# Patient Record
Sex: FEMALE | Race: WHITE | NOT HISPANIC OR LATINO | Employment: OTHER | URBAN - METROPOLITAN AREA
[De-identification: names, ages, dates, MRNs, and addresses within clinical notes are randomized per-mention and may not be internally consistent; named-entity substitution may affect disease eponyms.]

---

## 2017-01-06 NOTE — TELEPHONE ENCOUNTER
----- Message from Savanna Hamm sent at 1/5/2017  1:11 PM CST -----  Contact: Self- An- [phone]  Patient is calling because she is out of her atenolol (TENORMIN) 25 MG tablet and has been for 3 days. [facility] sent her a email stating the we need to send a new prescription to them for home delivery and to make it a 90 day supply. She needs some sent to a local pharmacy till she gets the prescription from them since she is out.    [facility] [place] [facility] [address] [facility]

## 2017-01-09 RX ORDER — ATENOLOL 25 MG/1
25 TABLET ORAL 2 TIMES DAILY
Qty: 180 TABLET | Refills: 3 | Status: SHIPPED | OUTPATIENT
Start: 2017-01-09 | End: 2017-04-24 | Stop reason: SDUPTHER

## 2017-04-24 ENCOUNTER — OFFICE VISIT (OUTPATIENT)
Dept: OPHTHALMOLOGY | Facility: CLINIC | Age: 82
End: 2017-04-24
Payer: MEDICARE

## 2017-04-24 ENCOUNTER — CLINICAL SUPPORT (OUTPATIENT)
Dept: OPHTHALMOLOGY | Facility: CLINIC | Age: 82
End: 2017-04-24
Payer: MEDICARE

## 2017-04-24 DIAGNOSIS — H40.043 STEROID RESPONDER, BILATERAL: ICD-10-CM

## 2017-04-24 DIAGNOSIS — Z96.1 PSEUDOPHAKIA OF BOTH EYES: ICD-10-CM

## 2017-04-24 DIAGNOSIS — H01.119 DERMATITIS OF EYELID, CONTACT OR ALLERGIC, UNSPECIFIED LATERALITY: ICD-10-CM

## 2017-04-24 DIAGNOSIS — H43.811 PVD (POSTERIOR VITREOUS DETACHMENT), RIGHT: ICD-10-CM

## 2017-04-24 DIAGNOSIS — H35.362 FAMILIAL DRUSEN, BILATERAL: ICD-10-CM

## 2017-04-24 DIAGNOSIS — H35.361 FAMILIAL DRUSEN, BILATERAL: ICD-10-CM

## 2017-04-24 DIAGNOSIS — G24.5 BLEPHAROSPASM: ICD-10-CM

## 2017-04-24 DIAGNOSIS — H40.013 OPEN ANGLE WITH BORDERLINE FINDINGS AND LOW GLAUCOMA RISK IN BOTH EYES: Primary | ICD-10-CM

## 2017-04-24 DIAGNOSIS — H57.9 ALLERGIC EYE REACTION: ICD-10-CM

## 2017-04-24 DIAGNOSIS — H04.123 DRY EYE SYNDROME, BILATERAL: ICD-10-CM

## 2017-04-24 DIAGNOSIS — H02.403 PTOSIS, BILATERAL: ICD-10-CM

## 2017-04-24 DIAGNOSIS — H40.50X0 SECONDARY GLAUCOMA DUE TO COMBINATION MECHANISMS, UNSPECIFIED LATERALITY, STAGE UNSPECIFIED: ICD-10-CM

## 2017-04-24 DIAGNOSIS — H40.013 OPEN ANGLE WITH BORDERLINE FINDINGS AND LOW GLAUCOMA RISK IN BOTH EYES: ICD-10-CM

## 2017-04-24 PROCEDURE — 99212 OFFICE O/P EST SF 10 MIN: CPT | Mod: PBBFAC | Performed by: OPHTHALMOLOGY

## 2017-04-24 PROCEDURE — 92134 CPTRZ OPH DX IMG PST SGM RTA: CPT | Mod: 26,S$PBB,, | Performed by: OPHTHALMOLOGY

## 2017-04-24 PROCEDURE — 99999 PR PBB SHADOW E&M-EST. PATIENT-LVL II: CPT | Mod: PBBFAC,,, | Performed by: OPHTHALMOLOGY

## 2017-04-24 PROCEDURE — 92012 INTRM OPH EXAM EST PATIENT: CPT | Mod: S$PBB,,, | Performed by: OPHTHALMOLOGY

## 2017-04-24 RX ORDER — ATENOLOL 25 MG/1
25 TABLET ORAL 2 TIMES DAILY
Qty: 6 TABLET | Refills: 0 | Status: SHIPPED | OUTPATIENT
Start: 2017-04-24 | End: 2017-08-01 | Stop reason: SDUPTHER

## 2017-04-24 NOTE — PROGRESS NOTES
HPI     Glaucoma    Additional comments: Pt here for glaucoma follow up            Comments   DLS: 07/22/2016    C/o: pt states her eyes are doing all right. No visual changes or problems   since last exam.     Meds:  ATears prn ou              Refresh PM qhs ou          Last edited by Pooja Billings MA on 4/24/2017  2:07 PM. (History)            Assessment /Plan     For exam results, see Encounter Report.    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 Glaucoma Suspect   First HVF   9/6/2011   First photos   2010   Treatment / Drops started   - none           Family history    Familial drusen OU        Glaucoma meds    none        H/O adverse rxn to glaucoma drops    none        LASERS    none        GLAUCOMA SURGERIES    none        OTHER EYE SURGERIES   PC IOL OD (12/07), OS (4/2010),                                     S/P neuromyectomy, levator repair, ptosis, repair RUL margin defect (4/10/11)        CDR    0.7/0.7        Tbase    12-18/12-18        Tmax    18/18        Ttarget    ??           HVF    6 test 2011 to  2016 - gen dep/NONSPECFICIC  od // IAD (POOR RELIABILITY) os        Gonio    +4 ou        CCT   508/508        OCT   4  test 2008 to 2015 - RNFL - bord S // Bord I os        HRT    3 test 2011 to 2015  - MR -  [place] off od // nl os /// CDR [place] off  od // 0.33 os        Disc photos    2010. 2016     - Ttoday    11/12  - Test done today   HRT/Gonio      2. -  Blepharospasm  - S/P neuromyectomy, levator repair, ptosis, repair RUL margin defect (4/10/11)- Dr Begum  -  Residual RUL notch  - Pt used to get Botox injections (last Botox injection OU was in 2011)  - since retiring she does not care to get more botox - she says her friends know she blinks a lot  - notch in th RUL post surgery with assoc lag    3. Dominant familial drusen OU   Monitor    4.   LUPE / lag - 2/2 above surgery - Lagopthalmous   AT's prn // ointment qhs    5. Ptosis ou    OS >> OD   2/2 #2    6. PCIOL - ou   OD (12/07), - [facility]   OS (4/2010) - Dr Cornejo    7. Refractive error    Very happy with her glasses from [facility]   Has computer glasses and regular bifocals    8. PVD od  - not having any symptoms    Stable x 3 weeks    Warned of signs of RD     Plan:  RTC in 9  months --HVF/DFE/OCT    I have seen and personally examined the patient.  I agree with the findings, assessment and plan of the resident and/or fellow.     Dian Cornejo MD

## 2017-04-24 NOTE — TELEPHONE ENCOUNTER
----- Message from Petty Dillon sent at 4/24/2017 12:36 PM CDT -----  Contact: Self/[phone]  RX request - refill or new RX.  Is this a refill or new RX:  Refill   RX name and strength: atenolol (TENORMIN) 25 MG tablet  Directions: Take 1 tablet (25 mg total) by mouth 2 (two) times daily  Is this a 30 day or 90 day RX:  6 day supply  Pharmacy name and phone #: [facility]                    [phone]   Comments:  Pt stated that her mail order has not arrived and she would like to get a 6 day supply until the mail order arrives. Please call and advise        Thank you

## 2017-05-02 ENCOUNTER — HOSPITAL ENCOUNTER (EMERGENCY)
Facility: OTHER | Age: 82
Discharge: HOME | End: 2017-05-02
Attending: EMERGENCY MEDICINE
Payer: MEDICARE

## 2017-05-02 VITALS
RESPIRATION RATE: 16 BRPM | SYSTOLIC BLOOD PRESSURE: 186 MMHG | HEART RATE: 60 BPM | TEMPERATURE: 99 F | WEIGHT: 106 LBS | OXYGEN SATURATION: 96 % | BODY MASS INDEX: 19.51 KG/M2 | DIASTOLIC BLOOD PRESSURE: 74 MMHG | HEIGHT: 62 IN

## 2017-05-02 DIAGNOSIS — S81.811A SKIN TEAR OF RIGHT LOWER LEG WITHOUT COMPLICATION, INITIAL ENCOUNTER: ICD-10-CM

## 2017-05-02 DIAGNOSIS — S60.212A CONTUSION OF LEFT WRIST, INITIAL ENCOUNTER: ICD-10-CM

## 2017-05-02 DIAGNOSIS — S81.812A LEG LACERATION, LEFT, INITIAL ENCOUNTER: Primary | ICD-10-CM

## 2017-05-02 DIAGNOSIS — S89.90XA LEG INJURY: ICD-10-CM

## 2017-05-02 PROCEDURE — 90715 TDAP VACCINE 7 YRS/> IM: CPT | Performed by: EMERGENCY MEDICINE

## 2017-05-02 PROCEDURE — 25000003 PHARM REV CODE 250: Performed by: EMERGENCY MEDICINE

## 2017-05-02 PROCEDURE — 90471 IMMUNIZATION ADMIN: CPT | Performed by: EMERGENCY MEDICINE

## 2017-05-02 PROCEDURE — 99284 EMERGENCY DEPT VISIT MOD MDM: CPT | Mod: 25

## 2017-05-02 PROCEDURE — 63600175 PHARM REV CODE 636 W HCPCS: Performed by: EMERGENCY MEDICINE

## 2017-05-02 PROCEDURE — 12032 INTMD RPR S/A/T/EXT 2.6-7.5: CPT

## 2017-05-02 RX ORDER — ASPIRIN 81 MG/1
81 TABLET ORAL DAILY
COMMUNITY
End: 2017-10-27

## 2017-05-02 RX ORDER — LIDOCAINE HYDROCHLORIDE AND EPINEPHRINE 10; 10 MG/ML; UG/ML
10 INJECTION, SOLUTION INFILTRATION; PERINEURAL
Status: DISCONTINUED | OUTPATIENT
Start: 2017-05-02 | End: 2017-05-02

## 2017-05-02 RX ORDER — CEPHALEXIN 500 MG/1
500 CAPSULE ORAL EVERY 8 HOURS
Qty: 15 CAPSULE | Refills: 0 | Status: SHIPPED | OUTPATIENT
Start: 2017-05-02 | End: 2017-05-07

## 2017-05-02 RX ORDER — LIDOCAINE HYDROCHLORIDE AND EPINEPHRINE 20; 10 MG/ML; UG/ML
1 INJECTION, SOLUTION INFILTRATION; PERINEURAL ONCE
Status: COMPLETED | OUTPATIENT
Start: 2017-05-02 | End: 2017-05-02

## 2017-05-02 RX ADMIN — LIDOCAINE HYDROCHLORIDE,EPINEPHRINE BITARTRATE 1 ML: 20; .01 INJECTION, SOLUTION INFILTRATION; PERINEURAL at 02:05

## 2017-05-02 RX ADMIN — CLOSTRIDIUM TETANI TOXOID ANTIGEN (FORMALDEHYDE INACTIVATED), CORYNEBACTERIUM DIPHTHERIAE TOXOID ANTIGEN (FORMALDEHYDE INACTIVATED), BORDETELLA PERTUSSIS TOXOID ANTIGEN (GLUTARALDEHYDE INACTIVATED), BORDETELLA PERTUSSIS FILAMENTOUS HEMAGGLUTININ ANTIGEN (FORMALDEHYDE INACTIVATED), BORDETELLA PERTUSSIS PERTACTIN ANTIGEN, AND BORDETELLA PERTUSSIS FIMBRIAE 2/3 ANTIGEN 0.5 ML: 5; 2; 2.5; 5; 3; 5 INJECTION, SUSPENSION INTRAMUSCULAR at 02:05

## 2017-05-02 NOTE — ED AVS SNAPSHOT
[facility]  [address]  [facility] [address]               An Hazel   2017  1:33 PM   ED    Description:  Female : 10/8/1927   Department:  [facility]           Your Care was Coordinated By:     Provider Role From To    Daniel Clarke DO Attending Provider 17 1400 --      Reason for Visit     Fall           Diagnoses this Visit        Comments    Leg laceration, left, initial encounter    -  Primary     Leg injury         Skin tear of right lower leg without complication, initial encounter         Contusion of left wrist, initial encounter           ED Disposition     None           To Do List           Follow-up Information     Follow up with Alcon Thomas Jr, MD In 1 week.    Specialty:  Internal Medicine    Contact information:    Gwen HAWKINS  [facility] [address]  [phone]         These Medications        Disp Refills Start End    cephALEXin (KEFLEX) 500 MG capsule 15 capsule 0 2017    Take 1 capsule (500 mg total) by mouth every 8 (eight) hours. - Oral    Pharmacy: [facility] - [address] #: [phone]         [facility]     [facility] Nurse Care Line -  Assistance  Unless otherwise directed by your provider, please contact [facility], our nurse care line that is available for  assistance.     Registered nurses in the [facility] provide: appointment scheduling, clinical advisement, health education, and other advisory services.  Call: [phone] (toll free)               Medications           Message regarding Medications     Verify the changes and/or additions to your medication regime listed below are the same as discussed with your clinician today.  If any of these changes or additions are incorrect, please notify your healthcare provider.        START taking these NEW medications         Refills    cephALEXin (KEFLEX) 500 MG capsule 0    Sig: Take 1 capsule (500 mg total) by mouth every 8 (eight) hours.    Class: Print    Route: Oral      These medications were administered today        Dose Freq    Tdap vaccine injection 0.5 mL 0.5 mL Once    Sig: Inject 0.5 mLs into the muscle once.    Class: Normal    Route: Intramuscular    lidocaine-EPINEPHrine 2%-1:100,000 injection 1 mL 1 mL Once    Si mL by Other route once.    Class: Normal    Route: Other      STOP taking these medications     miconazole (MICOTIN) 2 % cream Apply topically 2 (two) times daily.    FERROUS FUMARATE (IRON ORAL) Take by mouth.    FLUZONE HIGH-DOSE , PF, 180 mcg/0.5 mL Syrg            Verify that the below list of medications is an accurate representation of the medications you are currently taking.  If none reported, the list may be blank. If incorrect, please contact your healthcare provider. Carry this list with you in case of emergency.           Current Medications     aspirin (ECOTRIN) 81 MG EC tablet Take 81 mg by mouth once daily.    acetaminophen (TYLENOL) 325 MG tablet Take 2 tablets (650 mg total) by mouth every 6 (six) hours as needed (mild pain). Do NOT exceed 3000 mg in a 24 hour time period and do not take with other medications containing acetaminophen    amlodipine (NORVASC) 2.5 MG tablet Take 1 tablet (2.5 mg total) by mouth once daily.    atenolol (TENORMIN) 25 MG tablet Take 1 tablet (25 mg total) by mouth 2 (two) times daily.    cephALEXin (KEFLEX) 500 MG capsule Take 1 capsule (500 mg total) by mouth every 8 (eight) hours.    cholecalciferol, vitamin D3, (VITAMIN D3) 1,000 unit capsule Take 1,000 Units by mouth once daily.    FOLIC ACID/MULTIVIT-MIN/LUTEIN (CENTRUM SILVER ORAL) Take by mouth.    losartan (COZAAR) 25 MG tablet Take 1 tablet (25 mg total) by mouth once daily.    oxybutynin (DITROPAN-XL) 5 MG TR24 Take 1 tablet (5 mg total) by mouth once daily.    pravastatin (PRAVACHOL) 20 MG  "tablet Take 0.5 tablets (10 mg total) by mouth once daily.           Clinical Reference Information           Your Vitals Were     BP Pulse Temp Resp Height Weight    114/53 (BP Location: Left arm, Patient Position: Sitting, BP Method: Automatic) 86 99.1 °F (37.3 °C) (Oral) 16 5' 2" (1.575 m) 48.1 kg (106 lb)    SpO2 BMI             92% 19.39 kg/m2         Allergies as of 5/2/2017        Reactions    Augmentin [Amoxicillin-pot Clavulanate] Nausea And Vomiting    Sulfa (Sulfonamide Antibiotics)     Zestril  [Lisinopril]     Other reaction(s): Swelling      Immunizations Administered on Date of Encounter - 5/2/2017     Name Date Dose VIS Date Route    TDAP 5/2/2017 0.5 mL 2/24/2015 Intramuscular      ED Micro, Lab, POCT     None      ED Imaging Orders     Start Ordered       Status Ordering Provider    05/02/17 1416 05/02/17 1416  X-Ray Tibia Fibula Bilateral  1 time imaging      Final result         Discharge Instructions       Use your wheel chair for 2-3 days to allow wounds to heal. May wash with mild soap and water after 1-2 days. Do not submerge in water. Pat with wash cloth. Use tylenol as needed for pain. Change dressing once daily. Return for any further issues or concerns.    Discharge References/Attachments     LACERATION, EXTREMITY: SUTURE, STAPLE, OR TAPE (ENGLISH)      [facility] Sign-Up     Activating your [facility] account is as easy as 1-2-3!     1) Visit [email or web address], select Sign Up Now, enter this activation code and your date of birth, then select Next.  [email or web address]  Expires: 6/16/2017  3:54 PM      2) Create a username and password to use when you visit [facility] in the future and select a security question in case you lose your password and select Next.    3) Enter your e-mail address and click Sign Up!    Additional Information  If you have questions, please e-mail [email or web address] or call [phone] to talk to our [facility] staff. Remember, [facility] is NOT to be used for urgent " needs. For medical emergencies, dial 911.          [facility] complies with applicable Federal civil rights laws and does not discriminate on the basis of race, color, national origin, age, disability, or sex.        Language Assistance Services     ATTENTION: Language assistance services are available, free of charge. Please call [phone].      ATENCIÓN: Si habla español, tiene a gaffney disposición servicios gratuitos de asistencia lingüística. Llame al [phone].     KIRTI Ý: N?u b?n nói Ti?ng Vi?t, có các d?ch v? h? tr? ngôn ng? mi?n phí dành cho b?n. G?i s? [phone].

## 2017-05-02 NOTE — DISCHARGE INSTRUCTIONS
Use your wheel chair for 2-3 days to allow wounds to heal. May wash with mild soap and water after 1-2 days. Do not submerge in water. Pat with wash cloth. Use tylenol as needed for pain. Change dressing once daily. Return for any further issues or concerns.

## 2017-05-02 NOTE — ED NOTES
Patient Identifiers for Anluz maria Hazel checked and correct  LOC: The patient is awake, alert and aware of environment with an appropriate affect, the patient is oriented x 3 and speaking appropriate.  APPEARANCE: Pt wearing dark glasses. Patient resting comfortably and in no acute distress. The patient is well groomed with blood stains on pants legs.. The patient's clothing is properly fastened.  SKIN: The skin is warm and dry. The patient has delayed skin turgor and dry mucus membranes. No rashes or lesions upon observation. Pt has jagged deep lac on the left lower leg, 6 cm.  Pt also has deep jagged lac on the right lower leg, 6.5 cm in length. Abrasion of the right elbow reserved.  Musculoskeletal :  Normal range of motion noted. Moves all extremities well. Swelling & ecchymosis of the left wrist. Sensation of all extremities intact. Pain not reproduced upon palpation of left wrist, hips & lower extremities.  RESPIRATORY: Airway is open and patent, respirations are spontaneous, patient has a normal effort and rate. Breath sounds are clear & equal, bilaterally.  PULSES: 2+ radial & pedal pulses, symmetrical in all extremities.    Will continue to monitor

## 2017-05-02 NOTE — ED TRIAGE NOTES
Pt fell down 4 concrete steps. No LOC. Pt has lacerations on the bilateral lower extremities. Swelling with deformity of the left wrist.

## 2017-05-02 NOTE — ED PROVIDER NOTES
Encounter Date: 5/2/2017    SCRIBE #1 NOTE: I, John Macias, am scribing for, and in the presence of, Dr. Muniz.       History     Chief Complaint   Patient presents with    Fall     fell onto her shins, skin tear to right shin, laceration to left shin, abrasion to right albow and bruising to left wrist. Patient denies LOC      Review of patient's allergies indicates:   Allergen Reactions    Augmentin [amoxicillin-pot clavulanate] Nausea And Vomiting    Sulfa (sulfonamide antibiotics)     Zestril  [lisinopril]      Other reaction(s): Swelling     HPI Comments: Time seen by provider: 2:08 PM    This is a 89 y.o. female who presents to the ED after a mechanical trip and fall down six steps at home. She reports it occurred 2 hours ago. The patient denies any head trauma or LOC with the fall. She notes abrasions her right elbow, bruising to her left wrist, laceration to her left shin, and a skin tear to her right shin. She reports 5/10 pain currently. No analgesia use noted. No blood thinner use reported. She is unsure of her tetanus immunization status. Allergies to Augmentin and sulfa drugs noted.     The history is provided by the patient and a relative.     Past Medical History:   Diagnosis Date    Allergy     Anemia     Arthritis     Fever blister     Glaucoma suspect with open angle     Hypercalcemia     Hyperlipidemia     Hypertension     Joint pain     Osteoporosis     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 Cornejo    CATARACT EXTRACTION, BILATERAL      HYSTERECTOMY      OOPHORECTOMY      TONSILLECTOMY       Family History   Problem Relation Age of Onset    Stroke Father 49    Hypertension Father     Osteoporosis Mother     Cancer Maternal Grandmother      Amblyopia Neg Hx     Blindness Neg Hx     Cataracts Neg Hx     Macular degeneration Neg Hx     Retinal detachment Neg Hx     Strabismus Neg Hx     Thyroid disease Neg Hx     Melanoma Neg Hx      Social History   Substance Use Topics    Smoking status: Never Smoker    Smokeless tobacco: Never Used      Comment: The patient exercises every other day on an exercise bike.  On alternative days she does floor exercises with weights.    Alcohol use 1.2 - 2.4 oz/week     1 Glasses of wine, 1 - 3 Standard drinks or equivalent per week      Comment: Occasional use     Review of Systems   Constitutional: Negative for fever.   HENT: Negative for sore throat.    Respiratory: Negative for shortness of breath.    Cardiovascular: Negative for chest pain.   Gastrointestinal: Negative for nausea.   Genitourinary: Negative for dysuria.   Musculoskeletal: Negative for back pain.   Skin: Negative for rash.        Positive for an abrasion her right elbow, bruising to her left wrist, laceration to her left shin, and a skin tear to her right shin.    Neurological: Negative for syncope and weakness.   Hematological: Does not bruise/bleed easily.       Physical Exam   Initial Vitals   BP Pulse Resp Temp SpO2   05/02/17 1333 05/02/17 1333 05/02/17 1333 05/02/17 1333 05/02/17 1333   114/53 86 16 99.1 °F (37.3 °C) 92 %     Physical Exam    Nursing note and vitals reviewed.  Constitutional: She appears well-developed and well-nourished. She is not diaphoretic. No distress.   HENT:   Head: Normocephalic and atraumatic.   Right Ear: External ear normal.   Left Ear: External ear normal.   Mouth/Throat: Oropharynx is clear and moist.   Eyes: Conjunctivae and EOM are normal. Pupils are equal, round, and reactive to light. Right eye exhibits no discharge. Left eye exhibits no discharge.   Neck: Normal range of motion.   Cardiovascular: Normal rate, regular rhythm and normal heart sounds. Exam reveals no gallop and no friction rub.    No  murmur heard.  Pulmonary/Chest: Breath sounds normal. No respiratory distress. She has no wheezes. She has no rhonchi. She has no rales.   Abdominal: Soft. There is no tenderness. There is no rebound and no guarding.   Musculoskeletal: Normal range of motion. She exhibits no edema or tenderness.   Full ROM of all extremities. No pain with palpation of the extremities.    Neurological: She is alert and oriented to person, place, and time. She has normal strength. No cranial nerve deficit or sensory deficit.   Skin: Skin is warm and dry. No rash and no abscess noted. No erythema. No pallor.   7 cm x 3 cm skin tear to right shin. 7 cm laceration to the left shin. Ecchymosis over the left wrist.    Psychiatric: She has a normal mood and affect. Her behavior is normal. Judgment and thought content normal.         ED Course   Lac Repair  Date/Time: 5/2/2017 3:56 PM  Performed by: RAJI FUENTES.  Authorized by: RAJI FUENTES   Body area: lower extremity  Location details: left lower leg  Laceration length: 7 cm  Foreign bodies: no foreign bodies  Anesthesia: local infiltration    Anesthesia:  Anesthesia: local infiltration  Local Anesthetic: lidocaine 2% with epinephrine   Anesthetic total: 2 mL  Preparation: Patient was prepped and draped in the usual sterile fashion.  Irrigation solution: saline  Amount of cleaning: extensive  Skin closure: Steri-Strips and glue  Subcutaneous closure: 4-0 Vicryl  Number of sutures: 5  Patient tolerance: Patient tolerated the procedure well with no immediate complications  Comments: Swabbed with benzoin.        Labs Reviewed - No data to display  EKG Readings: (Independently Interpreted)     Imaging Results         X-Ray Tibia Fibula Bilateral (Final result) Result time:  05/02/17 14:51:14    Final result by Juanjose Wyatt MD (05/02/17 14:51:14)    Impression:      Soft tissue thickening and irregularity along the left leg anterolateral subcutaneous tissues inferiorly. no  fracture.      Electronically signed by: BRITTNEY EMILIA  Date:     05/02/17  Time:    14:51     Narrative:    HISTORY: Injury    TECHNIQUE: 2 view radiograph(s) of the right and left left tibia and fibula    COMPARISON: N/A      FINDINGS:     Fracture: None.     Soft Tissues: Soft tissue thickening and irregularity along the left leg anterolateral subcutaneous tissues inferiorly.     Other: N/A              X-Rays:   Independently Interpreted Readings:   Other Readings:  Left tib fib x-ray: No acute fracture.     Medical Decision Making:   ED Management:  89-year-old female with fall.  Based on my assessment she has no injuries to the upper extremities and only wounds to the  bilaterally.  The right leg is skin tear and there is no significant amount of tissue that can be used to suture.  We'll do Steri-Strips and Dermabond.  The left one has a deep gash.  We will connect the lower tissue with absorbable suture and then flap the skin above using Steri-Strips.  She has no head trauma.  There is no need for CT the brain.  No blood work is indicated.  She does have some ecchymosis of the left wrist however she has full range of motion no tenderness to palpation no deformity.  No x-rays needed.    Patient discharged home in stable condition. Diagnosis and treatment plan explained to patient. I have answered all questions and the patient is satisfied with the plan of care.            Scribe Attestation:   Scribe #1: I performed the above scribed service and the documentation accurately describes the services I performed. I attest to the accuracy of the note.    Attending Attestation:           Physician Attestation for Scribe:  Physician Attestation Statement for Scribe #1: I, Dr. Clarke, reviewed documentation, as scribed by John Macias in my presence, and it is both accurate and complete.                 ED Course     Clinical Impression:     1. Leg laceration, left, initial encounter    2. Leg injury    3. Skin tear  of right lower leg without complication, initial encounter    4. Contusion of left wrist, initial encounter                Daniel Clarke,   05/02/17 4212

## 2017-05-10 ENCOUNTER — OFFICE VISIT (OUTPATIENT)
Dept: INTERNAL MEDICINE | Facility: CLINIC | Age: 82
End: 2017-05-10
Payer: MEDICARE

## 2017-05-10 VITALS
HEIGHT: 62 IN | SYSTOLIC BLOOD PRESSURE: 174 MMHG | WEIGHT: 102.5 LBS | DIASTOLIC BLOOD PRESSURE: 82 MMHG | HEART RATE: 63 BPM | BODY MASS INDEX: 18.86 KG/M2

## 2017-05-10 DIAGNOSIS — S81.819D: Primary | ICD-10-CM

## 2017-05-10 DIAGNOSIS — I10 ESSENTIAL HYPERTENSION: ICD-10-CM

## 2017-05-10 PROCEDURE — 99999 PR PBB SHADOW E&M-EST. PATIENT-LVL III: CPT | Mod: PBBFAC,,, | Performed by: INTERNAL MEDICINE

## 2017-05-10 PROCEDURE — 99213 OFFICE O/P EST LOW 20 MIN: CPT | Mod: S$PBB,,, | Performed by: INTERNAL MEDICINE

## 2017-05-10 PROCEDURE — 99213 OFFICE O/P EST LOW 20 MIN: CPT | Mod: PBBFAC | Performed by: INTERNAL MEDICINE

## 2017-05-10 RX ORDER — MUPIROCIN 20 MG/G
OINTMENT TOPICAL 2 TIMES DAILY
Qty: 30 G | Refills: 0 | Status: SHIPPED | OUTPATIENT
Start: 2017-05-10 | End: 2017-10-27

## 2017-05-10 RX ORDER — LOSARTAN POTASSIUM 50 MG/1
50 TABLET ORAL DAILY
Qty: 90 TABLET | Refills: 3 | Status: SHIPPED | OUTPATIENT
Start: 2017-05-10 | End: 2017-06-07

## 2017-05-10 RX ORDER — MUPIROCIN 20 MG/G
OINTMENT TOPICAL 2 TIMES DAILY
Qty: 30 G | Refills: 2 | Status: SHIPPED | OUTPATIENT
Start: 2017-05-10 | End: 2017-05-10 | Stop reason: SDUPTHER

## 2017-05-10 RX ORDER — AMLODIPINE BESYLATE 2.5 MG/1
2.5 TABLET ORAL DAILY
Qty: 90 TABLET | Refills: 3 | Status: SHIPPED | OUTPATIENT
Start: 2017-05-10 | End: 2017-08-14

## 2017-05-10 RX ORDER — MUPIROCIN 20 MG/G
OINTMENT TOPICAL 3 TIMES DAILY
Qty: 30 G | Refills: 2 | Status: SHIPPED | OUTPATIENT
Start: 2017-05-10 | End: 2017-05-10 | Stop reason: SDUPTHER

## 2017-05-10 NOTE — PROGRESS NOTES
Subjective:       Patient ID: An Hazel is a 89 y.o. female.    Chief Complaint: Follow-up    HPI the patient is an 89-year-old female comes in for follow-up of lacerations on her legs.  The patient had a fall 8 days ago when she reached for the door and lost her balance.  She then fell down about 6 cemented steps.  She was seen in the emergency room.  No fractures were identified.  Her wounds were cleaned and dressed.  She has not had any fever.  She notes that improvement in the pain which she had been having from the muscle soreness.  Her daughter is helping her in dressing and cleaning the wounds daily.  Review of Systems   Constitutional: Negative.  Negative for activity change, appetite change and fatigue.   Respiratory: Negative for cough, chest tightness and shortness of breath.    Cardiovascular: Negative for chest pain and palpitations.       Objective:      Physical Exam   Constitutional: She appears well-developed and well-nourished. No distress.   Cardiovascular: Normal rate, regular rhythm and normal heart sounds.  Exam reveals no gallop and no friction rub.    No murmur heard.  Pulmonary/Chest: Effort normal and breath sounds normal. No respiratory distress. She has no wheezes. She has no rales.   Skin: She is not diaphoretic.     irregular lacerations a noticed on the lower extremities bilaterally.  The laceration on the left is worse in the right.  There is some inflammatory changes at the margins of the laceration.  No exudate or signs of cellulitis of present.  Minimal tenderness is present.  The right lower extremity laceration appears to be healing well with only minimal inflammation.  No exudate present.  Assessment:       1. Lacerations of multiple sites of leg, unspecified laterality, subsequent encounter    2. Essential hypertension        Plan:       1.  Intensify antihypertensive therapy by increasing losartan to 50 mg per day  2.  Bactroban apply twice a day to the  lacerations  3.  Return to clinic in one month  4.  Elevate lower extremities and continue daily wound care and dressing changes

## 2017-05-10 NOTE — TELEPHONE ENCOUNTER
----- Message from Bri Vazquez sent at 5/10/2017 11:56 AM CDT -----  Contact: patient & son on phone  Patient was in today to seen today where she [facility].  However the ointment she can't go through them because Dr Thomas wanted her to start today.  Please if the nurse would call this one into her pharmacy which is [facility] [phone]  Medication is Mupirocin 2%   Please call patient once it's ordered so she can get a ride to the drugstore for .  Patient's # is [phone]

## 2017-05-10 NOTE — MR AVS SNAPSHOT
[facility]  [address]  [facility] [address]  Phone: [phone]  Fax: [phone]                  An Hazel   5/10/2017 11:00 AM   Office Visit    Description:  Female : 10/8/1927   Provider:  Alcon Thomas Jr., MD   Department:  [facility] - Internal Medicine           Reason for Visit     Follow-up                To Do List           Future Appointments        Provider Department Dept Phone    2017 11:00 AM Alcon Thomas Jr., MD [facility] Internal Medicine [phone]      Goals (5 Years of Data)     None      Follow-Up and Disposition     Return in about 4 weeks (around 2017).       These Medications        Disp Refills Start End    amlodipine (NORVASC) 2.5 MG tablet 90 tablet 3 5/10/2017 5/10/2018    Take 1 tablet (2.5 mg total) by mouth once daily. - Oral    Pharmacy: [facility] - [address] Ph #: [phone]       losartan (COZAAR) 50 MG tablet 90 tablet 3 5/10/2017 5/10/2018    Take 1 tablet (50 mg total) by mouth once daily. - Oral    Pharmacy: [facility] - [address] Ph #: [phone]       mupirocin (BACTROBAN) 2 % ointment 30 g 2 5/10/2017     Apply topically 2 (two) times daily. - Topical (Top)    Pharmacy: [facility] - [address] Ph #: [phone]         [facility]     [facility] Care [facility] -  Assistance  Unless otherwise directed by your provider, please contact [facility], our nurse care line that is available for / assistance.     Registered nurses in the [facility] provide: appointment scheduling, clinical advisement, health education, and other advisory services.  Call: [phone] (toll free)               Medications           Message regarding Medications     Verify the changes and/or additions to your medication regime listed  "below are the same as discussed with your clinician today.  If any of these changes or additions are incorrect, please notify your healthcare provider.        START taking these NEW medications        Refills    losartan (COZAAR) 50 MG tablet 3    Sig: Take 1 tablet (50 mg total) by mouth once daily.    Class: Normal    Route: Oral    mupirocin (BACTROBAN) 2 % ointment 2    Sig: Apply topically 2 (two) times daily.    Class: Normal    Route: Topical (Top)           Verify that the below list of medications is an accurate representation of the medications you are currently taking.  If none reported, the list may be blank. If incorrect, please contact your healthcare provider. Carry this list with you in case of emergency.           Current Medications     acetaminophen (TYLENOL) 325 MG tablet Take 2 tablets (650 mg total) by mouth every 6 (six) hours as needed (mild pain). Do NOT exceed 3000 mg in a 24 hour time period and do not take with other medications containing acetaminophen    amlodipine (NORVASC) 2.5 MG tablet Take 1 tablet (2.5 mg total) by mouth once daily.    aspirin (ECOTRIN) 81 MG EC tablet Take 81 mg by mouth once daily.    atenolol (TENORMIN) 25 MG tablet Take 1 tablet (25 mg total) by mouth 2 (two) times daily.    cholecalciferol, vitamin D3, (VITAMIN D3) 1,000 unit capsule Take 1,000 Units by mouth once daily.    FOLIC ACID/MULTIVIT-MIN/LUTEIN (CENTRUM SILVER ORAL) Take by mouth.    oxybutynin (DITROPAN-XL) 5 MG TR24 Take 1 tablet (5 mg total) by mouth once daily.    pravastatin (PRAVACHOL) 20 MG tablet Take 0.5 tablets (10 mg total) by mouth once daily.    losartan (COZAAR) 50 MG tablet Take 1 tablet (50 mg total) by mouth once daily.    mupirocin (BACTROBAN) 2 % ointment Apply topically 2 (two) times daily.           Clinical Reference Information           Your Vitals Were     BP Pulse Height Weight BMI    182/70 (BP Location: Left arm, Patient Position: Sitting, BP Method: Automatic) 63 5' 2" " (1.575 m) 46.5 kg (102 lb 8.2 oz) 18.75 kg/m2      Blood Pressure          Most Recent Value    BP  (!)  182/70      Allergies as of 5/10/2017     Augmentin [Amoxicillin-pot Clavulanate]    Sulfa (Sulfonamide Antibiotics)    Zestril  [Lisinopril]      Immunizations Administered on Date of Encounter - 5/10/2017     None      [facility] Sign-Up     Activating your [facility] account is as easy as 1-2-3!     1) Visit [email or web address], select Sign Up Now, enter this activation code and your date of birth, then select Next.  [email or web address]  Expires: 6/16/2017  3:54 PM      2) Create a username and password to use when you visit [facility] in the future and select a security question in case you lose your password and select Next.    3) Enter your e-mail address and click Sign Up!    Additional Information  If you have questions, please e-mail [email or web address] or call [phone] to talk to our [facility] staff. Remember, [facility] is NOT to be used for urgent needs. For medical emergencies, dial 911.         Language Assistance Services     ATTENTION: Language assistance services are available, free of charge. Please call [phone].      ATENCIÓN: Si habla español, tiene a gaffney disposición servicios gratuitos de asistencia lingüística. Llame al [phone].     CHÚ Ý: N?u b?n nói Ti?ng Vi?t, có các d?ch v? h? tr? ngôn ng? mi?n phí dành cho b?n. G?i s? [phone].         [facility] complies with applicable Federal civil rights laws and does not discriminate on the basis of race, color, national origin, age, disability, or sex.

## 2017-06-07 ENCOUNTER — OFFICE VISIT (OUTPATIENT)
Dept: INTERNAL MEDICINE | Facility: CLINIC | Age: 82
End: 2017-06-07
Payer: MEDICARE

## 2017-06-07 VITALS
HEART RATE: 68 BPM | WEIGHT: 105.19 LBS | HEIGHT: 62 IN | BODY MASS INDEX: 19.36 KG/M2 | OXYGEN SATURATION: 97 % | DIASTOLIC BLOOD PRESSURE: 60 MMHG | SYSTOLIC BLOOD PRESSURE: 180 MMHG

## 2017-06-07 DIAGNOSIS — I10 ESSENTIAL HYPERTENSION: Primary | ICD-10-CM

## 2017-06-07 PROCEDURE — 99999 PR PBB SHADOW E&M-EST. PATIENT-LVL III: CPT | Mod: PBBFAC,,, | Performed by: INTERNAL MEDICINE

## 2017-06-07 PROCEDURE — 1159F MED LIST DOCD IN RCRD: CPT | Mod: ,,, | Performed by: INTERNAL MEDICINE

## 2017-06-07 PROCEDURE — 1126F AMNT PAIN NOTED NONE PRSNT: CPT | Mod: ,,, | Performed by: INTERNAL MEDICINE

## 2017-06-07 PROCEDURE — 99213 OFFICE O/P EST LOW 20 MIN: CPT | Mod: S$PBB,,, | Performed by: INTERNAL MEDICINE

## 2017-06-07 PROCEDURE — 99213 OFFICE O/P EST LOW 20 MIN: CPT | Mod: PBBFAC | Performed by: INTERNAL MEDICINE

## 2017-06-07 RX ORDER — LOSARTAN POTASSIUM 100 MG/1
100 TABLET ORAL DAILY
Qty: 90 TABLET | Refills: 3 | Status: ON HOLD | OUTPATIENT
Start: 2017-06-07 | End: 2017-10-30

## 2017-06-07 NOTE — PROGRESS NOTES
Subjective:       Patient ID: An Hazel is a 89 y.o. female.    Chief Complaint: Follow-up    HPI the patient is an 89-year-old female comes in for follow-up of her hypertension.  She is increase losartan to 50 mg by mouth daily and has not noticed any side effects.  She is not aware of home blood pressure readings.  She is not having any chest pain.  She has had no additional falls.  The previous wounds on her legs are healing well.  Review of Systems   Constitutional: Negative.  Negative for activity change, appetite change and fatigue.   Respiratory: Negative for cough, chest tightness and shortness of breath.    Cardiovascular: Negative for chest pain and palpitations.   Musculoskeletal: Positive for back pain.   Skin: Positive for wound.       Objective:      Physical Exam   Constitutional: She appears well-developed and well-nourished. No distress.   Cardiovascular: Normal rate, regular rhythm and normal heart sounds.  Exam reveals no gallop and no friction rub.    No murmur heard.  Pulmonary/Chest: Effort normal. No respiratory distress. She has no wheezes. She has rales.   Bibasilar rales present.   Musculoskeletal:   Kyphoscoliosis noted.   Skin: She is not diaphoretic.   Lacerations in both lower extremities a healing well.  No signs of infection present       Assessment:       1. Essential hypertension        Plan:       1.  Increase losartan to 100 mg by mouth daily  2.  Return to clinic in 2 months

## 2017-06-13 ENCOUNTER — OFFICE VISIT (OUTPATIENT)
Dept: PODIATRY | Facility: CLINIC | Age: 82
End: 2017-06-13
Payer: MEDICARE

## 2017-06-13 VITALS
SYSTOLIC BLOOD PRESSURE: 166 MMHG | DIASTOLIC BLOOD PRESSURE: 71 MMHG | HEART RATE: 67 BPM | WEIGHT: 104 LBS | HEIGHT: 62 IN | BODY MASS INDEX: 19.14 KG/M2

## 2017-06-13 DIAGNOSIS — L60.2 ONYCHOGRYPHOSIS: Primary | ICD-10-CM

## 2017-06-13 DIAGNOSIS — M79.674 PAIN IN TOES OF BOTH FEET: ICD-10-CM

## 2017-06-13 DIAGNOSIS — M79.675 PAIN IN TOES OF BOTH FEET: ICD-10-CM

## 2017-06-13 DIAGNOSIS — L90.9 FAT PAD ATROPHY OF FOOT: ICD-10-CM

## 2017-06-13 PROCEDURE — 99999 PR PBB SHADOW E&M-EST. PATIENT-LVL III: CPT | Mod: PBBFAC,,, | Performed by: PODIATRIST

## 2017-06-13 PROCEDURE — 99213 OFFICE O/P EST LOW 20 MIN: CPT | Mod: S$PBB,,, | Performed by: PODIATRIST

## 2017-06-13 PROCEDURE — 1159F MED LIST DOCD IN RCRD: CPT | Mod: ,,, | Performed by: PODIATRIST

## 2017-06-13 PROCEDURE — 99213 OFFICE O/P EST LOW 20 MIN: CPT | Mod: PBBFAC | Performed by: PODIATRIST

## 2017-06-13 PROCEDURE — 1126F AMNT PAIN NOTED NONE PRSNT: CPT | Mod: ,,, | Performed by: PODIATRIST

## 2017-06-13 NOTE — PROGRESS NOTES
Subjective:      Patient ID: An  Aleksander Luz is a 89 y.o. female.    Chief Complaint: Callouses (lt foot) and Nail Care    An is a 89 y.o. female who presents to the clinic complaining of painful ingrown, elongated toenails on both feet and L foot callus        Patient Active Problem List   Diagnosis    Hyperlipidemia    Osteoporosis, unspecified    Kyphoscoliosis    Pseudoaphakia - Both Eyes    Blepharospasm - Both Eyes    Ptosis - Both Eyes    Familial drusen - Both Eyes    Lagophthalmos, unspecified - Both Eyes    Dry eye syndrome - Both Eyes    Hip fracture, intertrochanteric    Anemia    Debility    Urinary dysfunction    Open angle with borderline findings and low glaucoma risk in both eyes    Fall    Closed fracture of right inferior pubic ramus    Closed fracture of right superior pubic ramus    Pelvic hematoma in female    Idiopathic scoliosis of lumbar spine    Idiopathic scoliosis of thoracic spine    Urinary incontinence    Candidiasis of skin    Essential hypertension       Current Outpatient Prescriptions on File Prior to Visit   Medication Sig Dispense Refill    acetaminophen (TYLENOL) 325 MG tablet Take 2 tablets (650 mg total) by mouth every 6 (six) hours as needed (mild pain). Do NOT exceed 3000 mg in a 24 hour time period and do not take with other medications containing acetaminophen      amlodipine (NORVASC) 2.5 MG tablet Take 1 tablet (2.5 mg total) by mouth once daily. 90 tablet 3    aspirin (ECOTRIN) 81 MG EC tablet Take 81 mg by mouth once daily.      atenolol (TENORMIN) 25 MG tablet Take 1 tablet (25 mg total) by mouth 2 (two) times daily. 6 tablet 0    cholecalciferol, vitamin D3, (VITAMIN D3) 1,000 unit capsule Take 1,000 Units by mouth once daily.      FOLIC ACID/MULTIVIT-MIN/LUTEIN (CENTRUM SILVER ORAL) Take by mouth.      losartan (COZAAR) 100 MG tablet Take 1 tablet (100 mg total) by mouth once daily. 90 tablet 3    mupirocin (BACTROBAN) 2  % ointment Apply topically 2 (two) times daily. 30 g 0    oxybutynin (DITROPAN-XL) 5 MG TR24 Take 1 tablet (5 mg total) by mouth once daily. 90 tablet 4    pravastatin (PRAVACHOL) 20 MG tablet Take 0.5 tablets (10 mg total) by mouth once daily. 90 tablet 3     No current facility-administered medications on file prior to visit.        Review of patient's allergies indicates:   Allergen Reactions    Augmentin [amoxicillin-pot clavulanate] Nausea And Vomiting    Sulfa (sulfonamide antibiotics)     Zestril  [lisinopril]      Other reaction(s): Swelling       Past Surgical History:   Procedure Laterality Date    APPENDECTOMY      blepharospasm surgery      CATARACT EXTRACTION W/  INTRAOCULAR LENS IMPLANT  12/07 and 4/10    OD then OS w/ Dr. Cornejo    CATARACT EXTRACTION, BILATERAL      HYSTERECTOMY      OOPHORECTOMY      TONSILLECTOMY         Family History   Problem Relation Age of Onset    Stroke Father 49    Hypertension Father     Osteoporosis Mother     Cancer Maternal Grandmother     Amblyopia Neg Hx     Blindness Neg Hx     Cataracts Neg Hx     Macular degeneration Neg Hx     Retinal detachment Neg Hx     Strabismus Neg Hx     Thyroid disease Neg Hx     Melanoma Neg Hx        Social History     Social History    Marital status:      Spouse name: N/A    Number of children: 4    Years of education: N/A     Occupational History     of [employer]      Retired after 37 years     Social History Main Topics    Smoking status: Never Smoker    Smokeless tobacco: Never Used      Comment: The patient exercises every other day on an exercise bike.  On alternative days she does floor exercises with weights.    Alcohol use 1.2 - 2.4 oz/week     1 Glasses of wine, 1 - 3 Standard drinks or equivalent per week      Comment: Occasional use    Drug use: No    Sexual activity: No     Other Topics Concern    Not on file     Social History Narrative    No  "narrative on file                Review of Systems   Constitution: Negative for chills, decreased appetite and fever.   Cardiovascular: Negative for leg swelling.   Skin: Positive for dry skin and nail changes (thickened).   Musculoskeletal: Positive for joint pain. Negative for arthritis, joint swelling and myalgias.   Gastrointestinal: Negative for nausea and vomiting.   Neurological: Negative for loss of balance, numbness and paresthesias.           Objective:       Vitals:    06/13/17 0950   BP: (!) 166/71   Pulse: 67   Weight: 47.2 kg (104 lb)   Height: 5' 2" (1.575 m)   PainSc: 0-No pain        Physical Exam   Constitutional: She is oriented to person, place, and time. She appears well-developed and well-nourished.   Cardiovascular:   Pulses:       Dorsalis pedis pulses are 2+ on the right side, and 2+ on the left side.        Posterior tibial pulses are 2+ on the right side, and 2+ on the left side.   Musculoskeletal: She exhibits no edema or tenderness.        Right foot: There is no deformity.        Left foot: There is no deformity.   Adequate joint range of motion without pain, limitation, nor crepitation Bilateral feet and ankle joints. Muscle strength is 5/5 in all groups bilaterally.      Atrophy of fat  Pad from bilateral foot with easily palpable bone     Lymphadenopathy:   No palpable lymph nodes   Neurological: She is alert and oriented to person, place, and time. She has normal strength.   Skin: Skin is warm, dry and intact. No burn, no lesion, no petechiae and no rash noted. She is not diaphoretic. No erythema. Nails show no clubbing.   Nails x 10  are elongated by  2-5mm's, thickened by 1-3 mm's, dystrophic, and are yellowish in  coloration . Xerosis Bilaterally. No open lesions noted.  Hyperkeratotic tissue noted to sub 3rd MPJ L      Psychiatric: She has a normal mood and affect. Her behavior is normal.   Vitals reviewed.            Assessment:       Encounter Diagnoses   Name Primary?    " Onychogryphosis Yes    Fat pad atrophy of foot     Pain in toes of both feet          Plan:       An was seen today for callouses and nail care.    Diagnoses and all orders for this visit:    Onychogryphosis    Fat pad atrophy of foot    Pain in toes of both feet      I counseled the patient on her conditions, their implications and medical management.      Shoe inspection.Foot Education.  Patient instructed on proper foot hygeine. We discussed wearing proper shoe gear, daily foot inspections, never walking without protective shoe gear, never putting sharp instruments to feet    With patient's permission, nails were aggressively reduced and debrided x 10 to their soft tissue attachment mechanically and with electric , removing all offending nail and debris. Patient relates relief following the procedure. She will continue to monitor the areas daily, inspect her feet, wear protective shoe gear when ambulatory, moisturizer to maintain skin integrity.

## 2017-07-13 ENCOUNTER — OFFICE VISIT (OUTPATIENT)
Dept: OTOLARYNGOLOGY | Facility: CLINIC | Age: 82
End: 2017-07-13
Payer: MEDICARE

## 2017-07-13 VITALS — TEMPERATURE: 98 F | HEART RATE: 67 BPM | DIASTOLIC BLOOD PRESSURE: 57 MMHG | SYSTOLIC BLOOD PRESSURE: 148 MMHG

## 2017-07-13 DIAGNOSIS — H61.23 IMPACTED CERUMEN, BILATERAL: Primary | ICD-10-CM

## 2017-07-13 DIAGNOSIS — Z97.4 WEARS HEARING AID: ICD-10-CM

## 2017-07-13 PROCEDURE — 69210 REMOVE IMPACTED EAR WAX UNI: CPT | Mod: 50,PBBFAC | Performed by: OTOLARYNGOLOGY

## 2017-07-13 PROCEDURE — 69210 REMOVE IMPACTED EAR WAX UNI: CPT | Mod: S$PBB,,, | Performed by: OTOLARYNGOLOGY

## 2017-07-13 PROCEDURE — 99213 OFFICE O/P EST LOW 20 MIN: CPT | Mod: PBBFAC | Performed by: OTOLARYNGOLOGY

## 2017-07-13 PROCEDURE — 99999 PR PBB SHADOW E&M-EST. PATIENT-LVL III: CPT | Mod: PBBFAC,,, | Performed by: OTOLARYNGOLOGY

## 2017-07-13 PROCEDURE — 99499 UNLISTED E&M SERVICE: CPT | Mod: S$PBB,,, | Performed by: OTOLARYNGOLOGY

## 2017-07-13 NOTE — PROGRESS NOTES
CC: Ear cleaning  HPI:Ms. Luz was a [employer] with my mother at [employer].  She offers me her condolences about my mother's passing.  She is here for an ear cleaning procedure.  She left her hearing aids at home today .  Her ears were last cleaned by me in late October 2016.  Cerumen impactions recurrently complicate her use of the hearing aids requiring removal periodically.    ALLERGIES: Augmentin, sulfa, Zestril  Her medical problem list includes hyperlipidemia, osteoporosis, kyphoscoliosis, blepharospasm of both eyes, ptosis of both eyes, anemia, debility, dry eye syndrome, idiopathic scoliosis and lumbar and thoracic spine, candidiasis scan, essential hypertension, close fracture right inferior pubic ramus and right superior pubic ramus among others    PE: Blood pressure 148/57 pulse 67 temperature 98.2  Gen.: Alert and oriented lady in no acute distress  Both ears were examined under the microscope in the micro-procedure room.  Cerumen is carefully extracted from the right ear canal manually.  The right eardrum is intact and clear as visualized directly.  Some wax is extracted from the posterior aspect of left ear canal in a similar fashion.  Left eardrum is intact and clear as visualized directly.  There is no evidence of otitis media, otitis externa or TONYA of either ear.  Audiometry was not performed today.    DIAGNOSIS:     ICD-10-CM ICD-9-CM    1. Impacted cerumen, bilateral H61.23 380.4    2. Wears hearing aid Z97.4 V45.89      PLAN: Cerumen removed from both eacs  RTC at least yearly for ear cleaning

## 2017-08-01 ENCOUNTER — TELEPHONE (OUTPATIENT)
Dept: INTERNAL MEDICINE | Facility: CLINIC | Age: 82
End: 2017-08-01

## 2017-08-01 RX ORDER — ATENOLOL 25 MG/1
25 TABLET ORAL 2 TIMES DAILY
Qty: 90 TABLET | Refills: 3 | Status: SHIPPED | OUTPATIENT
Start: 2017-08-01 | End: 2018-02-19 | Stop reason: SDUPTHER

## 2017-08-01 NOTE — TELEPHONE ENCOUNTER
Spoke to patient and informed---patient states that her mail order suppose to come in today----her mail does not come until 6 pm---patient is going to see if it comes in this evening and call in the morning if not

## 2017-08-01 NOTE — TELEPHONE ENCOUNTER
----- Message from Yaya Read MA sent at 8/1/2017 10:51 AM CDT -----  Contact: self - [phone]  Patient is requesting to have 10 tabs of atenolol (TENORMIN) 25 MG tablet sent to her local pharmacy. Has not received her mail order yet. Please call. Thanks!     Pharmacy: [facility] - [place], [place] - [address]

## 2017-08-09 ENCOUNTER — TELEPHONE (OUTPATIENT)
Dept: INTERNAL MEDICINE | Facility: CLINIC | Age: 82
End: 2017-08-09

## 2017-08-09 NOTE — TELEPHONE ENCOUNTER
Spoke with pharmacist who stated that the Atenolo is on back order until September 2017, I called [facility] in the area of [facility] to find out that they have the medication in stock. Upon calling the pt I learned that [facility] had a delay in the Atenolo and she was out 3 days (that is why she called in for a refill to [facility]) but has received it and it's enough to last until the next mail order.

## 2017-08-09 NOTE — TELEPHONE ENCOUNTER
----- Message from Colette Leone sent at 8/9/2017  9:53 AM CDT -----  Contact: Poppy/ Raquel/ [phone]   Poppy is calling let the doctor know the medication atenolol (TENORMIN) 25 MG tablet is on back order. She want to know if the doctor can send something else to the pharmacy. Please call and advise     Thank you

## 2017-08-14 ENCOUNTER — LAB VISIT (OUTPATIENT)
Dept: LAB | Facility: HOSPITAL | Age: 82
End: 2017-08-14
Attending: INTERNAL MEDICINE
Payer: MEDICARE

## 2017-08-14 ENCOUNTER — OFFICE VISIT (OUTPATIENT)
Dept: INTERNAL MEDICINE | Facility: CLINIC | Age: 82
End: 2017-08-14
Payer: MEDICARE

## 2017-08-14 DIAGNOSIS — E78.5 HYPERLIPIDEMIA, UNSPECIFIED: ICD-10-CM

## 2017-08-14 DIAGNOSIS — Z23 IMMUNIZATION DUE: Primary | ICD-10-CM

## 2017-08-14 DIAGNOSIS — I10 ESSENTIAL HYPERTENSION: ICD-10-CM

## 2017-08-14 DIAGNOSIS — I10 ESSENTIAL HYPERTENSION: Primary | ICD-10-CM

## 2017-08-14 LAB
ALBUMIN SERPL BCP-MCNC: 3.8 G/DL
ALP SERPL-CCNC: 65 U/L
ALT SERPL W/O P-5'-P-CCNC: 13 U/L
ANION GAP SERPL CALC-SCNC: 7 MMOL/L
AST SERPL-CCNC: 26 U/L
BASOPHILS # BLD AUTO: 0.1 K/UL
BASOPHILS NFR BLD: 1.4 %
BILIRUB SERPL-MCNC: 0.2 MG/DL
BUN SERPL-MCNC: 23 MG/DL
CALCIUM SERPL-MCNC: 10.2 MG/DL
CHLORIDE SERPL-SCNC: 104 MMOL/L
CHOLEST/HDLC SERPL: 4.4 {RATIO}
CO2 SERPL-SCNC: 28 MMOL/L
CREAT SERPL-MCNC: 1 MG/DL
DIFFERENTIAL METHOD: ABNORMAL
EOSINOPHIL # BLD AUTO: 0.3 K/UL
EOSINOPHIL NFR BLD: 4.4 %
ERYTHROCYTE [DISTWIDTH] IN BLOOD BY AUTOMATED COUNT: 14.6 %
EST. GFR  (AFRICAN AMERICAN): 57.7 ML/MIN/1.73 M^2
EST. GFR  (NON AFRICAN AMERICAN): 50.1 ML/MIN/1.73 M^2
GLUCOSE SERPL-MCNC: 103 MG/DL
HCT VFR BLD AUTO: 30 %
HDL/CHOLESTEROL RATIO: 22.9 %
HDLC SERPL-MCNC: 188 MG/DL
HDLC SERPL-MCNC: 43 MG/DL
HGB BLD-MCNC: 9.5 G/DL
LDLC SERPL CALC-MCNC: 84.6 MG/DL
LYMPHOCYTES # BLD AUTO: 2.2 K/UL
LYMPHOCYTES NFR BLD: 31.3 %
MCH RBC QN AUTO: 29.3 PG
MCHC RBC AUTO-ENTMCNC: 31.7 G/DL
MCV RBC AUTO: 93 FL
MONOCYTES # BLD AUTO: 0.9 K/UL
MONOCYTES NFR BLD: 13.1 %
NEUTROPHILS # BLD AUTO: 3.5 K/UL
NEUTROPHILS NFR BLD: 49.7 %
NONHDLC SERPL-MCNC: 145 MG/DL
PLATELET # BLD AUTO: 285 K/UL
PMV BLD AUTO: 11.5 FL
POTASSIUM SERPL-SCNC: 4.2 MMOL/L
PROT SERPL-MCNC: 8.3 G/DL
RBC # BLD AUTO: 3.24 M/UL
SODIUM SERPL-SCNC: 139 MMOL/L
TRIGL SERPL-MCNC: 302 MG/DL
WBC # BLD AUTO: 7.04 K/UL

## 2017-08-14 PROCEDURE — 99999 PR PBB SHADOW E&M-EST. PATIENT-LVL II: CPT | Mod: PBBFAC,,, | Performed by: INTERNAL MEDICINE

## 2017-08-14 PROCEDURE — 80061 LIPID PANEL: CPT

## 2017-08-14 PROCEDURE — 1159F MED LIST DOCD IN RCRD: CPT | Mod: ,,, | Performed by: INTERNAL MEDICINE

## 2017-08-14 PROCEDURE — 99213 OFFICE O/P EST LOW 20 MIN: CPT | Mod: S$PBB,,, | Performed by: INTERNAL MEDICINE

## 2017-08-14 PROCEDURE — 3008F BODY MASS INDEX DOCD: CPT | Mod: ,,, | Performed by: INTERNAL MEDICINE

## 2017-08-14 PROCEDURE — 85025 COMPLETE CBC W/AUTO DIFF WBC: CPT

## 2017-08-14 PROCEDURE — 36415 COLL VENOUS BLD VENIPUNCTURE: CPT

## 2017-08-14 PROCEDURE — 80053 COMPREHEN METABOLIC PANEL: CPT

## 2017-08-14 RX ORDER — AMLODIPINE BESYLATE 5 MG/1
5 TABLET ORAL DAILY
Qty: 90 TABLET | Refills: 3 | Status: ON HOLD | OUTPATIENT
Start: 2017-08-14 | End: 2017-11-14 | Stop reason: HOSPADM

## 2017-08-14 NOTE — PROGRESS NOTES
Per Health Maintenance/ pre-visit chart review, pt due for lipid panel & pneumovax. Orders placed per written order guideline.

## 2017-08-15 NOTE — PROGRESS NOTES
Subjective:       Patient ID: An Hazel is a 89 y.o. female.    Chief Complaint: No chief complaint on file.    HPI the patient is an 89-year-old female comes in for follow-up of her hypertension.  She is also notices slight crampy upper epigastric pain which is now resolved.  At the time that these symptoms were occurring she had noticed some reddish stool which she attributed to the ingestion of watermelon.  She is not noticing any lightheadedness or headaches.  Review of Systems   Constitutional: Negative.  Negative for activity change, appetite change and fatigue.   Respiratory: Negative for cough, chest tightness and shortness of breath.    Cardiovascular: Negative for chest pain and palpitations.   Gastrointestinal: Positive for diarrhea.       Objective:      Physical Exam   Constitutional: She appears well-developed and well-nourished. No distress.   Cardiovascular: Normal rate, regular rhythm and normal heart sounds.  Exam reveals no gallop and no friction rub.    No murmur heard.  Pulmonary/Chest: Effort normal and breath sounds normal. No respiratory distress. She has no wheezes. She has no rales.   Marked kyphosis noted   Abdominal: Soft. Bowel sounds are normal. She exhibits no distension and no mass. There is no tenderness. There is no guarding.   Skin: She is not diaphoretic.       Assessment:       1. Essential hypertension      2.  Epigastric pain possibly related to gastritis.  Will check CBC and laboratory studies to be sure no other bleeding had occurred.    Plan:       1.  CBC, CMP  2.  Increase amlodipine to 5 mg by mouth daily  3.  Return to clinic in 3 months for follow-up of hypertension

## 2017-08-16 ENCOUNTER — TELEPHONE (OUTPATIENT)
Dept: INTERNAL MEDICINE | Facility: CLINIC | Age: 82
End: 2017-08-16

## 2017-08-16 DIAGNOSIS — D64.9 ANEMIA, UNSPECIFIED TYPE: Primary | ICD-10-CM

## 2017-08-16 NOTE — TELEPHONE ENCOUNTER
Telephone call: Discussed low hemoglobin with the patient.  She has been feeling back to normal for over a week.  Her stools are not black.  There is no blood in her stool.  She has no dizziness.  Plan: 1.  Home Hemoccult studies  2.  Begin Prilosec 20 mg by mouth twice a day  3.  Telephone follow-up next week with possible repeat CBC and iron studies.

## 2017-08-23 ENCOUNTER — TELEPHONE (OUTPATIENT)
Dept: INTERNAL MEDICINE | Facility: CLINIC | Age: 82
End: 2017-08-23

## 2017-08-23 NOTE — TELEPHONE ENCOUNTER
----- Message from Margarita Rodriguez sent at 8/23/2017  1:18 PM CDT -----  Contact: pt [phone]  Pt has questions regarding stool sample

## 2017-08-28 ENCOUNTER — LAB VISIT (OUTPATIENT)
Dept: LAB | Facility: HOSPITAL | Age: 82
End: 2017-08-28
Attending: INTERNAL MEDICINE
Payer: MEDICARE

## 2017-08-28 DIAGNOSIS — D64.9 ANEMIA, UNSPECIFIED TYPE: ICD-10-CM

## 2017-08-28 LAB
OB PNL STL: NEGATIVE

## 2017-08-28 PROCEDURE — 82272 OCCULT BLD FECES 1-3 TESTS: CPT | Mod: 91

## 2017-09-01 RX ORDER — PRAVASTATIN SODIUM 20 MG/1
TABLET ORAL
Qty: 90 TABLET | Refills: 0 | Status: SHIPPED | OUTPATIENT
Start: 2017-09-01 | End: 2018-04-25 | Stop reason: SDUPTHER

## 2017-09-05 ENCOUNTER — TELEPHONE (OUTPATIENT)
Dept: INTERNAL MEDICINE | Facility: CLINIC | Age: 82
End: 2017-09-05

## 2017-09-05 DIAGNOSIS — D64.9 ANEMIA, UNSPECIFIED TYPE: Primary | ICD-10-CM

## 2017-09-05 NOTE — TELEPHONE ENCOUNTER
----- Message from Alcon Thomas Jr., MD sent at 9/5/2017  8:13 AM CDT -----  Please contact the patient and report that the stool studies were negative for blood.  Please also encourage the patient to participate in [facility].

## 2017-09-05 NOTE — PROGRESS NOTES
Please contact the patient and report that the stool studies were negative for blood.  Please also encourage the patient to participate in [facility].

## 2017-09-05 NOTE — TELEPHONE ENCOUNTER
Spoke to pt and informed of test results.  Pt needs  clarification on directions of Prilosec   Pt was told to take twice a day, but directions states once a day.

## 2017-09-06 ENCOUNTER — LAB VISIT (OUTPATIENT)
Dept: LAB | Facility: HOSPITAL | Age: 82
End: 2017-09-06
Attending: INTERNAL MEDICINE
Payer: MEDICARE

## 2017-09-06 DIAGNOSIS — D64.9 ANEMIA, UNSPECIFIED TYPE: ICD-10-CM

## 2017-09-06 LAB
BASOPHILS # BLD AUTO: 0.06 K/UL
BASOPHILS NFR BLD: 0.8 %
DIFFERENTIAL METHOD: ABNORMAL
EOSINOPHIL # BLD AUTO: 0.2 K/UL
EOSINOPHIL NFR BLD: 2.9 %
ERYTHROCYTE [DISTWIDTH] IN BLOOD BY AUTOMATED COUNT: 14.2 %
HCT VFR BLD AUTO: 31.2 %
HGB BLD-MCNC: 9.8 G/DL
IRON SERPL-MCNC: 68 UG/DL
LYMPHOCYTES # BLD AUTO: 1.9 K/UL
LYMPHOCYTES NFR BLD: 26.3 %
MCH RBC QN AUTO: 28.9 PG
MCHC RBC AUTO-ENTMCNC: 31.4 G/DL
MCV RBC AUTO: 92 FL
MONOCYTES # BLD AUTO: 0.8 K/UL
MONOCYTES NFR BLD: 11 %
NEUTROPHILS # BLD AUTO: 4.2 K/UL
NEUTROPHILS NFR BLD: 58.7 %
PLATELET # BLD AUTO: 300 K/UL
PMV BLD AUTO: 11.2 FL
RBC # BLD AUTO: 3.39 M/UL
SATURATED IRON: 16 %
TOTAL IRON BINDING CAPACITY: 414 UG/DL
TRANSFERRIN SERPL-MCNC: 280 MG/DL
WBC # BLD AUTO: 7.19 K/UL

## 2017-09-06 PROCEDURE — 36415 COLL VENOUS BLD VENIPUNCTURE: CPT

## 2017-09-06 PROCEDURE — 85025 COMPLETE CBC W/AUTO DIFF WBC: CPT

## 2017-09-06 PROCEDURE — 83540 ASSAY OF IRON: CPT

## 2017-09-07 ENCOUNTER — TELEPHONE (OUTPATIENT)
Dept: INTERNAL MEDICINE | Facility: CLINIC | Age: 82
End: 2017-09-07

## 2017-09-07 NOTE — TELEPHONE ENCOUNTER
Please call and let the patient know that her blood test showed mild improvement in her blood count.  She does have a mild iron deficiency.  I would recommend that she begin taking an iron supplement once a day.  I recommend Feosol one a day which she can obtain over-the-counter.  She should return to clinic again in 3 months.  Dr. Thomas

## 2017-10-03 RX ORDER — OXYBUTYNIN CHLORIDE 5 MG/1
TABLET, EXTENDED RELEASE ORAL
Qty: 90 TABLET | Refills: 4 | Status: SHIPPED | OUTPATIENT
Start: 2017-10-03 | End: 2018-10-22 | Stop reason: SDUPTHER

## 2017-10-27 ENCOUNTER — HOSPITAL ENCOUNTER (INPATIENT)
Facility: OTHER | Age: 82
LOS: 4 days | Discharge: NURSING, REHAB OR LONG-TERM CARE | DRG: 481 | End: 2017-10-31
Attending: EMERGENCY MEDICINE | Admitting: HOSPITALIST
Payer: MEDICARE

## 2017-10-27 ENCOUNTER — ANESTHESIA EVENT (OUTPATIENT)
Dept: SURGERY | Facility: OTHER | Age: 82
DRG: 481 | End: 2017-10-27
Payer: MEDICARE

## 2017-10-27 ENCOUNTER — ANESTHESIA (OUTPATIENT)
Dept: SURGERY | Facility: OTHER | Age: 82
DRG: 481 | End: 2017-10-27
Payer: MEDICARE

## 2017-10-27 DIAGNOSIS — Z01.818 PREOP TESTING: ICD-10-CM

## 2017-10-27 DIAGNOSIS — S42.91XA CLOSED FRACTURE OF RIGHT SHOULDER, INITIAL ENCOUNTER: ICD-10-CM

## 2017-10-27 DIAGNOSIS — T14.90XA INJURY: ICD-10-CM

## 2017-10-27 DIAGNOSIS — S72.141A CLOSED INTERTROCHANTERIC FRACTURE OF RIGHT HIP, INITIAL ENCOUNTER: Primary | ICD-10-CM

## 2017-10-27 DIAGNOSIS — S72.141A CLOSED DISPLACED INTERTROCHANTERIC FRACTURE OF RIGHT FEMUR, INITIAL ENCOUNTER: ICD-10-CM

## 2017-10-27 DIAGNOSIS — S42.294A OTHER CLOSED NONDISPLACED FRACTURE OF PROXIMAL END OF RIGHT HUMERUS, INITIAL ENCOUNTER: ICD-10-CM

## 2017-10-27 LAB
ABO + RH BLD: NORMAL
ALBUMIN SERPL BCP-MCNC: 3.2 G/DL
ALP SERPL-CCNC: 67 U/L
ALT SERPL W/O P-5'-P-CCNC: 14 U/L
ANION GAP SERPL CALC-SCNC: 12 MMOL/L
AST SERPL-CCNC: 27 U/L
BASOPHILS # BLD AUTO: 0.05 K/UL
BASOPHILS NFR BLD: 0.4 %
BILIRUB SERPL-MCNC: 0.3 MG/DL
BLD GP AB SCN CELLS X3 SERPL QL: NORMAL
BUN SERPL-MCNC: 23 MG/DL
CALCIUM SERPL-MCNC: 10.1 MG/DL
CHLORIDE SERPL-SCNC: 102 MMOL/L
CO2 SERPL-SCNC: 24 MMOL/L
CREAT SERPL-MCNC: 0.8 MG/DL
DIFFERENTIAL METHOD: ABNORMAL
EOSINOPHIL # BLD AUTO: 0.1 K/UL
EOSINOPHIL NFR BLD: 0.6 %
ERYTHROCYTE [DISTWIDTH] IN BLOOD BY AUTOMATED COUNT: 15.9 %
EST. GFR  (AFRICAN AMERICAN): >60 ML/MIN/1.73 M^2
EST. GFR  (NON AFRICAN AMERICAN): >60 ML/MIN/1.73 M^2
GLUCOSE SERPL-MCNC: 167 MG/DL
HCT VFR BLD AUTO: 30.5 %
HGB BLD-MCNC: 9.9 G/DL
LYMPHOCYTES # BLD AUTO: 1.9 K/UL
LYMPHOCYTES NFR BLD: 13.8 %
MCH RBC QN AUTO: 29.3 PG
MCHC RBC AUTO-ENTMCNC: 32.5 G/DL
MCV RBC AUTO: 90 FL
MONOCYTES # BLD AUTO: 1 K/UL
MONOCYTES NFR BLD: 7.6 %
NEUTROPHILS # BLD AUTO: 10.5 K/UL
NEUTROPHILS NFR BLD: 77.2 %
PLATELET # BLD AUTO: 275 K/UL
PMV BLD AUTO: 11.7 FL
POTASSIUM SERPL-SCNC: 3.9 MMOL/L
PROT SERPL-MCNC: 8.3 G/DL
RBC # BLD AUTO: 3.38 M/UL
SODIUM SERPL-SCNC: 138 MMOL/L
WBC # BLD AUTO: 13.65 K/UL

## 2017-10-27 PROCEDURE — 27230 TREAT THIGH FRACTURE: CPT

## 2017-10-27 PROCEDURE — 25000003 PHARM REV CODE 250: Performed by: NURSE ANESTHETIST, CERTIFIED REGISTERED

## 2017-10-27 PROCEDURE — 63600175 PHARM REV CODE 636 W HCPCS: Performed by: NURSE ANESTHETIST, CERTIFIED REGISTERED

## 2017-10-27 PROCEDURE — C1769 GUIDE WIRE: HCPCS | Performed by: ORTHOPAEDIC SURGERY

## 2017-10-27 PROCEDURE — 63600175 PHARM REV CODE 636 W HCPCS: Performed by: ORTHOPAEDIC SURGERY

## 2017-10-27 PROCEDURE — 80053 COMPREHEN METABOLIC PANEL: CPT

## 2017-10-27 PROCEDURE — 71000033 HC RECOVERY, INTIAL HOUR: Performed by: ORTHOPAEDIC SURGERY

## 2017-10-27 PROCEDURE — C1713 ANCHOR/SCREW BN/BN,TIS/BN: HCPCS | Performed by: ORTHOPAEDIC SURGERY

## 2017-10-27 PROCEDURE — 99900035 HC TECH TIME PER 15 MIN (STAT)

## 2017-10-27 PROCEDURE — 96374 THER/PROPH/DIAG INJ IV PUSH: CPT

## 2017-10-27 PROCEDURE — 27000221 HC OXYGEN, UP TO 24 HOURS

## 2017-10-27 PROCEDURE — 36000710: Performed by: ORTHOPAEDIC SURGERY

## 2017-10-27 PROCEDURE — 71000039 HC RECOVERY, EACH ADD'L HOUR: Performed by: ORTHOPAEDIC SURGERY

## 2017-10-27 PROCEDURE — 94761 N-INVAS EAR/PLS OXIMETRY MLT: CPT

## 2017-10-27 PROCEDURE — 86900 BLOOD TYPING SEROLOGIC ABO: CPT

## 2017-10-27 PROCEDURE — 25000003 PHARM REV CODE 250: Performed by: HOSPITALIST

## 2017-10-27 PROCEDURE — 86850 RBC ANTIBODY SCREEN: CPT

## 2017-10-27 PROCEDURE — 85025 COMPLETE CBC W/AUTO DIFF WBC: CPT

## 2017-10-27 PROCEDURE — 25000003 PHARM REV CODE 250: Performed by: EMERGENCY MEDICINE

## 2017-10-27 PROCEDURE — 37000008 HC ANESTHESIA 1ST 15 MINUTES: Performed by: ORTHOPAEDIC SURGERY

## 2017-10-27 PROCEDURE — 36000711: Performed by: ORTHOPAEDIC SURGERY

## 2017-10-27 PROCEDURE — 27201423 OPTIME MED/SURG SUP & DEVICES STERILE SUPPLY: Performed by: ORTHOPAEDIC SURGERY

## 2017-10-27 PROCEDURE — 96376 TX/PRO/DX INJ SAME DRUG ADON: CPT

## 2017-10-27 PROCEDURE — 93005 ELECTROCARDIOGRAM TRACING: CPT

## 2017-10-27 PROCEDURE — 99285 EMERGENCY DEPT VISIT HI MDM: CPT | Mod: 25

## 2017-10-27 PROCEDURE — 37000009 HC ANESTHESIA EA ADD 15 MINS: Performed by: ORTHOPAEDIC SURGERY

## 2017-10-27 PROCEDURE — 93010 ELECTROCARDIOGRAM REPORT: CPT | Mod: ,,, | Performed by: INTERNAL MEDICINE

## 2017-10-27 PROCEDURE — 99223 1ST HOSP IP/OBS HIGH 75: CPT | Mod: AI,,, | Performed by: HOSPITALIST

## 2017-10-27 PROCEDURE — 96361 HYDRATE IV INFUSION ADD-ON: CPT

## 2017-10-27 PROCEDURE — 11000001 HC ACUTE MED/SURG PRIVATE ROOM

## 2017-10-27 PROCEDURE — 25000003 PHARM REV CODE 250: Performed by: SPECIALIST

## 2017-10-27 PROCEDURE — 25000003 PHARM REV CODE 250: Performed by: ORTHOPAEDIC SURGERY

## 2017-10-27 PROCEDURE — 86920 COMPATIBILITY TEST SPIN: CPT

## 2017-10-27 DEVICE — NAIL IM CANN 130 DEG 11X420 R: Type: IMPLANTABLE DEVICE | Site: FEMUR | Status: FUNCTIONAL

## 2017-10-27 DEVICE — SCREW LOCKING 40MM: Type: IMPLANTABLE DEVICE | Site: FEMUR | Status: FUNCTIONAL

## 2017-10-27 DEVICE — WIRE GUIDE 3.2MM 400MM: Type: IMPLANTABLE DEVICE | Site: FEMUR | Status: FUNCTIONAL

## 2017-10-27 DEVICE — BLADE TFNA HELICAL 100MM ST: Type: IMPLANTABLE DEVICE | Site: FEMUR | Status: FUNCTIONAL

## 2017-10-27 RX ORDER — PHENYLEPHRINE HYDROCHLORIDE 10 MG/ML
INJECTION INTRAVENOUS
Status: DISCONTINUED | OUTPATIENT
Start: 2017-10-27 | End: 2017-10-27

## 2017-10-27 RX ORDER — OXYCODONE HYDROCHLORIDE 5 MG/1
5 TABLET ORAL
Status: DISCONTINUED | OUTPATIENT
Start: 2017-10-27 | End: 2017-10-27 | Stop reason: HOSPADM

## 2017-10-27 RX ORDER — PRAVASTATIN SODIUM 10 MG/1
10 TABLET ORAL DAILY
Status: DISCONTINUED | OUTPATIENT
Start: 2017-10-28 | End: 2017-10-31 | Stop reason: HOSPADM

## 2017-10-27 RX ORDER — DEXTROSE MONOHYDRATE, SODIUM CHLORIDE, AND POTASSIUM CHLORIDE 50; 1.49; 4.5 G/1000ML; G/1000ML; G/1000ML
INJECTION, SOLUTION INTRAVENOUS CONTINUOUS
Status: DISCONTINUED | OUTPATIENT
Start: 2017-10-27 | End: 2017-10-30

## 2017-10-27 RX ORDER — DIPHENHYDRAMINE HYDROCHLORIDE 50 MG/ML
25 INJECTION INTRAMUSCULAR; INTRAVENOUS EVERY 6 HOURS PRN
Status: DISCONTINUED | OUTPATIENT
Start: 2017-10-27 | End: 2017-10-27 | Stop reason: HOSPADM

## 2017-10-27 RX ORDER — ONDANSETRON 2 MG/ML
INJECTION INTRAMUSCULAR; INTRAVENOUS
Status: DISCONTINUED | OUTPATIENT
Start: 2017-10-27 | End: 2017-10-27

## 2017-10-27 RX ORDER — LIDOCAINE HCL/PF 100 MG/5ML
SYRINGE (ML) INTRAVENOUS
Status: DISCONTINUED | OUTPATIENT
Start: 2017-10-27 | End: 2017-10-27

## 2017-10-27 RX ORDER — NEOSTIGMINE METHYLSULFATE 1 MG/ML
INJECTION, SOLUTION INTRAVENOUS
Status: DISCONTINUED | OUTPATIENT
Start: 2017-10-27 | End: 2017-10-27

## 2017-10-27 RX ORDER — RAMELTEON 8 MG/1
8 TABLET ORAL NIGHTLY PRN
Status: DISCONTINUED | OUTPATIENT
Start: 2017-10-27 | End: 2017-10-31 | Stop reason: HOSPADM

## 2017-10-27 RX ORDER — LABETALOL HYDROCHLORIDE 5 MG/ML
INJECTION, SOLUTION INTRAVENOUS
Status: DISCONTINUED | OUTPATIENT
Start: 2017-10-27 | End: 2017-10-27

## 2017-10-27 RX ORDER — ROCURONIUM BROMIDE 10 MG/ML
INJECTION, SOLUTION INTRAVENOUS
Status: DISCONTINUED | OUTPATIENT
Start: 2017-10-27 | End: 2017-10-27

## 2017-10-27 RX ORDER — OXYCODONE HYDROCHLORIDE 5 MG/1
5 TABLET ORAL EVERY 4 HOURS PRN
Status: DISCONTINUED | OUTPATIENT
Start: 2017-10-27 | End: 2017-10-31 | Stop reason: HOSPADM

## 2017-10-27 RX ORDER — ACETAMINOPHEN 10 MG/ML
INJECTION, SOLUTION INTRAVENOUS
Status: DISCONTINUED | OUTPATIENT
Start: 2017-10-27 | End: 2017-10-27

## 2017-10-27 RX ORDER — ATENOLOL 25 MG/1
25 TABLET ORAL 2 TIMES DAILY
Status: DISCONTINUED | OUTPATIENT
Start: 2017-10-27 | End: 2017-10-31 | Stop reason: HOSPADM

## 2017-10-27 RX ORDER — ONDANSETRON 2 MG/ML
4 INJECTION INTRAMUSCULAR; INTRAVENOUS EVERY 12 HOURS PRN
Status: DISCONTINUED | OUTPATIENT
Start: 2017-10-27 | End: 2017-10-31

## 2017-10-27 RX ORDER — ONDANSETRON 2 MG/ML
4 INJECTION INTRAMUSCULAR; INTRAVENOUS DAILY PRN
Status: DISCONTINUED | OUTPATIENT
Start: 2017-10-27 | End: 2017-10-27 | Stop reason: HOSPADM

## 2017-10-27 RX ORDER — FENTANYL CITRATE 50 UG/ML
25 INJECTION, SOLUTION INTRAMUSCULAR; INTRAVENOUS EVERY 5 MIN PRN
Status: DISCONTINUED | OUTPATIENT
Start: 2017-10-27 | End: 2017-10-27 | Stop reason: HOSPADM

## 2017-10-27 RX ORDER — SODIUM CHLORIDE, SODIUM LACTATE, POTASSIUM CHLORIDE, CALCIUM CHLORIDE 600; 310; 30; 20 MG/100ML; MG/100ML; MG/100ML; MG/100ML
INJECTION, SOLUTION INTRAVENOUS CONTINUOUS PRN
Status: DISCONTINUED | OUTPATIENT
Start: 2017-10-27 | End: 2017-10-27

## 2017-10-27 RX ORDER — MEPERIDINE HYDROCHLORIDE 50 MG/ML
12.5 INJECTION INTRAMUSCULAR; INTRAVENOUS; SUBCUTANEOUS ONCE AS NEEDED
Status: DISCONTINUED | OUTPATIENT
Start: 2017-10-27 | End: 2017-10-27 | Stop reason: HOSPADM

## 2017-10-27 RX ORDER — ACETAMINOPHEN 325 MG/1
650 TABLET ORAL EVERY 4 HOURS PRN
Status: DISCONTINUED | OUTPATIENT
Start: 2017-10-27 | End: 2017-10-31 | Stop reason: HOSPADM

## 2017-10-27 RX ORDER — AMLODIPINE BESYLATE 5 MG/1
5 TABLET ORAL DAILY
Status: DISCONTINUED | OUTPATIENT
Start: 2017-10-28 | End: 2017-10-31 | Stop reason: HOSPADM

## 2017-10-27 RX ORDER — HYDROMORPHONE HYDROCHLORIDE 1 MG/ML
0.5 INJECTION, SOLUTION INTRAMUSCULAR; INTRAVENOUS; SUBCUTANEOUS
Status: COMPLETED | OUTPATIENT
Start: 2017-10-27 | End: 2017-10-27

## 2017-10-27 RX ORDER — ETOMIDATE 2 MG/ML
INJECTION INTRAVENOUS
Status: DISCONTINUED | OUTPATIENT
Start: 2017-10-27 | End: 2017-10-27

## 2017-10-27 RX ORDER — MUPIROCIN 20 MG/G
1 OINTMENT TOPICAL 2 TIMES DAILY
Status: DISCONTINUED | OUTPATIENT
Start: 2017-10-27 | End: 2017-10-31 | Stop reason: HOSPADM

## 2017-10-27 RX ORDER — SODIUM CHLORIDE 9 MG/ML
1000 INJECTION, SOLUTION INTRAVENOUS
Status: COMPLETED | OUTPATIENT
Start: 2017-10-27 | End: 2017-10-27

## 2017-10-27 RX ORDER — HYDROMORPHONE HYDROCHLORIDE 2 MG/ML
0.4 INJECTION, SOLUTION INTRAMUSCULAR; INTRAVENOUS; SUBCUTANEOUS EVERY 5 MIN PRN
Status: DISCONTINUED | OUTPATIENT
Start: 2017-10-27 | End: 2017-10-27 | Stop reason: HOSPADM

## 2017-10-27 RX ORDER — GLYCOPYRROLATE 0.2 MG/ML
INJECTION INTRAMUSCULAR; INTRAVENOUS
Status: DISCONTINUED | OUTPATIENT
Start: 2017-10-27 | End: 2017-10-27

## 2017-10-27 RX ORDER — CEFAZOLIN SODIUM 1 G/50ML
1 SOLUTION INTRAVENOUS
Status: COMPLETED | OUTPATIENT
Start: 2017-10-27 | End: 2017-10-28

## 2017-10-27 RX ORDER — MORPHINE SULFATE 4 MG/ML
4 INJECTION, SOLUTION INTRAMUSCULAR; INTRAVENOUS EVERY 4 HOURS PRN
Status: DISCONTINUED | OUTPATIENT
Start: 2017-10-27 | End: 2017-10-31 | Stop reason: HOSPADM

## 2017-10-27 RX ORDER — LOSARTAN POTASSIUM 50 MG/1
100 TABLET ORAL NIGHTLY
Status: DISCONTINUED | OUTPATIENT
Start: 2017-10-27 | End: 2017-10-31 | Stop reason: HOSPADM

## 2017-10-27 RX ORDER — POLYETHYLENE GLYCOL 3350 17 G/17G
17 POWDER, FOR SOLUTION ORAL 2 TIMES DAILY PRN
Status: DISCONTINUED | OUTPATIENT
Start: 2017-10-27 | End: 2017-10-31 | Stop reason: HOSPADM

## 2017-10-27 RX ORDER — SODIUM CHLORIDE 0.9 % (FLUSH) 0.9 %
3 SYRINGE (ML) INJECTION
Status: DISCONTINUED | OUTPATIENT
Start: 2017-10-27 | End: 2017-10-31 | Stop reason: HOSPADM

## 2017-10-27 RX ADMIN — PHENYLEPHRINE HYDROCHLORIDE 100 MCG: 10 INJECTION INTRAVENOUS at 06:10

## 2017-10-27 RX ADMIN — CARBOXYMETHYLCELLULOSE SODIUM 2 DROP: 2.5 SOLUTION/ DROPS OPHTHALMIC at 06:10

## 2017-10-27 RX ADMIN — ONDANSETRON 4 MG: 2 INJECTION INTRAMUSCULAR; INTRAVENOUS at 07:10

## 2017-10-27 RX ADMIN — DEXTROSE 1 G: 50 INJECTION, SOLUTION INTRAVENOUS at 06:10

## 2017-10-27 RX ADMIN — PHENYLEPHRINE HYDROCHLORIDE 100 MCG: 10 INJECTION INTRAVENOUS at 07:10

## 2017-10-27 RX ADMIN — EPHEDRINE SULFATE 10 MG: 50 INJECTION INTRAMUSCULAR; INTRAVENOUS; SUBCUTANEOUS at 07:10

## 2017-10-27 RX ADMIN — GLYCOPYRROLATE 0.4 MG: 0.2 INJECTION, SOLUTION INTRAMUSCULAR; INTRAVENOUS at 07:10

## 2017-10-27 RX ADMIN — PHENYLEPHRINE HYDROCHLORIDE 200 MCG: 10 INJECTION INTRAVENOUS at 07:10

## 2017-10-27 RX ADMIN — ETOMIDATE 20 MG: 2 INJECTION, SOLUTION INTRAVENOUS at 06:10

## 2017-10-27 RX ADMIN — HYDROMORPHONE HYDROCHLORIDE 0.5 MG: 1 INJECTION, SOLUTION INTRAMUSCULAR; INTRAVENOUS; SUBCUTANEOUS at 04:10

## 2017-10-27 RX ADMIN — MUPIROCIN 1 G: 20 OINTMENT TOPICAL at 09:10

## 2017-10-27 RX ADMIN — DEXTROSE MONOHYDRATE, SODIUM CHLORIDE, AND POTASSIUM CHLORIDE: 50; 4.5; 1.49 INJECTION, SOLUTION INTRAVENOUS at 09:10

## 2017-10-27 RX ADMIN — ROCURONIUM BROMIDE 20 MG: 10 INJECTION, SOLUTION INTRAVENOUS at 06:10

## 2017-10-27 RX ADMIN — ATENOLOL 25 MG: 25 TABLET ORAL at 09:10

## 2017-10-27 RX ADMIN — CEFAZOLIN SODIUM 1 G: 1 SOLUTION INTRAVENOUS at 09:10

## 2017-10-27 RX ADMIN — HYDROMORPHONE HYDROCHLORIDE 0.5 MG: 1 INJECTION, SOLUTION INTRAMUSCULAR; INTRAVENOUS; SUBCUTANEOUS at 02:10

## 2017-10-27 RX ADMIN — LOSARTAN POTASSIUM 100 MG: 50 TABLET, FILM COATED ORAL at 09:10

## 2017-10-27 RX ADMIN — LABETALOL HYDROCHLORIDE 10 MG: 5 INJECTION, SOLUTION INTRAVENOUS at 07:10

## 2017-10-27 RX ADMIN — SODIUM CHLORIDE 1000 ML: 0.9 INJECTION, SOLUTION INTRAVENOUS at 04:10

## 2017-10-27 RX ADMIN — SODIUM CHLORIDE, SODIUM LACTATE, POTASSIUM CHLORIDE, AND CALCIUM CHLORIDE: 600; 310; 30; 20 INJECTION, SOLUTION INTRAVENOUS at 05:10

## 2017-10-27 RX ADMIN — LIDOCAINE HYDROCHLORIDE 20 MG: 20 INJECTION, SOLUTION INTRAVENOUS at 06:10

## 2017-10-27 RX ADMIN — NEOSTIGMINE METHYLSULFATE 3 MG: 1 INJECTION INTRAVENOUS at 07:10

## 2017-10-27 RX ADMIN — ACETAMINOPHEN 1000 MG: 10 INJECTION, SOLUTION INTRAVENOUS at 06:10

## 2017-10-27 NOTE — ED TRIAGE NOTES
Pt presents to ER via EMS for a hip fracture with obvious deformity. Pt fell at about 1330 after tripping over a foot stool. Denies any head trauma, denies LOC. Pt also c/o pain to rt shoulder. Pt received fentanyl with EMS, currently rates pain 8/10. Will continue to monitor

## 2017-10-27 NOTE — ASSESSMENT & PLAN NOTE
- Patient has good exercise tolerance for her age and has no history of chronic kidney disease, stroke or VTE.  - Multiple musculoskeletal issues including scoliosis, osteoporosis and history of falls  - She is lying flat on the stretcher and other than shoulder pain appears comfortable.  - Medically optimized for surgery today.

## 2017-10-27 NOTE — H&P
[facility] Medicine  History & Physical    Patient Name: Antiarra Hazel  MRN: [id number]  Admission Date: 10/27/2017  Attending Physician: Kj Muniz II, MD   Primary Care Provider: Alcon Thomas Jr, MD         Patient information was obtained from patient, relative(s), past medical records and ER records.     Subjective:     Principal Problem:Closed intertrochanteric fracture of right hip, initial encounter    Chief Complaint:   Chief Complaint   Patient presents with    Hip Pain     Pt fell over a stool, denies LOC, pt with right sided hip pain with shortening and bowing of right leg, given total of Fentanyl 100mcg, VSS        HPI: Ms. Luz is a 89 year old woman known to me from a previous admission.  She presented today with her 2 daughters with pain in her right hip and shoulder after falling on her right side.  She had eaten breakfast at 9 AM, then got up to make a phone call and was going to sit back down after hanging up when she tripped over a stool.  X-rays showed an acute comminuted intertrochanteric fracture of the right femur and an acute impacted comminuted fracture of the neck of the humerus with extension into the greater tuberosity.    Further workup significant for elevated BP, CBC with mild anemia at baseline and leukocytosis, normal CXR and EKG with no acute changes with previously seen anteroseptal infarction.    Patient is independent and has good exercise tolerance, lives in a 2 story house and goes up and down the stairs many times during the day.  She has HTN and takes atenolol.  She takes iron for a known iron deficiency anemia.  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  She has idiopathic scoliosis and osteoporosis.  She was hospitalized here in late 2013 after falling and fracturing her left hip, and this was repaired with Dr. Davis.  She fell again in 2/2016 and was hospitalized here with  pubic ramus fractures and required a stay in SNF.    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 Cornejo    HYSTERECTOMY      OOPHORECTOMY      ORIF HIP FRACTURE Left 12/2013    TONSILLECTOMY         Review of patient's allergies indicates:   Allergen Reactions    Augmentin [amoxicillin-pot clavulanate] Nausea And Vomiting    Sulfa (sulfonamide antibiotics)     Zestril  [lisinopril]      Other reaction(s): Swelling       No current facility-administered medications on file prior to encounter.      Current Outpatient Prescriptions on File Prior to Encounter   Medication Sig    acetaminophen (TYLENOL) 325 MG tablet Take 2 tablets (650 mg total) by mouth every 6 (six) hours as needed (mild pain). Do NOT exceed 3000 mg in a 24 hour time period and do not take with other medications containing acetaminophen    amlodipine (NORVASC) 5 MG tablet Take 1 tablet (5 mg total) by mouth once daily.    atenolol (TENORMIN) 25 MG tablet Take 1 tablet (25 mg total) by mouth 2 (two) times daily.    cholecalciferol, vitamin D3, (VITAMIN D3) 1,000 unit capsule Take 1,000 Units by mouth once daily.    FOLIC ACID/MULTIVIT-MIN/LUTEIN (CENTRUM SILVER ORAL) Take 1 tablet by mouth once daily.     losartan (COZAAR) 100 MG tablet Take 1 tablet (100 mg total) by mouth once daily. (Patient taking differently: Take 100 mg by mouth once daily. in the evening)    oxybutynin (DITROPAN-XL) 5 MG TR24 TAKE 1 TABLET DAILY    pravastatin (PRAVACHOL) 20 MG tablet TAKE ONE-HALF (1/2) TABLET DAILY    [DISCONTINUED]  aspirin (ECOTRIN) 81 MG EC tablet Take 81 mg by mouth once daily.    [DISCONTINUED] mupirocin (BACTROBAN) 2 % ointment Apply topically 2 (two) times daily.     Family History     Problem Relation (Age of Onset)    Cancer Maternal Grandmother    Hypertension Father    Osteoporosis Mother    Stroke Father (49)        Social History Main Topics    Smoking status: Never Smoker    Smokeless tobacco: Never Used      Comment: The patient exercises every other day on an exercise bike.  On alternative days she does floor exercises with weights.    Alcohol use 1.2 - 2.4 oz/week     1 Glasses of wine, 1 - 3 Standard drinks or equivalent per week      Comment: Occasional use    Drug use: No    Sexual activity: Not on file     Review of Systems   Constitutional: Negative for chills and fever.   HENT: Negative for rhinorrhea and sore throat.    Eyes: Negative for photophobia and visual disturbance.   Respiratory: Negative for cough and shortness of breath.    Cardiovascular: Negative for chest pain and palpitations.   Gastrointestinal: Negative for constipation, diarrhea, nausea and vomiting.   Endocrine: Negative for polydipsia and polyuria.   Genitourinary: Negative for dysuria and frequency.   Musculoskeletal: Positive for arthralgias and joint swelling.        Severe pain in right shoulder.  Hip less painful.    Neurological: Negative for dizziness, weakness and headaches.   Psychiatric/Behavioral: Negative for confusion and dysphoric mood.     Objective:     Vital Signs (Most Recent):  Temp: 97.4 °F (36.3 °C) (10/27/17 1431)  Pulse: 78 (10/27/17 1546)  Resp: 18 (10/27/17 1431)  BP: (!) 225/95 (10/27/17 1546)  SpO2: 96 % (10/27/17 1546) Vital Signs (24h Range):  Temp:  [97.4 °F (36.3 °C)] 97.4 °F (36.3 °C)  Pulse:  [75-82] 78  Resp:  [18] 18  SpO2:  [89 %-100 %] 96 %  BP: (190-225)/(83-95) 225/95     Weight: 47.6 kg (105 lb)  Body mass index is 19.2 kg/m².    Physical Exam   Constitutional: She is oriented to person,  place, and time. She appears well-developed and well-nourished.   Elderly woman supine on stretcher.   HENT:   Head: Normocephalic.   Mildly hard of hearing.   Eyes: Conjunctivae are normal. Pupils are equal, round, and reactive to light.   Bilateral ptosis of eyelids.   Neck: Neck supple. No thyromegaly present.   Cardiovascular: Normal rate, regular rhythm, normal heart sounds and intact distal pulses.  Exam reveals no gallop and no friction rub.    No murmur heard.  Pulmonary/Chest: Effort normal and breath sounds normal.   Abdominal: Soft. Bowel sounds are normal. She exhibits no distension. There is no tenderness.   Musculoskeletal: Normal range of motion. She exhibits edema.   Pitting edema LLE.  RLE shortened and in landry's traction.  Swelling evidence right shoulder.   Lymphadenopathy:     She has no cervical adenopathy.   Neurological: She is alert and oriented to person, place, and time.   Strength equal and symmetric   Skin: Skin is warm and dry. No rash noted.   Multiple ecchymoses both upper extremities.   Psychiatric: She has a normal mood and affect. Her behavior is normal. Thought content normal.        Significant Labs:   CBC:   Recent Labs  Lab 10/27/17  1520   WBC 13.65*   HGB 9.9*   HCT 30.5*        CMP:   Recent Labs  Lab 10/27/17  1520      K 3.9      CO2 24   *   BUN 23   CREATININE 0.8   CALCIUM 10.1   PROT 8.3   ALBUMIN 3.2*   BILITOT 0.3   ALKPHOS 67   AST 27   ALT 14   ANIONGAP 12   EGFRNONAA >60       Significant Imaging: I have reviewed all pertinent imaging results/findings within the past 24 hours.    Assessment/Plan:     * Closed intertrochanteric fracture of right hip, initial encounter    - Patient has good exercise tolerance for her age and has no history of chronic kidney disease, stroke or VTE.  - Multiple musculoskeletal issues including scoliosis, osteoporosis and history of falls  - She is lying flat on the stretcher and other than shoulder pain  appears comfortable.  - Medically optimized for surgery today.            Essential hypertension    - She is on atenolol, amlodipine and Cozaar; took meds this morning  - Suspect unusually high BP is due to pain/stress and will come down during/after surgery  - Monitor closely              VTE Risk Mitigation     None             Siria Griffin MD  Department of Hospital Medicine   [facility], [place]

## 2017-10-27 NOTE — SUBJECTIVE & OBJECTIVE
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 Cornejo    HYSTERECTOMY      OOPHORECTOMY      ORIF HIP FRACTURE Left 12/2013    TONSILLECTOMY         Review of patient's allergies indicates:   Allergen Reactions    Augmentin [amoxicillin-pot clavulanate] Nausea And Vomiting    Sulfa (sulfonamide antibiotics)     Zestril  [lisinopril]      Other reaction(s): Swelling       No current facility-administered medications on file prior to encounter.      Current Outpatient Prescriptions on File Prior to Encounter   Medication Sig    acetaminophen (TYLENOL) 325 MG tablet Take 2 tablets (650 mg total) by mouth every 6 (six) hours as needed (mild pain). Do NOT exceed 3000 mg in a 24 hour time period and do not take with other medications containing acetaminophen    amlodipine (NORVASC) 5 MG tablet Take 1 tablet (5 mg total) by mouth once daily.    atenolol (TENORMIN) 25 MG tablet Take 1 tablet (25 mg total) by mouth 2 (two) times daily.    cholecalciferol, vitamin D3, (VITAMIN D3) 1,000 unit capsule Take 1,000 Units by mouth once daily.    FOLIC ACID/MULTIVIT-MIN/LUTEIN (CENTRUM SILVER ORAL) Take 1 tablet by mouth once daily.     losartan (COZAAR) 100 MG tablet Take 1 tablet (100 mg total) by mouth once daily. (Patient taking differently: Take 100 mg by mouth once daily. in the evening)    oxybutynin (DITROPAN-XL) 5 MG TR24 TAKE 1 TABLET DAILY    pravastatin (PRAVACHOL) 20 MG tablet TAKE ONE-HALF (1/2) TABLET DAILY    [DISCONTINUED] aspirin (ECOTRIN) 81 MG EC tablet Take 81 mg by mouth  once daily.    [DISCONTINUED] mupirocin (BACTROBAN) 2 % ointment Apply topically 2 (two) times daily.     Family History     Problem Relation (Age of Onset)    Cancer Maternal Grandmother    Hypertension Father    Osteoporosis Mother    Stroke Father (49)        Social History Main Topics    Smoking status: Never Smoker    Smokeless tobacco: Never Used      Comment: The patient exercises every other day on an exercise bike.  On alternative days she does floor exercises with weights.    Alcohol use 1.2 - 2.4 oz/week     1 Glasses of wine, 1 - 3 Standard drinks or equivalent per week      Comment: Occasional use    Drug use: No    Sexual activity: Not on file     Review of Systems   Constitutional: Negative for chills and fever.   HENT: Negative for rhinorrhea and sore throat.    Eyes: Negative for photophobia and visual disturbance.   Respiratory: Negative for cough and shortness of breath.    Cardiovascular: Negative for chest pain and palpitations.   Gastrointestinal: Negative for constipation, diarrhea, nausea and vomiting.   Endocrine: Negative for polydipsia and polyuria.   Genitourinary: Negative for dysuria and frequency.   Musculoskeletal: Positive for arthralgias and joint swelling.        Severe pain in right shoulder.  Hip less painful.    Neurological: Negative for dizziness, weakness and headaches.   Psychiatric/Behavioral: Negative for confusion and dysphoric mood.     Objective:     Vital Signs (Most Recent):  Temp: 97.4 °F (36.3 °C) (10/27/17 1431)  Pulse: 78 (10/27/17 1546)  Resp: 18 (10/27/17 1431)  BP: (!) 225/95 (10/27/17 1546)  SpO2: 96 % (10/27/17 1546) Vital Signs (24h Range):  Temp:  [97.4 °F (36.3 °C)] 97.4 °F (36.3 °C)  Pulse:  [75-82] 78  Resp:  [18] 18  SpO2:  [89 %-100 %] 96 %  BP: (190-225)/(83-95) 225/95     Weight: 47.6 kg (105 lb)  Body mass index is 19.2 kg/m².    Physical Exam   Constitutional: She is oriented to person, place, and time. She appears well-developed and  well-nourished.   Elderly woman supine on stretcher.   HENT:   Head: Normocephalic.   Mildly hard of hearing.   Eyes: Conjunctivae are normal. Pupils are equal, round, and reactive to light.   Bilateral ptosis of eyelids.   Neck: Neck supple. No thyromegaly present.   Cardiovascular: Normal rate, regular rhythm, normal heart sounds and intact distal pulses.  Exam reveals no gallop and no friction rub.    No murmur heard.  Pulmonary/Chest: Effort normal and breath sounds normal.   Abdominal: Soft. Bowel sounds are normal. She exhibits no distension. There is no tenderness.   Musculoskeletal: Normal range of motion. She exhibits edema.   Pitting edema LLE.  RLE shortened and in landry's traction.  Swelling evidence right shoulder.   Lymphadenopathy:     She has no cervical adenopathy.   Neurological: She is alert and oriented to person, place, and time.   Strength equal and symmetric   Skin: Skin is warm and dry. No rash noted.   Multiple ecchymoses both upper extremities.   Psychiatric: She has a normal mood and affect. Her behavior is normal. Thought content normal.        Significant Labs:   CBC:   Recent Labs  Lab 10/27/17  1520   WBC 13.65*   HGB 9.9*   HCT 30.5*        CMP:   Recent Labs  Lab 10/27/17  1520      K 3.9      CO2 24   *   BUN 23   CREATININE 0.8   CALCIUM 10.1   PROT 8.3   ALBUMIN 3.2*   BILITOT 0.3   ALKPHOS 67   AST 27   ALT 14   ANIONGAP 12   EGFRNONAA >60       Significant Imaging: I have reviewed all pertinent imaging results/findings within the past 24 hours.

## 2017-10-27 NOTE — HPI
Ms. Luz is a 89 year old woman known to me from a previous admission.  She presented today with her 2 daughters with pain in her right hip and shoulder after falling on her right side.  She had eaten breakfast at 9 AM, then got up to make a phone call and was going to sit back down after hanging up when she tripped over a stool.  X-rays showed an acute comminuted intertrochanteric fracture of the right femur and an acute impacted comminuted fracture of the neck of the humerus with extension into the greater tuberosity.    Further workup significant for elevated BP, CBC with mild anemia at baseline and leukocytosis, normal CXR and EKG with no acute changes with previously seen anteroseptal infarction.    Patient is independent and has good exercise tolerance, lives in a 2 story house and goes up and down the stairs many times during the day.  She has HTN and takes atenolol.  She takes iron for a known iron deficiency anemia.  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  She has idiopathic scoliosis and osteoporosis.  She was hospitalized here in late 2013 after falling and fracturing her left hip, and this was repaired with Dr. Davis.  She fell again in 2/2016 and was hospitalized here with pubic ramus fractures and required a stay in SNF.

## 2017-10-27 NOTE — ED PROVIDER NOTES
Encounter Date: 10/27/2017    SCRIBE #1 NOTE: IKay, am scribing for, and in the presence of, .       History     Chief Complaint   Patient presents with    Hip Pain     Pt fell over a stool, denies LOC, pt with right sided hip pain with shortening and bowing of right leg, given total of Fentanyl 100mcg, VSS     Time seen by provider: 2:37 PM    This is a 89 y.o. female who presents with complaint of hip pain that occurred PTA. Pt reports she tripped and and fell. Pt reports associated right shoulder pain, but denies any sign of trauma,back pain, LOC, headache, light-headedness, or dizziness.Pain to right hip is described as constant. She reports no identifying, alleviating, or exacerbating factors.           The history is provided by the patient.     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 Cornejo    HYSTERECTOMY      OOPHORECTOMY      ORIF HIP FRACTURE Left 12/2013    TONSILLECTOMY       Family History   Problem Relation Age of Onset    Stroke Father 49    Hypertension Father     Osteoporosis Mother     Cancer Maternal Grandmother     Amblyopia Neg Hx     Blindness Neg Hx     Cataracts Neg Hx     Macular degeneration Neg Hx     Retinal detachment Neg Hx     Strabismus Neg Hx     Thyroid disease Neg Hx     Melanoma Neg Hx      Social  History   Substance Use Topics    Smoking status: Never Smoker    Smokeless tobacco: Never Used      Comment: The patient exercises every other day on an exercise bike.  On alternative days she does floor exercises with weights.    Alcohol use 1.2 - 2.4 oz/week     1 Glasses of wine, 1 - 3 Standard drinks or equivalent per week      Comment: Occasional use     Review of Systems   Constitutional: Negative for chills, diaphoresis and fever.   HENT: Negative for congestion and sore throat.    Respiratory: Negative for shortness of breath.    Cardiovascular: Negative for chest pain.   Gastrointestinal: Negative for constipation, diarrhea, nausea and vomiting.   Genitourinary: Negative for dysuria.   Musculoskeletal: Negative for back pain and neck pain.        Positive for right hip and shoulder pain.   Skin: Negative for rash.   Neurological: Negative for dizziness, weakness, light-headedness and headaches.   Hematological: Does not bruise/bleed easily.       Physical Exam     Initial Vitals [10/27/17 1431]   BP Pulse Resp Temp SpO2   (!) 190/83 75 18 97.4 °F (36.3 °C) 100 %      MAP       118.67         Physical Exam    Nursing note and vitals reviewed.  Constitutional: She appears well-developed and well-nourished. She is not diaphoretic. No distress.   Elderly female with good health for age. Mild distress.    HENT:   Head: Normocephalic and atraumatic.   Mouth/Throat: Oropharynx is clear and moist.   Small contusion to central lower lip. No other cranial facial trauma.   Eyes: EOM are normal. Pupils are equal, round, and reactive to light. Right eye exhibits no discharge. Left eye exhibits no discharge.   Neck: Normal range of motion.   Pt has no neck pain.   Cardiovascular: Normal rate, regular rhythm, normal heart sounds and intact distal pulses.   Pulmonary/Chest: Breath sounds normal. No respiratory distress. She has no wheezes.   Abdominal: Soft. There is no tenderness. There is no rebound and no guarding.    Musculoskeletal: Normal range of motion. She exhibits tenderness. She exhibits no edema.   Non-specific tenderness of right shoulder, but good ROM. neurovasticularly intact. Marked deformity of right hip with shortening and extreme internal rotation. Intact distal sensation 2+ pulse. No bony tenderness of knee or ankle.   Lymphadenopathy:     She has no cervical adenopathy.   Neurological: She is alert and oriented to person, place, and time.   Skin: Skin is warm and dry.   Psychiatric: She has a normal mood and affect. Her behavior is normal. Judgment and thought content normal.         ED Course   Orthopedic Injury  Date/Time: 10/27/2017 3:03 PM  Performed by: KIMBERLY AGUILAR II  Authorized by: KIMBERLY AGUILAR II     Location procedure was performed:  [facility] EMERGENCY DEPARTMENT  Pre-operative diagnosis:  Hip fracture  Post-operative diagnosis:  Hip fracture  Consent Done?:  Emergent Situation  Injury:     Injury location:  Hip    Location details:  Right hip    Injury type:  Fracture    Fracture type: proximal femoral neck        Pre-procedure assessment:     Neurovascular status: Neurovascularly intact      Distal perfusion: normal      Neurological function: normal      Range of motion: reduced      Local anesthesia used?: No      Patient sedated?: No        Selections made in this section will also lock the Injury type section above.:     Manipulation performed?: Yes      Skin traction used?: Yes      Skeletal traction used?: No      Reduction successful?: Yes      Confirmation: Reduction confirmed by x-ray      MAST used?: No      Immobilization: traction with hanging weight.    Complications: No      Specimens: No      Implants: No    Post-procedure assessment:     Neurovascular status: Neurovascularly intact      Distal perfusion: normal      Neurological function: normal      Range of motion: improved      Patient tolerance:  Patient tolerated the procedure well with no immediate  complications      Critical Care  Date/Time: 10/27/2017 5:41 PM  Performed by: KIMBERLY AGUILAR II  Authorized by: KIMBERLY AGUILAR II   Direct patient critical care time: 15 minutes  Additional history critical care time: 3 minutes  Ordering / reviewing critical care time: 10 minutes  Documentation critical care time: 10 minutes  Consulting other physicians critical care time: 9 minutes  Consult with family critical care time: 5 minutes  Total critical care time (exclusive of procedural time) : 52 minutes  Critical care was necessary to treat or prevent imminent or life-threatening deterioration of the following conditions: trauma.        Labs Reviewed   CBC W/ AUTO DIFFERENTIAL - Abnormal; Notable for the following:        Result Value    WBC 13.65 (*)     RBC 3.38 (*)     Hemoglobin 9.9 (*)     Hematocrit 30.5 (*)     RDW 15.9 (*)     Gran # 10.5 (*)     Gran% 77.2 (*)     Lymph% 13.8 (*)     All other components within normal limits   COMPREHENSIVE METABOLIC PANEL - Abnormal; Notable for the following:     Glucose 167 (*)     Albumin 3.2 (*)     All other components within normal limits   TYPE & SCREEN     EKG Readings: (Independently Interpreted)   Initial Reading: No STEMI.   Normal Sinus Rhythm rate of 70 bpm.        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 Acute fracture of right hip with displacement and angulation.    Medical Decision Making:   Clinical Tests:   Lab Tests: Ordered and Reviewed  Radiological Study: Ordered and Reviewed  Medical Tests: Ordered and Reviewed  ED Management:  3:49 PM  Discussed case with Dr. Gerber, hopsitalist, will admit pt.    Imaging Results          SURG Fl Surgery FL[place]oro Greater Than 1 Hour (In process)                X-Ray Chest AP Portable (Final result)  Result time 10/27/17 15:33:26    Final result by Magalys Aquino MD (10/27/17 15:33:26)                 Impression:     No acute finding within the chest.        Electronically signed by: Magalys Aquino MD  Date:     10/27/17  Time:    15:33              Narrative:    Single view obtained.  Comparison is 2016 imaging.    There is scoliosis, osseous demineralization and degenerative change.  Patient's known right humeral fracture is again noted. Cardiac size is not enlarged.  Vascular calcification is present along the wall of the aorta. Lung fields are clear.  No large volume of pleural fluid is present.                             X-Ray Shoulder Complete 2 View Right (Final result)  Result time 10/27/17 15:27:05    Final result by Robin Rizo MD (10/27/17 15:27:05)                 Impression:        Acute impacted comminuted fracture of the neck and the greater tuberosity of the humerus.      Electronically signed by: ROBIN RIZO MD  Date:     10/27/17  Time:    15:27              Narrative:    History: Trauma.    Procedure: Right shoulder 2 views    Findings:    There is a acute impacted comminuted fracture of the neck of the humerus..  Fracture also extends into the greater tuberosity.  No dislocation.  No clavicular fracture or scapular fracture.                             X-Ray Pelvis Routine AP (Final result)  Result time 10/27/17 15:18:13   Procedure changed from X-Ray Hip 2 View Right     Final result by Robin Rizo MD (10/27/17 15:18:13)                 Impression:        1.  Acute comminuted intertrochanteric fracture of the right femur as above.  No dislocations.  2.  Old healed fractures of the right superior and inferior pubic rami and the left hip.      Electronically signed by: ROBIN RIZO MD  Date:     10/27/17  Time:    15:18              Narrative:    History: Trauma.    Procedure: Pelvis one view (2 films).    Findings:    The examination is compared to study of 2/16/2016.    There is acute comminuted intertrochanteric fracture of the right femur since the previous study.  There is a varus deformity of the right leg.  No dislocation of the  femoral head.  Healing fractures of the superior and inferior pubic rami on the right are noted.    Postoperative changes from left hip pinning remain without significant change from the previous study.  No loosening of the hardware noted on the left.  Healed intertrochanteric fracture noted.    There is osteopenia.                                      Scribe Attestation:   Scribe #1: I performed the above scribed service and the documentation accurately describes the services I performed. I attest to the accuracy of the note.            ED Course    I personally performed the history, physical exam and medical decision making; the documentation recorded by the scribe accurately reflects the service I performed and the decisions made by me.      Elderly female, good health for age, presents via EMS after mechanical trip and fall.  No syncope or loss of consciousness.  Primary complaint is right hip pain.  Presented with shortening and deformity, right leg was twisted, underneath her left leg, rotated at least 90°.  Patient was complaining of pain radiating down to the foot.  Soon after arrival on the ED stretcher, manipulation was performed the femur and hip were reduced to better anatomic positioning and distal traction device was applied.  Subsequent x-ray confirms intertrochanteric fracture.  The patient also complained of right shoulder pain and x-ray shows a nondisplaced fracture of the humerus for which she will be placed in a sling and swath.  Case discussed with her orthopedic surgeon, Dr. Davis, service Dr. Watkins is covering plan is for operative repair this evening as the patient has not had anything to eat since breakfast patient, family updated with findings and plan of care.  Hospitalist also consulted for preoperative evaluation and risk assessment    Clinical Impression:     1. Closed intertrochanteric fracture of right hip, initial encounter    2. Injury    3. Other closed nondisplaced fracture  of proximal end of right humerus, initial encounter                                 Kj Muniz II, MD  10/27/17 9130

## 2017-10-27 NOTE — ANESTHESIA PREPROCEDURE EVALUATION
10/27/2017  An Haezl is a 89 y.o., female.    Pre-op Assessment    I have reviewed the Patient Summary Reports.     I have reviewed the Nursing Notes.   I have reviewed the Medications.     Review of Systems  Anesthesia Hx:  No problems with previous Anesthesia  Denies Family Hx of Anesthesia complications.   Denies Personal Hx of Anesthesia complications.   Social:  Non-Smoker, No Alcohol Use    Hematology/Oncology:     Oncology Normal    -- Anemia:   EENT/Dental:   Glaucoma. Dry eye syndrome.   Cardiovascular:   Exercise tolerance: good Hypertension, well controlled    Pulmonary:  Pulmonary Normal Chronic cough due to allergies   Renal/:  Renal/ Normal     Hepatic/GI:  Hepatic/GI Normal    Musculoskeletal:   Arthritis  Osteoporosis. Kyphoscoliosis. Spine Disorders:    Neurological:   Neuromuscular Disease, kyphoscoliosis   Endocrine:  Endocrine Normal    Dermatological:  Skin Normal    Psych:  Psychiatric Normal           Physical Exam  General:  Well nourished, Cachexia    Airway/Jaw/Neck:  Airway Findings: Mouth Opening: Normal Tongue: Normal  General Airway Assessment: Adult, Good  Mallampati: II  Improves to II with phonation.  TM Distance: Normal, at least 6 cm  Jaw/Neck Findings:  Neck ROM: Extension Decreased, Mild     Eyes/Ears/Nose:  EYES/EARS/NOSE FINDINGS: Normal   Dental:  Dental Findings: In tact, Periodontal Disease, Mild, upper partial dentures, lower partial dentures        Mental Status:  Mental Status Findings:  Alert and Oriented         Anesthesia Plan  Type of Anesthesia, risks & benefits discussed:  Anesthesia Type:  general  Patient's Preference: Pt refuses spinal.  Intra-op Monitoring Plan: standard ASA monitors  Intra-op Monitoring Plan Comments:   Post Op Pain Control Plan:   Post Op Pain Control Plan Comments:   Induction:   IV and Inhalation  Beta Blocker:          Informed Consent: Patient understands risks and agrees with Anesthesia plan.  Questions answered. Anesthesia consent signed with patient.  ASA Score: 3  emergent   Day of Surgery Review of History & Physical:    H&P update referred to the surgeon.     Anesthesia Plan Notes: Mild anemia. Pt had GA for a hip fracture in 2013 at this facility and did well. Plan GA.        Ready For Surgery From Anesthesia Perspective.     Patient Active Problem List   Diagnosis    Hyperlipidemia    Osteoporosis    Kyphoscoliosis    Pseudoaphakia - Both Eyes    Blepharospasm - Both Eyes    Ptosis - Both Eyes    Familial drusen - Both Eyes    Lagophthalmos, unspecified - Both Eyes    Dry eye syndrome - Both Eyes    Hip fracture, intertrochanteric    Anemia    Debility    Urinary dysfunction    Open angle with borderline findings and low glaucoma risk in both eyes    Fall    Closed fracture of right inferior pubic ramus    Closed fracture of right superior pubic ramus    Pelvic hematoma in female    Idiopathic scoliosis of lumbar spine    Idiopathic scoliosis of thoracic spine    Urinary incontinence    Candidiasis of skin    Essential hypertension    Closed intertrochanteric fracture of right hip, initial encounter     Past Medical History:   Diagnosis Date    Anemia     Arthritis     Fever blister     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 Cornejo    HYSTERECTOMY      OOPHORECTOMY      ORIF HIP FRACTURE Left 12/2013    TONSILLECTOMY       Social History     Social History    Marital status:      Spouse name: N/A    Number of children: 4    Years of  education: N/A     Occupational History     of [employer]      Retired after 37 years     Social History Main Topics    Smoking status: Never Smoker    Smokeless tobacco: Never Used      Comment: The patient exercises every other day on an exercise bike.  On alternative days she does floor exercises with weights.    Alcohol use 1.2 - 2.4 oz/week     1 Glasses of wine, 1 - 3 Standard drinks or equivalent per week      Comment: Occasional use    Drug use: No    Sexual activity: Not on file     Other Topics Concern    Not on file     Social History Narrative    No narrative on file     CMP    Recent Labs  Lab 10/27/17  1520   *   CALCIUM 10.1   ALBUMIN 3.2*   PROT 8.3      K 3.9   CO2 24      BUN 23   CREATININE 0.8   ALKPHOS 67   ALT 14   AST 27   BILITOT 0.3     Recent Results (from the past 336 hour(s))   CBC auto differential    Collection Time: 10/27/17  3:20 PM   Result Value Ref Range    WBC 13.65 (H) 3.90 - 12.70 K/uL    Hemoglobin 9.9 (L) 12.0 - 16.0 g/dL    Hematocrit 30.5 (L) 37.0 - 48.5 %    Platelets 275 150 - 350 K/uL     BMP    Recent Labs  Lab 10/27/17  1520   *      K 3.9      CO2 24   BUN 23   CREATININE 0.8   CALCIUM 10.1     Lab Results   Component Value Date    TSH 3.870 02/16/2016

## 2017-10-27 NOTE — ASSESSMENT & PLAN NOTE
- She is on atenolol, amlodipine and Cozaar; took meds this morning  - Suspect unusually high BP is due to pain/stress and will come down during/after surgery  - Monitor closely

## 2017-10-28 PROBLEM — S42.454A CLOSED NONDISPLACED FRACTURE OF LATERAL CONDYLE OF RIGHT HUMERUS: Status: ACTIVE | Noted: 2017-10-28

## 2017-10-28 PROBLEM — S42.91XA CLOSED FRACTURE OF RIGHT SHOULDER: Status: ACTIVE | Noted: 2017-10-28

## 2017-10-28 LAB
ANION GAP SERPL CALC-SCNC: 8 MMOL/L
BASOPHILS # BLD AUTO: 0.03 K/UL
BASOPHILS NFR BLD: 0.3 %
BLD PROD TYP BPU: NORMAL
BLOOD UNIT EXPIRATION DATE: NORMAL
BLOOD UNIT TYPE CODE: 5100
BLOOD UNIT TYPE: NORMAL
BUN SERPL-MCNC: 26 MG/DL
CALCIUM SERPL-MCNC: 8.7 MG/DL
CHLORIDE SERPL-SCNC: 103 MMOL/L
CO2 SERPL-SCNC: 24 MMOL/L
CODING SYSTEM: NORMAL
CREAT SERPL-MCNC: 0.9 MG/DL
DIFFERENTIAL METHOD: ABNORMAL
DISPENSE STATUS: NORMAL
EOSINOPHIL # BLD AUTO: 0 K/UL
EOSINOPHIL NFR BLD: 0.1 %
ERYTHROCYTE [DISTWIDTH] IN BLOOD BY AUTOMATED COUNT: 15.8 %
EST. GFR  (AFRICAN AMERICAN): >60 ML/MIN/1.73 M^2
EST. GFR  (NON AFRICAN AMERICAN): 56 ML/MIN/1.73 M^2
GLUCOSE SERPL-MCNC: 163 MG/DL
HCT VFR BLD AUTO: 21.5 %
HGB BLD-MCNC: 6.8 G/DL
LYMPHOCYTES # BLD AUTO: 1.1 K/UL
LYMPHOCYTES NFR BLD: 9.7 %
MAGNESIUM SERPL-MCNC: 1.8 MG/DL
MCH RBC QN AUTO: 28.6 PG
MCHC RBC AUTO-ENTMCNC: 31.6 G/DL
MCV RBC AUTO: 90 FL
MONOCYTES # BLD AUTO: 1.2 K/UL
MONOCYTES NFR BLD: 10.5 %
NEUTROPHILS # BLD AUTO: 9 K/UL
NEUTROPHILS NFR BLD: 79.2 %
PHOSPHATE SERPL-MCNC: 5.3 MG/DL
PLATELET # BLD AUTO: 198 K/UL
PMV BLD AUTO: 11 FL
POTASSIUM SERPL-SCNC: 4.8 MMOL/L
RBC # BLD AUTO: 2.38 M/UL
SODIUM SERPL-SCNC: 135 MMOL/L
TRANS ERYTHROCYTES VOL PATIENT: NORMAL ML
WBC # BLD AUTO: 11.31 K/UL

## 2017-10-28 PROCEDURE — 36430 TRANSFUSION BLD/BLD COMPNT: CPT

## 2017-10-28 PROCEDURE — 83735 ASSAY OF MAGNESIUM: CPT

## 2017-10-28 PROCEDURE — 11000001 HC ACUTE MED/SURG PRIVATE ROOM

## 2017-10-28 PROCEDURE — 63600175 PHARM REV CODE 636 W HCPCS: Performed by: HOSPITALIST

## 2017-10-28 PROCEDURE — 63600175 PHARM REV CODE 636 W HCPCS: Performed by: ORTHOPAEDIC SURGERY

## 2017-10-28 PROCEDURE — P9021 RED BLOOD CELLS UNIT: HCPCS

## 2017-10-28 PROCEDURE — 99233 SBSQ HOSP IP/OBS HIGH 50: CPT | Mod: ,,, | Performed by: HOSPITALIST

## 2017-10-28 PROCEDURE — 25000003 PHARM REV CODE 250: Performed by: HOSPITALIST

## 2017-10-28 PROCEDURE — 36415 COLL VENOUS BLD VENIPUNCTURE: CPT

## 2017-10-28 PROCEDURE — 84100 ASSAY OF PHOSPHORUS: CPT

## 2017-10-28 PROCEDURE — 85025 COMPLETE CBC W/AUTO DIFF WBC: CPT

## 2017-10-28 PROCEDURE — G8979 MOBILITY GOAL STATUS: HCPCS | Mod: CJ

## 2017-10-28 PROCEDURE — G8978 MOBILITY CURRENT STATUS: HCPCS | Mod: CL

## 2017-10-28 PROCEDURE — 85018 HEMOGLOBIN: CPT

## 2017-10-28 PROCEDURE — 97530 THERAPEUTIC ACTIVITIES: CPT

## 2017-10-28 PROCEDURE — 94761 N-INVAS EAR/PLS OXIMETRY MLT: CPT

## 2017-10-28 PROCEDURE — 97161 PT EVAL LOW COMPLEX 20 MIN: CPT

## 2017-10-28 PROCEDURE — 99900035 HC TECH TIME PER 15 MIN (STAT)

## 2017-10-28 PROCEDURE — 25000003 PHARM REV CODE 250: Performed by: ORTHOPAEDIC SURGERY

## 2017-10-28 PROCEDURE — 80048 BASIC METABOLIC PNL TOTAL CA: CPT

## 2017-10-28 PROCEDURE — 97116 GAIT TRAINING THERAPY: CPT

## 2017-10-28 RX ORDER — HYDROCODONE BITARTRATE AND ACETAMINOPHEN 500; 5 MG/1; MG/1
TABLET ORAL
Status: DISCONTINUED | OUTPATIENT
Start: 2017-10-28 | End: 2017-10-31 | Stop reason: HOSPADM

## 2017-10-28 RX ORDER — ENOXAPARIN SODIUM 100 MG/ML
40 INJECTION SUBCUTANEOUS EVERY 24 HOURS
Status: DISCONTINUED | OUTPATIENT
Start: 2017-10-28 | End: 2017-10-31 | Stop reason: HOSPADM

## 2017-10-28 RX ADMIN — MUPIROCIN 1 G: 20 OINTMENT TOPICAL at 10:10

## 2017-10-28 RX ADMIN — ACETAMINOPHEN 650 MG: 325 TABLET ORAL at 07:10

## 2017-10-28 RX ADMIN — ATENOLOL 25 MG: 25 TABLET ORAL at 08:10

## 2017-10-28 RX ADMIN — DEXTROSE MONOHYDRATE, SODIUM CHLORIDE, AND POTASSIUM CHLORIDE: 50; 4.5; 1.49 INJECTION, SOLUTION INTRAVENOUS at 12:10

## 2017-10-28 RX ADMIN — LOSARTAN POTASSIUM 100 MG: 50 TABLET, FILM COATED ORAL at 08:10

## 2017-10-28 RX ADMIN — MUPIROCIN 1 G: 20 OINTMENT TOPICAL at 08:10

## 2017-10-28 RX ADMIN — ACETAMINOPHEN 650 MG: 325 TABLET ORAL at 03:10

## 2017-10-28 RX ADMIN — SODIUM CHLORIDE 500 ML: 0.9 INJECTION, SOLUTION INTRAVENOUS at 09:10

## 2017-10-28 RX ADMIN — ENOXAPARIN SODIUM 40 MG: 100 INJECTION SUBCUTANEOUS at 05:10

## 2017-10-28 RX ADMIN — ACETAMINOPHEN 650 MG: 325 TABLET ORAL at 05:10

## 2017-10-28 RX ADMIN — ACETAMINOPHEN 650 MG: 325 TABLET ORAL at 11:10

## 2017-10-28 RX ADMIN — CEFAZOLIN SODIUM 1 G: 1 SOLUTION INTRAVENOUS at 05:10

## 2017-10-28 RX ADMIN — ATENOLOL 25 MG: 25 TABLET ORAL at 09:10

## 2017-10-28 RX ADMIN — PRAVASTATIN SODIUM 10 MG: 10 TABLET ORAL at 09:10

## 2017-10-28 RX ADMIN — CEFAZOLIN SODIUM 1 G: 1 SOLUTION INTRAVENOUS at 12:10

## 2017-10-28 NOTE — PLAN OF CARE
"DC Planning:    Writer spoke with patient's daughter Dian in regards to plan of care. Per Dian, patient has history of falls and orthopedic surgeries. Patient has DME at home and does not anticipate having DME needs besides Hip Kit if OT recommends. Daughter stated patient has "usually gone to rehab and Home Health after for nursing and aid but no therapy". Patient and family would prefer referral to [facility] if recommended, pending PT/OT eval. Patient's daughter to stay at home with patient 24/7 through [facility] No needs expressed at this time, will re-evaluate for needs when recommendations are documented and discussed with treatment team. CM to follow and remain supportive.      10/28/17 1126   Discharge Assessment   Assessment Type Discharge Planning Assessment   Confirmed/corrected address and phone number on facesheet? Yes   Assessment information obtained from? Caregiver;Medical Record   Prior to hospitilization cognitive status: Alert/Oriented   Prior to hospitalization functional status: Assistive Equipment   Current cognitive status: Alert/Oriented   Current Functional Status: Assistive Equipment   Lives With alone   Able to Return to Prior Arrangements unable to determine at this time (comments)   Is patient able to care for self after discharge? Unable to determine at this time (comments)   Who are your caregiver(s) and their phone number(s)? Dian Luz, daughter, [phone]   Patient currently being followed by outpatient case management? No   Patient currently receives any other outside agency services? No   Equipment Currently Used at Home 3-in-1 commode;wheelchair;walker, rolling;shower chair;grab bar   Do you have any problems affording any of your prescribed medications? No   Is the patient taking medications as prescribed? yes   Does the patient have transportation home? Yes   Transportation Available family or friend will provide "   Discharge Plan A Home Health   Discharge Plan B Rehab   Patient/Family In Agreement With Plan yes

## 2017-10-28 NOTE — HOSPITAL COURSE
Patient underwent ORIF of the right hip on 10/27.  Her arm was treated with a sling and swathe, and PT/OT were consulted to work with her.  Given her excellent pre-injury functioning (able to do all ADLs and climb stairs) and with two joints affected she is a good candidate for inpatient rehabilitation.  She was accepted to [facility] on 10/31/17.

## 2017-10-28 NOTE — TRANSFER OF CARE
"Anesthesia Transfer of Care Note    Patient: An Hazel    Procedure(s) Performed: Procedure(s) (LRB):  OPEN REDUCTION INTERNAL FIXATION-HIP (Right)    Patient location: PACU    Anesthesia Type: general    Transport from OR: Transported from OR on 2-3 L/min O2 by NC with adequate spontaneous ventilation    Post pain: adequate analgesia    Post assessment: no apparent anesthetic complications and tolerated procedure well    Post vital signs: stable    Level of consciousness: awake and responds to stimulation    Nausea/Vomiting: no nausea/vomiting    Complications: none    Transfer of care protocol was followed      Last vitals:   Visit Vitals  BP (!) 225/95   Pulse 78   Temp 36.3 °C (97.4 °F) (Oral)   Resp 18   Ht 5' 2" (1.575 m)   Wt 47.6 kg (105 lb)   SpO2 96%   BMI 19.20 kg/m²     "

## 2017-10-28 NOTE — PLAN OF CARE
Problem: Patient Care Overview  Goal: Plan of Care Review  Outcome: Ongoing (interventions implemented as appropriate)  Pt resting in bed. VSS. 1 unit PRBC's transfused. Tolerated well. Dressing to right hip CDI. Sling to right arm. Pain controlled with po tylenol. Small in place, draining clear yellow urine. IV fluids maintained. Plan of care ongoing and revised as necessary. Will continue to monitor.

## 2017-10-28 NOTE — PROGRESS NOTES
Assessment/Plan:    Status Post right  Hip fracture intramedulary nail fixation of comminuted proximal femur fracture. Also with right proximal humerus fracture. Doing well postoperatively.   No chest pain, no shortness of breath. No nausea  Continues current post-op course  Consider transfusion if repeat Hct confirms significant acute blood loss anemia.  Pt for mobilization. Will bi non weightbearing through the right shoulder  May weight bear as tolerated BLE.     LOS: 1 day     Subjective:  Post-Operative Day: 1 Status Post right Total Hip Arthroplasty  Systemic or Specific Complaints:No Complaints    Objective:  Vital signs in last 24 hours:  Temp:  [96.1 °F (35.6 °C)-98 °F (36.7 °C)] 97.5 °F (36.4 °C)  Pulse:  [59-82] 74  Resp:  [16-18] 18  SpO2:  [89 %-100 %] 95 %  BP: (111-225)/(53-95) 124/54      General: alert, appears stated age and cooperative   Wound: Wound clean and dry no evidence of infection.   Motion: Painful range of Motion   DVT Exam: No evidence of DVT seen on physical exam.   Rt shoulder swelling. No significant deformity, Moving hand, fingers well. Normal sensability, Vasc intact  Right hip dressing dry, mild swelling.    Data Review  CBC:   Lab Results   Component Value Date    WBC 11.31 10/28/2017    RBC 2.38 (L) 10/28/2017    HGB 6.8 (L) 10/28/2017    HGB 6.8 (L) 10/28/2017    HCT 21.5 (L) 10/28/2017     10/28/2017

## 2017-10-28 NOTE — PT/OT/SLP EVAL
Physical Therapy  Evaluation and Treatement    [facility] Aleksander Luz   MRN: [id number]   Admitting Diagnosis: Closed intertrochanteric fracture of right hip, initial encounter   Surgery Date: 10/27/2017      Surgeon(s) and Role:     * Claude S. Williams IV, MD - Primary     Assisting Surgeon: None     Pre-op Diagnosis:  Closed fracture of right hip, initial encounter [S72.001A]     Post-op Diagnosis:  Post-Op Diagnosis Codes:     * Closed fracture of right hip, initial encounter [S72.001A]     Procedure(s) (LRB):  OPEN REDUCTION INTERNAL FIXATION-HIP (Right)    Closed fx of R humerus - nonsurgical      PT Received On: 10/28/17  PT Start Time: 1340     PT Stop Time: 1447    PT Total Time (min): 67 min       Billable Minutes:  Evaluation 22, Gait [id number] and Therapeutic Activity 30    Diagnosis: Closed intertrochanteric fracture of right hip, initial encounter      Past Medical History:   Diagnosis Date    Anemia     Arthritis     Fever blister     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 Cornejo    HYSTERECTOMY      OOPHORECTOMY      ORIF HIP FRACTURE Left 12/2013    TONSILLECTOMY         Referring physician: GUILLERMO Watkins  Date referred to PT: 10-27-17    General Precautions: Standard, fall  Orthopedic Precautions: RLE weight bearing as tolerated, RUE non weight bearing   Braces: UE Sling (R arm)       Do you have any cultural, spiritual, Sikh conflicts, given your current situation?: none mentioned    Patient History:  Lives With: alone, child(jose alberto), adult (two adult daughters have moved in with her to give her care)  Living Arrangements: house  Home Accessibility:  stairs to enter home  Home Layout: Able to live on 1st floor  Number of Stairs to Enter Home: 4  Stair Railings at Home: outside, present at both sides, inside, present on left side  Transportation Available: family or friend will provide  Living Environment Comment: has a daughter that is living with her but works during day and another daughter who lives in [place] that will be staying with her full time until the end of November ( 5 weeks).  pt will be able to stay on the first floor and would benefit from a hospital bed.  P has a tub/shower combo with and electric tub benc that lowers/lift pt into/out of tub.    Equipment Currently Used at Home: 3-in-1 commode, cane, straight, wheelchair, walker, rolling, shower chair (three point cane)  DME owned (not currently used): standard walker    Previous Level of Function:  Ambulation Skills: needs device (amb with cane mostly outside home)  Transfer Skills: needs device  ADL Skills: needs device  Work/Leisure Activity: needs device    Subjective:  Communicated with patient and two daughters prior to session.    Chief Complaint: R shoulder pain  Patient goals: to be able to walk independently again    Pain/Comfort  Pain Rating 1: 5/10  Location - Side 1: Right  Location 1: shoulder  Pain Addressed 1: Pre-medicate for activity, Distraction  Pain Rating Post-Intervention 1: 7/10  Pain Rating 2: 1/10  Location - Side 2: Right  Location 2: leg  Pain Addressed 2: Pre-medicate for activity, Distraction (pain increased with mobility)  Pain Rating Post-Intervention 2: 5/10      Objective:   Patient found with: peripheral IV, call catheter     Cognitive Exam:  Oriented to: Person, Place, Time and Situation    Follows Commands/attention: Follows multistep  commands  Communication: clear/fluent  Safety awareness/insight to disability: intact    Physical Exam:  Postural examination/scapula alignment: Rounded shoulder, Head forward and Scoliosis    Skin integrity: Visible skin  intact  Edema: Mild R LE and UE    Sensation:   Intact    Upper Extremity Range of Motion:  Right Upper Extremity: Deficits: nwb 2/2 fx  Left Upper Extremity: WFL    Upper Extremity Strength:  Right Upper Extremity: Deficits: nwb 2/2 fx  Left Upper Extremity: WFL    Lower Extremity Range of Motion:  Right Lower Extremity: WFL  Left Lower Extremity: WFL    Lower Extremity Strength:  Right Lower Extremity: WFL except limited by pain  Left Lower Extremity: WFL     Fine motor coordination:  Impaired  RLE heel shin - 2/2 pain    Gross motor coordination: WFL    Functional Mobility:  Bed Mobility:  Supine to Sit: Maximum Assistance    Transfers:  Sit <> Stand Assistance: Moderate Assistance  Sit <> Stand Assistive Device: Hemiwalker    Gait:   Gait Distance: 2  Assistance 1: Moderate assistance  Gait Assistive Device: Edison Walker  Gait Pattern: swing-through gait  Gait Deviation(s): decreased lissette, toe drag, increased stride width, decreased step length, decreased toe-to-floor clearance      Balance:   Static Sit: GOOD+: Takes MAXIMAL challenges from all directions.    Dynamic Sit: GOOD-: Maintains balance through MODERATE excursions of active trunk movement,     Static Stand: POOR: Needs MODERATE assist to maintain  Dynamic stand: POOR: N/A    Therapeutic Activities and Exercises:  On /off [facility]    AM-PAC 6 CLICK MOBILITY  How much help from another person does this patient currently need?   1 = Unable, Total/Dependent Assistance  2 = A lot, Maximum/Moderate Assistance  3 = A little, Minimum/Contact Guard/Supervision  4 = None, Modified [place]/Independent    Turning over in bed (including adjusting bedclothes, sheets and blankets)?: 2  Sitting down on and standing up from a chair with arms (e.g., wheelchair, bedside commode, etc.): 2  Moving from lying on back to sitting on the side of the bed?: 2  Moving to and from a bed to a chair (including a wheelchair)?: 2  Need to walk in hospital room?: 2  Climbing 3-5  steps with a railing?: 1  Total Score: 11     AM-PAC Raw Score CMS G-Code Modifier Level of Impairment Assistance   6 % Total / Unable   7 - 9 CM 80 - 100% Maximal Assist   10 - 14 CL 60 - 80% Moderate Assist   15 - 19 CK 40 - 60% Moderate Assist   20 - 22 CJ 20 - 40% Minimal Assist   23 CI 1-20% SBA / CGA   24 CH 0% Independent/ Mod I     Patient left up in chair with all lines intact, call button in reach, nurse notified and daughters present.    Assessment:   An Hazel is a 89 y.o. female with a medical diagnosis of Closed intertrochanteric fracture of right hip, initial encounter   Surgery Date: 10/27/2017      Surgeon(s) and Role:     * Claude S. Williams IV, MD - Primary     Assisting Surgeon: None     Pre-op Diagnosis:  Closed fracture of right hip, initial encounter [S72.001A]     Post-op Diagnosis:  Post-Op Diagnosis Codes:     * Closed fracture of right hip, initial encounter [S72.001A]     Procedure(s) (LRB):  OPEN REDUCTION INTERNAL FIXATION-HIP (Right)    Closed fx of R humerus - nonsurgical     and presents with pain and impaired functional mobility especially limited 2/2 R humerus fx that will not be surgically treated and is NWB.  Pt will benefit from hospital PT and SNF to regain full independent functional mobility.      Rehab identified problem list/impairments: Rehab identified problem list/impairments: weakness, impaired endurance, gait instability, impaired functional mobilty, impaired balance, pain, decreased upper extremity function, decreased lower extremity function, orthopedic precautions    Rehab potential is good.    Activity tolerance: Good    Discharge recommendations: Discharge Facility/Level Of Care Needs: nursing facility, skilled     Barriers to discharge: Barriers to Discharge: Decreased caregiver support, Inaccessible home environment    Equipment recommendations: Equipment Needed After Discharge: walker, javy     GOALS:    Physical Therapy Goals         Problem: Physical Therapy Goal    Goal Priority Disciplines Outcome Goal Variances Interventions   Physical Therapy Goal     PT/OT, PT      Description:  Goals to be met by 11-3-17.  1. Sup<>sit min A  2. Sit<>stand with RW min A  3. amb 10' with RW min A                      PLAN:    Patient to be seen daily to address the above listed problems via gait training, therapeutic activities, therapeutic exercises  Plan of Care expires: 11/28/17  Plan of Care reviewed with: patient, daughter (TWO DAUGHTERS)    Functional Assessment Tool Used: AM-PAC  Score: 11  Functional Limitation: Mobility: Walking and moving around  Mobility: Walking and Moving Around Current Status (): CL  Mobility: Walking and Moving Around Goal Status (): DALTON De La Cruz, PT  10/28/2017

## 2017-10-28 NOTE — PLAN OF CARE
Problem: Physical Therapy Goal  Goal: Physical Therapy Goal  Goals to be met by 11-3-17.  1. Sup<>sit min A  2. Sit<>stand with RW min A  3. amb 10' with RW min A    -    Comments: Physical therapy eval completed.  Recommend SNF.  Will need hemiwalker and hospital bed.

## 2017-10-28 NOTE — ASSESSMENT & PLAN NOTE
- Expected post-operative drop in H/H, somewhat greater than expected so will repeat stat hemoglobin  - If still low will transfuse one unit given lower than usual BP.

## 2017-10-28 NOTE — SUBJECTIVE & OBJECTIVE
Interval History:  Patient's BP was on the low side overnight and she was treated with a bolus of IVF this morning with BP held (except beta blocker).  She is alert this morning and having problems maneuvering with her left arm as she is right handed.  Has sore throat after surgery.  No other complaints.    Review of Systems   Constitutional: Negative for chills and fever.   Respiratory: Negative for cough and shortness of breath.    Cardiovascular: Negative for chest pain and palpitations.     Objective:     Vital Signs (Most Recent):  Temp: 97.5 °F (36.4 °C) (10/28/17 0722)  Pulse: 74 (10/28/17 0958)  Resp: 18 (10/28/17 0722)  BP: (!) 124/54 (10/28/17 0958)  SpO2: 95 % (10/28/17 0958) Vital Signs (24h Range):  Temp:  [96.1 °F (35.6 °C)-98 °F (36.7 °C)] 97.5 °F (36.4 °C)  Pulse:  [59-82] 74  Resp:  [16-18] 18  SpO2:  [89 %-100 %] 95 %  BP: (111-225)/(53-95) 124/54     Weight: 47.6 kg (104 lb 15 oz)  Body mass index is 19.19 kg/m².    Intake/Output Summary (Last 24 hours) at 10/28/17 1042  Last data filed at 10/28/17 0543   Gross per 24 hour   Intake             1248 ml   Output              800 ml   Net              448 ml      Physical Exam   Constitutional: She is oriented to person, place, and time. She appears well-developed and well-nourished.   Elderly woman sitting up, NAD.   HENT:   Head: Normocephalic.   Mildly hard of hearing.   Eyes: Conjunctivae are normal. Pupils are equal, round, and reactive to light.   Bilateral ptosis of eyelids.   Neck: Neck supple. No thyromegaly present.   Cardiovascular: Normal rate, regular rhythm, normal heart sounds and intact distal pulses.  Exam reveals no gallop and no friction rub.    No murmur heard.  Pulmonary/Chest: Effort normal and breath sounds normal.   Abdominal: Soft. Bowel sounds are normal. She exhibits no distension. There is no tenderness.   Musculoskeletal: Normal range of motion. She exhibits no edema.   Right arm in sling.   Lymphadenopathy:     She has  no cervical adenopathy.   Neurological: She is alert and oriented to person, place, and time.   Strength equal and symmetric   Skin: Skin is warm and dry. No rash noted.   Multiple ecchymoses both upper extremities.   Psychiatric: She has a normal mood and affect. Her behavior is normal. Thought content normal.       Significant Labs:   CBC:   Recent Labs  Lab 10/27/17  1520 10/28/17  0436   WBC 13.65* 11.31   HGB 9.9* 6.8*   HCT 30.5* 21.5*    198       Significant Imaging: I have reviewed all pertinent imaging results/findings within the past 24 hours.

## 2017-10-28 NOTE — PT/OT/SLP PROGRESS
Occupational Therapy  Not Seen Note    An Hazel  MRN: [id number]    Patient not seen today secondary to Unavailable (Comment) (nursing care, blood transfusion). Will follow-up Sunday, 10/29/17.    HUSSEIN Ramirez  10/28/2017

## 2017-10-28 NOTE — BRIEF OP NOTE
[facility]  Brief Operative Note    SUMMARY     Surgery Date: 10/27/2017     Surgeon(s) and Role:     * Claude S. Williams IV, MD - Primary    Assisting Surgeon: None    Pre-op Diagnosis:  Closed fracture of right hip, initial encounter [S72.001A]    Post-op Diagnosis:  Post-Op Diagnosis Codes:     * Closed fracture of right hip, initial encounter [S72.001A]    Procedure(s) (LRB):  OPEN REDUCTION INTERNAL FIXATION-HIP (Right)    Anesthesia: General    Description of Procedure: Right him intertrochanteric hip fracture    Description of the findings of the procedure: as above    Estimated Blood Loss: * 100cc   *         Specimens:   Specimen (12h ago through future)    None

## 2017-10-28 NOTE — ANESTHESIA POSTPROCEDURE EVALUATION
"Anesthesia Post Evaluation    Patient: An Hazel    Procedure(s) Performed: Procedure(s) (LRB):  OPEN REDUCTION INTERNAL FIXATION-HIP (Right)    Final Anesthesia Type: general  Patient location during evaluation: PACU  Patient participation: Yes- Able to Participate  Level of consciousness: awake and alert  Post-procedure vital signs: reviewed and stable  Pain management: adequate  Airway patency: patent  PONV status at discharge: No PONV  Anesthetic complications: no      Cardiovascular status: hemodynamically stable  Respiratory status: unassisted  Hydration status: euvolemic  Follow-up not needed.        Visit Vitals  BP (!) 157/69 (BP Location: Left arm, Patient Position: Lying)   Pulse 67   Temp 36.1 °C (97 °F) (Axillary)   Resp 18   Ht 5' 2" (1.575 m)   Wt 47.6 kg (105 lb)   SpO2 99%   BMI 19.20 kg/m²       Pain/Henry Score: Pain Assessment Performed: Yes (10/27/2017  7:51 PM)  Presence of Pain: denies (10/27/2017  7:51 PM)  Pain Rating Prior to Med Admin: 7 (10/27/2017  4:27 PM)  Henry Score: 8 (10/27/2017  7:51 PM)      "

## 2017-10-28 NOTE — PLAN OF CARE
Problem: Patient Care Overview  Goal: Plan of Care Review  Outcome: Ongoing (interventions implemented as appropriate)  Pt in no distress on 2L N[place], will continue to monitor.

## 2017-10-28 NOTE — ASSESSMENT & PLAN NOTE
- She is on atenolol, amlodipine and Cozaar - BP decreased after surgery  - Hold amlodipine and Cozaar this morning  - Monitor closely

## 2017-10-28 NOTE — PROGRESS NOTES
[facility] Hospital Medicine  Progress Note    Patient Name: An Hazel  MRN: [id number]  Patient Class: IP- Inpatient   Admission Date: 10/27/2017  Length of Stay: 1 days  Attending Physician: Siria Gerber MD  Primary Care Provider: Alcon Thomas Jr, MD        Subjective:     Principal Problem:Closed intertrochanteric fracture of right hip, initial encounter    HPI:  Ms. Luz is a 89 year old woman known to me from a previous admission.  She presented today with her 2 daughters with pain in her right hip and shoulder after falling on her right side.  She had eaten breakfast at 9 AM, then got up to make a phone call and was going to sit back down after hanging up when she tripped over a stool.  X-rays showed an acute comminuted intertrochanteric fracture of the right femur and an acute impacted comminuted fracture of the neck of the humerus with extension into the greater tuberosity.    Further workup significant for elevated BP, CBC with mild anemia at baseline and leukocytosis, normal CXR and EKG with no acute changes with previously seen anteroseptal infarction.    Patient is independent and has good exercise tolerance, lives in a 2 story house and goes up and down the stairs many times during the day.  She has HTN and takes atenolol.  She takes iron for a known iron deficiency anemia.  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  She has idiopathic scoliosis and osteoporosis.  She was hospitalized here in late 2013 after falling and fracturing her left hip, and this was repaired with Dr. Davis.  She fell again in 2/2016 and was hospitalized here with pubic ramus fractures and required a stay in SNF.    Hospital Course:  Patient underwent ORIF of the right hip on 10/27.  Her arm was treated with a sling and swathe, and PT/OT were consulted to work with her.    Interval History:  Patient's BP was on the low side overnight and she  was treated with a bolus of IVF this morning with BP held (except beta blocker).  She is alert this morning and having problems maneuvering with her left arm as she is right handed.  Has sore throat after surgery.  No other complaints.    Review of Systems   Constitutional: Negative for chills and fever.   Respiratory: Negative for cough and shortness of breath.    Cardiovascular: Negative for chest pain and palpitations.     Objective:     Vital Signs (Most Recent):  Temp: 97.5 °F (36.4 °C) (10/28/17 0722)  Pulse: 74 (10/28/17 0958)  Resp: 18 (10/28/17 0722)  BP: (!) 124/54 (10/28/17 0958)  SpO2: 95 % (10/28/17 0958) Vital Signs (24h Range):  Temp:  [96.1 °F (35.6 °C)-98 °F (36.7 °C)] 97.5 °F (36.4 °C)  Pulse:  [59-82] 74  Resp:  [16-18] 18  SpO2:  [89 %-100 %] 95 %  BP: (111-225)/(53-95) 124/54     Weight: 47.6 kg (104 lb 15 oz)  Body mass index is 19.19 kg/m².    Intake/Output Summary (Last 24 hours) at 10/28/17 1042  Last data filed at 10/28/17 0543   Gross per 24 hour   Intake             1248 ml   Output              800 ml   Net              448 ml      Physical Exam   Constitutional: She is oriented to person, place, and time. She appears well-developed and well-nourished.   Elderly woman sitting up, NAD.   HENT:   Head: Normocephalic.   Mildly hard of hearing.   Eyes: Conjunctivae are normal. Pupils are equal, round, and reactive to light.   Bilateral ptosis of eyelids.   Neck: Neck supple. No thyromegaly present.   Cardiovascular: Normal rate, regular rhythm, normal heart sounds and intact distal pulses.  Exam reveals no gallop and no friction rub.    No murmur heard.  Pulmonary/Chest: Effort normal and breath sounds normal.   Abdominal: Soft. Bowel sounds are normal. She exhibits no distension. There is no tenderness.   Musculoskeletal: Normal range of motion. She exhibits no edema.   Right arm in sling.   Lymphadenopathy:     She has no cervical adenopathy.   Neurological: She is alert and oriented to  person, place, and time.   Strength equal and symmetric   Skin: Skin is warm and dry. No rash noted.   Multiple ecchymoses both upper extremities.   Psychiatric: She has a normal mood and affect. Her behavior is normal. Thought content normal.       Significant Labs:   CBC:   Recent Labs  Lab 10/27/17  1520 10/28/17  0436   WBC 13.65* 11.31   HGB 9.9* 6.8*   HCT 30.5* 21.5*    198       Significant Imaging: I have reviewed all pertinent imaging results/findings within the past 24 hours.    Assessment/Plan:      * Closed intertrochanteric fracture of right hip, initial encounter    - POD #1 ORIF  - Pain control adequate  - PT/OT  - DVT prophylaxis with enoxaparin for now        Essential hypertension    - She is on atenolol, amlodipine and Cozaar - BP decreased after surgery  - Hold amlodipine and Cozaar this morning  - Monitor closely            Closed fracture of right shoulder    - OT consulted.          Acute posthemorrhagic anemia    - Expected post-operative drop in H/H, somewhat greater than expected so will repeat stat hemoglobin  - If still low will transfuse one unit given lower than usual BP.          Kyphoscoliosis              Osteoporosis                VTE Risk Mitigation         Ordered     enoxaparin injection 40 mg  Daily     Route:  Subcutaneous        10/28/17 1054     Medium Risk of VTE  Once      10/27/17 1723     Place sequential compression device  Until discontinued      10/27/17 1723              Siria Griffin MD  Department of Hospital Medicine   [facility]

## 2017-10-29 LAB
BASOPHILS # BLD AUTO: 0.04 K/UL
BASOPHILS NFR BLD: 0.3 %
DIFFERENTIAL METHOD: ABNORMAL
EOSINOPHIL # BLD AUTO: 0.1 K/UL
EOSINOPHIL NFR BLD: 0.8 %
ERYTHROCYTE [DISTWIDTH] IN BLOOD BY AUTOMATED COUNT: 15.9 %
HCT VFR BLD AUTO: 25.4 %
HGB BLD-MCNC: 8.2 G/DL
LYMPHOCYTES # BLD AUTO: 1.6 K/UL
LYMPHOCYTES NFR BLD: 10.6 %
MCH RBC QN AUTO: 28.2 PG
MCHC RBC AUTO-ENTMCNC: 32.3 G/DL
MCV RBC AUTO: 87 FL
MONOCYTES # BLD AUTO: 1.8 K/UL
MONOCYTES NFR BLD: 12.4 %
NEUTROPHILS # BLD AUTO: 11.1 K/UL
NEUTROPHILS NFR BLD: 75.5 %
PLATELET # BLD AUTO: 171 K/UL
PMV BLD AUTO: 11.2 FL
RBC # BLD AUTO: 2.91 M/UL
WBC # BLD AUTO: 14.65 K/UL

## 2017-10-29 PROCEDURE — 36415 COLL VENOUS BLD VENIPUNCTURE: CPT

## 2017-10-29 PROCEDURE — 97530 THERAPEUTIC ACTIVITIES: CPT

## 2017-10-29 PROCEDURE — G8997 SWALLOW GOAL STATUS: HCPCS | Mod: CJ

## 2017-10-29 PROCEDURE — 25000003 PHARM REV CODE 250: Performed by: ORTHOPAEDIC SURGERY

## 2017-10-29 PROCEDURE — 99900035 HC TECH TIME PER 15 MIN (STAT)

## 2017-10-29 PROCEDURE — 25000003 PHARM REV CODE 250: Performed by: HOSPITALIST

## 2017-10-29 PROCEDURE — 63600175 PHARM REV CODE 636 W HCPCS: Performed by: HOSPITALIST

## 2017-10-29 PROCEDURE — 85025 COMPLETE CBC W/AUTO DIFF WBC: CPT

## 2017-10-29 PROCEDURE — 97116 GAIT TRAINING THERAPY: CPT

## 2017-10-29 PROCEDURE — 97166 OT EVAL MOD COMPLEX 45 MIN: CPT

## 2017-10-29 PROCEDURE — 92610 EVALUATE SWALLOWING FUNCTION: CPT

## 2017-10-29 PROCEDURE — G8996 SWALLOW CURRENT STATUS: HCPCS | Mod: CJ

## 2017-10-29 PROCEDURE — G8998 SWALLOW D/C STATUS: HCPCS | Mod: CJ

## 2017-10-29 PROCEDURE — 94761 N-INVAS EAR/PLS OXIMETRY MLT: CPT

## 2017-10-29 PROCEDURE — 11000001 HC ACUTE MED/SURG PRIVATE ROOM

## 2017-10-29 PROCEDURE — 97110 THERAPEUTIC EXERCISES: CPT

## 2017-10-29 PROCEDURE — 99233 SBSQ HOSP IP/OBS HIGH 50: CPT | Mod: ,,, | Performed by: HOSPITALIST

## 2017-10-29 RX ADMIN — ACETAMINOPHEN 650 MG: 325 TABLET ORAL at 04:10

## 2017-10-29 RX ADMIN — ATENOLOL 25 MG: 25 TABLET ORAL at 08:10

## 2017-10-29 RX ADMIN — ACETAMINOPHEN 650 MG: 325 TABLET ORAL at 08:10

## 2017-10-29 RX ADMIN — DEXTROSE MONOHYDRATE, SODIUM CHLORIDE, AND POTASSIUM CHLORIDE: 50; 4.5; 1.49 INJECTION, SOLUTION INTRAVENOUS at 02:10

## 2017-10-29 RX ADMIN — ACETAMINOPHEN 650 MG: 325 TABLET ORAL at 03:10

## 2017-10-29 RX ADMIN — LOSARTAN POTASSIUM 100 MG: 50 TABLET, FILM COATED ORAL at 08:10

## 2017-10-29 RX ADMIN — ENOXAPARIN SODIUM 40 MG: 100 INJECTION SUBCUTANEOUS at 04:10

## 2017-10-29 RX ADMIN — PRAVASTATIN SODIUM 10 MG: 10 TABLET ORAL at 08:10

## 2017-10-29 RX ADMIN — RAMELTEON 8 MG: 8 TABLET, FILM COATED ORAL at 10:10

## 2017-10-29 RX ADMIN — MUPIROCIN 1 G: 20 OINTMENT TOPICAL at 08:10

## 2017-10-29 RX ADMIN — AMLODIPINE BESYLATE 5 MG: 5 TABLET ORAL at 08:10

## 2017-10-29 RX ADMIN — ACETAMINOPHEN 650 MG: 325 TABLET ORAL at 12:10

## 2017-10-29 NOTE — CARE UPDATE
Pt lying in bed with HOB elevated. AAO x 4. Resp. Even and unlabored. Sling to right arm. Dsg to right hip CDI. Peripheral iv's to right arm patent with D51/2NS with 20KCL @ 75cc/hr infusing to right arm. No acute distress noted. No c/o disocmfort @ this time. Safety maintained. Vital signs stable. Call light in reach. Family @ bedside.

## 2017-10-29 NOTE — SUBJECTIVE & OBJECTIVE
Interval History:  She is up in a chair after therapy.  Has been sitting for 2.5 hours and wants to get back in bed.  She does not like the idea of SNF, prefers to go home.  Daughter at bedside noncommittal.    Review of Systems   Constitutional: Negative for chills and fever.   Respiratory: Negative for cough and shortness of breath.    Cardiovascular: Negative for chest pain and palpitations.     Objective:     Vital Signs (Most Recent):  Temp: 97.5 °F (36.4 °C) (10/29/17 1125)  Pulse: 67 (10/29/17 1125)  Resp: 18 (10/29/17 1125)  BP: (!) 152/70 (10/29/17 1125)  SpO2: 96 % (10/29/17 1125) Vital Signs (24h Range):  Temp:  [97.5 °F (36.4 °C)-99.2 °F (37.3 °C)] 97.5 °F (36.4 °C)  Pulse:  [67-83] 67  Resp:  [16-20] 18  SpO2:  [94 %-97 %] 96 %  BP: (139-168)/(58-73) 152/70     Weight: 47.6 kg (104 lb 15 oz)  Body mass index is 19.19 kg/m².    Intake/Output Summary (Last 24 hours) at 10/29/17 1424  Last data filed at 10/29/17 1235   Gross per 24 hour   Intake           809.17 ml   Output             1125 ml   Net          -315.83 ml      Physical Exam   Constitutional: She is oriented to person, place, and time. She appears well-developed and well-nourished.   Elderly woman sitting up, NAD.   HENT:   Head: Normocephalic.   Mildly hard of hearing.   Eyes: Conjunctivae are normal. Pupils are equal, round, and reactive to light.   Bilateral ptosis of eyelids.   Neck: Neck supple. No thyromegaly present.   Cardiovascular: Normal rate, regular rhythm, normal heart sounds and intact distal pulses.  Exam reveals no gallop and no friction rub.    No murmur heard.  Pulmonary/Chest: Effort normal and breath sounds normal.   Abdominal: Soft. Bowel sounds are normal. She exhibits no distension. There is no tenderness.   Musculoskeletal: Normal range of motion. She exhibits no edema.   Right arm in sling.   Lymphadenopathy:     She has no cervical adenopathy.   Neurological: She is alert and oriented to person, place, and time.    Strength equal and symmetric   Skin: Skin is warm and dry. No rash noted.   Multiple ecchymoses both upper extremities.   Psychiatric: She has a normal mood and affect. Her behavior is normal. Thought content normal.       Significant Labs:   CBC:   Recent Labs  Lab 10/27/17  1520 10/28/17  0436 10/28/17  1049 10/29/17  0449   WBC 13.65* 11.31  --  14.65*   HGB 9.9* 6.8* 6.8*  6.8* 8.2*   HCT 30.5* 21.5*  --  25.4*    198  --  171       Significant Imaging: I have reviewed all pertinent imaging results/findings within the past 24 hours.

## 2017-10-29 NOTE — PLAN OF CARE
Problem: SLP Goal  Goal: SLP Goal  SLP Short Term Goals:  1.  Pt will be able to consume a dental soft/finely chopped diet with thin liquids with no signs of airway threat or aspiration given min assistance to follow through on compensatory strategies: small bites/sips, alternate bites/sips, slow feeding rate (single bites/sips at a time)  Outcome: Ongoing (interventions implemented as appropriate)  Bedside swallow evaluation completed. See report for details     Comments: Speech to follow up x1 visit to review swallow precautions with pt and pt's family

## 2017-10-29 NOTE — PLAN OF CARE
Problem: Occupational Therapy Goal  Goal: Occupational Therapy Goal  Goals to be met by 11/29/17  1. Max A UBD  2. Max A bed>BSC/chair with Edison-Walker  3. Set-up G/H (seated)  4. Min A Right shoulder pendulum and elbow>hand ROM exercises     Outcome: Ongoing (interventions implemented as appropriate)  OT evaluation completed with treatment initiated.  Recommend SNF placement at discharge.  If discharge home she will need attendant care and DME: hospital bed, edison-walker, drop-arm BSC, mckenzie lift.  HUSSEIN Ramirez 10/29/2017

## 2017-10-29 NOTE — PT/OT/SLP EVAL
Occupational Therapy  Evaluation/Treatment    An Hazel   MRN: [id number]   Admitting Diagnosis: Closed intertrochanteric fracture of right hip, initial encounter, S/p Right hip ORIF, Right humerus fx    OT Date of Treatment: 10/29/17   OT Start Time: 0904  OT Stop Time: 1040  OT Total Time (min): 96 min    Billable Minutes:  Evaluation 42  Therapeutic Activity 38  Therapeutic Exercise 16    Diagnosis: Closed intertrochanteric fracture of right hip, initial encounter   S/p Right hip ORIF, Right humerus fx (no surgical repair)    Past Medical History:   Diagnosis Date    Anemia     Arthritis     Fever blister     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 Cornejo    HYSTERECTOMY      OOPHORECTOMY      ORIF HIP FRACTURE Left 12/2013    TONSILLECTOMY         Referring physician: Malcolm Watkins  Date referred to OT: 10/27/17    General Precautions: Standard, fall  Orthopedic Precautions: RLE weight bearing as tolerated, RUE non weight bearing  Braces: UE Sling    Do you have any cultural, spiritual, Catholic conflicts, given your current situation?: none     Patient History:  Living Environment  Lives With: alone (daughter stays at her house nearly full time)  Living Arrangements: house  Home Accessibility: stairs to enter home  Home Layout: Able to live on 1st floor  Number of Stairs to Enter Home: 4  Stair Railings at Home: outside, present at both sides, inside, present at both sides  Transportation Available: family or friend will provide (until the end of the month, then no transportation exeept by arrangement - daughter works day hours)  Living Environment  Comment: The patient lives alone, with her daughter staying with her nearly full-time to assist with her mother's care. thid daughter works day hours and has limited ability to assist with her care at discharge. SHRAVAN aguilera is visiting through [employer] and can assist with her mother's care during this limited time span.  Equipment Currently Used at Home: 3-in-1 commode, cane, straight, cane, quad, wheelchair, walker, rolling, bath bench, shower chair (3=1 commode used over toilet / DME available for use)    Prior level of function:   Bed Mobility/Transfers: needs device (QC)  Grooming: needs device (QC)  Bathing: needs device (TTB, QC for transferf)  Upper Body Dressing: independent  Lower Body Dressing: independent  Toileting: needs device (QLC)  Home Management Skills: unable to perform  Driving License: Yes  Mode of Transportation: Car  Leisure and Hobbies: TV, reading  IADL Comments: ambulates with QC, MI self-care, daughter does household tasks     Dominant hand: right    Subjective:  Communicated with PT prior to session.  The patient was found seated in bed trying to eat breakfast. She reported difficulty eating because of swallowing difficulties since surgery; Speech Therapy consult attained from Dr Gerber.  She also expressed concern about non-surgical intervention with Right shoulder.  This was discussed with Dr Watkins.  He reports it to be a complete non-displaced fx of the Right humeral head, expected to heal within 3 month without surgical intervention.  He provided OT orders to begin ROM exercises to the RUE noting that it was to be pendulum exercises (verbally recommending no PROM Right shoulder), encouraging AROM elbow, forearm hand within the patient's pain tolerance.  The RUE is NWB with no UE support for ambulation.  The patient was agreeable to OT service.    Chief Complaint: pain Right shoulder   Patient/Family stated goals: regain PLOF    Pain/Comfort  Pain Rating 1: 5/10  Location  - Side 1: Right  Location 1: shoulder  Pain Addressed 1: Cessation of Activity, Pre-medicate for activity, Distraction  Pain Rating Post-Intervention 1: 5/10    Objective:  Patient found with: peripheral IV, telemetry, [place]    Cognitive Exam:  Oriented to: Person, Place, Time and Situation  Follows Commands/attention: Follows two-step commands  Communication: clear/fluent  Memory:  No Deficits noted  Safety awareness/insight to disability: impaired  Coping skills/emotional control: Appropriate to situation    Visual/perceptual:  Impaired  acuity and glasses, [place] (hearing aides at home), partial dental plates at home    Physical Exam:  Postural examination/scapula alignment: Rounded shoulder, Head forward, Posterior pelvic tilt and Kyphosis  Skin integrity: Visible skin intact  Edema: None noted during Assessment    Sensation:   Intact for light touch both hands    Upper Extremity Range of Motion:  Right Upper Extremity: NT (arm in sling)  Left Upper Extremity: shoulder flexion/ABD 60*    Upper Extremity Strength:  Right Upper Extremity: NT  Left Upper Extremity: arm 3/5, elbow-forearm 4/5   Strength: fair both hands    Fine motor coordination:   Right hand (WFL - pt limits hand use in the sling), Left hand: WFL for self-care    Gross motor coordination: positional static sitting EOB good, with very little positional displacement sited, poor standing balance, not able to step without Total A  for LLE limb placement with stand-pivot transfer    Functional Mobility:  Bed Mobility:  Supine to Sit: Maximum Assistance    Transfers: (manual assist)   Mod A sit>stand/Max A stand>sit   Max A stand-pivot transfer bed>chair    Functional Ambulation: none    Activities of Daily Living:  Feeding Level of Assistance: Stand by assistance (Left hand use: difficulty manipulating utencils and containers)  UE Dressing Level of Assistance: Total assistance (hospital gown)  LE Dressing Level of Assistance: Total assistance  Grooming  "Position:  (supported sitting in bed)  Grooming Level of Assistance: Minimum assistance (brush teeth, wash face, brush hair)      Balance:   Static Sit: FAIR: Maintains without assist, but unable to take any challenges   Dynamic Sit: FAIR+: Maintains balance through MINIMAL excursions of active trunk motion  Static Stand: 0: Needs MAXIMAL assist to maintain   Dynamic stand: Not husam to step with stand-pivot transfer    Therapeutic Activities and Exercises:  Education (at Dr Watkins request): status of Right shoulder fx, reason for non-surgical intervention, expected time for bone healing, reason for initiating RUE exercise  Pt/family conference: in-depth discussion: discharge planning options and reason for current recommendations, rehabilitation treatment planning goals with potential functional status and DME needs if discharged home  Discussion: RUE hand and lower arm use precautions and recommendations  Instruction: bed mobility, transfer training, and UE hand use with self-care    AM-PAC 6 CLICK ADL  How much help from another person does this patient currently need?  1 = Unable, Total/Dependent Assistance  2 = A lot, Maximum/Moderate Assistance  3 = A little, Minimum/Contact Guard/Supervision  4 = None, Modified [place]/Independent    Putting on and taking off regular lower body clothing? : 1  Bathing (including washing, rinsing, drying)?: 1  Toileting, which includes using toilet, bedpan, or urinal? : 1  Putting on and taking off regular upper body clothing?: 1  Taking care of personal grooming such as brushing teeth?: 3 (supported sitting)  Eating meals?: 3 (supported sitting)  Total Score: 10    AM-PAC Raw Score CMS "G-Code Modifier Level of Impairment Assistance   6 % Total / Unable   7 - 9 CM 80 - 100% Maximal Assist   10-14 CL 60 - 80% Moderate Assist   15 - 19 CK 40 - 60% Moderate Assist   20 - 22 CJ 20 - 40% Minimal Assist   23 CI 1-20% SBA / CGA   24 CH 0% Independent/ Mod I       Patient " left up in chair with all lines intact, call button in reach, nurse notified and Speech Therapist and patient's daughter present    Assessment:  An Hazel is a 89 y.o. female with a medical diagnosis of Closed intertrochanteric fracture of right hip, initial encounter,S/p Right hip ORIF, Right humerus fx  and presents wit weakness, impaired self care skills, impaired functional mobility, gait instability, impaired balance, decreased upper extremity function, decreased lower extremity function, decreased safety awareness, pain, orthopedic precautions effecting functional performance during the session.  Extra time, expanded time for patient/family education and environmental modifications, adaptation and approach and patient assist needed for safety.  OT treatment is needed to maximize function, begin RUE exercise and address discharge needs during her hospital stay.  .  Rehab identified problem list/impairments: Rehab identified problem list/impairments: weakness, impaired self care skills, impaired functional mobility, gait instability, impaired balance, decreased upper extremity function, decreased lower extremity function, decreased safety awareness, pain, orthopedic precautions    Rehab potential is good.    Activity tolerance: Good    Discharge recommendations: Discharge Facility/Level Of Care Needs: nursing facility, skilled (If discharged home whe will need attendant care and Home Health PT/OT)     Barriers to discharge: Barriers to Discharge: Inaccessible home environment, Decreased caregiver support    Equipment recommendations: hospital bed, lift device, walker, javy (drop-arm BSC (for use at bed side))     GOALS:    Occupational Therapy Goals        Problem: Occupational Therapy Goal    Goal Priority Disciplines Outcome Interventions   Occupational Therapy Goal     OT, PT/OT Ongoing (interventions implemented as appropriate)    Description:  Goals to be met by 11/29/17  1. Max SHRAVAN POZO  2. Max  A bed>BSC/chair with Edison-Walker  3. Set-up G/H (seated)  4. Min A Right shoulder pendulum and elbow>hand ROM exercises                      PLAN:  Patient to be seen daily to address the above listed problems via self-care/home management, therapeutic activities, therapeutic exercises  Plan of Care expires: 11/29/17  Plan of Care reviewed with: patient, daughter         Leanne OlsonHUSSEIN  10/29/2017

## 2017-10-29 NOTE — PLAN OF CARE
Problem: Patient Care Overview  Goal: Plan of Care Review  Outcome: Ongoing (interventions implemented as appropriate)  Patient in no apparent distress. Sat's  95 % on room air . Will continue to monitor.

## 2017-10-29 NOTE — PT/OT/SLP EVAL
Speech Language Pathology  Evaluation    [facility] Aleksander Luz   MRN: [id number]   Admitting Diagnosis: Closed intertrochanteric fracture of right hip, initial encounter    Diet recommendations: Solid Diet Level: Dental Soft, Finely chopped meat  Liquid Diet Level: Thin   1. Small bites/sips  2. Alternate bites/sips  3. Slow feeding rate (SINGLE bite/sip at a time)  4. Pt must be sitting upright for all PO intake    SLP Treatment Date: 10/29/17  Speech Start Time: 1030     Speech Stop Time: 1055     Speech Total (min): 25 min       TREATMENT BILLABLE MINUTES:  Eval Swallow and Oral Function 25 minutes    Diagnosis: Closed intertrochanteric fracture of right hip, initial encounter  Ms. uLz is a 89 year old woman who presented on 10/27/2017 with her 2 daughters with pain in her right hip and shoulder after falling on her right side.  She had eaten breakfast at 9 AM, then got up to make a phone call and was going to sit back down after hanging up when she tripped over a stool.  X-rays showed an acute comminuted intertrochanteric fracture of the right femur and an acute impacted comminuted fracture of the neck of the humerus with extension into the greater tuberosity.     Further workup significant for elevated BP, CBC with mild anemia at baseline and leukocytosis, normal CXR and EKG with no acute changes with previously seen anteroseptal infarction.     Patient is independent and has good exercise tolerance, lives in a 2 story house and goes up and down the stairs many times during the day.  She has HTN and takes atenolol.  She takes iron for a known iron deficiency anemia.  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  She has idiopathic scoliosis and osteoporosis.  She was hospitalized here in late 2013 after falling and fracturing her left hip, and this was repaired with Dr. Davis.  She fell again in 2/2016 and was hospitalized here with pubic ramus fractures and required a  "stay in SNF.    Status Post right Hip fracture intramedulary nail fixation of comminuted proximal femur fracture. Also with right proximal humerus fracture. Doing well postoperatively.     Speech consulted to assess current diet as pt reportedly experiencing difficulty swallowing since sx.    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 Cornejo    HYSTERECTOMY      OOPHORECTOMY      ORIF HIP FRACTURE Left 12/2013    TONSILLECTOMY         Has the patient been evaluated by SLP for swallowing? : Yes  Keep patient NPO?: No   General Precautions: Standard, fall       Prior diet: regular solids/thin liquids.    Subjective:  Pt sitting upright in bedside chair after working with OT as ST entered room. Pt reports difficulties "getting food down" with eggs and grits this AM. Pt stated, "I don't like applesauce but that's the only thin I could eat this morning." Pt's dtr present at bedside reports no swallowing difficulties with peaches and lasagna last night. Pt reports swallowing difficulties take place in AM since brief intubation during sx. Pt reported experiencing anxiety this AM and previous morning which she feels may have contributed to swallowing difficulties.     Objective:   Patient found with: peripheral IV, call catheter     COGNITIVE COMMUNICATION STATUS: Pt is alert, pleasant, cooperative, and oriented x4. Able to follow all commands and express all wants and needs independently. Speech is 100% intelligible at conversational level.    Oral Musculature Evaluation:  Oral Musculature: " WFL  Dentition: present and adequate  Mandibular Strength and Mobility: WNL  Oral Labial Strength and Mobility: WNL  Lingual Strength and Mobility: WNL  Velar Elevation: WNL  Buccal Strength and Mobility: WNL  Volitional Cough: strong, productive  Volitional Swallow: WFL  Voice Prior to PO Intake: clear, strong vocal quality     Bedside Swallow Eval:  Consistencies Assessed: Thin liquids (x7 straw sips of thin liquids), Puree (x4 tsp bites of pudding), Soft solids (x3 tsp bites of soft/diced peaches) and Solids (x4 small pieces of sabra cracker)    Oral Phase: WNL for labial seal with spoon and straw, ability to form a cohesive bolus, ability to bite and chew a dry cracker, and a-p transport of liquids, puree, and dental soft/regular solids. No overt signs of difficulty with mastication noted with any PO trials.    Pharyngeal Phase: Trigger of the swallow reflex and hyolaryngeal elevation/excursion appear to be WFL. No overt signs of airway threat or aspiration on intake of liquids from straw, puree from tsp, or on dental soft/regular solids. No coughing, throat clearing, or change in vocal quality noted before, during, or after swallowing any consistencies.    Additional Treatment:  SLP discussed diet recommendations and reviewed universal swallow precautions with pt and pt's dtr at bedside.    Assessment:  An Hazel is a 89 y.o. female with a medical diagnosis of Closed intertrochanteric fracture of right hip, initial encounter and presents with no overt s/s of aspiration or dysphagia with any consistencies. Recommend dental soft/finely chopped diet w/ thin liquids with adherence to universal swallow precautions: small bites/sips, alternate bites/sips, single bites/sips at a time. ST will f/u x1 to ensure safety with diet and to review swallow precautions with patient.    Do you have any cultural, spiritual, Restorationist conflicts, given your current situation?: none mentioned     Discharge  recommendations: Discharge Facility/Level Of Care Needs: nursing facility, skilled     Goals:    SLP Goals        Problem: SLP Goal    Goal Priority Disciplines Outcome   SLP Goal     SLP Ongoing (interventions implemented as appropriate)   Description:  SLP Short Term Goals:  1.  Pt will be able to consume a dental soft/finely chopped diet with thin liquids with no signs of airway threat or aspiration given min assistance to follow through on compensatory strategies: small bites/sips, alternate bites/sips, slow feeding rate (single bites/sips at a time)                     Plan:   Patient to be seen Therapy Frequency: 1 x/week   Plan of Care expires:    Plan of Care reviewed with: patient, daughter  SLP Follow-up?: Yes  SLP - Next Visit Date: 10/30/17      SLP G-Codes  Functional Assessment Tool Used: NOMS  Score: 5  Functional Limitations: Swallowing  Swallow Current Status (): DALTON  Swallow Goal Status (): DALTON Castillo CCC-SLP  10/29/2017

## 2017-10-29 NOTE — ASSESSMENT & PLAN NOTE
- POD #2 ORIF  - Pain control adequate  - PT/OT recommending SNF  - DVT prophylaxis with enoxaparin for now.

## 2017-10-29 NOTE — PROGRESS NOTES
[facility] Hospital Medicine  Progress Note    Patient Name: An Hzael  MRN: [id number]  Patient Class: IP- Inpatient   Admission Date: 10/27/2017  Length of Stay: 2 days  Attending Physician: Siria Gerber MD  Primary Care Provider: Alcon Thomas Jr, MD        Subjective:     Principal Problem:Closed intertrochanteric fracture of right hip, initial encounter    HPI:  Ms. Luz is a 89 year old woman known to me from a previous admission.  She presented today with her 2 daughters with pain in her right hip and shoulder after falling on her right side.  She had eaten breakfast at 9 AM, then got up to make a phone call and was going to sit back down after hanging up when she tripped over a stool.  X-rays showed an acute comminuted intertrochanteric fracture of the right femur and an acute impacted comminuted fracture of the neck of the humerus with extension into the greater tuberosity.    Further workup significant for elevated BP, CBC with mild anemia at baseline and leukocytosis, normal CXR and EKG with no acute changes with previously seen anteroseptal infarction.    Patient is independent and has good exercise tolerance, lives in a 2 story house and goes up and down the stairs many times during the day.  She has HTN and takes atenolol.  She takes iron for a known iron deficiency anemia.  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  She has idiopathic scoliosis and osteoporosis.  She was hospitalized here in late 2013 after falling and fracturing her left hip, and this was repaired with Dr. Davis.  She fell again in 2/2016 and was hospitalized here with pubic ramus fractures and required a stay in SNF.    Hospital Course:  Patient underwent ORIF of the right hip on 10/27.  Her arm was treated with a sling and swathe, and PT/OT were consulted to work with her.    Interval History:  She is up in a chair after therapy.  Has been  sitting for 2.5 hours and wants to get back in bed.  She does not like the idea of SNF, prefers to go home.  Daughter at bedside noncommittal.    Review of Systems   Constitutional: Negative for chills and fever.   Respiratory: Negative for cough and shortness of breath.    Cardiovascular: Negative for chest pain and palpitations.     Objective:     Vital Signs (Most Recent):  Temp: 97.5 °F (36.4 °C) (10/29/17 1125)  Pulse: 67 (10/29/17 1125)  Resp: 18 (10/29/17 1125)  BP: (!) 152/70 (10/29/17 1125)  SpO2: 96 % (10/29/17 1125) Vital Signs (24h Range):  Temp:  [97.5 °F (36.4 °C)-99.2 °F (37.3 °C)] 97.5 °F (36.4 °C)  Pulse:  [67-83] 67  Resp:  [16-20] 18  SpO2:  [94 %-97 %] 96 %  BP: (139-168)/(58-73) 152/70     Weight: 47.6 kg (104 lb 15 oz)  Body mass index is 19.19 kg/m².    Intake/Output Summary (Last 24 hours) at 10/29/17 1424  Last data filed at 10/29/17 1235   Gross per 24 hour   Intake           809.17 ml   Output             1125 ml   Net          -315.83 ml      Physical Exam   Constitutional: She is oriented to person, place, and time. She appears well-developed and well-nourished.   Elderly woman sitting up, NAD.   HENT:   Head: Normocephalic.   Mildly hard of hearing.   Eyes: Conjunctivae are normal. Pupils are equal, round, and reactive to light.   Bilateral ptosis of eyelids.   Neck: Neck supple. No thyromegaly present.   Cardiovascular: Normal rate, regular rhythm, normal heart sounds and intact distal pulses.  Exam reveals no gallop and no friction rub.    No murmur heard.  Pulmonary/Chest: Effort normal and breath sounds normal.   Abdominal: Soft. Bowel sounds are normal. She exhibits no distension. There is no tenderness.   Musculoskeletal: Normal range of motion. She exhibits no edema.   Right arm in sling.   Lymphadenopathy:     She has no cervical adenopathy.   Neurological: She is alert and oriented to person, place, and time.   Strength equal and symmetric   Skin: Skin is warm and dry. No rash  noted.   Multiple ecchymoses both upper extremities.   Psychiatric: She has a normal mood and affect. Her behavior is normal. Thought content normal.       Significant Labs:   CBC:   Recent Labs  Lab 10/27/17  1520 10/28/17  0436 10/28/17  1049 10/29/17  0449   WBC 13.65* 11.31  --  14.65*   HGB 9.9* 6.8* 6.8*  6.8* 8.2*   HCT 30.5* 21.5*  --  25.4*    198  --  171       Significant Imaging: I have reviewed all pertinent imaging results/findings within the past 24 hours.    Assessment/Plan:      * Closed intertrochanteric fracture of right hip, initial encounter    - POD #2 ORIF  - Pain control adequate  - PT/OT recommending SNF  - DVT prophylaxis with enoxaparin for now.        Essential hypertension    - She is on atenolol, amlodipine and Cozaar - BP decreased after surgery  - Meds held yesterday for low BP, recovered after transfusion and now elevated.          Closed fracture of right shoulder    - OT consulted as well as PT.  - Will need SNF          Acute posthemorrhagic anemia    - Expected post-operative drop in H/H, transfused one unit yesterday with good result.          Kyphoscoliosis              Osteoporosis                VTE Risk Mitigation         Ordered     enoxaparin injection 40 mg  Daily     Route:  Subcutaneous        10/28/17 1054     Medium Risk of VTE  Once      10/27/17 1723     Place sequential compression device  Until discontinued      10/27/17 1723              Siria Griffin MD  Department of Hospital Medicine   [facility]

## 2017-10-29 NOTE — PLAN OF CARE
Problem: Patient Care Overview  Goal: Plan of Care Review  Outcome: Ongoing (interventions implemented as appropriate)  Patient remains free from injury or falls. HTN through shift but other vital signs stable throughout night on room air. Positions self independently. Pain managed with PO medications. Small to gravity draining clear yellow urine with adequate output. Neurovascular intact. Hip precautions maintained. Bed in low locked position and call light within reach. Will continue to monitor.

## 2017-10-29 NOTE — ASSESSMENT & PLAN NOTE
- She is on atenolol, amlodipine and Cozaar - BP decreased after surgery  - Meds held yesterday for low BP, recovered after transfusion and now elevated.

## 2017-10-29 NOTE — PLAN OF CARE
Problem: Patient Care Overview  Goal: Plan of Care Review  Outcome: Ongoing (interventions implemented as appropriate)  Pt remains on RA with saturation 95-96%.

## 2017-10-29 NOTE — PLAN OF CARE
Problem: Physical Therapy Goal  Goal: Physical Therapy Goal  Goals to be met by 11-3-17.  1. Sup<>sit min A  2. Sit<>stand with RW min A  3. amb 10' with RW min A     -    Comments: Excellent participation in therapy.  Pt with limited endurance.  Excellent SNF candidate.

## 2017-10-29 NOTE — PROGRESS NOTES
Assessment/Plan:    Status Post right  Hip fracture intramedulary nail fixation of comminuted proximal femur fracture. Also with right proximal humerus fracture. Doing well postoperatively.   No chest pain, no shortness of breath. No nausea  Continues current post-op course  Hct improved today  Pt for mobilization. Will bi non weightbearing through the right shoulder  May weight bear as tolerated BLE.  D/C planning, possible SNF eval   LOS: 2 days     Subjective:  Post-Operative Day:  2  Status Post right Total Hip ORIF with IM nail.  Systemic or Specific Complaints:No Complaints    Objective:  Vital signs in last 24 hours:  Temp:  [98 °F (36.7 °C)-99.2 °F (37.3 °C)] 98.4 °F (36.9 °C)  Pulse:  [71-83] 73  Resp:  [16-20] 16  SpO2:  [90 %-97 %] 95 %  BP: (124-168)/(54-73) 151/63      General: alert, appears stated age and cooperative   Wound: Wound clean and dry no evidence of infection.   Motion: Painful range of Motion   DVT Exam: No evidence of DVT seen on physical exam.   Rt shoulder swelling. No significant deformity, Moving hand, fingers well. Normal sensability, Vasc intact  Right hip dressing dry, mild swelling.    Data Review  CBC:   Lab Results   Component Value Date    WBC 14.65 (H) 10/29/2017    RBC 2.91 (L) 10/29/2017    HGB 8.2 (L) 10/29/2017    HCT 25.4 (L) 10/29/2017     10/29/2017

## 2017-10-29 NOTE — PT/OT/SLP PROGRESS
Physical Therapy  Treatment    An  Aleksander Luz   MRN: [id number]   Admitting Diagnosis: Closed intertrochanteric fracture of right hip, initial encounter     Surgery Date: 10/27/2017      Surgeon(s) and Role:     * Claude S. Williams IV, MD - Primary     Assisting Surgeon: None     Pre-op Diagnosis:  Closed fracture of right hip, initial encounter [S72.001A]     Post-op Diagnosis:  Post-Op Diagnosis Codes:     * Closed fracture of right hip, initial encounter [S72.001A]     Procedure(s) (LRB):  OPEN REDUCTION INTERNAL FIXATION-HIP (Right)      PT Received On: 10/29/17  PT Start Time: 1332     PT Stop Time: 1405    PT Total Time (min): 33 min       Billable Minutes:  Gait [id number] and Therapeutic Exercise 15    Treatment Type: Treatment                General Precautions: Standard, fall  Orthopedic Precautions: RLE weight bearing as tolerated, RUE non weight bearing   Braces: UE Sling (R shoulder)    Do you have any cultural, spiritual, Hoahaoism conflicts, given your current situation?: none mentioned    Subjective:  Communicated with patient and daughter prior to session.      Pain/Comfort  Pain Rating 1: 5/10  Location - Side 1: Right  Location 1: shoulder  Pain Addressed 1: Pre-medicate for activity, Distraction  Pain Rating Post-Intervention 1: 5/10  Pain Rating 2: 1/10  Location - Side 2: Right  Location 2: leg  Pain Addressed 2: Pre-medicate for activity, Distraction  Pain Rating Post-Intervention 2: 3/10    Objective:   Patient found with: peripheral IV, telemetry    Functional Mobility:  Bed Mobility:   Sit to Supine: Maximum Assistance    Transfers:  Sit <> Stand Assistance: Moderate Assistance  Sit <> Stand Assistive Device: Hemiwalker    Gait:   Gait Distance: 2 steps each leg  Assistance 1: Maximum assistance  Gait Assistive Device: Edison Walker  Gait Deviation(s):  (difficulty advancing right foot needing assistance  needed max v/c to advance and use hemiwalker )        Balance:   Static Sit:  FAIR: Maintains without assist, but unable to take any challenges   Dynamic Sit: FAIR: Cannot move trunk without losing balance  Static Stand: POOR: Needs MODERATE assist to maintain  Dynamic stand: POOR: N/A     Therapeutic Activities and Exercises:  In bed supine: ap x 20; B slr with assist x 15; B ab with assist x 15; qs x 5; gs x 5     AM-PAC 6 CLICK MOBILITY  How much help from another person does this patient currently need?   1 = Unable, Total/Dependent Assistance  2 = A lot, Maximum/Moderate Assistance  3 = A little, Minimum/Contact Guard/Supervision  4 = None, Modified [place]/Independent    Turning over in bed (including adjusting bedclothes, sheets and blankets)?: 2  Sitting down on and standing up from a chair with arms (e.g., wheelchair, bedside commode, etc.): 2  Moving from lying on back to sitting on the side of the bed?: 2  Moving to and from a bed to a chair (including a wheelchair)?: 2  Need to walk in hospital room?: 2  Climbing 3-5 steps with a railing?: 1  Total Score: 11    AM-PAC Raw Score CMS G-Code Modifier Level of Impairment Assistance   6 % Total / Unable   7 - 9 CM 80 - 100% Maximal Assist   10 - 14 CL 60 - 80% Moderate Assist   15 - 19 CK 40 - 60% Moderate Assist   20 - 22 CJ 20 - 40% Minimal Assist   23 CI 1-20% SBA / CGA   24 CH 0% Independent/ Mod I     Patient left with bed in chair position with all lines intact, call button in reach, bed alarm on, nurse notified and daughter' present.    Assessment:  An Hazel is a 89 y.o. female with a medical diagnosis of Closed intertrochanteric fracture of right hip, initial encounter   Surgery Date: 10/27/2017      Surgeon(s) and Role:     * Claude S. Williams IV, MD - Primary     Assisting Surgeon: None     Pre-op Diagnosis:  Closed fracture of right hip, initial encounter [S72.001A]     Post-op Diagnosis:  Post-Op Diagnosis Codes:     * Closed fracture of right hip, initial encounter [S72.001A]     Procedure(s)  (LRB):  OPEN REDUCTION INTERNAL FIXATION-HIP (Right)    and presents with pain and impaired functional mobility.  Pt is excellent SNF candidate and will benefit to regain full independent functional mobility.      Rehab identified problem list/impairments: Rehab identified problem list/impairments: weakness, impaired endurance, impaired balance, impaired functional mobilty, gait instability, decreased coordination, decreased upper extremity function, decreased lower extremity function, pain, orthopedic precautions    Rehab potential is excellent.    Activity tolerance: Excellent    Discharge recommendations: Discharge Facility/Level Of Care Needs: nursing facility, skilled     Barriers to discharge: Barriers to Discharge: Inaccessible home environment, Decreased caregiver support    Equipment recommendations: Equipment Needed After Discharge: hospital bed, walker, javy     GOALS:    Physical Therapy Goals        Problem: Physical Therapy Goal    Goal Priority Disciplines Outcome Goal Variances Interventions   Physical Therapy Goal     PT/OT, PT      Description:  Goals to be met by 11-3-17.  1. Sup<>sit min A  2. Sit<>stand with RW min A  3. amb 10' with RW min A                      PLAN:    Patient to be seen daily  to address the above listed problems via gait training, therapeutic activities, therapeutic exercises  Plan of Care expires: 11/28/17  Plan of Care reviewed with: patient, daughter         Mike De La Cruz, PT  10/29/2017

## 2017-10-30 LAB
BASOPHILS # BLD AUTO: 0.02 K/UL
BASOPHILS NFR BLD: 0.1 %
DIFFERENTIAL METHOD: ABNORMAL
EOSINOPHIL # BLD AUTO: 0 K/UL
EOSINOPHIL NFR BLD: 0.1 %
ERYTHROCYTE [DISTWIDTH] IN BLOOD BY AUTOMATED COUNT: 16.3 %
HCT VFR BLD AUTO: 24.1 %
HGB BLD-MCNC: 7.9 G/DL
LYMPHOCYTES # BLD AUTO: 1.1 K/UL
LYMPHOCYTES NFR BLD: 8.3 %
MCH RBC QN AUTO: 28.7 PG
MCHC RBC AUTO-ENTMCNC: 32.8 G/DL
MCV RBC AUTO: 88 FL
MONOCYTES # BLD AUTO: 1.7 K/UL
MONOCYTES NFR BLD: 12.6 %
NEUTROPHILS # BLD AUTO: 10.5 K/UL
NEUTROPHILS NFR BLD: 78.6 %
PLATELET # BLD AUTO: 186 K/UL
PMV BLD AUTO: 11 FL
RBC # BLD AUTO: 2.75 M/UL
WBC # BLD AUTO: 13.39 K/UL

## 2017-10-30 PROCEDURE — 99900035 HC TECH TIME PER 15 MIN (STAT)

## 2017-10-30 PROCEDURE — 97110 THERAPEUTIC EXERCISES: CPT

## 2017-10-30 PROCEDURE — 85025 COMPLETE CBC W/AUTO DIFF WBC: CPT

## 2017-10-30 PROCEDURE — 11000001 HC ACUTE MED/SURG PRIVATE ROOM

## 2017-10-30 PROCEDURE — 97530 THERAPEUTIC ACTIVITIES: CPT

## 2017-10-30 PROCEDURE — 63600175 PHARM REV CODE 636 W HCPCS: Performed by: ORTHOPAEDIC SURGERY

## 2017-10-30 PROCEDURE — 63600175 PHARM REV CODE 636 W HCPCS: Performed by: HOSPITALIST

## 2017-10-30 PROCEDURE — 94761 N-INVAS EAR/PLS OXIMETRY MLT: CPT

## 2017-10-30 PROCEDURE — 25000003 PHARM REV CODE 250: Performed by: HOSPITALIST

## 2017-10-30 PROCEDURE — 25000003 PHARM REV CODE 250: Performed by: ORTHOPAEDIC SURGERY

## 2017-10-30 PROCEDURE — 36415 COLL VENOUS BLD VENIPUNCTURE: CPT

## 2017-10-30 PROCEDURE — 86580 TB INTRADERMAL TEST: CPT | Performed by: ORTHOPAEDIC SURGERY

## 2017-10-30 PROCEDURE — 99233 SBSQ HOSP IP/OBS HIGH 50: CPT | Mod: ,,, | Performed by: HOSPITALIST

## 2017-10-30 RX ORDER — LOSARTAN POTASSIUM 100 MG/1
100 TABLET ORAL DAILY
Qty: 90 TABLET | Refills: 3 | Status: ON HOLD
Start: 2017-10-30 | End: 2017-11-14 | Stop reason: HOSPADM

## 2017-10-30 RX ORDER — ENOXAPARIN SODIUM 100 MG/ML
40 INJECTION SUBCUTANEOUS DAILY
Status: ON HOLD
Start: 2017-10-30 | End: 2017-11-14 | Stop reason: HOSPADM

## 2017-10-30 RX ADMIN — TUBERCULIN PURIFIED PROTEIN DERIVATIVE 5 UNITS: 5 INJECTION, SOLUTION INTRADERMAL at 12:10

## 2017-10-30 RX ADMIN — MUPIROCIN 1 G: 20 OINTMENT TOPICAL at 08:10

## 2017-10-30 RX ADMIN — AMLODIPINE BESYLATE 5 MG: 5 TABLET ORAL at 08:10

## 2017-10-30 RX ADMIN — DEXTROSE MONOHYDRATE, SODIUM CHLORIDE, AND POTASSIUM CHLORIDE: 50; 4.5; 1.49 INJECTION, SOLUTION INTRAVENOUS at 04:10

## 2017-10-30 RX ADMIN — RAMELTEON 8 MG: 8 TABLET, FILM COATED ORAL at 10:10

## 2017-10-30 RX ADMIN — ACETAMINOPHEN 650 MG: 325 TABLET ORAL at 12:10

## 2017-10-30 RX ADMIN — ATENOLOL 25 MG: 25 TABLET ORAL at 09:10

## 2017-10-30 RX ADMIN — PRAVASTATIN SODIUM 10 MG: 10 TABLET ORAL at 08:10

## 2017-10-30 RX ADMIN — ACETAMINOPHEN 650 MG: 325 TABLET ORAL at 06:10

## 2017-10-30 RX ADMIN — MUPIROCIN 1 G: 20 OINTMENT TOPICAL at 09:10

## 2017-10-30 RX ADMIN — ACETAMINOPHEN 650 MG: 325 TABLET ORAL at 04:10

## 2017-10-30 RX ADMIN — ENOXAPARIN SODIUM 40 MG: 100 INJECTION SUBCUTANEOUS at 05:10

## 2017-10-30 RX ADMIN — ACETAMINOPHEN 650 MG: 325 TABLET ORAL at 08:10

## 2017-10-30 RX ADMIN — ATENOLOL 25 MG: 25 TABLET ORAL at 08:10

## 2017-10-30 RX ADMIN — LOSARTAN POTASSIUM 100 MG: 50 TABLET, FILM COATED ORAL at 09:10

## 2017-10-30 RX ADMIN — ACETAMINOPHEN 650 MG: 325 TABLET ORAL at 10:10

## 2017-10-30 RX ADMIN — ACETAMINOPHEN 650 MG: 325 TABLET ORAL at 01:10

## 2017-10-30 NOTE — PLAN OF CARE
Updated clinicals sent to [facility] via [facility]. Called [facility] to follow up-message left on Whitney's voicemail.

## 2017-10-30 NOTE — PLAN OF CARE
Dr Gerber & therapy are requesting that a referral be sent to [facility] feeling that the patient may be more suitable for that level of care. Referral sent via [facility] & called into [facility]. [facility] will review & get back to us but feels patient can be admitted tomorrow

## 2017-10-30 NOTE — PROGRESS NOTES
[facility] Hospital Medicine  Progress Note    Patient Name: An Hazel  MRN: [id number]  Patient Class: IP- Inpatient   Admission Date: 10/27/2017  Length of Stay: 3 days  Attending Physician: Siria Gerber MD  Primary Care Provider: Alcon Thomas Jr, MD        Subjective:     Principal Problem:Closed intertrochanteric fracture of right hip, initial encounter    HPI:  Ms. Luz is a 89 year old woman known to me from a previous admission.  She presented today with her 2 daughters with pain in her right hip and shoulder after falling on her right side.  She had eaten breakfast at 9 AM, then got up to make a phone call and was going to sit back down after hanging up when she tripped over a stool.  X-rays showed an acute comminuted intertrochanteric fracture of the right femur and an acute impacted comminuted fracture of the neck of the humerus with extension into the greater tuberosity.    Further workup significant for elevated BP, CBC with mild anemia at baseline and leukocytosis, normal CXR and EKG with no acute changes with previously seen anteroseptal infarction.    Patient is independent and has good exercise tolerance, lives in a 2 story house and goes up and down the stairs many times during the day.  She has HTN and takes atenolol.  She takes iron for a known iron deficiency anemia.  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  She has idiopathic scoliosis and osteoporosis.  She was hospitalized here in late 2013 after falling and fracturing her left hip, and this was repaired with Dr. Davis.  She fell again in 2/2016 and was hospitalized here with pubic ramus fractures and required a stay in SNF.    Hospital Course:  Patient underwent ORIF of the right hip on 10/27.  Her arm was treated with a sling and swathe, and PT/OT were consulted to work with her.  Given her excellent pre-injury functioning (able to do all ADLs and  climb stairs) and with two joints affected she is a good candidate for inpatient rehabilitation.      Interval History:  Seen while working with OT.  Doing very well.  No complaints, pain is tolerable.      Review of Systems   Constitutional: Negative for chills and fever.   Respiratory: Negative for cough and shortness of breath.    Cardiovascular: Negative for chest pain and palpitations.     Objective:     Vital Signs (Most Recent):  Temp: 97.6 °F (36.4 °C) (10/30/17 1246)  Pulse: 76 (10/30/17 1246)  Resp: 16 (10/30/17 1246)  BP: 129/60 (10/30/17 1246)  SpO2: 96 % (10/30/17 1246) Vital Signs (24h Range):  Temp:  [97.6 °F (36.4 °C)-99.3 °F (37.4 °C)] 97.6 °F (36.4 °C)  Pulse:  [71-77] 76  Resp:  [16-20] 16  SpO2:  [95 %-96 %] 96 %  BP: (129-185)/(60-78) 129/60     Weight: 47.6 kg (104 lb 15 oz)  Body mass index is 19.19 kg/m².    Intake/Output Summary (Last 24 hours) at 10/30/17 1455  Last data filed at 10/30/17 0800   Gross per 24 hour   Intake              240 ml   Output             2500 ml   Net            -2260 ml      Physical Exam   Constitutional: She is oriented to person, place, and time. She appears well-developed and well-nourished.   Elderly woman sitting up, NAD.   HENT:   Head: Normocephalic.   Mildly hard of hearing.   Eyes: Conjunctivae are normal. Pupils are equal, round, and reactive to light.   Neck: Neck supple. No thyromegaly present.   Cardiovascular: Normal rate, regular rhythm, normal heart sounds and intact distal pulses.  Exam reveals no gallop and no friction rub.    No murmur heard.  Pulmonary/Chest: Effort normal and breath sounds normal.   Abdominal: Soft. Bowel sounds are normal. She exhibits no distension. There is no tenderness.   Musculoskeletal: Normal range of motion. She exhibits no edema.   Right arm in sling.   Lymphadenopathy:     She has no cervical adenopathy.   Neurological: She is alert and oriented to person, place, and time.   Strength equal and symmetric   Skin: Skin  is warm and dry. No rash noted.   Multiple ecchymoses both upper extremities.   Psychiatric: She has a normal mood and affect. Her behavior is normal. Thought content normal.       Significant Labs: All pertinent labs within the past 24 hours have been reviewed.    Significant Imaging: I have reviewed all pertinent imaging results/findings within the past 24 hours.    Assessment/Plan:      * Closed intertrochanteric fracture of right hip, initial encounter    - POD #3 ORIF  - Pain control adequate  - Recommend inpatient rehab given good pre-morbid functioning and with 2 joints affected.  - DVT prophylaxis with enoxaparin for 35 days total.        Essential hypertension    - She is on atenolol, amlodipine and Cozaar - BP decreased after surgery  - Meds held for low BP after surgery, recovered after transfusion and now normalized.          Closed fracture of right shoulder    - OT consulted as well as PT.  - Training with javy walker          Acute posthemorrhagic anemia    - Expected post-operative drop in H/H, transfused one unit with good result.  - Asymptomatic          Kyphoscoliosis              Osteoporosis                VTE Risk Mitigation         Ordered     enoxaparin injection 40 mg  Daily     Route:  Subcutaneous        10/28/17 1054     Medium Risk of VTE  Once      10/27/17 1723     Place sequential compression device  Until discontinued      10/27/17 1723              Siria Griffin MD  Department of Hospital Medicine   [facility]

## 2017-10-30 NOTE — PROGRESS NOTES
Assessment/Plan:    Status Post right  Hip fracture intramedulary nail fixation of comminuted proximal femur fracture. Also with right proximal humerus fracture. Doing well postoperatively. Less shoulder pain today.  No chest pain, no shortness of breath. No nausea  Continues current post-op course  Hct improved today  Pt for mobilization. Will be non weightbearing through the right shoulder  May weight bear as tolerated BLE.  D/C planning, possible SNF eval   LOS: 3 days     Subjective:  Post-Operative Day:  2  Status Post right Total Hip ORIF with IM nail.  Systemic or Specific Complaints:No Complaints    Objective:  Vital signs in last 24 hours:  Temp:  [97.5 °F (36.4 °C)-99.3 °F (37.4 °C)] 98.3 °F (36.8 °C)  Pulse:  [67-77] 73  Resp:  [18-20] 18  SpO2:  [95 %-96 %] 96 %  BP: (152-185)/(62-78) 185/77      General: alert, appears stated age and cooperative   Wound: Wound clean and dry no evidence of infection.   Motion: Painful range of Motion   DVT Exam: No evidence of DVT seen on physical exam.   Rt shoulder swelling. No significant deformity, Moving hand although some edema today, fingers well. Normal sensability, Vasc intact  Rt hand with some edema - plan to d/c IV from right hand now.  Right hip dressing dry, mild swelling.    Data Review  CBC:   Lab Results   Component Value Date    WBC 13.39 (H) 10/30/2017    RBC 2.75 (L) 10/30/2017    HGB 7.9 (L) 10/30/2017    HCT 24.1 (L) 10/30/2017     10/30/2017

## 2017-10-30 NOTE — PLAN OF CARE
CM met with patient & daughter for second & third choice SNF's to send out more referrals. Both asked for some time to review facilities & ask around & will give me their decision shortly

## 2017-10-30 NOTE — PT/OT/SLP PROGRESS
"Occupational Therapy  Treatment    An  Aleksander Luz   MRN: [id number]   Admitting Diagnosis: Closed intertrochanteric fracture of right hip, initial encounter    OT Date of Treatment: 10/30/17   OT Start Time: 1400  OT Stop Time: 1505  OT Total Time (min): 65 min    Billable Minutes:  Therapeutic Activity 65    General Precautions: Standard, fall  Orthopedic Precautions: RLE weight bearing as tolerated, RUE non weight bearing  Braces: UE Sling    Do you have any cultural, spiritual, Hindu conflicts, given your current situation?: none    Subjective:  Communicated with nursing prior to session.  "I trust you, I don't trust myself!" Pt with great fear of falling requiring rapport building and education.   Pain/Comfort  Pain Rating 1: 4/10  Location - Side 1: Right  Location 1: shoulder  Pain Addressed 1: Reposition, Distraction, Cessation of Activity  Pain Rating Post-Intervention 1: 4/10    Objective:  Patient found with: peripheral IV, telemetry, SCD     Functional Mobility:  Bed Mobility:  Scooting/Bridging: Minimum Assistance  Sit to Supine: Minimum Assistance    Transfers:   Sit <> Stand Assistance:  (x3 trials 1st trial- MAX A, 2nd/3rd MOD A )  Sit <> Stand Assistive Device: Hemiwalker (Pt required verbal and tactile cues for walker management and trust for safety. )  Bed <> Chair Technique:  (chair> bed, side steps)  Bed <> Chair Transfer Assistance: Maximum Assistance (with verbal cueing and walker management for safety and use of javy-walker. )  Bed <> Chair Assistive Device: Hemiwalker    Functional Ambulation: side steps with javy walker MAX-MOD A for safety and walker management      Balance:   Static Sit: GOOD-: Takes MODERATE challenges from all directions but inconsistently  Dynamic Sit: GOOD-: Maintains balance through MODERATE excursions of active trunk movement,     Static Stand: POOR: Needs MODERATE assist to maintain  Dynamic stand: POOR: N/A    Therapeutic Activities and Exercises:  Sit <> " "stand transfers, javy-walker education, functional transfers, bed mobility, light pendulum exercises while sitting edge of chair (pt unable to tolerate in standing), education on ROM exercises and positioning, education on OT POC.       AM-PAC 6 CLICK ADL   How much help from another person does this patient currently need?   1 = Unable, Total/Dependent Assistance  2 = A lot, Maximum/Moderate Assistance  3 = A little, Minimum/Contact Guard/Supervision  4 = None, Modified [place]/Independent    Putting on and taking off regular lower body clothing? : 1  Bathing (including washing, rinsing, drying)?: 1  Toileting, which includes using toilet, bedpan, or urinal? : 2 (pt with incontinent "leaks")  Putting on and taking off regular upper body clothing?: 1  Taking care of personal grooming such as brushing teeth?: 3  Eating meals?: 3  Total Score: 11     AM-PAC Raw Score CMS "G-Code Modifier Level of Impairment Assistance   6 % Total / Unable   7 - 8 CM 80 - 100% Maximal Assist   9-13 CL 60 - 80% Moderate Assist   14 - 19 CK 40 - 60% Moderate Assist   20 - 22 CJ 20 - 40% Minimal Assist   23 CI 1-20% SBA / CGA   24 CH 0% Independent/ Mod I       Patient left HOB elevated with call button in reach and daughters  present    ASSESSMENT:  An Hazel is a 89 y.o. female with a medical diagnosis of Closed intertrochanteric fracture of right hip, initial encounter and presents with Rehab identified problem list/impairments: Rehab identified problem list/impairments: weakness, impaired endurance, impaired functional mobilty, decreased safety awareness, pain, impaired balance, decreased lower extremity function, decreased upper extremity function, decreased ROM, orthopedic precautions, edema, gait instability, impaired fine motor. Pt completed transfer with javy-walker with increased verbal cuing, tactile cues. Pt presenting with a extreme fear of falling and lack of trusting mobility and use of " edison-walker. Pt participated in static standing and use of edison-walker prior to transfer for improved trust and conformability for transfers.  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 Pt's clinical condition meets full inpatient rehab criteria. The lower level of care (SNF) cannot provide to total interdisciplinary treatment approach needed. Pt is at high risk of unplanned readmission due to fall risk, inability to self-administer medication, and lack of 24 hour caregiver in prior setting.  Pt would continue to benefit from skilled occupational therapy intervention for increased activity tolerance and independence in ADL's during her acute care stay.      Rehab potential is good.    Activity tolerance: Good    Discharge recommendations: Discharge Facility/Level Of Care Needs: rehabilitation facility     Barriers to discharge: Barriers to Discharge: Inaccessible home environment, Decreased caregiver support    Equipment recommendations:  (pending rehab recommendations)     GOALS:    Occupational Therapy Goals        Problem: Occupational Therapy Goal    Goal Priority Disciplines Outcome Interventions   Occupational Therapy Goal     OT, PT/OT Ongoing (interventions implemented as appropriate)    Description:  Goals to be met by 11/29/17  1. Max A UBD  2. Max A bed>BSC/chair with Edison-Walker (MET 10/30/17)  3. Set-up G/H (seated)  4. Min A Right shoulder pendulum and elbow>hand ROM exercises                        Plan:  Patient to be seen daily to address the above listed problems via self-care/home management, therapeutic activities, therapeutic exercises  Plan of Care expires: 11/29/17  Plan of Care reviewed with: patient, daughter         Jose DIAZ Eliezer, OT  10/30/2017

## 2017-10-30 NOTE — ASSESSMENT & PLAN NOTE
- She is on atenolol, amlodipine and Cozaar - BP decreased after surgery  - Meds held for low BP after surgery, recovered after transfusion and now normalized.

## 2017-10-30 NOTE — SUBJECTIVE & OBJECTIVE
Interval History:  Seen while working with OT.  Doing very well.  No complaints, pain is tolerable.      Review of Systems   Constitutional: Negative for chills and fever.   Respiratory: Negative for cough and shortness of breath.    Cardiovascular: Negative for chest pain and palpitations.     Objective:     Vital Signs (Most Recent):  Temp: 97.6 °F (36.4 °C) (10/30/17 1246)  Pulse: 76 (10/30/17 1246)  Resp: 16 (10/30/17 1246)  BP: 129/60 (10/30/17 1246)  SpO2: 96 % (10/30/17 1246) Vital Signs (24h Range):  Temp:  [97.6 °F (36.4 °C)-99.3 °F (37.4 °C)] 97.6 °F (36.4 °C)  Pulse:  [71-77] 76  Resp:  [16-20] 16  SpO2:  [95 %-96 %] 96 %  BP: (129-185)/(60-78) 129/60     Weight: 47.6 kg (104 lb 15 oz)  Body mass index is 19.19 kg/m².    Intake/Output Summary (Last 24 hours) at 10/30/17 1455  Last data filed at 10/30/17 0800   Gross per 24 hour   Intake              240 ml   Output             2500 ml   Net            -2260 ml      Physical Exam   Constitutional: She is oriented to person, place, and time. She appears well-developed and well-nourished.   Elderly woman sitting up, NAD.   HENT:   Head: Normocephalic.   Mildly hard of hearing.   Eyes: Conjunctivae are normal. Pupils are equal, round, and reactive to light.   Neck: Neck supple. No thyromegaly present.   Cardiovascular: Normal rate, regular rhythm, normal heart sounds and intact distal pulses.  Exam reveals no gallop and no friction rub.    No murmur heard.  Pulmonary/Chest: Effort normal and breath sounds normal.   Abdominal: Soft. Bowel sounds are normal. She exhibits no distension. There is no tenderness.   Musculoskeletal: Normal range of motion. She exhibits no edema.   Right arm in sling.   Lymphadenopathy:     She has no cervical adenopathy.   Neurological: She is alert and oriented to person, place, and time.   Strength equal and symmetric   Skin: Skin is warm and dry. No rash noted.   Multiple ecchymoses both upper extremities.   Psychiatric: She has  a normal mood and affect. Her behavior is normal. Thought content normal.       Significant Labs: All pertinent labs within the past 24 hours have been reviewed.    Significant Imaging: I have reviewed all pertinent imaging results/findings within the past 24 hours.

## 2017-10-30 NOTE — PLAN OF CARE
[facility]    FACILITY TRANSFER ORDERS      Patient Name: An Hazel  YOB: 1927    PCP: Alcon Thomas Jr, MD   PCP [facility]: [address] [facility] [place] [address]  PCP Phone Number: [phone]  PCP Fax: [phone]    Encounter Date: 10/30/2017    Admit to:  Inpatient rehab    Vital Signs:  Routine    Diagnoses:   Active Hospital Problems    Diagnosis  POA    *Closed intertrochanteric fracture of right hip, initial encounter [S72.141A]  Yes     Priority: 1 - High    Essential hypertension [I10]  Yes     Priority: 2     Closed fracture of right shoulder [S42.91XA]  Yes    Acute posthemorrhagic anemia [D62]  Yes    Kyphoscoliosis [M41.9]  Yes    Osteoporosis [M81.0]  Yes      Resolved Hospital Problems    Diagnosis Date Resolved POA   No resolved problems to display.       Allergies:  Review of patient's allergies indicates:   Allergen Reactions    Augmentin [amoxicillin-pot clavulanate] Nausea And Vomiting    Sulfa (sulfonamide antibiotics)     Zestril  [lisinopril]      Other reaction(s): Swelling       Diet: soft diet    Activities:  Non weight bearing RUE for 2 weeks.  Sling and swathe for comfort.  Weight bearing as tolerated RLE.    CONSULTS:    Physical Therapy to evaluate and treat.  and Occupational Therapy to evaluate and treat.       Follow up:   With Dr. Claude Williams IV in 2 weeks.      WOUND CARE ORDERS  Surgical wound:  Dressing changes every 3 days.  Remove staples on POD #14.    Medications: Review discharge medications with patient and family and provide education.      Current Discharge Medication List      START taking these medications    Details   enoxaparin (LOVENOX) 40 mg/0.4 mL Syrg Inject 0.4 mLs (40 mg total) into the skin once daily.         CONTINUE these medications which have CHANGED    Details   losartan (COZAAR) 100 MG tablet Take 1 tablet (100 mg total) by mouth once daily. in the evening  Qty: 90 tablet, Refills: 3          CONTINUE these medications which have NOT CHANGED    Details   acetaminophen (TYLENOL) 325 MG tablet Take 2 tablets (650 mg total) by mouth every 6 (six) hours as needed (mild pain). Do NOT exceed 3000 mg in a 24 hour time period and do not take with other medications containing acetaminophen      amlodipine (NORVASC) 5 MG tablet Take 1 tablet (5 mg total) by mouth once daily.  Qty: 90 tablet, Refills: 3      atenolol (TENORMIN) 25 MG tablet Take 1 tablet (25 mg total) by mouth 2 (two) times daily.  Qty: 90 tablet, Refills: 3      cholecalciferol, vitamin D3, (VITAMIN D3) 1,000 unit capsule Take 1,000 Units by mouth once daily.      FOLIC ACID/MULTIVIT-MIN/LUTEIN (CENTRUM SILVER ORAL) Take 1 tablet by mouth once daily.       oxybutynin (DITROPAN-XL) 5 MG TR24 TAKE 1 TABLET DAILY  Qty: 90 tablet, Refills: 4      pravastatin (PRAVACHOL) 20 MG tablet TAKE ONE-HALF (1/2) TABLET DAILY  Qty: 90 tablet, Refills: 0                  _________________________________  Siria Griffin MD  10/30/2017

## 2017-10-30 NOTE — PLAN OF CARE
Problem: Patient Care Overview  Goal: Plan of Care Review  Outcome: Ongoing (interventions implemented as appropriate)  Pt on RA;SPO2 96% with no distress oxygen not needed.Will continue to monitor.

## 2017-10-30 NOTE — NURSING
c/o of pain, see nursing flowsheet and MAR for details.  c/o of slight nausea, but passed quickly after taking pills. Offered snack and prn Zofran but pt refused at present.

## 2017-10-30 NOTE — PLAN OF CARE
CM met with patient at bedside regarding discharge disposition. Patient is in agreement to SNF placement & reports her preference is [facility] [place]. Referral sent to [facility] via [facility]. PASSR completed & faxed in to obtain 142. LOCET called in. Order initiated for PPD.

## 2017-10-30 NOTE — PLAN OF CARE
Problem: Physical Therapy Goal  Goal: Physical Therapy Goal  Goals to be met by 11-3-17.  1. Sup<>sit min A  2. Sit<>stand with RW min A  3. amb 10' with RW min A     Pt with slow progression towards goals, sup to sit max.A, sit to stand max/totA with HW, pt t/f'd to chair with max/totA with HW (pt resisting to opposite direction of t/f, despite cueing). Recommend SNF.

## 2017-10-30 NOTE — PT/OT/SLP PROGRESS
"Physical Therapy  Treatment    An  Aleksander Luz   MRN: [id number]   Admitting Diagnosis: Closed intertrochanteric fracture of right hip, initial encounter    PT Received On: 10/30/17  PT Start Time: 1100     PT Stop Time: 1201    PT Total Time (min): 61 min       Billable Minutes:  Therapeutic Activity 35 and Therapeutic Exercise 26    Treatment Type: Treatment  PT/PTA: PTA     PTA Visit Number: 1       General Precautions: Standard, fall  Orthopedic Precautions: RLE weight bearing as tolerated, RUE non weight bearing   Braces: UE Sling    Do you have any cultural, spiritual, Mosque conflicts, given your current situation?: none mentioned    Subjective:  Communicated with nurse prior to session.  Pt agreeable to tx session, reporting afterwards that she has "a fear of falling". Pt also reports she is used to using a RW, "like last time" (when pt fx R hip).    Pain/Comfort  Pain Rating 1: 4/10  Location - Side 1: Right  Location 1: shoulder  Pain Addressed 1: Pre-medicate for activity, Reposition, Distraction  Pain Rating Post-Intervention 1: 5/10  Pain Rating 2: 0/10  Location - Side 2: Right  Location - Orientation 2: generalized  Location 2: hip  Pain Addressed 2: Pre-medicate for activity  Pain Rating Post-Intervention 2: 0/10    Objective:   Patient found with: peripheral IV, telemetry, SCD    Functional Mobility:  Bed Mobility:   Supine to Sit: Maximum Assistance, With side rail (HOB partially up)    Transfers:  Sit <> Stand Assistance: Moderate Assistance, Maximum Assistance  Sit <> Stand Assistive Device: Hemiwalker  Bed <> Chair Technique: Stand Pivot  Bed <> Chair Assistance: Maximum Assistance, Total Assistance  Bed <> Chair Assistive Device: Hemiwalker    Gait:   Gait Distance: pt mostly shuffling feet, not wanting to take steps (fear of falling per pt)  Assistance 1: Maximum assistance, Total assistance  Gait Assistive Device: Edison Walker    Stairs:  n/a    Balance:   Static Sit: FAIR+: Able to " take MINIMAL challenges from all directions  Dynamic Sit: FAIR+: Maintains balance through MINIMAL excursions of active trunk motion  Static Stand: POOR: Needs MODERATE assist to maintain  Dynamic stand: POOR: N/A     Therapeutic Activities and Exercises:  Pt perf'd supine ex's of AP's, QS,GS, heelslides; seated LAQ's x 10 ea. With BLE's.     AM-PAC 6 CLICK MOBILITY  How much help from another person does this patient currently need?   1 = Unable, Total/Dependent Assistance  2 = A lot, Maximum/Moderate Assistance  3 = A little, Minimum/Contact Guard/Supervision  4 = None, Modified [place]/Independent    Turning over in bed (including adjusting bedclothes, sheets and blankets)?: 2  Sitting down on and standing up from a chair with arms (e.g., wheelchair, bedside commode, etc.): 2  Moving from lying on back to sitting on the side of the bed?: 2  Moving to and from a bed to a chair (including a wheelchair)?: 2  Need to walk in hospital room?: 2  Climbing 3-5 steps with a railing?: 1  Total Score: 11    AM-PAC Raw Score CMS G-Code Modifier Level of Impairment Assistance   6 % Total / Unable   7 - 9 CM 80 - 100% Maximal Assist   10 - 14 CL 60 - 80% Moderate Assist   15 - 19 CK 40 - 60% Moderate Assist   20 - 22 CJ 20 - 40% Minimal Assist   23 CI 1-20% SBA / CGA   24 CH 0% Independent/ Mod I     Patient left up in chair with all lines intact, call button in reach, nurse notified and daughter present.    Assessment:  An Hazel is a 89 y.o. female with a medical diagnosis of Closed intertrochanteric fracture of right hip, initial encounter and presents with dec mobility, mostly having pain in R shldr, minimal c/o's R hip pain. Pt's fear of falling limiting progress at this time.    Rehab identified problem list/impairments: Rehab identified problem list/impairments: weakness, impaired endurance, gait instability, impaired balance, impaired functional mobilty, impaired self care skills, decreased  lower extremity function, decreased upper extremity function, pain, decreased safety awareness, decreased ROM, edema, impaired skin, orthopedic precautions    Rehab potential is good.    Activity tolerance: Good    Discharge recommendations: Discharge Facility/Level Of Care Needs: nursing facility, skilled     Barriers to discharge: Barriers to Discharge: Inaccessible home environment, Decreased caregiver support    Equipment recommendations: Equipment Needed After Discharge: walker, javy, hospital bed (equipment needs could change , defer to SNF)     GOALS:    Physical Therapy Goals        Problem: Physical Therapy Goal    Goal Priority Disciplines Outcome Goal Variances Interventions   Physical Therapy Goal     PT/OT, PT      Description:  Goals to be met by 11-3-17.  1. Sup<>sit min A  2. Sit<>stand with RW min A  3. amb 10' with RW min A                      PLAN:    Patient to be seen daily  to address the above listed problems via gait training, therapeutic activities, therapeutic exercises  Plan of Care expires: 11/28/17  Plan of Care reviewed with: patient, daughter         Kerry Smallwood, PTA  10/30/2017

## 2017-10-30 NOTE — ASSESSMENT & PLAN NOTE
- POD #3 ORIF  - Pain control adequate  - Recommend inpatient rehab given good pre-morbid functioning and with 2 joints affected.  - DVT prophylaxis with enoxaparin for 35 days total.

## 2017-10-30 NOTE — PLAN OF CARE
Problem: Occupational Therapy Goal  Goal: Occupational Therapy Goal  Goals to be met by 11/29/17  1. Max A UBD  2. Max A bed>BSC/chair with Edison-Walker (MET 10/30/17)  3. Set-up G/H (seated)  4. Min A Right shoulder pendulum and elbow>hand ROM exercises     Outcome: Ongoing (interventions implemented as appropriate)  Pt completed transfer with edison-walker with increased verbal cuing, tactile cues. Pt presenting with a extreme fear of falling and lack of trusting mobility and use of edison-walker. Pt participated in static standing and use of edison-walker prior to transfer for improved trust and conformability for transfers.  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 Pt's clinical condition meets full inpatient rehab criteria. The lower level of care (SNF) cannot provide to total interdisciplinary treatment approach needed. Pt is at high risk of unplanned readmission due to fall risk, inability to self-administer medication, and lack of 24 hour caregiver in prior setting.  Pt would continue to benefit from skilled occupational therapy intervention for increased activity tolerance and independence in ADL's during her acute care stay.

## 2017-10-31 ENCOUNTER — HOSPITAL ENCOUNTER (INPATIENT)
Facility: HOSPITAL | Age: 82
LOS: 14 days | Discharge: HOME HEALTH | DRG: 560 | End: 2017-11-14
Attending: PHYSICAL MEDICINE & REHABILITATION | Admitting: PHYSICAL MEDICINE & REHABILITATION
Payer: MEDICARE

## 2017-10-31 ENCOUNTER — TELEPHONE (OUTPATIENT)
Dept: INTERNAL MEDICINE | Facility: CLINIC | Age: 82
End: 2017-10-31

## 2017-10-31 VITALS
DIASTOLIC BLOOD PRESSURE: 70 MMHG | OXYGEN SATURATION: 96 % | WEIGHT: 104.94 LBS | RESPIRATION RATE: 18 BRPM | HEART RATE: 79 BPM | BODY MASS INDEX: 19.31 KG/M2 | SYSTOLIC BLOOD PRESSURE: 153 MMHG | HEIGHT: 62 IN | TEMPERATURE: 97 F

## 2017-10-31 DIAGNOSIS — S72.141A CLOSED DISPLACED INTERTROCHANTERIC FRACTURE OF RIGHT FEMUR, INITIAL ENCOUNTER: ICD-10-CM

## 2017-10-31 DIAGNOSIS — S72.009A HIP FRACTURE: ICD-10-CM

## 2017-10-31 DIAGNOSIS — S42.91XA CLOSED FRACTURE OF RIGHT SHOULDER, INITIAL ENCOUNTER: ICD-10-CM

## 2017-10-31 DIAGNOSIS — I10 ESSENTIAL HYPERTENSION: ICD-10-CM

## 2017-10-31 DIAGNOSIS — Z78.9 IMPAIRED MOBILITY AND ADLS: Primary | ICD-10-CM

## 2017-10-31 DIAGNOSIS — Z74.09 IMPAIRED MOBILITY AND ADLS: Primary | ICD-10-CM

## 2017-10-31 DIAGNOSIS — S72.141A CLOSED INTERTROCHANTERIC FRACTURE OF RIGHT HIP, INITIAL ENCOUNTER: ICD-10-CM

## 2017-10-31 DIAGNOSIS — S72.143A: ICD-10-CM

## 2017-10-31 PROCEDURE — 25000003 PHARM REV CODE 250: Performed by: HOSPITALIST

## 2017-10-31 PROCEDURE — 92526 ORAL FUNCTION THERAPY: CPT

## 2017-10-31 PROCEDURE — 25000003 PHARM REV CODE 250: Performed by: ORTHOPAEDIC SURGERY

## 2017-10-31 PROCEDURE — 0QS604Z REPOSITION RIGHT UPPER FEMUR WITH INTERNAL FIXATION DEVICE, OPEN APPROACH: ICD-10-PCS | Performed by: ORTHOPAEDIC SURGERY

## 2017-10-31 PROCEDURE — 99223 1ST HOSP IP/OBS HIGH 75: CPT | Mod: ,,, | Performed by: PHYSICAL MEDICINE & REHABILITATION

## 2017-10-31 PROCEDURE — 97530 THERAPEUTIC ACTIVITIES: CPT

## 2017-10-31 PROCEDURE — 99238 HOSP IP/OBS DSCHRG MGMT 30/<: CPT | Mod: ,,, | Performed by: HOSPITALIST

## 2017-10-31 PROCEDURE — 25000003 PHARM REV CODE 250: Performed by: PHYSICAL MEDICINE & REHABILITATION

## 2017-10-31 PROCEDURE — 63600175 PHARM REV CODE 636 W HCPCS: Performed by: HOSPITALIST

## 2017-10-31 PROCEDURE — 94799 UNLISTED PULMONARY SVC/PX: CPT

## 2017-10-31 PROCEDURE — 12800000 HC REHAB SEMI-PRIVATE ROOM

## 2017-10-31 RX ORDER — ATENOLOL 25 MG/1
25 TABLET ORAL 2 TIMES DAILY
Status: DISCONTINUED | OUTPATIENT
Start: 2017-10-31 | End: 2017-11-14 | Stop reason: HOSPADM

## 2017-10-31 RX ORDER — AMLODIPINE BESYLATE 5 MG/1
5 TABLET ORAL DAILY
Status: CANCELLED | OUTPATIENT
Start: 2017-11-01

## 2017-10-31 RX ORDER — OXYCODONE HYDROCHLORIDE 5 MG/1
5 TABLET ORAL EVERY 4 HOURS PRN
Status: DISCONTINUED | OUTPATIENT
Start: 2017-10-31 | End: 2017-11-03

## 2017-10-31 RX ORDER — LOSARTAN POTASSIUM 50 MG/1
100 TABLET ORAL NIGHTLY
Status: DISCONTINUED | OUTPATIENT
Start: 2017-10-31 | End: 2017-11-14 | Stop reason: HOSPADM

## 2017-10-31 RX ORDER — ONDANSETRON 4 MG/1
4 TABLET, FILM COATED ORAL ONCE
Status: DISCONTINUED | OUTPATIENT
Start: 2017-10-31 | End: 2017-10-31

## 2017-10-31 RX ORDER — RAMELTEON 8 MG/1
8 TABLET ORAL NIGHTLY PRN
Status: DISCONTINUED | OUTPATIENT
Start: 2017-10-31 | End: 2017-11-14 | Stop reason: HOSPADM

## 2017-10-31 RX ORDER — OXYCODONE HYDROCHLORIDE 5 MG/1
5 TABLET ORAL EVERY 4 HOURS PRN
Status: CANCELLED | OUTPATIENT
Start: 2017-10-31

## 2017-10-31 RX ORDER — ATENOLOL 25 MG/1
25 TABLET ORAL 2 TIMES DAILY
Status: CANCELLED | OUTPATIENT
Start: 2017-10-31

## 2017-10-31 RX ORDER — PRAVASTATIN SODIUM 10 MG/1
10 TABLET ORAL DAILY
Status: DISCONTINUED | OUTPATIENT
Start: 2017-11-01 | End: 2017-11-14 | Stop reason: HOSPADM

## 2017-10-31 RX ORDER — MUPIROCIN 20 MG/G
1 OINTMENT TOPICAL 2 TIMES DAILY
Status: CANCELLED | OUTPATIENT
Start: 2017-10-31 | End: 2017-11-01

## 2017-10-31 RX ORDER — ACETAMINOPHEN 325 MG/1
650 TABLET ORAL EVERY 4 HOURS PRN
Status: CANCELLED | OUTPATIENT
Start: 2017-10-31

## 2017-10-31 RX ORDER — ENOXAPARIN SODIUM 100 MG/ML
40 INJECTION SUBCUTANEOUS EVERY 24 HOURS
Status: DISCONTINUED | OUTPATIENT
Start: 2017-10-31 | End: 2017-11-14 | Stop reason: HOSPADM

## 2017-10-31 RX ORDER — DOCUSATE SODIUM 100 MG/1
100 CAPSULE, LIQUID FILLED ORAL 2 TIMES DAILY
Status: DISCONTINUED | OUTPATIENT
Start: 2017-10-31 | End: 2017-11-10

## 2017-10-31 RX ORDER — ONDANSETRON 4 MG/1
4 TABLET, FILM COATED ORAL EVERY 6 HOURS PRN
Status: DISCONTINUED | OUTPATIENT
Start: 2017-10-31 | End: 2017-10-31 | Stop reason: HOSPADM

## 2017-10-31 RX ORDER — POLYETHYLENE GLYCOL 3350 17 G/17G
17 POWDER, FOR SOLUTION ORAL 2 TIMES DAILY PRN
Status: CANCELLED | OUTPATIENT
Start: 2017-10-31

## 2017-10-31 RX ORDER — BISACODYL 10 MG
10 SUPPOSITORY, RECTAL RECTAL DAILY PRN
Status: DISCONTINUED | OUTPATIENT
Start: 2017-10-31 | End: 2017-11-14 | Stop reason: HOSPADM

## 2017-10-31 RX ORDER — RAMELTEON 8 MG/1
8 TABLET ORAL NIGHTLY PRN
Status: CANCELLED | OUTPATIENT
Start: 2017-10-31

## 2017-10-31 RX ORDER — ACETAMINOPHEN 325 MG/1
650 TABLET ORAL EVERY 4 HOURS PRN
Status: DISCONTINUED | OUTPATIENT
Start: 2017-10-31 | End: 2017-11-14 | Stop reason: HOSPADM

## 2017-10-31 RX ORDER — ENOXAPARIN SODIUM 100 MG/ML
40 INJECTION SUBCUTANEOUS EVERY 24 HOURS
Status: CANCELLED | OUTPATIENT
Start: 2017-10-31

## 2017-10-31 RX ORDER — PRAVASTATIN SODIUM 10 MG/1
10 TABLET ORAL DAILY
Status: CANCELLED | OUTPATIENT
Start: 2017-11-01

## 2017-10-31 RX ORDER — LOSARTAN POTASSIUM 50 MG/1
100 TABLET ORAL NIGHTLY
Status: CANCELLED | OUTPATIENT
Start: 2017-10-31

## 2017-10-31 RX ORDER — AMLODIPINE BESYLATE 5 MG/1
5 TABLET ORAL DAILY
Status: DISCONTINUED | OUTPATIENT
Start: 2017-11-01 | End: 2017-11-07

## 2017-10-31 RX ORDER — ONDANSETRON 4 MG/1
4 TABLET, FILM COATED ORAL EVERY 6 HOURS PRN
Status: DISCONTINUED | OUTPATIENT
Start: 2017-10-31 | End: 2017-10-31

## 2017-10-31 RX ORDER — ONDANSETRON 4 MG/1
4 TABLET, FILM COATED ORAL EVERY 6 HOURS PRN
Status: CANCELLED | OUTPATIENT
Start: 2017-10-31

## 2017-10-31 RX ORDER — POLYETHYLENE GLYCOL 3350 17 G/17G
17 POWDER, FOR SOLUTION ORAL 2 TIMES DAILY PRN
Status: DISCONTINUED | OUTPATIENT
Start: 2017-10-31 | End: 2017-11-14 | Stop reason: HOSPADM

## 2017-10-31 RX ORDER — ONDANSETRON 8 MG/1
8 TABLET, ORALLY DISINTEGRATING ORAL EVERY 8 HOURS PRN
Status: DISCONTINUED | OUTPATIENT
Start: 2017-10-31 | End: 2017-11-14 | Stop reason: HOSPADM

## 2017-10-31 RX ORDER — ACETAMINOPHEN 325 MG/1
650 TABLET ORAL 2 TIMES DAILY
Status: DISCONTINUED | OUTPATIENT
Start: 2017-10-31 | End: 2017-11-03

## 2017-10-31 RX ORDER — MUPIROCIN 20 MG/G
1 OINTMENT TOPICAL 2 TIMES DAILY
Status: DISPENSED | OUTPATIENT
Start: 2017-10-31 | End: 2017-11-01

## 2017-10-31 RX ADMIN — ATENOLOL 25 MG: 25 TABLET ORAL at 07:10

## 2017-10-31 RX ADMIN — OXYCODONE HYDROCHLORIDE 5 MG: 5 TABLET ORAL at 08:10

## 2017-10-31 RX ADMIN — LOSARTAN POTASSIUM 100 MG: 50 TABLET, FILM COATED ORAL at 09:10

## 2017-10-31 RX ADMIN — AMLODIPINE BESYLATE 5 MG: 5 TABLET ORAL at 08:10

## 2017-10-31 RX ADMIN — ONDANSETRON 4 MG: 4 TABLET, FILM COATED ORAL at 08:10

## 2017-10-31 RX ADMIN — MUPIROCIN 1 G: 20 OINTMENT TOPICAL at 09:10

## 2017-10-31 RX ADMIN — DOCUSATE SODIUM 100 MG: 100 CAPSULE, LIQUID FILLED ORAL at 08:10

## 2017-10-31 RX ADMIN — ATENOLOL 25 MG: 25 TABLET ORAL at 08:10

## 2017-10-31 RX ADMIN — PRAVASTATIN SODIUM 10 MG: 10 TABLET ORAL at 08:10

## 2017-10-31 RX ADMIN — ACETAMINOPHEN 650 MG: 325 TABLET ORAL at 08:10

## 2017-10-31 RX ADMIN — ACETAMINOPHEN 650 MG: 325 TABLET ORAL at 12:10

## 2017-10-31 RX ADMIN — ACETAMINOPHEN 650 MG: 325 TABLET ORAL at 02:10

## 2017-10-31 RX ADMIN — ENOXAPARIN SODIUM 40 MG: 100 INJECTION SUBCUTANEOUS at 04:10

## 2017-10-31 RX ADMIN — ACETAMINOPHEN 325 MG: 325 TABLET ORAL at 08:10

## 2017-10-31 RX ADMIN — MUPIROCIN 1 G: 20 OINTMENT TOPICAL at 08:10

## 2017-10-31 NOTE — PLAN OF CARE
Problem: SLP Goal  Goal: SLP Goal  SLP Short Term Goals:  1.  Pt will be able to consume a dental soft/finely chopped diet with thin liquids with no signs of airway threat or aspiration given min assistance to follow through on compensatory strategies: small bites/sips, alternate bites/sips, slow feeding rate (single bites/sips at a time) GOAL MET  Outcome: Outcome(s) achieved Date Met: 10/31/17  Pt was seen for ST session. No further ST service warranted at this time for swallowing as pt is tolerating current diet.     Lauren Narayan MS, CCC-SLP  Speech Language Pathologist  Pager: [phone]  10/31/2017

## 2017-10-31 NOTE — H&P
[facility]  Physical Medicine & Rehab  History & Physical    Patient Name: An Hazel  MRN: [id number]  Admission Date: 10/31/2017  Attending Physician: Verna Mc MD  Primary Care Provider: Alcon Thomas Jr, MD    Subjective:     Principal Problem: Closed intertrochanteric fracture of right hip, initial encounter    HPI: 89 year old  Presented to [facility]  9/29 with her 2 daughters with pain in her right hip and shoulder after falling on her right side. S/p mechanical fall.   X-rays showed an acute comminuted intertrochanteric fracture of the right femur and an acute impacted comminuted fracture of the neck of the humerus with extension into the greater tuberosity.    ORIF of the right hip on 10/27, WBAT.  Her arm was treated with a sling and swath.   - Ortho precautions: Non weight bearing RUE for 2 weeks.  Sling and swathe for comfort.  Weight bearing as tolerated RLE.  - Surgical wound:  Dressing changes every 3 days.  Remove staples on POD #14.    Diet: Solid Diet Level: Dental Soft (finely chopped meat)  Liquid Diet Level: Thin (baseline diet)      Current Functional Status:  Participating with therapy.  Min A w bed mobility, max w transfers, Amb Max/TA t mostly shuffling feet, not wanting to take steps (fear of falling per pt)  UBD/LBD TA    Functional History: Patient lives  Alone. 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 Cornejo    HYSTERECTOMY      OOPHORECTOMY       ORIF HIP FRACTURE Left 12/2013    TONSILLECTOMY       Review of patient's allergies indicates:   Allergen Reactions    Augmentin [amoxicillin-pot clavulanate] Nausea And Vomiting    Sulfa (sulfonamide antibiotics)     Zestril  [lisinopril]      Other reaction(s): Swelling       Scheduled Medications:    acetaminophen  650 mg Oral BID    [START ON 11/1/2017] amLODIPine  5 mg Oral Daily    atenolol  25 mg Oral BID    docusate sodium  100 mg Oral BID    enoxaparin  40 mg Subcutaneous Daily    losartan  100 mg Oral QHS    mupirocin  1 g Nasal BID    [START ON 11/1/2017] pravastatin  10 mg Oral Daily       PRN Medications:   acetaminophen, bisacodyl, ondansetron, oxyCODONE, polyethylene glycol, ramelteon    Family History     Problem Relation (Age of Onset)    Cancer Maternal Grandmother    Hypertension Father    Osteoporosis Mother    Stroke Father (49)        Social History Main Topics    Smoking status: Never Smoker    Smokeless tobacco: Never Used      Comment: The patient exercises every other day on an exercise bike.  On alternative days she does floor exercises with weights.    Alcohol use 1.2 - 2.4 oz/week     1 Glasses of wine, 1 - 3 Standard drinks or equivalent per week      Comment: Occasional use    Drug use: No    Sexual activity: Not on file     Review of Systems   Constitutional: Negative for unexpected weight change.        Vision stable w glasses  Hearing stable w hearing aids  Bladder urgency, premorbid  Bowels stable   HENT: Negative for congestion and ear pain.    Eyes: Negative for discharge.   Respiratory: Negative for shortness of breath.    Cardiovascular: Negative for chest pain.   Gastrointestinal: Negative for abdominal pain and vomiting.   Endocrine: Negative for cold intolerance.   Genitourinary: Negative for flank pain.   Neurological: Positive for weakness.   Psychiatric/Behavioral: Negative for hallucinations.     Objective:     Vital Signs (Most Recent):       Vital  Signs (24h Range):  Temp:  [97.2 °F (36.2 °C)-98.6 °F (37 °C)] 97.2 °F (36.2 °C)  Pulse:  [72-80] 79  Resp:  [16-20] 18  SpO2:  [95 %-97 %] 96 %  BP: (153-176)/(70-75) 153/70     There is no height or weight on file to calculate BMI.    Physical Exam   Constitutional: She is oriented to person, place, and time. She appears well-developed and well-nourished.   HENT:   Head: Normocephalic and atraumatic.   Eyes: EOM are normal.   Neck: Normal range of motion.   Cardiovascular: Normal rate.    Pulmonary/Chest: Effort normal. No respiratory distress.   Abdominal: Soft. She exhibits no distension. There is no tenderness.   Musculoskeletal:   RUE in sling, + pulse,  WNL  LUE: SA, EF/EE  WFL  RLE: HF/KE 1/5, DF/PF 3/5  LLE: HF, KE, DF/PF 4/5      Neurological: She is alert and oriented to person, place, and time.   Skin: Skin is warm.   Psychiatric: She has a normal mood and affect.   Vitals reviewed.    NEUROLOGICAL EXAMINATION:     MENTAL STATUS   Oriented to person, place, and time.     CRANIAL NERVES     CN III, IV, VI   Extraocular motions are normal.       Diagnostic Results: Labs: Reviewed    Assessment/Plan:     An Hazel is a 89 y.o. female being admitted to inpatient rehabilitation on 10/31/2017 for Closed intertrochanteric fracture of right hip, initial encounter with impaired mobility and ADLs.     * Closed intertrochanteric fracture of right hip, initial encounter    ORIF of the right hip on 10/27, WBAT.  Her arm was treated with a sling and swath.   - Ortho precautions: Non weight bearing RUE for 2 weeks.  Sling and swathe for comfort.  Weight bearing as tolerated RLE.  - Surgical wound:  Dressing changes every 3 days.  Remove staples on POD #14  - PT/OT        Essential hypertension    - cont to monitor BP/HR    Vitals:    10/31/17 1400   BP: (!) 141/62   BP Location: Left arm   Patient Position: Lying   Pulse: 77   Resp: 18   Temp: 98.1 °F (36.7 °C)   TempSrc: Oral   SpO2: 100%  "  Weight: 47.6 kg (104 lb 15 oz)   Height: 5' 2" (1.575 m)             Impaired mobility and ADLs    Other Rehab Plan/Continue to monitor:   Nutrition:    - Prealbumin - will order on admission    - Nutritionist on board    - GI ppx: none  Bladder Management:  monitor for continence   Bowel Management: monitor for continence   - Miralax  and Suppository PRN   - Will monitor for regularity   Mood: stable   Sleep: monitor; ramelteon PRN   Skin: Monitor   DVT ppx: Lovenox 40mg daily               Verna Mc MD  Department of Physical Medicine & Rehab   [facility]    Post Admission Assessment:    An Hazel is a 89 y.o. female being admitted to inpatient rehabilitation on 10/31/2017 for multiple fx with impaired mobility and ADLs.   Patient is appropriate for inpatient rehabilitation and is expected to tolerate 3 hours of therapy a day or 15 hours of therapy a week.  Patient is expected to benefit from the following therapy services: Physical Therapy, Occupational Therapy as well as Recreational Therapy  Impairments include:  Impaired balance, Decreased Endurance, Impaired activity tolerance  Goals:  Supervision to Mod I with Mobility, ADLs, Transfers using LRAD   Precautions:  Fall, Aspiration,   Non weight bearing RUE for 2 weeks, Sling and swathe for comfort,  Weight bearing as tolerated to RLE.     ELOS: 10-14 days   Disposition: Home with family     I have had the opportunity to examine the patient within 24 hours of admission and have reviewed the Pre-Admission Assessment and find it consistent with my examination and evaluation of the patient. I confirm that this patient is appropriate for admission and treatment in this inpatient rehabilitation hospital, needs intense interdisciplinary rehabilitation care under my direction and is expected to achieve meaningful goals within a reasonable period of time that are consistent with the planned discharge disposition.     The " patient will be admitted for inpatient comprehensive interdisciplinary rehabilitation to address the impairments and medical conditions listed above while assessing equipment needs and compensatory strategies, with coordinated interdisciplinary services that will include physical therapy, occupational therapy, and close monitoring and treatment with 24-hour rehabilitative nursing. This interdisciplinary program will be performed under the direction of a physiatrist.    Verna Mc MD

## 2017-10-31 NOTE — NURSING
NURSE TECH FIM  REPORT    EATING  [] Pt. opens packages, cuts food, pours liquids, and feeds self.  Regular consistency (7)  [x] Pt. needs dentures, swallow technique, special foods or drink consistency (6)  [] Pt. needs supervision (cueing), set up (open containers, cut food, etc. (5)  [] Pt. needs assist with occasional scooping or checking for food pocketing (75-99%) (4)  [] Pt. needs assist to load each bite on utensils, pt.brings food to mouth (50-74%) (3)  [] Pt. needs assist to scoop, bring food to mouth (hand over hand) (25-49%) (2)  [] Pt. Is receiving IV fluids for hydration or tube feedings  (0-24%) (1)      GROOMING   [] Pt. performs all tasks independently and safely (7)  [] Pt. obtains all articles.  Needs adaptive/assistive device (such as wash mitt) (6)  [] Pt. needs supervision (cueing), set up (helper obtains articles, applies toothpaste, plugs razor, hands items to patient, opens containers, adjusts water temperature) (5)       [] Pt. doing 3 out of 4,  or 4 out of 5 tasks.  Needs assist to put in or remove dentures.  Needs steadying assist.(Pt. Doing 75-99%. (4)                                                                 [] Pt. doing 2 out of 4, or 3 out of 5 tasks (50-74%) (3)  [] Pt. doing 1 out of 4, or 2 out of 5 tasks (25-49%) (2)  [] Pt. doing 0 out of 4, or 1 out of 5 tasks or needs assistance of 2 helpers (0-24%) (1)  [] Activity done with OT  Did not occur    BATHING  [] Pt. safely bathes whole body (10 parts of 10) (7)  [] Pt. doing 10 out of 10 body parts.  Uses adaptive devices (such as wash mitt, long handled sponge, etc.) (6)  []  Pt. doing 8-9 out of 10 body parts , or pt. needs steadying assistance. (75-99%) (4)  []  Pt. doing 10 out of 10 body parts buts needs supervision or set up (5)  [] Pt. doing 5-7 out of 10 body parts (50-74%) (3)  [] Pt. doing 3-4 of out 10 body parts (25-49%) (2)  [] Pt. doing 0-2 out of 10 body parts or needs assist of two helpers. (0-24%) (1)    []  Activity done with OT  Did not occur    DRESSING UPPER BODY  [] Pt. dresses independently and undresses independently, obtaining clothes from          storage area (7)  [] Pt. needs adaptive devices (such as Velcro fastener, reacher, button hook, etc.)          Applies abdominal binder or any orthotic.  Uses  walker for steadying. (6)  [] Pt. needs supervision (cueing), set up (helper brings clothes or applies orthotics (such as abdominal binder, TLSO, cervical collar) (5)  [] Pt. doing 75-99%. (4)  [] Pt. doing 50-74%. (3)  [] Pt. doing 25-49%. (2)  [] Pt. doing 0-24%, or needs assistance of 2 helpers. (1)  [] Activity done with OT.  Did not occur    DRESSING LOWER BODY  [] Pt. independent with all parts of dressing lower body. (7)  [] Pt. needs adaptive devices ( such as reacher, long handled shoe horn, sock aid) (6)  [] Pt. needs supervision (cueing), set up(helper brings clothes or applies orthotic, such   as ELIZABETH hose, AFO) (5)  [] Pt. doing 75-99%.  Needs steadying assistance: buttoning pants, zipping a zipper,        fastening a belt, tying shoelaces, applying one sock/shoe. (4)  [] Pt. doing 50-74%. (3)  [] Pt. doing 25-49%. (2)  [] Pt. Doing 0-24%, or needs assistance of 2 helpers. (1)  [] Activity done with OT.  Did not occur    TOILETING  [] Pt. doing 3 out of 3 tasks independently (pulling down clothes, cleansing dona area,  pulling up clothes) (7)  [] Pt. doing 3 out of 3 tasks, but needs assistive device (grab bars, etc.) (6)  [] Pt. needs supervision (cueing), or set up (obtaining supplies for dona care.) (5)  [] Pt. doing 3 out of 3 tasks, but needs steadying assistance with one or more parts. (75-90%) (4)  [] Pt. doing 2 out of 3 tasks (50-74%) (3)  [] Pt. doing out of 3 tasks (25-49%) (2)  [] Pt. needs assist (more than steadying) with all 3 tasks.  Needs assist of 2 helpers.    *Incontinent of B/B today. (0-24%) (1)  [] Activity did not occur.      BLADDER  [] Pt.  uses toilet (7)  [] Pt. is on  dialysis - does not urinate (7)  [] Pt. uses bedside commode (5)  [] Pt. uses bedpan - staff does ____% of tasks(includes rolling on/off, holding bedpan in place, hygiene, and emptying pan)  [] Pt. uses urinal - staff empties (5)                     []  Pt. needs help with positioning the urinal (4)                     []  Pt. needs help holding the urinal (1)  [] Pt. has call catheter/suprapubic catheter/concom cath - staff empties (1)  [] Pt. is on ICP:                     []  Pt. does catheterization (6)                     []  Staff does catheterization (1)  [x] Pt. is incontinent - staff does _100___% of tasks (includes clothes management,  hygiene, and changing diaper)  [] Number of accidents during this shift ____       BOWEL  [] Pt. uses toilet (7)  [] Pt. uses bedside commode (5)  [] Pt. uses bedpan - staff does ____% of tasks (includes rolling on/off, holding bedpan                                                          In place, hygiene, and emptying pan)  [] Pt. on bowel program:                    [] Pt.inserts suppository (6)                    [] Nurse inserts suppository (4)                    []  Nurse does dig. stim (1)  [] Pt. has colostomy - staff maintains care and empties (1)                    [] Pt. does ____% of care for colostomy  [] Pt. is incontinent - staff does ____% of tasks (includes clothes management,  hygiene, and changing diaper)  [] Number of accidents during this shift____  [x] No bowel movement this shift      TRANSFERS:  BED/CHAIR/WHEELCHAIR  [] Pt. transfers without assistive device into and out of wheelchair/bed/chair (7)  [] Pt. uses assistive/adaptive devices (grab bars, sliding board, walker, bed rails,  wheelchair armrests, etc.) (6)  [] Pt. needs supervision, cues, or head of bed raised by staff (5)  [] Pt. needs steadying assist with any or all parts of transfer, or needs assist with one  leg during transfer (4)  [] Pt. needs lifting or lowering, or needs assist  with 2 legs during transfer (3)  [] Pt. needs lifting and lowering during transfer (2)  [x] Pt. needs assist of 2 helpers or mechanical lift (1)  [] Activity did not occur   ________________________________________________________________  Includes lying to seated position at edge of bed.  Check assist level needed:  [] Used bedrails              []  Supervision                   []   Min. A  [] Mod A                          []  Max A                            []  Total A    If in wheelchair:  Check assist level needed:  [] Lock brakes                 []  Lifts/lowers footrests       []  Removes w/c arm                                                                                (for slide board transfers)    TRANSFER:  TOILET/BEDSIDE COMMODE   [] Pt. transfers from wheelchair or walking without assistive device to toilet.  Gets on and off a standard toilet. (7)  [] Pt. uses assistive/adaptive device (commode, grab bars, sliding board, walker prosthesis, orthotic, raised toilet seat) (6)  [] Pt. needs supervision, cues, or set up (needs assist to lock brakes, remove leg or arm rest, position sliding board) (5)  [] Pt. needs steadying assist with any or all parts of transfer or needs assist with one leg during transfer. (4)  [] Pt. needs lifting or lowering or needs assist with two legs during transfer. (3)  [] Pt. needs lifting and lowering during transfer. (2)  [] Pt. needs assist of 2 helpers or mechanical lift. (1)  [x] Activity did not occur.      TRANSFER:  SHOWER  [] Pt. transfers without assistive device into and out of shower. (7)  [] Pt. uses assistive/adaptive device (shower chair/bench, grab bars, sliding board,   walker, prothesis, orthotic, raised toilet seat. (6)  [] Pt. needs supervision, cues, or set up (needs assist to lock brakes, remove leg or armrest, position sliding board) (5)  [] Pt. needs steadying assist with any or all parts of transfer or needs assist with one      leg during transfer.  (4)  [] Pt. needs lifting or lowering or needs assist with two legs during transfer. (3)  [] Pt. needs lifting and lowering during transfer. (2)  [] Pt. needs assist of 2 helpers.  [place] pushes shower chair on wheels in and out of   shower. (1)  [x] Activity did not occur.                                                                                                                                                                                 r                                                                                                                    pT.

## 2017-10-31 NOTE — PROGRESS NOTES
Physical Therapy Discharge Summary    An Hazel  MRN: [id number]   Closed intertrochanteric fracture of right hip, initial encounter   Patient Discharged from acute Physical Therapy on 10/31/17.  Please refer to prior PT noted date on 10/30/17 for functional status.     Assessment:   Goals partially met.  GOALS:    Physical Therapy Goals        Problem: Physical Therapy Goal    Goal Priority Disciplines Outcome Goal Variances Interventions   Physical Therapy Goal     PT/OT, PT      Description:  Goals to be met by 11-3-17.  1. Sup<>sit min A  2. Sit<>stand with RW min A  3. amb 10' with RW min A                    Reasons for Discontinuation of Therapy Services  Transfer to alternate level of care.      Plan:  Patient Discharged to: Inpatient Rehab.

## 2017-10-31 NOTE — PT/OT/SLP PROGRESS
"Occupational Therapy  Treatment    An  Aleksander Luz   MRN: [id number]   Admitting Diagnosis: Closed intertrochanteric fracture of right hip, initial encounter    OT Date of Treatment: 10/31/17   OT Start Time: 1234  OT Stop Time: 1252  OT Total Time (min): 18 min    Billable Minutes:  Therapeutic Activity 18    General Precautions: Standard, fall  Orthopedic Precautions: RLE weight bearing as tolerated, RUE non weight bearing  Braces:      Do you have any cultural, spiritual, Taoism conflicts, given your current situation?: none    Subjective:  Communicated with nursing prior to session.  "Thank - you so much, you really helped me."       Objective:     Functional Mobility:    Transfers:   Sit <> Stand Assistance: Moderate Assistance  Sit <> Stand Assistive Device: Hemiwalker  Bed <> Chair Technique: Stand Pivot (Chair to wheelchair 1-2 steps to left)  Bed <> Chair Assistive Device: No Assistive Device    Activities of Daily Living:    LE Dressing Level of Assistance: Total assistance (stood and PCT pulling over hips)    Therapeutic Activities and Exercises:  Functional transfers, standing balance with LB dressing, positioning and education on POC    AM-PAC 6 CLICK ADL   How much help from another person does this patient currently need?   1 = Unable, Total/Dependent Assistance  2 = A lot, Maximum/Moderate Assistance  3 = A little, Minimum/Contact Guard/Supervision  4 = None, Modified [place]/Independent    Putting on and taking off regular lower body clothing? : 1  Bathing (including washing, rinsing, drying)?: 1  Toileting, which includes using toilet, bedpan, or urinal? : 2 ("incontinent leaks")  Putting on and taking off regular upper body clothing?: 1  Taking care of personal grooming such as brushing teeth?: 3  Eating meals?: 3  Total Score: 11     AM-PAC Raw Score CMS "G-Code Modifier Level of Impairment Assistance   6 % Total / Unable   7 - 8 CM 80 - 100% Maximal Assist   9-13 CL 60 - 80% " Moderate Assist   14 - 19 CK 40 - 60% Moderate Assist   20 - 22 CJ 20 - 40% Minimal Assist   23 CI 1-20% SBA / CGA   24 CH 0% Independent/ Mod I       Patient left with transport in wheelchair leaving room with nursing notified    ASSESSMENT:  An Hazel is a 89 y.o. female with a medical diagnosis of Closed intertrochanteric fracture of right hip, initial encounter and presents with the below impairments. Pt seen prior to discharge. Pt completed SIT <> stand transfer with MOD A from bedside chair with verbal cuing for safety with edisno-walker with ADL's. Stand pivot transfer to wheelchair with no DME. Pt presented with decreased anxiety and improved standing quality. Pt repositioned for safety and hand positioning for safety with transport.  Pt would continue benefit from skilled occupational therapy intervention for increased activity tolerance and independence in ADL's.    Rehab identified problem list/impairments: Rehab identified problem list/impairments: weakness, impaired endurance, impaired functional mobilty, decreased safety awareness, pain, impaired balance, decreased lower extremity function, decreased upper extremity function, decreased ROM, orthopedic precautions, edema, gait instability, impaired fine motor    Rehab potential is good.    Activity tolerance: Good    Discharge recommendations: Discharge Facility/Level Of Care Needs: rehabilitation facility     Barriers to discharge: Barriers to Discharge: Inaccessible home environment, Decreased caregiver support    Equipment recommendations:  (pending rehab recommendations)     GOALS:    Occupational Therapy Goals        Problem: Occupational Therapy Goal    Goal Priority Disciplines Outcome Interventions   Occupational Therapy Goal     OT, PT/OT Ongoing (interventions implemented as appropriate)    Description:  Goals to be met by 11/29/17  1. Max A UBD  2. Max A bed>BSC/chair with Edison-Walker (MET 10/30/17)  3. Set-up G/H (seated)  4.  Min A Right shoulder pendulum and elbow>hand ROM exercises                       Plan:  Patient to be seen daily to address the above listed problems via self-care/home management, therapeutic activities, therapeutic exercises  Plan of Care expires: 11/29/17  Plan of Care reviewed with: patient, daughter         Jose Martins, OT  10/31/2017

## 2017-10-31 NOTE — PROGRESS NOTES
FILI called [facility] to arrange transport [phone], spoke to Angela pickcaroline for 12:30pm.      Called report to [facility] [phone], FILI informed edi Yang's nurse of report contact and pickup time.

## 2017-10-31 NOTE — PT/OT/SLP PROGRESS
"Speech Language Pathology  Treatment  Discharge Summary    An Hazel   MRN: [id number]   Admitting Diagnosis: Closed intertrochanteric fracture of right hip, initial encounter    Diet recommendations: Solid Diet Level: Dental Soft (finely chopped meat)  Liquid Diet Level: Thin (baseline diet)  Feed only when awake/alert, HOB to 90 degrees, Small bites/sips, Alternating bites/sips and 1 bite/sip at a time    SLP Treatment Date: 10/31/17  Speech Start Time: 1023     Speech Stop Time: 1040     Speech Total (min): 17 min       TREATMENT BILLABLE MINUTES:  Treatment Swallowing Dysfunction 17    Has the patient been evaluated by SLP for swallowing? : Yes  Keep patient NPO?: No   General Precautions: Standard, fall          Subjective:  Awake & alert, seated in reclined position in chair. NSG reports that pt is tolerating current diet & taking meds whole with thin without difficulty although appetite is poor. Pt reports that she has been nauseous "I can't eat" & was recently given meds to treat this.     Pain/Comfort  Pain Rating 1: 0/10  Pain Rating Post-Intervention 2: 0/10    Objective:    Pt is able to elicit strong volitional cough, clear voicing prior to PO trials & adequate hyolaryngeal excursion & rise felt via digital palpation. Pt tolerated 6 oz thin liquid via cup & straw sips with no s/s aspiration & timely initiation of swallow. Pt tolerated 2 saltine crackers, self fed, taking small bites, demonstrating prolonged mastication but clears oral cavity completely given time & by alternating bites/sips. No s/s aspiration with solid trial. SLP reviewed all recs & all swallow precautions & strategies, pt verbalized understanding.     FIM:                                 Assessment:  An Hazel is a 89 y.o. female with a medical diagnosis of Closed intertrochanteric fracture of right hip, initial encounter and presents with oropharyngeal swallow appears WFL, tolerating baseline diet. No " further ST f/u necessary for swallowing at this time.    Discharge recommendations: Discharge Facility/Level Of Care Needs: rehabilitation facility     Goals:    SLP Goals     Not on file          Multidisciplinary Problems (Resolved)        Problem: SLP Goal    Goal Priority Disciplines Outcome   SLP Goal   (Resolved)     SLP Outcome(s) achieved   Description:  SLP Short Term Goals:  1.  Pt will be able to consume a dental soft/finely chopped diet with thin liquids with no signs of airway threat or aspiration given min assistance to follow through on compensatory strategies: small bites/sips, alternate bites/sips, slow feeding rate (single bites/sips at a time) GOAL MET                     Plan:   Patient to be seen Therapy Frequency: 1 x/week   Plan of Care expires:    Plan of Care reviewed with: patient  SLP Follow-up?: No  SLP - Next Visit Date: 10/30/17           Lauren Narayan CCC-SLP  10/31/2017

## 2017-10-31 NOTE — TELEPHONE ENCOUNTER
----- Message from Janae Asencio sent at 10/31/2017  1:08 PM CDT -----  Contact: Dian jovel- [phone]  Wanted to let Dr know that mother is in the hospital.

## 2017-10-31 NOTE — DISCHARGE SUMMARY
[facility] Hospital Medicine  Discharge Summary      Patient Name: An Hazel  MRN: [id number]  Admission Date: 10/27/2017  Hospital Length of Stay: 4 days  Discharge Date and Time:  10/31/2017 9:49 AM  Attending Physician: Siria Gerber MD   Discharging Provider: Siria Gerber MD  Primary Care Provider: Alcon Thomas Jr, MD      HPI:   Ms. Luz is a 89 year old woman known to me from a previous admission.  She presented today with her 2 daughters with pain in her right hip and shoulder after falling on her right side.  She had eaten breakfast at 9 AM, then got up to make a phone call and was going to sit back down after hanging up when she tripped over a stool.  X-rays showed an acute comminuted intertrochanteric fracture of the right femur and an acute impacted comminuted fracture of the neck of the humerus with extension into the greater tuberosity.    Further workup significant for elevated BP, CBC with mild anemia at baseline and leukocytosis, normal CXR and EKG with no acute changes with previously seen anteroseptal infarction.    Patient is independent and has good exercise tolerance, lives in a 2 story house and goes up and down the stairs many times during the day.  She has HTN and takes atenolol.  She takes iron for a known iron deficiency anemia.  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  She has idiopathic scoliosis and osteoporosis.  She was hospitalized here in late 2013 after falling and fracturing her left hip, and this was repaired with Dr. Davis.  She fell again in 2/2016 and was hospitalized here with pubic ramus fractures and required a stay in SNF.    Procedure(s) (LRB):  OPEN REDUCTION INTERNAL FIXATION-HIP (ADD ON ) (Right)      Hospital Course:   Patient underwent ORIF of the right hip on 10/27.  Her arm was treated with a sling and swathe, and PT/OT were consulted to work with her.  Given her  excellent pre-injury functioning (able to do all ADLs and climb stairs) and with two joints affected she is a good candidate for inpatient rehabilitation.  She was accepted to [facility] on 10/31/17.       Consults:   Consults         Status Ordering Provider     Inpatient consult to Orthopedic Surgery  Once     Provider:  Claude S. Williams IV, MD    Acknowledged FEDERICO MIRAMONTES     Inpatient consult to Physical Medicine Rehab  Once     Provider:  (Not yet assigned)    Acknowledged FEDERICO MIRAMONTES     Inpatient consult to SNF  Once     Provider:  (Not yet assigned)    Acknowledged WILLIAMS, CLAUDE S. 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    Medications:  Reconciled Home Medications:   Current Discharge Medication List      START taking these medications    Details   enoxaparin (LOVENOX) 40 mg/0.4 mL Syrg Inject 0.4 mLs (40 mg total) into the skin once daily.         CONTINUE these medications which have CHANGED    Details   losartan (COZAAR) 100 MG tablet Take 1 tablet (100 mg total) by mouth once daily. in the evening  Qty: 90 tablet, Refills: 3         CONTINUE these medications which have NOT CHANGED    Details   acetaminophen (TYLENOL) 325 MG tablet Take 2 tablets (650 mg total) by mouth every 6 (six) hours as needed (mild pain). Do NOT exceed 3000 mg in a 24 hour time period and do not take  with other medications containing acetaminophen      amlodipine (NORVASC) 5 MG tablet Take 1 tablet (5 mg total) by mouth once daily.  Qty: 90 tablet, Refills: 3      atenolol (TENORMIN) 25 MG tablet Take 1 tablet (25 mg total) by mouth 2 (two) times daily.  Qty: 90 tablet, Refills: 3      cholecalciferol, vitamin D3, (VITAMIN D3) 1,000 unit capsule Take 1,000 Units by mouth once daily.      FOLIC ACID/MULTIVIT-MIN/LUTEIN (CENTRUM SILVER ORAL) Take 1 tablet by mouth once daily.       oxybutynin (DITROPAN-XL) 5 MG TR24 TAKE 1 TABLET DAILY  Qty: 90 tablet, Refills: 4      pravastatin (PRAVACHOL) 20 MG tablet TAKE ONE-HALF (1/2) TABLET DAILY  Qty: 90 tablet, Refills: 0             Time spent on the discharge of patient: <30 minutes  Patient was seen and examined on the date of discharge and determined to be suitable for discharge.         Siria Griffin MD  Department of Hospital Medicine  [facility]

## 2017-10-31 NOTE — PROGRESS NOTES
Admit Assessment    Patient Identification  An Hazel   :  10/8/1927  Admit Date:  10/31/2017  Attending Provider:  Verna Mc MD                EXPECTED DATE OF DISCHARGE:  To be determined at first treatment team staffing.     Pt was admitted to inpatient rehab.   is involved.    NAME OF PERSON PROVIDING INFORMATION FOR ASSESSMENT:  Patient    EMERGENCY CONTACT:  Primary Contact: Dian Luz                             Mobile Phone: [phone]                Relation: Daughter    LIVING ARRANGEMENTS: [address] [facility], [facility] [address].  Pt lived alone, but daughter Dian has recently been staying with pt.  Dian works during the day and pt will be home alone.      LEVEL OF INDEPENDENCE/ASSISTANCE REQUIRED PRIOR TO ADMISSION: Independent with ADLs and driving short distances.      HOME HEALTH OR OUTPATIENT THERAPY USAGE PRIOR TO ADMISSION:  None      DURABLE MEDICAL EQUIPMENT:  Wheelchair, rolling walker, cane, shower chair and commode.    Patient Preference of agencies include: None stated  Patient/Caregiver informed of right to choose providers or agencies.  Patient provides permission to release any necessary information to [facility] and to [facility] agencies as needed to facilitate patient care, treatment planning, and patient discharge planning.  Written and verbal resources provided.      Outpatient Pharmacy:     [facility] - [place], [place] - [address] [place] [address]  Phone: [phone] Fax: [phone]    [facility] [address] [place] [address]  [address] [place] [address]  Phone: [phone] Fax: [phone]    [facility]  [address] [place] [address]  Phone: [phone] Fax: [phone]    [facility] - [place], [place] - [address] [facility]  [address] [facility] [address]  Phone: [phone] Fax:  [phone]    [facility], [place] - [address]  [address]  [address]  [facility] [address]  Phone: [phone] Fax: [phone]      FAMILY/CAREGIVER SUPPORT:  Daughter Dian is primary support.  Pt has second daughter currently visiting and will assist with care through [facility].      LEVEL OF ORIENTATION:  AA&Ox4      ADJUSTMENT TO DIAGNOSIS AND TREATMENT:  Pt adjusting appropriately      EMOTIONAL/BEHAVIORAL STATUS/COGNITIVE ISSUES:  Pt presented with calm mood and  good recall.  Pt is hard of hearing.       History/Current Substance Use:   Social History     Social History Main Topics    Smoking status: Never Smoker    Smokeless tobacco: Never Used      Comment: The patient exercises every other day on an exercise bike.  On alternative days she does floor exercises with weights.    Alcohol use 1.2 - 2.4 oz/week     1 Glasses of wine, 1 - 3 Standard drinks or equivalent per week      Comment: Occasional use    Drug use: No    Sexual activity: Not on file         Financial:  Payor/Plan Subscr  Sex Relation Sub. Ins. ID Effective Group Num   1. MEDICARE - ME* ANITA RUGGIERO* 10/8/1927 Female  [id number] 10/1/1992                                    [address]   2. [facility]* ANITA RUGGIERO* 10/8/1927 Female  [id number] 1/[birth year] [id number]                                   [address]          DISCHARGE PLAN:  Home with daughters and home health.      PATIENT PREFERENCE OF AGENCIES: None stated       PATIENT/CAREGIVER CONCERNS EXPRESSED DURING ASSESSMENT:  None stated      INTERVENTIONS/REFERRALS:  Pt caregiver engaged in treatment planning process. SW will continue to follow for all resources, education, discharge planning and psychosocial needs.  Patient/caregiver engaged in treatment planning process.  SW available as needed.

## 2017-10-31 NOTE — PLAN OF CARE
10/31/17 1134   Final Note   Assessment Type Final Discharge Note   Discharge Disposition Rehab  ([facility]   Hospital Follow Up  Appt(s) scheduled? No   Discharge plans and expectations educations in teach back method with documentation complete? Yes

## 2017-10-31 NOTE — PT/OT/SLP DISCHARGE
Occupational Therapy Discharge Summary    An Hazel  MRN: [id number]   Closed intertrochanteric fracture of right hip, initial encounter   Patient Discharged from acute Occupational Therapy on 10/31/17.  Please refer to prior OT note dated on 10/31/17 for functional status.     Assessment:   Patient appropriate for care in another setting.  GOALS:    Occupational Therapy Goals        Problem: Occupational Therapy Goal    Goal Priority Disciplines Outcome Interventions   Occupational Therapy Goal     OT, PT/OT Ongoing (interventions implemented as appropriate)    Description:  Goals to be met by 11/29/17  1. Max A UBD  2. Max A bed>BSC/chair with Edison-Walker (MET 10/30/17)  3. Set-up G/H (seated)  4. Min A Right shoulder pendulum and elbow>hand ROM exercises                     Reasons for Discontinuation of Therapy Services  Transfer to alternate level of care.      Plan:  Patient Discharged to: Inpatient Rehab.

## 2017-10-31 NOTE — PLAN OF CARE
Problem: Occupational Therapy Goal  Goal: Occupational Therapy Goal  Goals to be met by 11/29/17  1. Max A UBD  2. Max A bed>BSC/chair with Edison-Walker (MET 10/30/17)  3. Set-up G/H (seated)  4. Min A Right shoulder pendulum and elbow>hand ROM exercises      Outcome: Ongoing (interventions implemented as appropriate)  Pt seen prior to discharge. Pt completed SIT <> stand transfer with MOD A from bedside chair with verbal cuing for safety with edison-walker with ADL's. Stand pivot transfer to wheelchair with no DME. Pt presented with decreased anxiety and improved standing quality. Pt repositioned for safety and hand positioning for safety with transport.  Pt would continue benefit from skilled occupational therapy intervention for increased activity tolerance and independence in ADL's.

## 2017-11-01 LAB
ALBUMIN SERPL BCP-MCNC: 2.1 G/DL
ALP SERPL-CCNC: 74 U/L
ALT SERPL W/O P-5'-P-CCNC: 9 U/L
ANION GAP SERPL CALC-SCNC: 10 MMOL/L
AST SERPL-CCNC: 22 U/L
BACTERIA #/AREA URNS AUTO: ABNORMAL /HPF
BASOPHILS # BLD AUTO: 0.06 K/UL
BASOPHILS NFR BLD: 0.5 %
BILIRUB SERPL-MCNC: 0.7 MG/DL
BILIRUB UR QL STRIP: NEGATIVE
BUN SERPL-MCNC: 24 MG/DL
CALCIUM SERPL-MCNC: 9 MG/DL
CHLORIDE SERPL-SCNC: 103 MMOL/L
CLARITY UR REFRACT.AUTO: ABNORMAL
CO2 SERPL-SCNC: 22 MMOL/L
COLOR UR AUTO: YELLOW
CREAT SERPL-MCNC: 0.7 MG/DL
DIFFERENTIAL METHOD: ABNORMAL
EOSINOPHIL # BLD AUTO: 0.3 K/UL
EOSINOPHIL NFR BLD: 2.1 %
ERYTHROCYTE [DISTWIDTH] IN BLOOD BY AUTOMATED COUNT: 16.8 %
EST. GFR  (AFRICAN AMERICAN): >60 ML/MIN/1.73 M^2
EST. GFR  (NON AFRICAN AMERICAN): >60 ML/MIN/1.73 M^2
GLUCOSE SERPL-MCNC: 104 MG/DL
GLUCOSE UR QL STRIP: NEGATIVE
HCT VFR BLD AUTO: 26 %
HGB BLD-MCNC: 8 G/DL
HGB UR QL STRIP: ABNORMAL
HYALINE CASTS UR QL AUTO: 0 /LPF
KETONES UR QL STRIP: ABNORMAL
LEUKOCYTE ESTERASE UR QL STRIP: ABNORMAL
LYMPHOCYTES # BLD AUTO: 1.6 K/UL
LYMPHOCYTES NFR BLD: 13.4 %
MAGNESIUM SERPL-MCNC: 1.8 MG/DL
MCH RBC QN AUTO: 28.5 PG
MCHC RBC AUTO-ENTMCNC: 30.8 G/DL
MCV RBC AUTO: 93 FL
MICROSCOPIC COMMENT: ABNORMAL
MONOCYTES # BLD AUTO: 1.6 K/UL
MONOCYTES NFR BLD: 13.4 %
NEUTROPHILS # BLD AUTO: 8.4 K/UL
NEUTROPHILS NFR BLD: 70.6 %
NITRITE UR QL STRIP: POSITIVE
NON-SQ EPI CELLS #/AREA URNS AUTO: 1 /HPF
PH UR STRIP: 5 [PH] (ref 5–8)
PLATELET # BLD AUTO: 305 K/UL
PMV BLD AUTO: 11.7 FL
POTASSIUM SERPL-SCNC: 4.3 MMOL/L
PREALB SERPL-MCNC: 9 MG/DL
PROT SERPL-MCNC: 6.7 G/DL
PROT UR QL STRIP: ABNORMAL
RBC # BLD AUTO: 2.81 M/UL
RBC #/AREA URNS AUTO: 13 /HPF (ref 0–4)
SODIUM SERPL-SCNC: 135 MMOL/L
SP GR UR STRIP: 1.01 (ref 1–1.03)
SQUAMOUS #/AREA URNS AUTO: 4 /HPF
URN SPEC COLLECT METH UR: ABNORMAL
UROBILINOGEN UR STRIP-ACNC: NEGATIVE EU/DL
WBC # BLD AUTO: 11.9 K/UL
WBC #/AREA URNS AUTO: >100 /HPF (ref 0–5)
WBC CLUMPS UR QL AUTO: ABNORMAL

## 2017-11-01 PROCEDURE — 83735 ASSAY OF MAGNESIUM: CPT

## 2017-11-01 PROCEDURE — 63600175 PHARM REV CODE 636 W HCPCS: Performed by: HOSPITALIST

## 2017-11-01 PROCEDURE — 97167 OT EVAL HIGH COMPLEX 60 MIN: CPT

## 2017-11-01 PROCEDURE — 99233 SBSQ HOSP IP/OBS HIGH 50: CPT | Mod: ,,, | Performed by: PHYSICAL MEDICINE & REHABILITATION

## 2017-11-01 PROCEDURE — 12800000 HC REHAB SEMI-PRIVATE ROOM

## 2017-11-01 PROCEDURE — 81001 URINALYSIS AUTO W/SCOPE: CPT

## 2017-11-01 PROCEDURE — 87086 URINE CULTURE/COLONY COUNT: CPT

## 2017-11-01 PROCEDURE — 87186 SC STD MICRODIL/AGAR DIL: CPT

## 2017-11-01 PROCEDURE — 36415 COLL VENOUS BLD VENIPUNCTURE: CPT

## 2017-11-01 PROCEDURE — 85025 COMPLETE CBC W/AUTO DIFF WBC: CPT

## 2017-11-01 PROCEDURE — 87088 URINE BACTERIA CULTURE: CPT

## 2017-11-01 PROCEDURE — 97163 PT EVAL HIGH COMPLEX 45 MIN: CPT

## 2017-11-01 PROCEDURE — 84134 ASSAY OF PREALBUMIN: CPT

## 2017-11-01 PROCEDURE — 97535 SELF CARE MNGMENT TRAINING: CPT

## 2017-11-01 PROCEDURE — 80053 COMPREHEN METABOLIC PANEL: CPT

## 2017-11-01 PROCEDURE — 25000003 PHARM REV CODE 250: Performed by: PHYSICAL MEDICINE & REHABILITATION

## 2017-11-01 PROCEDURE — 87077 CULTURE AEROBIC IDENTIFY: CPT

## 2017-11-01 PROCEDURE — 25000003 PHARM REV CODE 250: Performed by: HOSPITALIST

## 2017-11-01 PROCEDURE — 97530 THERAPEUTIC ACTIVITIES: CPT

## 2017-11-01 RX ORDER — OXYBUTYNIN CHLORIDE 5 MG/1
5 TABLET, EXTENDED RELEASE ORAL DAILY
Status: DISCONTINUED | OUTPATIENT
Start: 2017-11-02 | End: 2017-11-02

## 2017-11-01 RX ADMIN — ONDANSETRON 8 MG: 8 TABLET, ORALLY DISINTEGRATING ORAL at 09:11

## 2017-11-01 RX ADMIN — ACETAMINOPHEN 650 MG: 325 TABLET ORAL at 08:11

## 2017-11-01 RX ADMIN — DOCUSATE SODIUM 100 MG: 100 CAPSULE, LIQUID FILLED ORAL at 08:11

## 2017-11-01 RX ADMIN — ENOXAPARIN SODIUM 40 MG: 100 INJECTION SUBCUTANEOUS at 06:11

## 2017-11-01 RX ADMIN — ATENOLOL 25 MG: 25 TABLET ORAL at 08:11

## 2017-11-01 RX ADMIN — LOSARTAN POTASSIUM 100 MG: 50 TABLET, FILM COATED ORAL at 08:11

## 2017-11-01 RX ADMIN — OXYCODONE HYDROCHLORIDE 5 MG: 5 TABLET ORAL at 05:11

## 2017-11-01 RX ADMIN — OXYCODONE HYDROCHLORIDE 5 MG: 5 TABLET ORAL at 03:11

## 2017-11-01 RX ADMIN — ACETAMINOPHEN 650 MG: 325 TABLET ORAL at 09:11

## 2017-11-01 RX ADMIN — DOCUSATE SODIUM 100 MG: 100 CAPSULE, LIQUID FILLED ORAL at 09:11

## 2017-11-01 RX ADMIN — AMLODIPINE BESYLATE 5 MG: 5 TABLET ORAL at 09:11

## 2017-11-01 RX ADMIN — ONDANSETRON 8 MG: 8 TABLET, ORALLY DISINTEGRATING ORAL at 03:11

## 2017-11-01 RX ADMIN — ATENOLOL 25 MG: 25 TABLET ORAL at 12:11

## 2017-11-01 RX ADMIN — ACETAMINOPHEN 650 MG: 325 TABLET ORAL at 02:11

## 2017-11-01 RX ADMIN — OXYCODONE HYDROCHLORIDE 5 MG: 5 TABLET ORAL at 09:11

## 2017-11-01 NOTE — PLAN OF CARE
Problem: Fall Risk (Adult)  Goal: Absence of Falls  Patient will demonstrate the desired outcomes by discharge/transition of care.   Outcome: Ongoing (interventions implemented as appropriate)  Patient remains free of fall or injury, safety measures maintained. Side rails up x 2 . Bed alarm set. Call light is within reach. Frequent rounds to assess pain & safety.    Problem: Pressure Ulcer Risk (Lucho Scale) (Adult,Obstetrics,Pediatric)  Goal: Skin Integrity  Patient will demonstrate the desired outcomes by discharge/transition of care.   Outcome: Ongoing (interventions implemented as appropriate)  No new skin breakdown noted this shift. Assistance is provided to turn & reposition often.     Problem: Pain, Acute (Adult)  Goal: Acceptable Pain Control/Comfort Level  Patient will demonstrate the desired outcomes by discharge/transition of care.   Outcome: Ongoing (interventions implemented as appropriate)  Pain is being managed with prn meds. Patient is resting well thus far this shift.     Problem: Mobility, Physical Impaired (Adult)  Goal: Enhanced Mobility Skills  Patient will demonstrate the desired outcomes by discharge/transition of care.   Outcome: Ongoing (interventions implemented as appropriate)  Patient is mod assist with transfers.

## 2017-11-01 NOTE — ASSESSMENT & PLAN NOTE
ORIF of the right hip on 10/27, WBAT. Arm was treated with a sling and swath.   - Ortho precautions: Non weight bearing RUE for 2 weeks.    - Sling and swathe for comfort.  Weight bearing as tolerated RLE.  - Surgical wound:  Dressing changes every 3 days.  Remove staples on POD #14  - PT/OT    Tolerating therapy evaluations today

## 2017-11-01 NOTE — PROGRESS NOTES
Physical Therapy   Admit Physical Therapy FIM Scores    An Yanesisabella Luz   MRN: [id number]        11/01/17 1400   Transfers   Bed/Chair/WC 2   Toilet 2   Tub 1   Locomotion   Distance Walked 1   Distance Wheelchair 1   Walk 1   Wheelchair 1   Mode C   Stairs 1     Ronda Templeton, PT  11/1/2017

## 2017-11-01 NOTE — PROGRESS NOTES
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     Hypercalcemia     Hyperlipidemia     Hypertension     Osteoporosis     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    Pubic ramus fracture     Scoliosis         Date Referred to PT 10/31/17   Referring Physician Jesusita   Family/Caregiver Present Yes  (Pt's daughter present)   Patient Found (position) Supine in bed   Pt found with (sling to R UE)   Precautions   General Precautions fall   Orthopedic Yes   RUE Weight Bearing NWB   RLE Weight Bearing WBAT   Required Braces or Orthoses Yes   RUE Brace/Splint sling   Visual/Auditory Vision impaired  (Pt requires glasses)   Previous Level of Function   Ambulation Skills needs device   Transfer Skills needs device   ADL Skills needs device   Living Environment   Lives With alone   Living Arrangements house   Home Accessibility stairs to enter home;stairs within home   Home Layout Able to live on 1st floor   Number of Stairs to Enter Home 5   Number of Stairs Within Home 18   Stair Railings at Home outside, present at both sides;inside, present on left side   Transportation Available family or friend will provide   Living Environment Comment Per pt, pt lives in a 2 story home in [place], [place]. Pt lives alone but her daughter comes to help her with meals after work and often stays overnight. Pt's daughter from [place] will be staying for 5 weeks following D/C. Pt's master bedroom is on the 2nd floor but pt reports that she will stay on  "the 1st floor at D/C. Pt was using a SPC for mobility in community. Pt reports having a mechanical tub lift.    Equipment Currently Used at Home 3-in-1 commode;bath bench;wheelchair;cane, quad  (mechanical tub lift)   Subjective   Patient states "I am feeling better than I did earlier"   Pain/Comfort   Pain Rating 1 4/10   Location - Side 1 Right   Location - Orientation 1 anterior   Location 1 shoulder   Pain Addressed 1 Reposition;Distraction   Pain Rating Post-Intervention 1 4/10   Cognitive Status   Level of Consciousness (AVPU) alert   Arousal Level opens eyes spontaneously   Orientation oriented x 4   Able to Follow Commands (Communication) WFL   Speech clear/fluent;follows commands   Mood/Behavior calm;cooperative   Sensory Examination   Additional Documentation yes   Light Touch   RLE w/i normal limits   LLE w/i normal limits   Range of Motion (ROM)   Range of Motion Examination LLE ROM was WFL  (Unable to perform ROM testing on pt's R UE due to injury. Pt's R hip flexion passive ROM limited due to pain)       MMT: Hip, Rehab Eval   Right Flexion (2-/5) poor minus, right   Left Flexion (4-/5) good minus, left       MMT: Knee, Rehab Eval   Left Flexion (4-/5) good minus, left   Right Flexion (2/5) poor, right   Left Extension (4-/5) good minus, left   Right Extension (2/5) poor, right       MMT: Ankle, Rehab Eval   Left Dorsiflexion (4-/5) good minus, left   Right Dorsiflexion (3/5) fair, right   Left Plantarflexion (4-/5) good minus, left   Right Plantarflexion (3/5) fair, right   Tone   Right LE normal   Left LE normal   Observation   Appearance Thin, elderly female sitting upright in wheelchair with R UE sling donned   Posture Rounded shoulders;Kyphosis in Thoracic Spine;Scoliosis  (while seated at EOB)   Skin thin  (bruising along B forearms, skin visible to PT intact)   Bed Mobility   Bed Mobility yes   Rolling/Turning to Left Maximum assistance  (x 1 trial in bed)   Rolling/Turning Right (Not performed " due to NWB to R LE)   Supine to Sit Maximum Assistance   Supine to Sit Comments x 1 trial in bed, assistance required for B LE and trunk management, Increased time required for pt to complete transfer   Sit to Supine Maximum Assistance  (x 1 trial in bed, )   Transfers   Transfer yes   Sit to Stand   Sit <> Stand Assistance Moderate Assistance;Maximum Assistance   Sit <> Stand Assistive Device No Assistive Device;Other (see comments)  (parallel bars)   Trials/Comments Pt performed multiple trials of sit to stand with no AD. Pt required moderate to maximum physical assistance to elevate hips out of wheelchair    Stand to Sit   Assistance Moderate Assistance;Maximum Assistance   Assistive Device No Assistive Device;Other (see comments)  (parallel bars)   Trials/Comments Pt performed multiple trials throughout PT evaluation, pt required assistance to control descent into sitting in wheelchair    Bed to Chair   Bed <> Chair Technique Stand Pivot   Bed <> Chair Assistance Maximum Assistance   Bed <> Chair Assistive Device No Assistive Device   Trials/Comments x 1 trial, pt required lifting and lowering assistance to complete sit to stand and stand to sit transfer. Pt required moderate assistance for pivot portion of transfer    Chair to Bed   Technique Stand Pivot   Assistance Maximum Assistance   Assistive Device No Assistive Device   Trials/Comments x 1 trial, pt required lifting and lowering assistance to perform sit to stand and stand to sit portion of transfer. Pt required moderate assist for pivot portion of transfer   Tub Bench Transfer   Trials/Comments Due to pt's difficulty maintaining standing and pt's fear of falling, PT deferred performing tub transfer due to safety concerns. PT will attempt transfer when pt is appropriate    Toilet Transfer   Toilet Transfer Technique Stand Pivot   Toilet Transfer Assistance Maximum Assistance   Toilet Transfer Assistive Device No Assistive Device   Trials/Comments x 1  trial, pt required lifting and lowering assistance to complete sit to stand and stand to sit portion of transfer. Pt required moderate assistance for pivot portion of transfer    Car Transfer   Trials/Comments Due to low nature of car, pt's fear of falling, and pt's decreased ability to perform sit to stand transfer, PT deferred performing car transfer due to safety concerns   Wheelchair Activities   Propulsion Yes   Propulsion Type 1 Manual   Level 1 Level tile   Method 1 Left upper extremity;Left lower extremity   Level of Assistance 1 Minimum assistance;Moderate assistance   Description/ Details 1 Pt propelled self in wheelchair x 2 trials: 1st trial: 32 feet, 2nd trial: 27 feet. Pt required seated rest breaks between trials due to fatigue. Pt had difficulty coordinating UE and LE.    Gait   Gait Yes   Weight Bearing Status WBAT to R LE    Gait 1   Surface 1 Level tile   Gait Assistive Device Parallel bars   Other Apparatus 1 Wheelchair follow   Assistance 1 Maximum assistance;Total assistance   Gait Distance Pt took 2 steps forward in parallel bars, posterior lean observed. Pt became anxious and fearful of falling and required to sit in wheelchair. Pt required physical assistnace from PT to bring R LE forward during swing phase   Gait Pattern swing-through gait   Gait Deviation(s) decreased lissette;increased time in double stance;decreased velocity of limb motion;decreased step length;decreased stride length;decreased toe-to-floor clearance;decreased weight-shifting ability;forward lean   Impairments Contributing to Gait Deviations impaired balance;decreased strength;pain;impaired postural control;decreased ROM;other (see comments)  (Pt's fear of falling)   Stairs   Stairs No  (Due to pt's fear of falling and requiring increased assistance for sit to stand transfers, PT deferred having pt perform curb step and stair negotiation)   Endurance Deficit   Endurance Deficit Yes   Endurance Deficit Description Pt  required frequent rest breaks throughout PT evaluation due to pt's R LE pain, fatigue, and SOB   Balance   Balance Yes   Static Sitting Balance   Static Sitting-Balance Support Left upper extremity supported;Feet supported;Feet unsupported  (seated at EOB)   Static Sitting-Level of Assistance Stand by Assistance   Static Sitting-Comment/# of Minutes Pt tolerated static sitting, extremely kyphotic posture observed   Static Standing Balance   Static Standing-Balance Support Left upper extremity supported  (in parallel bars)   Static Standing-Level of Assistance Minimum assistance   Static Standing-Comment/# of Minutes Pt tolerated 1 trial of static standing in parallel bars for 32 seconds, posterior lean observed with decreased R LE weight bearing    Activity Tolerance   Activity Tolerance Patient tolerated treatment well;Patient limited by fatigue   After Treatment   Patient Position After Treatment Supine in bed   Patient after treatment left call button in reach   Treatment   Treatment PT educated pt on importance of maintaining NWB to R UE during all functional mobility.     PT deferred having pt perform picking object up off of floor and ambulating over uneven surfaces due to safety concerns.   Assessment   Prognosis Good   Problem List Decreased range of motion;Decreased strength;Decreased endurance;Impaired balance;Decreased mobility;Orthopedic restrictions;Pain;Decreased skin integrity   Session Assessment other (see comments)  (PT evaluation completed)   Assessment Pt is a 89 y/o female who presents to [facility] following a recent hospitalization due to R femur fracture with IM nail placement due to fall at home. Prior to admit pt required use of cane for mobility and was able to complete all self care independently. Pt presents to PT with increased fear of falling, R UE and R LE pain, decreased R LE strength, poor endurance, and overall impaired functional mobility. Pt will benefit from further  skilled PT intervention in order to increase pt's functional independence and to decrease pt's caregiver burden. Pt's greatest barrier to D/C home is that pt has 5 steps to enter her home.    Level of Motivation/Participation good   Barriers to Discharge Inaccessible home environment;Decreased caregiver support   Barriers to Discharge Comments Pt's daughter works during day. Pt has 5 steps to enter her home   Short Term Goals   Additional Documentation yes   Time For Goal Achievement 7 days   Pt Will Perform Supine To Sit New goal;With moderate assist   Supine to Sit-Met/Not Met Not Met   Pt Will Perform Sit to Supine New goal;With moderate assist   Sit to Supine - Met/Not Met Not Met   Pt Will Transfer Sit to Stand New goal;With moderate assist  (with appropriate AD)   Transfer Sit to stand - Met/Not Met Not Met   Pt Will Transfer Bed/Chair New goal;Stand Pivot;With moderate assist  (with appropriate AD)   Transfer Bed/Chair - Met/Not Met Not Met   Pt Will Ambulate New goal;11-30 feet;With moderate assist  (with appropriate AD)   Ambulate - Met/Not Met Not met   Pt Will Stand New goal;3-5 min;With minimal assist  (in parallel bars)   Stand - Met/Not Met Not Met   Pt Will Tolerate Exercise New goal;15-20 reps;With verbal cues required/provided;With active assist   Tolerate Exercise - Met/Not Met Not met   Pt Will Propel Wheelchair New goal; feet;With (L) UE;With (L) LE;With contact guard assist   Propel Wheelchair - Met/Not Met Not met   Other Goal Patient will perform stand pivot transfer from wheelchair to bedside commode with appropriate AD and moderate assist   Other Goal - Progress Not met   Long Term Goals   Additional Documentation yes   Time For Goal Achievement 2 weeks   Pt Will Perform Supine To Sit New goal;With minimal assist   Supine to Sit-Met/Not Met Not Met   Pt Will Perform Sit to Supine New goal;With minimal assist   Sit to Supine - Met/Not Met Not Met   Pt Will Transfer Sit to Stand New  goal;With minimal assist  (with appropriate AD)   Transfer Sit to stand - Met/Not Met Not Met   Pt Will Transfer Bed/Chair New goal;Stand Pivot;With minimal assist  (with appropriate AD)   Transfer Bed/Chair - Met/Not Met Not Met   Pt Will Ambulate New goal; feet;With minimal assist  (with appropriate AD)   Ambulate - Met/Not Met Not met   Pt Will Go Up / Down Stairs New goal;3-5 stairs;With 1 Rail;With minimal assist;With moderate assist   Go Up/Down Stairs - Met/Not Met Not met   Pt Will Propel Wheelchair New goal;> 150 feet;With stand by assist;With (L) UE;With (L) LE   Propel Wheelchair - Met/Not Met Not met   Other Goal Patient will perform stand pivot transfer from wheelchair to bedside commode with appropriate AD and minimal assistance   Other Goal - Progress Not met   Other Goal 2 Patient will perform stand pivot transfer with appropriate AD from wheelchair to bedside commode with minimal assistance   Other Goal - Progress 2 Not met    Discharge Recommendations   Equipment Needed After Discharge (TBD pending weight bearing status)   Discharge Facility/Level Of Care Needs home health PT   Plan   Planned Therapy Intervention Plan of care initiated;Bed mobility training;Transfer training;Gait training;Balance Training;ROM;Strengthening;Stretching;Wheelchair Management/Propulsion   Therapy Frequency 2 times/day;daily;Monday-Friday;Saturday or Sunday   Physical Therapy Follow-up   PT Follow-up? Yes   PT - Next Visit Date 11/02/17   Treatment/Billable Minutes   Evaluation 60   Therapeutic Activity 30   Total Time 90     Ronda Templeton, PT  11/1/2017

## 2017-11-01 NOTE — PROGRESS NOTES
Recreational Therapy   Evaluation    An  Aleksander Luz  MRN: [id number]         11/01/17 1800   Rec Therapy Time Calculation   Rec Start Time 0305   Rec Stop Time 0340   Rec Total Time (min) 35 min   General   Admit Date 10/31/17   Primary Diagnosis Interochanteric Fx Right Hip   Secondary Diagnosis Cath   Number of Children 5   Occupation Retired   If you were to take part in activities, which of the following would you prefer? Both   Do you feel like you have enough to keep you busy now? Yes   Subjective   Patient states Hello   Family states She a little tired from today   Precautions   General Precautions fall   Orthopedic Yes   RUE Weight Bearing NWB   RLE Weight Bearing WBAT   Required Braces or Orthoses Yes   RUE Brace/Splint sling   Visual/Auditory Vision impaired   Assessment   Musculoskeletal impaired fine motor coordination;impaired gross motor coordination;impaired strength;impaired endurance   Hearing (wfl)   Cognitive Concerns oriented x4;short term memory loss   Leisure Interest Survey   Leisure Interest Survey Yes   Social/Group Activities   Mandaeism/Rastafari Current Interest   Other Social/Group Activity (Being with friends and family)   Solitary Activities   Watching TV Current Interest   Reading Current Interest   Therapeutic Recreation   Social Skills Interacts independently in social activity   Leisure Attitude/Participation Able to identify personal benefits and sets goals for leisure involvement   Rec Therapy Group Assistance Moderate Assistance;Maximum Assistance   Problem Solving Activity Assistance Moderate Assistance   Assessment   Assessment Pt seen at bedside to complete Rt assessment, pt's 2 daughter were present at the time.  All voiced that pt was tired from today activities, therapist reduced Rt time to allow pt to rest. During the assessment pt was able to answer the questions appropriately, pt current and premorbid leisure interests are appropriate for age.  No pain complaints  voiced, no barriers noted.   Goals   Additional Documentation yes   Goal Formulation With patient   Time For Goal Achievement (10-12 days)   Goal 1 Pt will attend and activley participate in various Rt activites to enchance physical and social skills   Plan   Planned Therapy Intervention Plan of care initiated;Social group;Group Recreational Therapy;Music   PT Frequency Minimum of 1 visit per week   Time   Treatment time 2 units     Rehab orientation and unit safety rules reviewed with pt and her 2 daughters who were present.  All agreed that they understood what was discussed and reviewed with them.    Jennifer Diaz, CTRS,   11/1/2017

## 2017-11-01 NOTE — NURSING
NURSE TECH FIM  REPORT    EATING  [x] Pt. opens packages, cuts food, pours liquids, and feeds self.  Regular consistency (7)  [] Pt. needs dentures, swallow technique, special foods or drink consistency (6)  [] Pt. needs supervision (cueing), set up (open containers, cut food, etc. (5)  [] Pt. needs assist with occasional scooping or checking for food pocketing (75-99%) (4)  [] Pt. needs assist to load each bite on utensils, pt.brings food to mouth (50-74%) (3)  [] Pt. needs assist to scoop, bring food to mouth (hand over hand) (25-49%) (2)  [] Pt. Is receiving IV fluids for hydration or tube feedings  (0-24%) (1)      GROOMING   [] Pt. performs all tasks independently and safely (7)  [] Pt. obtains all articles.  Needs adaptive/assistive device (such as wash mitt) (6)  [] Pt. needs supervision (cueing), set up (helper obtains articles, applies toothpaste, plugs razor, hands items to patient, opens containers, adjusts water temperature) (5)       [] Pt. doing 3 out of 4,  or 4 out of 5 tasks.  Needs assist to put in or remove dentures.  Needs steadying assist.(Pt. Doing 75-99%. (4)                                                                 [] Pt. doing 2 out of 4, or 3 out of 5 tasks (50-74%) (3)  [] Pt. doing 1 out of 4, or 2 out of 5 tasks (25-49%) (2)  [] Pt. doing 0 out of 4, or 1 out of 5 tasks or needs assistance of 2 helpers (0-24%) (1)  [x] Activity done with OT      BATHING  [] Pt. safely bathes whole body (10 parts of 10) (7)  [] Pt. doing 10 out of 10 body parts.  Uses adaptive devices (such as wash mitt, long handled sponge, etc.) (6)  []  Pt. doing 8-9 out of 10 body parts , or pt. needs steadying assistance. (75-99%) (4)  []  Pt. doing 10 out of 10 body parts buts needs supervision or set up (5)  [] Pt. doing 5-7 out of 10 body parts (50-74%) (3)  [] Pt. doing 3-4 of out 10 body parts (25-49%) (2)  [] Pt. doing 0-2 out of 10 body parts or needs assist of two helpers. (0-24%) (1)    [x] Activity  done with OT      DRESSING UPPER BODY  [] Pt. dresses independently and undresses independently, obtaining clothes from          storage area (7)  [] Pt. needs adaptive devices (such as Velcro fastener, reacher, button hook, etc.)          Applies abdominal binder or any orthotic.  Uses  walker for steadying. (6)  [] Pt. needs supervision (cueing), set up (helper brings clothes or applies orthotics (such as abdominal binder, TLSO, cervical collar) (5)  [] Pt. doing 75-99%. (4)  [] Pt. doing 50-74%. (3)  [] Pt. doing 25-49%. (2)  [] Pt. doing 0-24%, or needs assistance of 2 helpers. (1)  [x] Activity done with OT.      DRESSING LOWER BODY  [] Pt. independent with all parts of dressing lower body. (7)  [] Pt. needs adaptive devices ( such as reacher, long handled shoe horn, sock aid) (6)  [] Pt. needs supervision (cueing), set up(helper brings clothes or applies orthotic, such   as ELIZABETH hose, AFO) (5)  [] Pt. doing 75-99%.  Needs steadying assistance: buttoning pants, zipping a zipper,        fastening a belt, tying shoelaces, applying one sock/shoe. (4)  [] Pt. doing 50-74%. (3)  [] Pt. doing 25-49%. (2)  [] Pt. Doing 0-24%, or needs assistance of 2 helpers. (1)  [x] Activity done with OT.      TOILETING  [] Pt. doing 3 out of 3 tasks independently (pulling down clothes, cleansing dona area,  pulling up clothes) (7)  [] Pt. doing 3 out of 3 tasks, but needs assistive device (grab bars, etc.) (6)  [] Pt. needs supervision (cueing), or set up (obtaining supplies for dona care.) (5)  [] Pt. doing 3 out of 3 tasks, but needs steadying assistance with one or more parts. (75-90%) (4)  [] Pt. doing 2 out of 3 tasks (50-74%) (3)  [] Pt. doing out of 3 tasks (25-49%) (2)  [] Pt. needs assist (more than steadying) with all 3 tasks.  Needs assist of 2 helpers.    Incontinent of B/B today. (0-24%) (1)  [x] Activity did not occur.      BLADDER  [] Pt.  uses toilet (7)  [] Pt. is on dialysis - does not urinate (7)  [] Pt. uses  bedside commode (5)  [] Pt. uses bedpan - staff does ____% of tasks(includes rolling on/off, holding bedpan in place, hygiene, and emptying pan)  [] Pt. uses urinal - staff empties (5)                     []  Pt. needs help with positioning the urinal (4)                     []  Pt. needs help holding the urinal (1)  [] Pt. has call catheter/suprapubic catheter/concom cath - staff empties (1)  [] Pt. is on ICP:                     []  Pt. does catheterization (6)                     []  Staff does catheterization (1)  [] Pt. is incontinent - staff does ____% of tasks (includes clothes management,  hygiene, and changing diaper)  [] Number of accidents during this shift ____       BOWEL  [] Pt. uses toilet (7)  [] Pt. uses bedside commode (5)  [] Pt. uses bedpan - staff does ____% of tasks (includes rolling on/off, holding bedpan                                                          In place, hygiene, and emptying pan)  [] Pt. on bowel program:                    [] Pt.inserts suppository (6)                    [] Nurse inserts suppository (4)                    []  Nurse does dig. stim (1)  [] Pt. has colostomy - staff maintains care and empties (1)                    [] Pt. does ____% of care for colostomy  [] Pt. is incontinent - staff does ____% of tasks (includes clothes management,  hygiene, and changing diaper)  [] Number of accidents during this shift____  [] No bowel movement this shift      TRANSFERS:  BED/CHAIR/WHEELCHAIR  [] Pt. transfers without assistive device into and out of wheelchair/bed/chair (7)  [] Pt. uses assistive/adaptive devices (grab bars, sliding board, walker, bed rails,  wheelchair armrests, etc.) (6)  [] Pt. needs supervision, cues, or head of bed raised by staff (5)  [] Pt. needs steadying assist with any or all parts of transfer, or needs assist with one  leg during transfer (4)  [] Pt. needs lifting or lowering, or needs assist with 2 legs during transfer (3)  [] Pt. needs lifting  and lowering during transfer (2)  [] Pt. needs assist of 2 helpers or mechanical lift (1)  [x] Activity did not occur   ________________________________________________________________  Includes lying to seated position at edge of bed.  Check assist level needed:  [] Used bedrails              []  Supervision                   []   Min. A  [] Mod A                          []  Max A                            []  Total A    If in wheelchair:  Check assist level needed:  [] Lock brakes                 []  Lifts/lowers footrests       []  Removes w/c arm                                                                                (for slide board transfers)    TRANSFER:  TOILET/BEDSIDE COMMODE   [] Pt. transfers from wheelchair or walking without assistive device to toilet.  Gets on and off a standard toilet. (7)  [] Pt. uses assistive/adaptive device (commode, grab bars, sliding board, walker prosthesis, orthotic, raised toilet seat) (6)  [] Pt. needs supervision, cues, or set up (needs assist to lock brakes, remove leg or arm rest, position sliding board) (5)  [] Pt. needs steadying assist with any or all parts of transfer or needs assist with one leg during transfer. (4)  [] Pt. needs lifting or lowering or needs assist with two legs during transfer. (3)  [] Pt. needs lifting and lowering during transfer. (2)  [] Pt. needs assist of 2 helpers or mechanical lift. (1)  [x] Activity did not occur.      TRANSFER:  SHOWER  [] Pt. transfers without assistive device into and out of shower. (7)  [] Pt. uses assistive/adaptive device (shower chair/bench, grab bars, sliding board,   walker, prothesis, orthotic, raised toilet seat. (6)  [] Pt. needs supervision, cues, or set up (needs assist to lock brakes, remove leg or armrest, position sliding board) (5)  [] Pt. needs steadying assist with any or all parts of transfer or needs assist with one      leg during transfer. (4)  [] Pt. needs lifting or lowering or needs assist  with two legs during transfer. (3)  [] Pt. needs lifting and lowering during transfer. (2)  [] Pt. needs assist of 2 helpers.  [employer] pushes shower chair on wheels in and out of   shower. (1)  [x] Activity did not occur.                                                                                                                                                                                 r                                                                                                                    pT.

## 2017-11-01 NOTE — PROGRESS NOTES
Occupational Therapy  Admit [facility]    An Hazel  MRN: [id number]  Room/Bed: E278/E278 A      11/01/17 0830   Transfers   Bed/Chair/WC 2   Toilet 2   Tub 1   Self Care   Eating 5   Grooming 3   Bathing 3   Dressing-Upper 3   Dressing-Lower 1   Toileting 1   Communication   Comprehension 6   Mode B   Expression 6   Mode B   Social Cognition   Social Interaction 5   Problem Solving 5   Memory 6     Addendum( adding a detail to already initiated content)documented on 11/2/2017 (date of addendum) for services provided on 11/1/2017 (date of actual service) by HUSSEIN Mosqueda(name and title of person making change) due to information entered in error (reason).      Trina Moore OT  11/1/2017

## 2017-11-01 NOTE — PROGRESS NOTES
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 Cornejo    HYSTERECTOMY      OOPHORECTOMY      ORIF HIP FRACTURE Left 12/2013    TONSILLECTOMY          Date Referred to OT 10/31/17   Referring Physician Dr. Mc   Family/Caregiver Present No   Patient Found (position) Supine in bed   Precautions   General Precautions fall   Orthopedic Yes   RUE Weight Bearing NWB   RLE Weight Bearing WBAT   Required Braces or Orthoses Yes   RUE Brace/Splint Sling   Visual/Auditory Vision impaired  (glasses)   BADL History   Bed Mobility/Transfers needs device   Grooming independent   Bathing needs device   Upper Body Dressing independent   Lower Body Dressing independent   Toileting needs device   Home Management Skills needs assist   IADL History   Homemaking Responsibilities Yes   Meal Prep Responsibility Secondary   Laundry Responsibility Primary   Cleaning Responsibility Primary   Bill Paying/Finance Responsibility Primary   Shopping Responsibility Secondary   Driving License Yes   Mode of Transportation Car   Occupation Retired   Type of Occupation  of [employer]    Leisure and  "Hobbies Reading (part of bookclub)    Living Environment   Lives With alone   Living Arrangements house   Home Accessibility stairs to enter home  (5)   Home Layout Able to live on 1st floor   Number of Stairs to Enter Home (5)   Stair Railings at Home outside, present at both sides   Transportation Available family or friend will provide   Living Environment Comment Pt reports living in 2S with 5 CORTEZ with rails on both sides. Pt reports using QC for ambulation and owns w/c and standard walker. Pt reports needing assist for IADLs, but being (I) with ADLs PTA. Pt reports daughter will be able to assist at D/C (one will be staying with her during the day until the end of November - the other will be staying at night).    Equipment Currently Used at Home 3-in-1 commode;bath bench;cane, quad;shower chair;wheelchair   Subjective   Patient states "I've gone through this whole thing before. I've had some falls and went to the hospital."    Pain/Comfort   Pain Rating 5/10   Location - Side Right   Location leg   Pain Addressed Reposition;Distraction;Nurse notified   Cognitive Status    Comprehension:    - Understands complex / abstract conversation/directions with extra time, assistance device (glasses, if visual mode or both; hearing aide if auditory mode or both    (6)      Expression:    - Expresses complex / abstract ideas with extra time, assistive devic (augmentative communication device    (6)    Social Interaction:    - Interacts appropriately 90% of time - needs monitoring or encouragement for participation or interaction  (5)      Problem Solving:    - Solves complex problems with cues  (5)  - Solves basic problems 90% of the time. Ex: I need to go to bed-Im tired  (5)    Memory:      -  Recognizes, recalls, or executes 3 steps of 3 step request with extra time/devic (memory book, calendar to remember)  (6)       Level of Consciousness (AVPU) alert   Arousal Level opens eyes spontaneously   Orientation " oriented x 4   Able to Follow Commands (Communication) WFL   Speech clear/fluent;follows commands   Mood/Behavior calm;cooperative   Sensory Exam   Additional Documenation yes   Light Touch   LUE w/i normal limits   RUE w/i normal limits   Proprioception   LUE w/i normal limits   Stereognosis   LUE w/i normal limits   RUE w/i normal limits   Range of Motion (ROM)   Range of Motion Examination other (see comments)   Additional Documentation (RUE not tested 2* to fx; LUE WFL)   Manual Muscle Testing (MMT)   Manual Muscle Testing Results other (see comments)   Additional Documentation (LUE 3/5)   Fine Motor Coordination   Left Hand, Manipulation of Objects mild impairment   Right Hand, Manipulation of Objects moderate impairment   Tone   Right UE  normal   Left UE normal   Observation   Appearance 89 y.o. asleep in bed    Posture Kyphosis in Thoracic Spine;Rounded shoulders;Scoliosis;Forward flexed trunk   Skin Other (see comments)  (Incision lateral R hip)   Bed Mobility   Bed Mobility yes   Rolling/Turning to Left Stand by assistance   Rolling/Turning Left Comments SBA for safety no use of bed rail   Scooting/Bridging Contact Guard Assistance   Scooting/Bridging Comments CGA for steadying assist to EOB   Supine to Sit Maximum Assistance   Supine to Sit Comments Max (A) for (A) with trunk and RLE   Sit to Supine Minimum Assistance   Sit to Supine Comments Min (A) for RLE over EOB   Transfers   Transfer yes   Sit to Stand   Sit <> Stand Assistance Maximum Assistance   Sit <> Stand Assistive Device Quad Cane   Trials/Comments Max (A) from EOB   Stand to Sit   Assistance Maximum Assistance   Assistive Device Quad Cane   Trials/Comments Max (A) to EOB   Bed to Chair   Bed <> Chair Technique Stand Pivot   Bed <> Chair Transfer Assistance Maximum Assistance   Bed <> Chair Assistive Device Quad Cane   Trials/Comments Max (A) from bed > chair   Tub Bench Transfer   Tub Transfer Technique Stand Pivot   Tub Bench Transfer  Assistance Total Assistance   Tub Transfer Assistive Device Quad Cane   Trials/Comments Total (A) as pt requires two helpers and is unsafe to clear threshold at this time   Toilet Transfer   Toilet Transfer Technique Stand Pivot   Toilet Transfer Assistance Maximum Assistance   Toilet Transfer Assistive Device Quad Cane   Trials/Comments Max (A) from w/c    Feeding   Feeding Level of Assistance Set-up Assistance   Feeding Where Assessed Wheelchair   Feeding Comments Set-up for opening containers   Grooming   Additional Grooming yes   Grooming Level of Assistance Stand by assistance   Hand Washing Level of Assistance Moderate assistance   Face Washing Level of Assistance Stand by assistance   Oral Hygeine Level of Assistance Stand by assistance   Grooming Where Assessed Edge of bed   Grooming Comments Mod (A) for assistance for washing LUE; Set-up for opening toothpaste; SBA for hair combing    Bathing   Bathing Level of Assistance Moderate assistance   Bathing Where Assessed Edge of bed   Bathing Comments Mod (A) for 5/10 body parts with assistance for RUE, LUE, LLE and RLE below knees, and buttocks   UE Dressing   UE Dressing Level of Assistance Moderate assistance   UE Dressing Where Assessed Edge of bed   UE Dressing Comments Mod (A) for threading RUE and overhead   LE Dressing   LE Dressing Yes   LE Dressing  Level of Assistance Total assistance   LE Dressing Level of Assistance Total assistance   Sock Level of Assistance Total assistance   Adult Briefs Level of Assistance Total assistance   LE Dressing Where Assessed Edge of bed   LE Dressing Comments Total (A) for 4/4 steps for donning pants and undergarment   Toileting   Toileting Level of Assistance Total assistance   Toileting Where Assessed Toilet   Toileting Comments Total (A) for doffing/donning pants and pericare    Treatment   Treatment Pt performed self care tasks including bathing, UB dressing, LB dressing, and toileting.    Activity Tolerance    Activity Tolerance Patient tolerated treatment well   After Treatment   Patient Position After Treatment Supine in bed   Patient after treatment left call button in reach;with bed alarm on   Assessment   Prognosis Fair   Problem List Decreased Self Care skills;Decreased upper extremity range of motion;Decreased upper extremity strength;Decreased safe judgment during ADL;Decreased endurance;Decreased fine motor control;Decreased functional mobility;Decreased gross motor control;Decreased IADLs;Decreased Function of right upper extremity;Decreased trunk control for functional activities   Assessment Pt tolerated tx session and eval well today with some c/o pain at beginning (nursing then notified.) Pt with minimal edema present in RUE and kyphosis in thoracic spine. Pt with FOF demonstrated prior to and during t/fs. Pt with decreased functional mobility and (I) in ADLs and would benefit from continued skilled OT services.    Level of Motivation/Participation good   Barriers to Discharge Home environment accessibility limitations   Short Term Goals   Additional Documentation yes   Time For Goal Achievement 7 days   Pt Will Transfer Bed/Chair New goal;With minimal assist   Transfer Bed/Chair - Met/Not Met Not Met   Pt Will Perform Bathing New goal;With minimal assistance   Bathing - Met/Not Met Not Met   Pt Will Perform LE Dressing New goal;With moderate assistance   LE Dressing - Met/Not Met Not Met   Pt Will Perform Toileting New goal;With moderate assistance   Toileting-Met/Not Met Not Met   Long Term Goals   Additional Documentation yes   Time For Goal Achievement (2 weeks)   Pt Will Transfer Bed/Chair New goal;With contact guard assistance   Transfer Bed/Chair - Met/Not Met Not Met   Pt Will Transfer To Bedside Commode New goal;With contact guard assist   Transfer Bedside Commode -Met/Not Met Not Met   Pt Will Transfer To Shower New goal;With contact guard assist   Transfer to Shower-Met/Not Met Not Met   Pt Will  Perform Eating New goal;With modified independence   Eating - Progress dental soft diet   Eating - Met/Not Met Not Met  (dental soft diet)   Pt Will Perform Grooming New goal;Independently   Grooming - Met/Not Met Not Met   Pt Will Perform Bathing New goal;With minimal assistance   Bathing - Met/Not Met Not Met   Pt Will Perform UE Dressing New goal;Independently   UE Dressing - Met/Not Met Not Met   Pt Will Perform LE Dressing New goal;With contact guard assistance   LE Dressing - Met/Not Met Not Met   Pt Will Perform Toileting New goal;With contact guard assistance   Toileting-Met/Not Met Not Met   Discharge Recommendations   Equipment Needed After Discharge walker, rolling   Discharge Facility/Level Of Care Needs home health OT   Plan   Plan Continue with current plan   Therapy Frequency 2 times/day;Monday-Friday;Saturday or Sunday   Occupational Therapy Follow-up   OT Follow-up? Yes   Treatment/Billable Minutes   Evaluation 60   Self Care/Home Management 30   Total Time 90       Trina Moore OT  11/1/2017    LEGEND:   CGA: Contact Guard Assist   EOB: Edge of Bed   HHA: Hand Held Assist   HOB: Head of Bed   (I): Independent-patient performs task in a timely manner   Max (A): Maximal Assist-patient performs 25-49% of task   Min (A): Minimal Assist- patient performs 75% or more of task   Mod (A): Moderate Assist- patient performs 50-74% of task   NA: Not applicable   NT: Not tested   OOB: Out of Bed   PTA: Prior to admit   QC: Quad Cane   RW: Rolling Walker   (S): Supervision- patient requires cues, coaxing, prompting   SBA: Stand By Assist   SC: Straight Cane   SW: Standard Walker   TBA: To be assessed   Total (A): Total Assist- patient performs less than 25% of task   WC: Wheelchair   WFL: Within Functional Limits   WNL: Within Normal Limits

## 2017-11-01 NOTE — ASSESSMENT & PLAN NOTE
"- cont to monitor BP/HR    Vitals:    10/31/17 1400   BP: (!) 141/62   BP Location: Left arm   Patient Position: Lying   Pulse: 77   Resp: 18   Temp: 98.1 °F (36.7 °C)   TempSrc: Oral   SpO2: 100%   Weight: 47.6 kg (104 lb 15 oz)   Height: 5' 2" (1.575 m)       "

## 2017-11-01 NOTE — OP NOTE
DATE OF PROCEDURE:  10/31/2017.    SURGEON:  Claude Williams, IV, M.D.    ASSISTANT:  None.    PREOPERATIVE DIAGNOSIS:  Right hip intertrochanteric/subtrochanteric femoral   fracture.    POSTOPERATIVE DIAGNOSIS:  Right hip intertrochanteric/subtrochanteric femoral   fracture.    PROCEDURE PERFORMED:  Right hip fracture open reduction and internal fixation   with intramedullary nail.  Synthes TFN long locking nail.    INDICATIONS:  Ms. Luz is a 89-year-old woman who sustained a fall.  She has   been unable to ambulate due to right hip pain and x-rays have demonstrated a   displaced fracture of the proximal femur.  After the potential benefits as well   as risks and complications of treatment options were reviewed with the patient   elected to undergo the above procedure.    PROCEDURE IN DETAIL:  After proper informed consent was obtained, the patient   was transferred to the Operating Suite, placed supine on the operating table.    General endotracheal anesthesia was administered per the anesthesiologist   without difficulty.  The patient was placed on the fracture table and all bony   prominences were well padded.  The right lower extremity was placed into a   well-padded boot.  With traction and the left lower extremity was placed into a   stirrup.  The image intensifier was then brought in and the fracture was   identified and was reduced with a combination of traction and rotation of the   lower extremity.  The image intensifier confirmed appropriate alignment and   position of the proximal femur fracture.  The right hip was then thoroughly   prepped with alcohol, ChloraPrep and draped in the usual sterile fashion.    Routine preoperative timeout was taken and the operative site was positively   identified by the operative team.  Preoperative antibiotics were administered.    An incision was then made over the lateral aspect of the right hip and careful   dissection was carried down through the subdermal tissue  using Bovie cautery for   hemostasis.  The greater trochanter was identified and a guidepin was placed   from the tip of the greater trochanter and into the proximal femur.  The image   intensifier confirmed appropriate alignment and position.  A ball-tip guidewire   was then advanced down the length of the femur and was serially reamed to a size   12.5.  An 11 mm nail was then advanced anterograde into appropriate position   and the guide was used to identify appropriate position where a guidepin was   inserted from the lateral aspect of the proximal femur across the fracture site   and into the femoral head and this was confirmed to be centered in the AP and   lateral planes using the image intensifier.  The triple reamer was used followed   by the proximal femur locking screw, which had excellent purchase in the   femoral head.  The proximal locking screw was secured and attention was then   turned to the distal femur where the image intensifier was used to visualize the   distal locking static hole where a locking bolt was placed from lateral to   medial using the image intensifier to confirm appropriate alignment and position   and length of the screw.  Once this was drilled and the screw was placed   securely and the image intensifier again was able to visualize the entire femur   from distal to the hip in the AP and lateral planes and was able to confirm   appropriate alignment and position of the proximal femur fracture and all   hardware.  The wounds were irrigated and then closed in layers with 0 and 2-0   Vicryl and sterile skin staples.  A sterile soft dressing was then applied.  The   patient was awakened in the Operating Suite and taken to the recovery area in   stable condition.  She tolerated the procedure very well.  Lap, instrument and   needle counts were correct.    BLOOD LOSS:  Approximately 150 mL    COMPLICATIONS:  None.      TANYA/IN  dd: 10/31/2017 13:54:59 (CDT)  td: 10/31/2017 21:21:35 (CDT)   Doc ID   #[id number]  Job ID #[id number]    CC:

## 2017-11-01 NOTE — ASSESSMENT & PLAN NOTE
Other Rehab Plan/Continue to monitor:   Nutrition:    - Prealbumin -  Pending    - Nutritionist on board    - GI ppx: none  Bladder Management:  monitor for continence   - restart home Oxybutinin  - UA consistent w infxn, will await culture  Bowel Management: monitor for continence   - Miralax  and Suppository PRN   - Will monitor for regularity   Mood: stable   Sleep: monitor; ramelteon PRN   Skin: Monitor   DVT ppx: Lovenox 40mg daily

## 2017-11-02 PROBLEM — E87.1 HYPONATREMIA: Status: ACTIVE | Noted: 2017-11-02

## 2017-11-02 LAB
ANION GAP SERPL CALC-SCNC: 7 MMOL/L
BASOPHILS # BLD AUTO: 0.03 K/UL
BASOPHILS NFR BLD: 0.2 %
BUN SERPL-MCNC: 25 MG/DL
CALCIUM SERPL-MCNC: 9.3 MG/DL
CHLORIDE SERPL-SCNC: 104 MMOL/L
CO2 SERPL-SCNC: 23 MMOL/L
CREAT SERPL-MCNC: 0.8 MG/DL
DIFFERENTIAL METHOD: ABNORMAL
EOSINOPHIL # BLD AUTO: 0.2 K/UL
EOSINOPHIL NFR BLD: 1.2 %
ERYTHROCYTE [DISTWIDTH] IN BLOOD BY AUTOMATED COUNT: 16.4 %
EST. GFR  (AFRICAN AMERICAN): >60 ML/MIN/1.73 M^2
EST. GFR  (NON AFRICAN AMERICAN): >60 ML/MIN/1.73 M^2
GLUCOSE SERPL-MCNC: 112 MG/DL
HCT VFR BLD AUTO: 25.1 %
HGB BLD-MCNC: 7.8 G/DL
LYMPHOCYTES # BLD AUTO: 1.1 K/UL
LYMPHOCYTES NFR BLD: 8.2 %
MCH RBC QN AUTO: 28.5 PG
MCHC RBC AUTO-ENTMCNC: 31.1 G/DL
MCV RBC AUTO: 92 FL
MONOCYTES # BLD AUTO: 1.4 K/UL
MONOCYTES NFR BLD: 10 %
NEUTROPHILS # BLD AUTO: 11.1 K/UL
NEUTROPHILS NFR BLD: 80.4 %
PLATELET # BLD AUTO: 317 K/UL
PMV BLD AUTO: 11.4 FL
POTASSIUM SERPL-SCNC: 4.9 MMOL/L
RBC # BLD AUTO: 2.74 M/UL
SODIUM SERPL-SCNC: 134 MMOL/L
WBC # BLD AUTO: 13.82 K/UL

## 2017-11-02 PROCEDURE — 25000003 PHARM REV CODE 250: Performed by: PHYSICAL MEDICINE & REHABILITATION

## 2017-11-02 PROCEDURE — 97802 MEDICAL NUTRITION INDIV IN: CPT

## 2017-11-02 PROCEDURE — 97110 THERAPEUTIC EXERCISES: CPT

## 2017-11-02 PROCEDURE — 25000003 PHARM REV CODE 250: Performed by: HOSPITALIST

## 2017-11-02 PROCEDURE — 97150 GROUP THERAPEUTIC PROCEDURES: CPT

## 2017-11-02 PROCEDURE — 97530 THERAPEUTIC ACTIVITIES: CPT

## 2017-11-02 PROCEDURE — 12800000 HC REHAB SEMI-PRIVATE ROOM

## 2017-11-02 PROCEDURE — 63600175 PHARM REV CODE 636 W HCPCS: Performed by: HOSPITALIST

## 2017-11-02 PROCEDURE — 85025 COMPLETE CBC W/AUTO DIFF WBC: CPT

## 2017-11-02 PROCEDURE — 36415 COLL VENOUS BLD VENIPUNCTURE: CPT

## 2017-11-02 PROCEDURE — 80048 BASIC METABOLIC PNL TOTAL CA: CPT

## 2017-11-02 PROCEDURE — 97535 SELF CARE MNGMENT TRAINING: CPT

## 2017-11-02 PROCEDURE — 99233 SBSQ HOSP IP/OBS HIGH 50: CPT | Mod: ,,, | Performed by: PHYSICAL MEDICINE & REHABILITATION

## 2017-11-02 RX ORDER — LACTULOSE 10 G/15ML
30 SOLUTION ORAL DAILY PRN
Status: DISCONTINUED | OUTPATIENT
Start: 2017-11-02 | End: 2017-11-06

## 2017-11-02 RX ORDER — POLYETHYLENE GLYCOL 3350 17 G/17G
17 POWDER, FOR SOLUTION ORAL DAILY
Status: DISCONTINUED | OUTPATIENT
Start: 2017-11-02 | End: 2017-11-14 | Stop reason: HOSPADM

## 2017-11-02 RX ORDER — OXYBUTYNIN CHLORIDE 5 MG/1
5 TABLET, EXTENDED RELEASE ORAL NIGHTLY
Status: DISCONTINUED | OUTPATIENT
Start: 2017-11-02 | End: 2017-11-14 | Stop reason: HOSPADM

## 2017-11-02 RX ORDER — CIPROFLOXACIN 500 MG/1
500 TABLET ORAL EVERY 12 HOURS
Status: ACTIVE | OUTPATIENT
Start: 2017-11-02 | End: 2017-11-11

## 2017-11-02 RX ADMIN — DOCUSATE SODIUM 100 MG: 100 CAPSULE, LIQUID FILLED ORAL at 09:11

## 2017-11-02 RX ADMIN — LACTULOSE 30 G: 20 SOLUTION ORAL at 05:11

## 2017-11-02 RX ADMIN — ACETAMINOPHEN 650 MG: 325 TABLET ORAL at 07:11

## 2017-11-02 RX ADMIN — ONDANSETRON 8 MG: 8 TABLET, ORALLY DISINTEGRATING ORAL at 08:11

## 2017-11-02 RX ADMIN — CIPROFLOXACIN HYDROCHLORIDE 500 MG: 500 TABLET, FILM COATED ORAL at 09:11

## 2017-11-02 RX ADMIN — OXYCODONE HYDROCHLORIDE 5 MG: 5 TABLET ORAL at 02:11

## 2017-11-02 RX ADMIN — ONDANSETRON 8 MG: 8 TABLET, ORALLY DISINTEGRATING ORAL at 12:11

## 2017-11-02 RX ADMIN — ONDANSETRON 8 MG: 8 TABLET, ORALLY DISINTEGRATING ORAL at 02:11

## 2017-11-02 RX ADMIN — ACETAMINOPHEN 650 MG: 325 TABLET ORAL at 12:11

## 2017-11-02 RX ADMIN — OXYCODONE HYDROCHLORIDE 5 MG: 5 TABLET ORAL at 05:11

## 2017-11-02 RX ADMIN — POLYETHYLENE GLYCOL 3350 17 G: 17 POWDER, FOR SOLUTION ORAL at 02:11

## 2017-11-02 RX ADMIN — AMLODIPINE BESYLATE 5 MG: 5 TABLET ORAL at 08:11

## 2017-11-02 RX ADMIN — ATENOLOL 25 MG: 25 TABLET ORAL at 08:11

## 2017-11-02 RX ADMIN — PRAVASTATIN SODIUM 10 MG: 10 TABLET ORAL at 08:11

## 2017-11-02 RX ADMIN — ENOXAPARIN SODIUM 40 MG: 100 INJECTION SUBCUTANEOUS at 05:11

## 2017-11-02 RX ADMIN — LOSARTAN POTASSIUM 100 MG: 50 TABLET, FILM COATED ORAL at 09:11

## 2017-11-02 RX ADMIN — OXYBUTYNIN CHLORIDE 5 MG: 5 TABLET, FILM COATED, EXTENDED RELEASE ORAL at 09:11

## 2017-11-02 RX ADMIN — DOCUSATE SODIUM 100 MG: 100 CAPSULE, LIQUID FILLED ORAL at 08:11

## 2017-11-02 RX ADMIN — OXYCODONE HYDROCHLORIDE 5 MG: 5 TABLET ORAL at 09:11

## 2017-11-02 RX ADMIN — ACETAMINOPHEN 650 MG: 325 TABLET ORAL at 05:11

## 2017-11-02 RX ADMIN — ATENOLOL 25 MG: 25 TABLET ORAL at 09:11

## 2017-11-02 NOTE — ASSESSMENT & PLAN NOTE
Inadequate oral intake   Related to (etiology):   Reduced appetite post op    Signs and Symptoms (as evidenced by):   PO of 50-75% in the presence of increased needs    Interventions/Recommendations (treatment strategy):Boost plus bid, RD to follow    Nutrition Diagnosis Status:   New

## 2017-11-02 NOTE — PROGRESS NOTES
Occupational Therapy   FIM scores    An Hazel  MRN: [id number]  Room/Bed: E278/E278 A       11/02/17 1200   Transfers   Bed/Chair/WC 2   Self Care   Dressing-Upper 2   Dressing-Lower 2       HUSSEIN Lagos  11/2/2017    LEGEND:   CGA: Contact Guard Assist   EOB: Edge of Bed   HHA: Hand Held Assist   HOB: Head of Bed   (I): Independent-patient performs task in a timely manner   Max (A): Maximal Assist-patient performs 25-49% of task   Min (A): Minimal Assist- patient performs 75% or more of task   Mod (A): Moderate Assist- patient performs 50-74% of task   NA: Not applicable   NT: Not tested   OOB: Out of Bed   PTA: Prior to admit   QC: Quad Cane   RW: Rolling Walker   (S): Supervision- patient requires cues, coaxing, prompting   SBA: Stand By Assist   SC: Straight Cane   SW: Standard Walker   TBA: To be assessed   Total (A): Total Assist- patient performs less than 25% of task   WC: Wheelchair   WFL: Within Functional Limits   WNL: Within Normal Limits

## 2017-11-02 NOTE — CONSULTS
"  [facility]  Adult Nutrition  Consult Note    SUMMARY     Recommendations     Goals: PO to meet 70% of EEN by 11/06/17  Nutrition Goal Status: new  Communication of RD Recs: reviewed with physician     Reason for Assessment    Reason for Assessment: physician consult  Diagnosis:  (spp ORIF hip)  Relevent Medical History: HTN, HLD, Hypercalcemia, anemia   Interdisciplinary Rounds: attended      Nutrition Discharge Planning: DC on dental soft diet , finely chopped     Nutrition Prescription Ordered    Current Diet Order: Dental soft finely chopped  Nutrition Order Comments: Consider boost plus oral supplement        Oral Nutrition Supplement: none     Evaluation of Received Nutrients/Fluid Intake     Energy Calories Required: not meeting needs          Protein Required: not meeting needs         Fluid Required: meeting needs  Comments: PO 50-75%     Tolerance: tolerating     % Intake of Estimated Energy Needs: 50 - 75 %  % Meal Intake: 50%     Nutrition Risk Screen     Nutrition Risk Screen: no indicators present    Nutrition/Diet History    Patient Reported Diet/Restrictions/Preferences: general   Food Preferences: No cultural or Hindu food preferences       Labs/Tests/Procedures/Meds       Pertinent Labs Reviewed: reviewed, pertinent  Pertinent Labs Comments: Fasting glucose 112, Hg 7.8, Hct 25.1  Pertinent Medications Reviewed: reviewed, pertinent  Pertinent Medications Comments: docusate, statin,    Physical Findings    Overall Physical Appearance: loss of muscle mass, loss of subcutaneous fat, weak  Tubes:  (-)  Oral/Mouth Cavity: WDL  Skin: incision    Anthropometrics     Height: 5' 2" (157.5 cm)  Weight Method: Stated  Weight: 47.6 kg (104 lb 15 oz)     Ideal Body Weight (IBW), Female: 110 lb     % Ideal Body Weight, Female (lb): 95.4 lb  BMI (Calculated): 19.2  BMI Grade: 17 - 18.4 protein-energy malnutrition grade I      Estimated/Assessed Needs  Weight Used For Calorie Calculations: " 47.6 kg (104 lb 15 oz)   Height (cm): 157 cm     Energy Need Method: Barren-St Jeor x 1.25(PAL) = 1100   RMR (Barren-St. Jeor Equation): 849.25      Weight Used For Protein Calculations: 47.6 kg (104 lb 15 oz)  Protein Requirements: 62g (1.3gm/kg)       Fluid Need Method: RDA Method 1100 or per MD   Assessment and Plan    * Closed intertrochanteric fracture of right hip, initial encounter    Inadequate oral intake   Related to (etiology):   Reduced appetite post op    Signs and Symptoms (as evidenced by):   PO of 50-75% in the presence of increased needs    Interventions/Recommendations (treatment strategy):Boost plus bid, RD to follow    Nutrition Diagnosis Status:   New              Monitor and Evaluation  Food and Nutrient Intake: energy intake   Knowledge/Beliefs/Attitudes: food and nutrition knowledge/skill    Biochemical Data, Medical Tests and Procedures: electrolyte and renal panel   Nutrition Risk  Level of Risk:  (follow twice per week till po established)  Nutrition Follow-Up  RD Follow-up?: Yes

## 2017-11-02 NOTE — ASSESSMENT & PLAN NOTE
- cont to monitor BP/HR     Vitals:    11/01/17 0700 11/01/17 0927 11/01/17 1916 11/02/17 0700   BP: (!) 145/64 (!) 145/64 137/62 (!) 149/65   BP Location: Left arm  Left arm Right arm   Patient Position: Lying  Lying Lying   Pulse: 80  78 81   Resp: 18  18 16   Temp: 98.8 °F (37.1 °C)  97.9 °F (36.6 °C) 98.7 °F (37.1 °C)   TempSrc:   Oral Oral   SpO2: (!) 93%  (!) 93% (!) 94%   Weight:       Height:

## 2017-11-02 NOTE — PROGRESS NOTES
[facility]  Physical Medicine & Rehab  Progress Note    Patient Name: An Hazel  MRN: [id number]  Patient Class: IP- Rehab   Admission Date: 10/31/2017  Length of Stay: 1 days  Attending Physician: Verna Mc MD  Primary Care Provider: Alcon Thomas Jr, MD    Subjective:     Principal Problem:Closed intertrochanteric fracture of right hip, initial encounter    Interval History: No acute events over night. Patient is tolerating therapy. Pain controlled w esthela Tylenol and PRN Oxy.      Scheduled Medications:    acetaminophen  650 mg Oral BID    amLODIPine  5 mg Oral Daily    atenolol  25 mg Oral BID    docusate sodium  100 mg Oral BID    enoxaparin  40 mg Subcutaneous Daily    losartan  100 mg Oral QHS    [START ON 11/2/2017] oxybutynin  5 mg Oral Daily    pravastatin  10 mg Oral Daily       PRN Medications: acetaminophen, bisacodyl, ondansetron, oxyCODONE, polyethylene glycol, ramelteon    Review of Systems   Constitutional: Negative for unexpected weight change.   HENT: Negative for congestion and ear pain.    Eyes: Negative for discharge.   Respiratory: Negative for shortness of breath.    Cardiovascular: Negative for chest pain.   Gastrointestinal: Negative for abdominal pain and vomiting.   Endocrine: Negative for cold intolerance.   Genitourinary: Negative for flank pain.   Neurological: Positive for weakness.   Psychiatric/Behavioral: Negative for hallucinations.     Objective:     Vital Signs (Most Recent):  Temp: 97.9 °F (36.6 °C) (11/01/17 1916)  Pulse: 78 (11/01/17 1916)  Resp: 18 (11/01/17 1916)  BP: 137/62 (11/01/17 1916)  SpO2: (!) 93 % (11/01/17 1916)    Vital Signs (24h Range):  Temp:  [97.9 °F (36.6 °C)-98.8 °F (37.1 °C)] 97.9 °F (36.6 °C)  Pulse:  [78-80] 78  Resp:  [18] 18  SpO2:  [93 %] 93 %  BP: (137-145)/(62-64) 137/62     Physical Exam   Constitutional: She appears well-developed and well-nourished.   HENT:   Head: Normocephalic and atraumatic.  "  Eyes: EOM are normal.   Cardiovascular: Normal rate.    Pulmonary/Chest: Effort normal. No respiratory distress.   Abdominal: Soft. She exhibits no distension. There is no tenderness.   Musculoskeletal:   RUE in sling, + pulse,  WNL  LUE: SA, EF/EE  WFL  RLE: HF/KE 1/5, DF/PF 3/5  LLE: HF, KE, DF/PF 4/5    Neurological: She is alert.   Skin: Skin is warm.   Psychiatric: She has a normal mood and affect.   Vitals reviewed.    NEUROLOGICAL EXAMINATION:     CRANIAL NERVES     CN III, IV, VI   Extraocular motions are normal.       Assessment/Plan:      An Hazel is a 89 y.o. female admitted to inpatient rehabilitation on 10/31/2017 for Closed intertrochanteric fracture of right hip, initial encounter with impaired mobility and ADLs. Patient remains appropriate for PT, OT, and as required Speech therapy. Patient continues to require 24 hour nursing care as well as daily Physician assessment.    * Closed intertrochanteric fracture of right hip, initial encounter    ORIF of the right hip on 10/27, WBAT. Arm was treated with a sling and swath.   - Ortho precautions: Non weight bearing RUE for 2 weeks.    - Sling and swathe for comfort.  Weight bearing as tolerated RLE.  - Surgical wound:  Dressing changes every 3 days.  Remove staples on POD #14  - PT/OT    Tolerating therapy evaluations today        Essential hypertension    - cont to monitor BP/HR    Vitals:    10/31/17 1400   BP: (!) 141/62   BP Location: Left arm   Patient Position: Lying   Pulse: 77   Resp: 18   Temp: 98.1 °F (36.7 °C)   TempSrc: Oral   SpO2: 100%   Weight: 47.6 kg (104 lb 15 oz)   Height: 5' 2" (1.575 m)             Impaired mobility and ADLs    Other Rehab Plan/Continue to monitor:   Nutrition:    - Prealbumin -  Pending    - Nutritionist on board    - GI ppx: none  Bladder Management:  monitor for continence   - restart home Oxybutinin  - UA consistent w infxn, will await culture  Bowel Management: monitor for continence   - " Miralax  and Suppository PRN   - Will monitor for regularity   Mood: stable   Sleep: monitor; ramelteon PRN   Skin: Monitor   DVT ppx: Lovenox 40mg daily               DISCHARGE PLANNING:  Tentative Discharge Date:     Future Appointments  Date Time Provider Department Center   11/13/2017 1:20 PM Alcon Thomas Jr., MD [facility]       Verna Mc MD  Department of Physical Medicine & Rehab   [facility]

## 2017-11-02 NOTE — SUBJECTIVE & OBJECTIVE
Interval History: No acute events over night. Patient is tolerating therapy. Pain controlled w esthela Tylenol and PRN Oxy.      Scheduled Medications:    acetaminophen  650 mg Oral BID    amLODIPine  5 mg Oral Daily    atenolol  25 mg Oral BID    docusate sodium  100 mg Oral BID    enoxaparin  40 mg Subcutaneous Daily    losartan  100 mg Oral QHS    [START ON 11/2/2017] oxybutynin  5 mg Oral Daily    pravastatin  10 mg Oral Daily       PRN Medications: acetaminophen, bisacodyl, ondansetron, oxyCODONE, polyethylene glycol, ramelteon    Review of Systems   Constitutional: Negative for unexpected weight change.   HENT: Negative for congestion and ear pain.    Eyes: Negative for discharge.   Respiratory: Negative for shortness of breath.    Cardiovascular: Negative for chest pain.   Gastrointestinal: Negative for abdominal pain and vomiting.   Endocrine: Negative for cold intolerance.   Genitourinary: Negative for flank pain.   Neurological: Positive for weakness.   Psychiatric/Behavioral: Negative for hallucinations.     Objective:     Vital Signs (Most Recent):  Temp: 97.9 °F (36.6 °C) (11/01/17 1916)  Pulse: 78 (11/01/17 1916)  Resp: 18 (11/01/17 1916)  BP: 137/62 (11/01/17 1916)  SpO2: (!) 93 % (11/01/17 1916)    Vital Signs (24h Range):  Temp:  [97.9 °F (36.6 °C)-98.8 °F (37.1 °C)] 97.9 °F (36.6 °C)  Pulse:  [78-80] 78  Resp:  [18] 18  SpO2:  [93 %] 93 %  BP: (137-145)/(62-64) 137/62     Physical Exam   Constitutional: She appears well-developed and well-nourished.   HENT:   Head: Normocephalic and atraumatic.   Eyes: EOM are normal.   Cardiovascular: Normal rate.    Pulmonary/Chest: Effort normal. No respiratory distress.   Abdominal: Soft. She exhibits no distension. There is no tenderness.   Musculoskeletal:   RUE in sling, + pulse,  WNL  LUE: SA, EF/EE  WFL  RLE: HF/KE 1/5, DF/PF 3/5  LLE: HF, KE, DF/PF 4/5    Neurological: She is alert.   Skin: Skin is warm.   Psychiatric: She has a normal mood  and affect.   Vitals reviewed.    NEUROLOGICAL EXAMINATION:     CRANIAL NERVES     CN III, IV, VI   Extraocular motions are normal.

## 2017-11-02 NOTE — NURSING
NURSE TECH FIM  REPORT    EATING  [x] Pt. opens packages, cuts food, pours liquids, and feeds self.  Regular consistency (7)  [] Pt. needs dentures, swallow technique, special foods or drink consistency (6)  [] Pt. needs supervision (cueing), set up (open containers, cut food, etc. (5)  [] Pt. needs assist with occasional scooping or checking for food pocketing (75-99%) (4)  [] Pt. needs assist to load each bite on utensils, pt.brings food to mouth (50-74%) (3)  [] Pt. needs assist to scoop, bring food to mouth (hand over hand) (25-49%) (2)  [] Pt. Is receiving IV fluids for hydration or tube feedings  (0-24%) (1)      GROOMING   [] Pt. performs all tasks independently and safely (7)  [] Pt. obtains all articles.  Needs adaptive/assistive device (such as wash mitt) (6)  [] Pt. needs supervision (cueing), set up (helper obtains articles, applies toothpaste, plugs razor, hands items to patient, opens containers, adjusts water temperature) (5)       [] Pt. doing 3 out of 4,  or 4 out of 5 tasks.  Needs assist to put in or remove dentures.  Needs steadying assist.(Pt. Doing 75-99%. (4)                                                                 [x] Pt. doing 2 out of 4, or 3 out of 5 tasks (50-74%) (3)  [] Pt. doing 1 out of 4, or 2 out of 5 tasks (25-49%) (2)  [] Pt. doing 0 out of 4, or 1 out of 5 tasks or needs assistance of 2 helpers (0-24%) (1)  [] Activity done with OT      BATHING  [] Pt. safely bathes whole body (10 parts of 10) (7)  [] Pt. doing 10 out of 10 body parts.  Uses adaptive devices (such as wash mitt, long handled sponge, etc.) (6)  []  Pt. doing 8-9 out of 10 body parts , or pt. needs steadying assistance. (75-99%) (4)  []  Pt. doing 10 out of 10 body parts buts needs supervision or set up (5)  [x] Pt. doing 5-7 out of 10 body parts (50-74%) (3)  [] Pt. doing 3-4 of out 10 body parts (25-49%) (2)  [] Pt. doing 0-2 out of 10 body parts or needs assist of two helpers. (0-24%) (1)    [] Activity  done with OT      DRESSING UPPER BODY  [] Pt. dresses independently and undresses independently, obtaining clothes from          storage area (7)  [] Pt. needs adaptive devices (such as Velcro fastener, reacher, button hook, etc.)          Applies abdominal binder or any orthotic.  Uses  walker for steadying. (6)  [x] Pt. needs supervision (cueing), set up (helper brings clothes or applies orthotics (such as abdominal binder, TLSO, cervical collar) (5)  [] Pt. doing 75-99%. (4)  [] Pt. doing 50-74%. (3)  [] Pt. doing 25-49%. (2)  [] Pt. doing 0-24%, or needs assistance of 2 helpers. (1)  [] Activity done with OT.      DRESSING LOWER BODY  [] Pt. independent with all parts of dressing lower body. (7)  [] Pt. needs adaptive devices ( such as reacher, long handled shoe horn, sock aid) (6)  [x] Pt. needs supervision (cueing), set up(helper brings clothes or applies orthotic, such   as ELIZABETH hose, AFO) (5)  [] Pt. doing 75-99%.  Needs steadying assistance: buttoning pants, zipping a zipper,        fastening a belt, tying shoelaces, applying one sock/shoe. (4)  [] Pt. doing 50-74%. (3)  [] Pt. doing 25-49%. (2)  [] Pt. Doing 0-24%, or needs assistance of 2 helpers. (1)  [] Activity done with OT.      TOILETING  [] Pt. doing 3 out of 3 tasks independently (pulling down clothes, cleansing dona area,  pulling up clothes) (7)  [] Pt. doing 3 out of 3 tasks, but needs assistive device (grab bars, etc.) (6)  [] Pt. needs supervision (cueing), or set up (obtaining supplies for dona care.) (5)  [] Pt. doing 3 out of 3 tasks, but needs steadying assistance with one or more parts. (75-90%) (4)  [] Pt. doing 2 out of 3 tasks (50-74%) (3)  [] Pt. doing out of 3 tasks (25-49%) (2)  [x] Pt. needs assist (more than steadying) with all 3 tasks.  Needs assist of 2 helpers.    Incontinent of B/B today. (0-24%) (1)  [] Activity did not occur.      BLADDER  [] Pt.  uses toilet (7)  [] Pt. is on dialysis - does not urinate (7)  [] Pt. uses  bedside commode (5)  [x] Pt. uses bedpan - staff does ____% of tasks(includes rolling on/off, holding bedpan in place, hygiene, and emptying pan)  [] Pt. uses urinal - staff empties (5)                     []  Pt. needs help with positioning the urinal (4)                     []  Pt. needs help holding the urinal (1)  [] Pt. has call catheter/suprapubic catheter/concom cath - staff empties (1)  [] Pt. is on ICP:                     []  Pt. does catheterization (6)                     []  Staff does catheterization (1)  [] Pt. is incontinent - staff does ____% of tasks (includes clothes management,  hygiene, and changing diaper)  [] Number of accidents during this shift ____       BOWEL  [] Pt. uses toilet (7)  [] Pt. uses bedside commode (5)  [] Pt. uses bedpan - staff does ____% of tasks (includes rolling on/off, holding bedpan                                                          In place, hygiene, and emptying pan)  [] Pt. on bowel program:                    [] Pt.inserts suppository (6)                    [] Nurse inserts suppository (4)                    []  Nurse does dig. stim (1)  [] Pt. has colostomy - staff maintains care and empties (1)                    [] Pt. does ____% of care for colostomy  [] Pt. is incontinent - staff does ____% of tasks (includes clothes management,  hygiene, and changing diaper)  [] Number of accidents during this shift____  [x] No bowel movement this shift      TRANSFERS:  BED/CHAIR/WHEELCHAIR  [] Pt. transfers without assistive device into and out of wheelchair/bed/chair (7)  [] Pt. uses assistive/adaptive devices (grab bars, sliding board, walker, bed rails,  wheelchair armrests, etc.) (6)  [] Pt. needs supervision, cues, or head of bed raised by staff (5)  [x] Pt. needs steadying assist with any or all parts of transfer, or needs assist with one  leg during transfer (4)  [] Pt. needs lifting or lowering, or needs assist with 2 legs during transfer (3)  [] Pt. needs  lifting and lowering during transfer (2)  [] Pt. needs assist of 2 helpers or mechanical lift (1)  [] Activity did not occur   ________________________________________________________________  Includes lying to seated position at edge of bed.  Check assist level needed:  [] Used bedrails              []  Supervision                   []   Min. A  [] Mod A                          []  Max A                            []  Total A    If in wheelchair:  Check assist level needed:  [] Lock brakes                 []  Lifts/lowers footrests       []  Removes w/c arm                                                                                (for slide board transfers)    TRANSFER:  TOILET/BEDSIDE COMMODE   [] Pt. transfers from wheelchair or walking without assistive device to toilet.  Gets on and off a standard toilet. (7)  [] Pt. uses assistive/adaptive device (commode, grab bars, sliding board, walker prosthesis, orthotic, raised toilet seat) (6)  [] Pt. needs supervision, cues, or set up (needs assist to lock brakes, remove leg or arm rest, position sliding board) (5)  [] Pt. needs steadying assist with any or all parts of transfer or needs assist with one leg during transfer. (4)  [] Pt. needs lifting or lowering or needs assist with two legs during transfer. (3)  [] Pt. needs lifting and lowering during transfer. (2)  [] Pt. needs assist of 2 helpers or mechanical lift. (1)  [x] Activity did not occur.      TRANSFER:  SHOWER  [] Pt. transfers without assistive device into and out of shower. (7)  [] Pt. uses assistive/adaptive device (shower chair/bench, grab bars, sliding board,   walker, prothesis, orthotic, raised toilet seat. (6)  [] Pt. needs supervision, cues, or set up (needs assist to lock brakes, remove leg or armrest, position sliding board) (5)  [] Pt. needs steadying assist with any or all parts of transfer or needs assist with one      leg during transfer. (4)  [] Pt. needs lifting or lowering or needs  assist with two legs during transfer. (3)  [] Pt. needs lifting and lowering during transfer. (2)  [] Pt. needs assist of 2 helpers.  Rhodes pushes shower chair on wheels in and out of   shower. (1)  [x] Activity did not occur.                                                                                                                                                                                 r                                                                                                                    pT.

## 2017-11-02 NOTE — PROGRESS NOTES
Subjective/Objective  Interval History: No acute events overnight. Patient is tolerating therapy. Pain controlled w esthela Tylenol and PRN Oxy. No other complaints.      Scheduled Medications:    acetaminophen  650 mg Oral BID    amLODIPine  5 mg Oral Daily    atenolol  25 mg Oral BID    docusate sodium  100 mg Oral BID    enoxaparin  40 mg Subcutaneous Daily    losartan  100 mg Oral QHS    oxybutynin  5 mg Oral QHS    pravastatin  10 mg Oral Daily       PRN Medications: acetaminophen, bisacodyl, ondansetron, oxyCODONE, polyethylene glycol, ramelteon    Review of Systems   Constitutional: Negative for unexpected weight change.   HENT: Negative for congestion and ear pain.    Eyes: Negative for discharge.   Respiratory: Negative for shortness of breath.    Cardiovascular: Negative for chest pain.   Gastrointestinal: Negative for abdominal pain and vomiting.   Endocrine: Negative for cold intolerance.   Genitourinary: Negative for flank pain.   Neurological: Positive for weakness.   Psychiatric/Behavioral: Negative for hallucinations.     Objective:     Vital Signs (Most Recent):  Temp: 98.7 °F (37.1 °C) (11/02/17 0700)  Pulse: 81 (11/02/17 0700)  Resp: 16 (11/02/17 0700)  BP: (!) 149/65 (11/02/17 0700)  SpO2: (!) 94 % (11/02/17 0700)    Vital Signs (24h Range):  Temp:  [97.9 °F (36.6 °C)-98.7 °F (37.1 °C)] 98.7 °F (37.1 °C)  Pulse:  [78-81] 81  Resp:  [16-18] 16  SpO2:  [93 %-94 %] 94 %  BP: (137-149)/(62-65) 149/65     Physical Exam   Constitutional: She appears well-developed and well-nourished.   HENT:   Head: Normocephalic and atraumatic.   Eyes: EOM are normal.   Cardiovascular: Normal rate.    Pulmonary/Chest: Effort normal. No respiratory distress.   Abdominal: Soft. She exhibits no distension. There is no tenderness.   Musculoskeletal:   RUE in sling, + pulse,  WNL  LUE: SA, EF/EE  WFL  RLE: HF/KE 1/5, DF/PF 3/5  LLE: HF, KE, DF/PF 4/5    Neurological: She is alert.   Skin: Skin is warm.    Psychiatric: She has a normal mood and affect.   Vitals reviewed.    NEUROLOGICAL EXAMINATION:     CRANIAL NERVES     CN III, IV, VI   Extraocular motions are normal.         Scheduled Meds:   acetaminophen  650 mg Oral BID    amLODIPine  5 mg Oral Daily    atenolol  25 mg Oral BID    docusate sodium  100 mg Oral BID    enoxaparin  40 mg Subcutaneous Daily    losartan  100 mg Oral QHS    oxybutynin  5 mg Oral QHS    pravastatin  10 mg Oral Daily     Continuous Infusions:   PRN Meds:acetaminophen, bisacodyl, ondansetron, oxyCODONE, polyethylene glycol, ramelteon    Vital signs in last 24 hours:  Temp:  [97.9 °F (36.6 °C)-98.7 °F (37.1 °C)] 98.7 °F (37.1 °C)  Pulse:  [78-81] 81  Resp:  [16-18] 16  SpO2:  [93 %-94 %] 94 %  BP: (137-149)/(62-65) 149/65    Intake/Output last 3 shifts:  No intake/output data recorded.  Intake/Output this shift:  No intake/output data recorded.    Problem Assessment/Plan  Cardiac/Vascular   Essential hypertension   Assessment & Plan     - cont to monitor BP/HR     Vitals:    11/01/17 0700 11/01/17 0927 11/01/17 1916 11/02/17 0700   BP: (!) 145/64 (!) 145/64 137/62 (!) 149/65   BP Location: Left arm  Left arm Right arm   Patient Position: Lying  Lying Lying   Pulse: 80  78 81   Resp: 18  18 16   Temp: 98.8 °F (37.1 °C)  97.9 °F (36.6 °C) 98.7 °F (37.1 °C)   TempSrc:   Oral Oral   SpO2: (!) 93%  (!) 93% (!) 94%   Weight:       Height:                 Renal/   Hyponatremia   Assessment & Plan    Last Na 134  Cont to monitor for now        Oncology   Anemia   Assessment & Plan    Likely post-op  Last Hgb 7.8  Pt asx  Cont to monitor closely        Other   Impaired mobility and ADLs   Assessment & Plan    Nutrition:    - Prealbumin -  Pending    - Nutritionist on board    - GI ppx: none  Bladder Management:  monitor for continence   - restart home Oxybutinin  - UA consistent w infxn, will await culture  Bowel Management: monitor for continence   - Miralax  and Suppository PRN   -  Will monitor for regularity   Mood: stable   Sleep: monitor; ramelteon PRN   Skin: Monitor   DVT ppx: Lovenox 40mg daily         * Closed intertrochanteric fracture of right hip, initial encounter   Assessment & Plan    ORIF of the right hip on 10/27, WBAT. Arm was treated with a sling and swath.   - Ortho precautions: Non weight bearing RUE for 2 weeks.    - Sling and swathe for comfort.  Weight bearing as tolerated RLE.  - Surgical wound:  Dressing changes every 3 days.  Remove staples on POD #14  - PT/OT

## 2017-11-02 NOTE — PROGRESS NOTES
Recreational Therapy   Treatment    An Hazel  MRN: [id number]       11/02/17 1115   Rec Therapy Time Calculation   Rec Start Time 1115   Rec Stop Time 1151   Rec Total Time (min) 36 min   General   Primary Diagnosis Interochanteric Fx   Subjective   Patient states What your name again?   Precautions   General Precautions fall   RUE Weight Bearing NWB   RLE Weight Bearing WBAT   RUE Brace/Splint sling   Visual/Auditory Vision impaired   Attendance   Activity Cards   Participation Active participation   Therapeutic Recreation   Rec Therapy Group Assistance Moderate Assistance   Assessment   Assessment Pt seen for Rt session, pt was seated in w/c.  Pt edyta engaged in card game activity, pt required min-mod verbal cues throughout the game.  Pt's endurance fair, social affect pleasant, interactive well throughout the activity.  No pain voice, but pt did state she need to rest before lunch, pt was return to room 9 mins early. Barriers: endurarnce   Plan   Planned Therapy Intervention Continue with current plan   PT Frequency Minimum of 1 visit per week   Time   Treatment time 3 units         Jennifer Diaz, LISAS, 11/2/2017

## 2017-11-02 NOTE — PROGRESS NOTES
Physical Therapy   Daily Physical Therapy FIM Scores    An Mehta Aleksander Luz   MRN: [id number]        11/02/17 1600   Transfers   Bed/Chair/WC 2   Locomotion   Distance Walked 1   Distance Wheelchair 1   Walk 1   Wheelchair 1   Mode C     Ronda Templeton, PT  11/2/2017

## 2017-11-02 NOTE — NURSING
TB skin test site reading = negative; 0 induration. Read from left inner forearm site at 1700 on 11/02/2017.

## 2017-11-02 NOTE — PROGRESS NOTES
Occupational Therapy  PM Endurance Group Session  An Hazel   MRN: [id number]   A client care conference was performed between the LOTR and MARK, prior to treatment by DEEDEE, to discuss the patient's status, treatment plan and established goals.     11/02/17 1330   OT Time Calculation   OT Start Time 1330   OT Stop Time 1415   OT Total Time (min) 45 min   General   OT Date of Treatment 11/02/17   Treatment/Billable Minutes   Therapeutic Group 45   Total Time 45     Patient participated in 45 minute seated high endurance group. The group activity challenged (LUE)  upper extremity and lower extremity strengthening. In addition, cardiovascular and functional endurance were challenged during this group. Pt utulized 1# Dumbell  during the following exercises.    Patient completed 20 reps x 3 sets bicep curls, side raises, shoulder flexion, shoulder extension (in LUE )    Patient completed 20 reps x 3 sets hip flexion, knee flexion, knee extension, toe and heel taps (in LLE circuit)    - Alternating punches, side punches, diagonal reaches, Sh ADD/ABDuction, arm raises, running (LUE) 3 x 20 reps    Pt required short rest breaks during therapeutic exercises. These activities were performed in a group setting to encourage participation with peers and social interaction skills.                 SABRINA Vegas 11/2/2017

## 2017-11-02 NOTE — SUBJECTIVE & OBJECTIVE
Interval History: No acute events overnight. Patient is tolerating therapy. Pain controlled w esthela Tylenol and PRN Oxy. No other complaints.      Scheduled Medications:    acetaminophen  650 mg Oral BID    amLODIPine  5 mg Oral Daily    atenolol  25 mg Oral BID    docusate sodium  100 mg Oral BID    enoxaparin  40 mg Subcutaneous Daily    losartan  100 mg Oral QHS    oxybutynin  5 mg Oral QHS    pravastatin  10 mg Oral Daily       PRN Medications: acetaminophen, bisacodyl, ondansetron, oxyCODONE, polyethylene glycol, ramelteon    Review of Systems   Constitutional: Negative for unexpected weight change.   HENT: Negative for congestion and ear pain.    Eyes: Negative for discharge.   Respiratory: Negative for shortness of breath.    Cardiovascular: Negative for chest pain.   Gastrointestinal: Negative for abdominal pain and vomiting.   Endocrine: Negative for cold intolerance.   Genitourinary: Negative for flank pain.   Neurological: Positive for weakness.   Psychiatric/Behavioral: Negative for hallucinations.     Objective:     Vital Signs (Most Recent):  Temp: 98.7 °F (37.1 °C) (11/02/17 0700)  Pulse: 81 (11/02/17 0700)  Resp: 16 (11/02/17 0700)  BP: (!) 149/65 (11/02/17 0700)  SpO2: (!) 94 % (11/02/17 0700)    Vital Signs (24h Range):  Temp:  [97.9 °F (36.6 °C)-98.7 °F (37.1 °C)] 98.7 °F (37.1 °C)  Pulse:  [78-81] 81  Resp:  [16-18] 16  SpO2:  [93 %-94 %] 94 %  BP: (137-149)/(62-65) 149/65     Physical Exam   Constitutional: She appears well-developed and well-nourished.   HENT:   Head: Normocephalic and atraumatic.   Eyes: EOM are normal.   Cardiovascular: Normal rate.    Pulmonary/Chest: Effort normal. No respiratory distress.   Abdominal: Soft. She exhibits no distension. There is no tenderness.   Musculoskeletal:   RUE in sling, + pulse,  WNL  LUE: SA, EF/EE  WFL  RLE: HF/KE 1/5, DF/PF 3/5  LLE: HF, KE, DF/PF 4/5    Neurological: She is alert.   Skin: Skin is warm.   Psychiatric: She has a  normal mood and affect.   Vitals reviewed.    NEUROLOGICAL EXAMINATION:     CRANIAL NERVES     CN III, IV, VI   Extraocular motions are normal.

## 2017-11-02 NOTE — ASSESSMENT & PLAN NOTE
ORIF of the right hip on 10/27, WBAT. Arm was treated with a sling and swath.   - Ortho precautions: Non weight bearing RUE for 2 weeks.    - Sling and swathe for comfort.  Weight bearing as tolerated RLE.  - Surgical wound:  Dressing changes every 3 days.  Remove staples on POD #14  - PT/OT

## 2017-11-02 NOTE — ASSESSMENT & PLAN NOTE
Nutrition:    - Prealbumin -  Pending    - Nutritionist on board    - GI ppx: none  Bladder Management:  monitor for continence   - restart home Oxybutinin  - UA consistent w infxn, will await culture  Bowel Management: monitor for continence   - Miralax  and Suppository PRN   - Will monitor for regularity   Mood: stable   Sleep: monitor; ramelteon PRN   Skin: Monitor   DVT ppx: Lovenox 40mg daily

## 2017-11-02 NOTE — NURSING
NURSE TECH FIM  REPORT    EATING  [] Pt. opens packages, cuts food, pours liquids, and feeds self.  Regular consistency (7)  [] Pt. needs dentures, swallow technique, special foods or drink consistency (6)  [] Pt. needs supervision (cueing), set up (open containers, cut food, etc. (5)  [x] Pt. needs assist with occasional scooping or checking for food pocketing (75-99%) (4)  [] Pt. needs assist to load each bite on utensils, pt.brings food to mouth (50-74%) (3)  [] Pt. needs assist to scoop, bring food to mouth (hand over hand) (25-49%) (2)  [] Pt. Is receiving IV fluids for hydration or tube feedings  (0-24%) (1)      GROOMING   [] Pt. performs all tasks independently and safely (7)  [] Pt. obtains all articles.  Needs adaptive/assistive device (such as wash mitt) (6)  [] Pt. needs supervision (cueing), set up (helper obtains articles, applies toothpaste, plugs razor, hands items to patient, opens containers, adjusts water temperature) (5)       [] Pt. doing 3 out of 4,  or 4 out of 5 tasks.  Needs assist to put in or remove dentures.  Needs steadying assist.(Pt. Doing 75-99%. (4)                                                                 [] Pt. doing 2 out of 4, or 3 out of 5 tasks (50-74%) (3)  [x] Pt. doing 1 out of 4, or 2 out of 5 tasks (25-49%) (2)  [] Pt. doing 0 out of 4, or 1 out of 5 tasks or needs assistance of 2 helpers (0-24%) (1)  [] Activity done with OT      BATHING  [] Pt. safely bathes whole body (10 parts of 10) (7)  [] Pt. doing 10 out of 10 body parts.  Uses adaptive devices (such as wash mitt, long handled sponge, etc.) (6)  []  Pt. doing 8-9 out of 10 body parts , or pt. needs steadying assistance. (75-99%) (4)  []  Pt. doing 10 out of 10 body parts buts needs supervision or set up (5)  [] Pt. doing 5-7 out of 10 body parts (50-74%) (3)  [x] Pt. doing 3-4 of out 10 body parts (25-49%) (2)  [] Pt. doing 0-2 out of 10 body parts or needs assist of two helpers. (0-24%) (1)    [] Activity  done with OT      DRESSING UPPER BODY  [] Pt. dresses independently and undresses independently, obtaining clothes from          storage area (7)  [] Pt. needs adaptive devices (such as Velcro fastener, reacher, button hook, etc.)          Applies abdominal binder or any orthotic.  Uses  walker for steadying. (6)  [] Pt. needs supervision (cueing), set up (helper brings clothes or applies orthotics (such as abdominal binder, TLSO, cervical collar) (5)  [] Pt. doing 75-99%. (4)  [] Pt. doing 50-74%. (3)  [] Pt. doing 25-49%. (2)  [x] Pt. doing 0-24%, or needs assistance of 2 helpers. (1)  [] Activity done with OT.      DRESSING LOWER BODY  [] Pt. independent with all parts of dressing lower body. (7)  [] Pt. needs adaptive devices ( such as reacher, long handled shoe horn, sock aid) (6)  [] Pt. needs supervision (cueing), set up(helper brings clothes or applies orthotic, such   as ELIZABETH hose, AFO) (5)  [] Pt. doing 75-99%.  Needs steadying assistance: buttoning pants, zipping a zipper,        fastening a belt, tying shoelaces, applying one sock/shoe. (4)  [] Pt. doing 50-74%. (3)  [] Pt. doing 25-49%. (2)  [x] Pt. Doing 0-24%, or needs assistance of 2 helpers. (1)  [] Activity done with OT.      TOILETING  [] Pt. doing 3 out of 3 tasks independently (pulling down clothes, cleansing dona area,  pulling up clothes) (7)  [] Pt. doing 3 out of 3 tasks, but needs assistive device (grab bars, etc.) (6)  [] Pt. needs supervision (cueing), or set up (obtaining supplies for dona care.) (5)  [] Pt. doing 3 out of 3 tasks, but needs steadying assistance with one or more parts. (75-90%) (4)  [] Pt. doing 2 out of 3 tasks (50-74%) (3)  [] Pt. doing out of 3 tasks (25-49%) (2)  [x] Pt. needs assist (more than steadying) with all 3 tasks.  Needs assist of 2 helpers.    Incontinent of B/B today. (0-24%) (1)  [] Activity did not occur.      BLADDER  [] Pt.  uses toilet (7)  [] Pt. is on dialysis - does not urinate (7)  [] Pt. uses  bedside commode (5)  [] Pt. uses bedpan - staff does ____% of tasks(includes rolling on/off, holding bedpan in place, hygiene, and emptying pan)  [] Pt. uses urinal - staff empties (5)                     []  Pt. needs help with positioning the urinal (4)                     []  Pt. needs help holding the urinal (1)  [] Pt. has call catheter/suprapubic catheter/concom cath - staff empties (1)  [] Pt. is on ICP:                     []  Pt. does catheterization (6)                     []  Staff does catheterization (1)  [x] Pt. is incontinent - staff does __75__% of tasks (includes clothes management,  hygiene, and changing diaper)  [] Number of accidents during this shift ____       BOWEL  [] Pt. uses toilet (7)  [] Pt. uses bedside commode (5)  [] Pt. uses bedpan - staff does ____% of tasks (includes rolling on/off, holding bedpan                                                          In place, hygiene, and emptying pan)  [] Pt. on bowel program:                    [] Pt.inserts suppository (6)                    [] Nurse inserts suppository (4)                    []  Nurse does dig. stim (1)  [] Pt. has colostomy - staff maintains care and empties (1)                    [] Pt. does ____% of care for colostomy  [x] Pt. is incontinent - staff does _75__% of tasks (includes clothes management,  hygiene, and changing diaper)  [] Number of accidents during this shift____  [] No bowel movement this shift      TRANSFERS:  BED/CHAIR/WHEELCHAIR  [] Pt. transfers without assistive device into and out of wheelchair/bed/chair (7)  [] Pt. uses assistive/adaptive devices (grab bars, sliding board, walker, bed rails,  wheelchair armrests, etc.) (6)  [] Pt. needs supervision, cues, or head of bed raised by staff (5)  [] Pt. needs steadying assist with any or all parts of transfer, or needs assist with one  leg during transfer (4)  [] Pt. needs lifting or lowering, or needs assist with 2 legs during transfer (3)  [x] Pt. needs  lifting and lowering during transfer (2)  [] Pt. needs assist of 2 helpers or mechanical lift (1)  [] Activity did not occur   ________________________________________________________________  Includes lying to seated position at edge of bed.  Check assist level needed:  [] Used bedrails              []  Supervision                   []   Min. A  [] Mod A                          [x]  Max A                            []  Total A    If in wheelchair:  Check assist level needed:  [] Lock brakes                 []  Lifts/lowers footrests       []  Removes w/c arm                                                                                (for slide board transfers)    TRANSFER:  TOILET/BEDSIDE COMMODE   [] Pt. transfers from wheelchair or walking without assistive device to toilet.  Gets on and off a standard toilet. (7)  [] Pt. uses assistive/adaptive device (commode, grab bars, sliding board, walker prosthesis, orthotic, raised toilet seat) (6)  [] Pt. needs supervision, cues, or set up (needs assist to lock brakes, remove leg or arm rest, position sliding board) (5)  [] Pt. needs steadying assist with any or all parts of transfer or needs assist with one leg during transfer. (4)  [] Pt. needs lifting or lowering or needs assist with two legs during transfer. (3)  [x] Pt. needs lifting and lowering during transfer. (2)  [] Pt. needs assist of 2 helpers or mechanical lift. (1)  [] Activity did not occur.      TRANSFER:  SHOWER  [] Pt. transfers without assistive device into and out of shower. (7)  [] Pt. uses assistive/adaptive device (shower chair/bench, grab bars, sliding board,   walker, prothesis, orthotic, raised toilet seat. (6)  [] Pt. needs supervision, cues, or set up (needs assist to lock brakes, remove leg or armrest, position sliding board) (5)  [] Pt. needs steadying assist with any or all parts of transfer or needs assist with one      leg during transfer. (4)  [] Pt. needs lifting or lowering or  needs assist with two legs during transfer. (3)  [] Pt. needs lifting and lowering during transfer. (2)  [] Pt. needs assist of 2 helpers.  [place] pushes shower chair on wheels in and out of   shower. (1)  [x] Activity did not occur.                                                                                                                                                                                 r                                                                                                                    pT.

## 2017-11-02 NOTE — PROGRESS NOTES
"Occupational Therapy   Treatment    [facility] Aleksander Luz  MRN: [id number]  Room/Bed: E278/E278 A       11/02/17 1015   OT Time Calculation   OT Start Time 1015   OT Stop Time 1105   OT Total Time (min) 50 min   General   OT Date of Treatment 11/02/17   Family/Caregiver Present No   Patient Found (position) Seated in wheelchair   Precautions   General Precautions fall   RUE Weight Bearing NWB   RLE Weight Bearing WBAT   RUE Brace/Splint sling   Visual/Auditory Vision impaired;Hearing impaired   Subjective   Patient states "This arm is starting to hurt now."     Pain/Comfort   Pain Rating 7/10   Location - Side Right   Location arm   Pain Addressed Pre-medicate for activity;Distraction;Reposition;Nurse notified   Pain Comment Pt. takes only Tylenol for pain.    Sit to Stand   Sit <> Stand Assistance Maximum Assistance   Sit <> Stand Assistive Device No Assistive Device   Stand to Sit   Assistance Maximum Assistance   Assistive Device No Assistive Device   UE Dressing   UE Dressing Level of Assistance Maximum assistance  (to don button shirt)   UE Dressing Where Assessed Wheelchair   UE Dressing Comments Assist to thread over RUE/LUE, button shirt   LE Dressing   LE Dressing Level of Assistance Maximum assistance  (Pt. is able to thread over LLE with AE)   Sock Level of Assistance Maximum assistance  (with sock aide)   LE Dressing Where Assessed Wheelchair   Additional Activities   Additional Activities Comments Participated in UB/LB dressing trng.  OT educated pt. in correctly donning RUE sling.  Pt. also c/o w/c pressing on RUE.  OT swapped chairs for 20 in. chair; also switched for chair with desk arms 2* Difficulty pushing up from high arm for sit to stand.  T/f trng- multiple trials of sit to stand to increase (I) with t/fs.    Activity Tolerance   Activity Tolerance Patient limited by pain   After Treatment   Patient Position After Treatment Seated in wheelchair   Patient after treatment left call button in " reach;nursing notified   Assessment   Prognosis Fair   Problem List Decreased Self Care skills;Decreased upper extremity range of motion;Decreased upper extremity strength;Decreased safe judgment during ADL;Decreased endurance;Decreased fine motor control;Decreased functional mobility;Decreased gross motor control;Decreased IADLs;Decreased Function of right upper extremity;Decreased trunk control for functional activities   Assessment Participation in therapy limited by pain to RUE/RLE, difficulty with sit to stand t/fs.  Pt. reports taking only Tylenol for pain.  Sling does not provide much support for RUE and pt. tends to attempt to use RUE with ADLs.  Pt. will benefit from continued OT to progress towards goals.     Level of Motivation/Participation Fair/good   Barriers to Discharge Home environment accessibility limitations   Discharge Recommendations   Equipment Needed After Discharge 3-in-1 commode;bath bench;wheelchair   Discharge Facility/Level Of Care Needs home health OT   Plan   Plan Continue with current plan   Therapy Frequency 2 times/day;Monday-Friday   Occupational Therapy Follow-up   OT Follow-up? Yes   Treatment/Billable Minutes   Self Care/Home Management 35   Therapeutic Activity 15   Total Time 50       HUSSEIN Lagos  11/2/2017

## 2017-11-02 NOTE — PROGRESS NOTES
NURSE TECH FIM  REPORT    EATING  [x] Pt. opens packages, cuts food, pours liquids, and feeds self.  Regular consistency (7)  [] Pt. needs dentures, swallow technique, special foods or drink consistency (6)  [] Pt. needs supervision (cueing), set up (open containers, cut food, etc. (5)  [] Pt. needs assist with occasional scooping or checking for food pocketing (75-99%) (4)  [] Pt. needs assist to load each bite on utensils, pt.brings food to mouth (50-74%) (3)  [] Pt. needs assist to scoop, bring food to mouth (hand over hand) (25-49%) (2)  [] Pt. Is receiving IV fluids for hydration or tube feedings  (0-24%) (1)      GROOMING   [] Pt. performs all tasks independently and safely (7)  [] Pt. obtains all articles.  Needs adaptive/assistive device (such as wash mitt) (6)  [] Pt. needs supervision (cueing), set up (helper obtains articles, applies toothpaste, plugs razor, hands items to patient, opens containers, adjusts water temperature) (5)       [] Pt. doing 3 out of 4,  or 4 out of 5 tasks.  Needs assist to put in or remove dentures.  Needs steadying assist.(Pt. Doing 75-99%. (4)                                                                 [] Pt. doing 2 out of 4, or 3 out of 5 tasks (50-74%) (3)  [] Pt. doing 1 out of 4, or 2 out of 5 tasks (25-49%) (2)  [] Pt. doing 0 out of 4, or 1 out of 5 tasks or needs assistance of 2 helpers (0-24%) (1)  [x] Activity done with OT      BATHING  [] Pt. safely bathes whole body (10 parts of 10) (7)  [] Pt. doing 10 out of 10 body parts.  Uses adaptive devices (such as wash mitt, long handled sponge, etc.) (6)  []  Pt. doing 8-9 out of 10 body parts , or pt. needs steadying assistance. (75-99%) (4)  []  Pt. doing 10 out of 10 body parts buts needs supervision or set up (5)  [] Pt. doing 5-7 out of 10 body parts (50-74%) (3)  [] Pt. doing 3-4 of out 10 body parts (25-49%) (2)  [] Pt. doing 0-2 out of 10 body parts or needs assist of two helpers. (0-24%) (1)    [x] Activity  done with OT      DRESSING UPPER BODY  [] Pt. dresses independently and undresses independently, obtaining clothes from          storage area (7)  [] Pt. needs adaptive devices (such as Velcro fastener, reacher, button hook, etc.)          Applies abdominal binder or any orthotic.  Uses  walker for steadying. (6)  [] Pt. needs supervision (cueing), set up (helper brings clothes or applies orthotics (such as abdominal binder, TLSO, cervical collar) (5)  [] Pt. doing 75-99%. (4)  [] Pt. doing 50-74%. (3)  [] Pt. doing 25-49%. (2)  [] Pt. doing 0-24%, or needs assistance of 2 helpers. (1)  [x] Activity done with OT.      DRESSING LOWER BODY  [] Pt. independent with all parts of dressing lower body. (7)  [] Pt. needs adaptive devices ( such as reacher, long handled shoe horn, sock aid) (6)  [] Pt. needs supervision (cueing), set up(helper brings clothes or applies orthotic, such   as ELIZABETH hose, AFO) (5)  [] Pt. doing 75-99%.  Needs steadying assistance: buttoning pants, zipping a zipper,        fastening a belt, tying shoelaces, applying one sock/shoe. (4)  [] Pt. doing 50-74%. (3)  [] Pt. doing 25-49%. (2)  [] Pt. Doing 0-24%, or needs assistance of 2 helpers. (1)  [x] Activity done with OT.      TOILETING  [] Pt. doing 3 out of 3 tasks independently (pulling down clothes, cleansing dona area,  pulling up clothes) (7)  [] Pt. doing 3 out of 3 tasks, but needs assistive device (grab bars, etc.) (6)  [] Pt. needs supervision (cueing), or set up (obtaining supplies for dona care.) (5)  [] Pt. doing 3 out of 3 tasks, but needs steadying assistance with one or more parts. (75-90%) (4)  [] Pt. doing 2 out of 3 tasks (50-74%) (3)  [] Pt. doing out of 3 tasks (25-49%) (2)  [] Pt. needs assist (more than steadying) with all 3 tasks.  Needs assist of 2 helpers.    Incontinent of B/B today. (0-24%) (1)  [x] Activity did not occur.      BLADDER  [] Pt.  uses toilet (7)  [] Pt. is on dialysis - does not urinate (7)  [] Pt. uses  bedside commode (5)  [] Pt. uses bedpan - staff does ____% of tasks(includes rolling on/off, holding bedpan in place, hygiene, and emptying pan)  [] Pt. uses urinal - staff empties (5)                     []  Pt. needs help with positioning the urinal (4)                     []  Pt. needs help holding the urinal (1)  [] Pt. has call catheter/suprapubic catheter/concom cath - staff empties (1)  [] Pt. is on ICP:                     []  Pt. does catheterization (6)                     []  Staff does catheterization (1)  [x] Pt. is incontinent - staff does _80___% of tasks (includes clothes management,  hygiene, and changing diaper)  [] Number of accidents during this shift ____       BOWEL  [] Pt. uses toilet (7)  [] Pt. uses bedside commode (5)  [] Pt. uses bedpan - staff does ____% of tasks (includes rolling on/off, holding bedpan                                                          In place, hygiene, and emptying pan)  [] Pt. on bowel program:                    [] Pt.inserts suppository (6)                    [] Nurse inserts suppository (4)                    []  Nurse does dig. stim (1)  [] Pt. has colostomy - staff maintains care and empties (1)                    [] Pt. does ____% of care for colostomy  [] Pt. is incontinent - staff does ____% of tasks (includes clothes management,  hygiene, and changing diaper)  [] Number of accidents during this shift____  [x] No bowel movement this shift      TRANSFERS:  BED/CHAIR/WHEELCHAIR  [] Pt. transfers without assistive device into and out of wheelchair/bed/chair (7)  [] Pt. uses assistive/adaptive devices (grab bars, sliding board, walker, bed rails,  wheelchair armrests, etc.) (6)  [] Pt. needs supervision, cues, or head of bed raised by staff (5)  [] Pt. needs steadying assist with any or all parts of transfer, or needs assist with one  leg during transfer (4)  [] Pt. needs lifting or lowering, or needs assist with 2 legs during transfer (3)  [] Pt. needs  lifting and lowering during transfer (2)  [] Pt. needs assist of 2 helpers or mechanical lift (1)  [x] Activity did not occur   ________________________________________________________________  Includes lying to seated position at edge of bed.  Check assist level needed:  [] Used bedrails              []  Supervision                   []   Min. A  [] Mod A                          []  Max A                            []  Total A    If in wheelchair:  Check assist level needed:  [] Lock brakes                 []  Lifts/lowers footrests       []  Removes w/c arm                                                                                (for slide board transfers)    TRANSFER:  TOILET/BEDSIDE COMMODE   [] Pt. transfers from wheelchair or walking without assistive device to toilet.  Gets on and off a standard toilet. (7)  [] Pt. uses assistive/adaptive device (commode, grab bars, sliding board, walker prosthesis, orthotic, raised toilet seat) (6)  [] Pt. needs supervision, cues, or set up (needs assist to lock brakes, remove leg or arm rest, position sliding board) (5)  [] Pt. needs steadying assist with any or all parts of transfer or needs assist with one leg during transfer. (4)  [] Pt. needs lifting or lowering or needs assist with two legs during transfer. (3)  [] Pt. needs lifting and lowering during transfer. (2)  [] Pt. needs assist of 2 helpers or mechanical lift. (1)  [x] Activity did not occur.      TRANSFER:  SHOWER  [] Pt. transfers without assistive device into and out of shower. (7)  [] Pt. uses assistive/adaptive device (shower chair/bench, grab bars, sliding board,   walker, prothesis, orthotic, raised toilet seat. (6)  [] Pt. needs supervision, cues, or set up (needs assist to lock brakes, remove leg or armrest, position sliding board) (5)  [] Pt. needs steadying assist with any or all parts of transfer or needs assist with one      leg during transfer. (4)  [] Pt. needs lifting or lowering or  needs assist with two legs during transfer. (3)  [] Pt. needs lifting and lowering during transfer. (2)  [] Pt. needs assist of 2 helpers.  [place] pushes shower chair on wheels in and out of   shower. (1)  [x] Activity did not occur.

## 2017-11-02 NOTE — PROGRESS NOTES
"Physical Therapy   Treatment    [facility] Aleksander Luz   MRN: [id number]        11/02/17 1415   PT Time Calculation   PT Start Time 1416   PT Stop Time 1546   PT Total Time (min) 90 min   Treatment   Treatment Type Treatment   PT/PTA PT   General   PT Received On 11/02/17   Family/Caregiver Present Yes  (Pt's daughter present in room)   Patient Found (position) Seated in wheelchair   Pt found with (sling to R UE)   Precautions   General Precautions fall   Orthopedic Yes   RUE Weight Bearing NWB   RLE Weight Bearing WBAT   Required Braces or Orthoses Yes   RUE Brace/Splint Sling   Visual/Auditory Vision impaired   Subjective   Patient states "I have been sitting up a while today"   Pain/Comfort   Pain Rating 1 4/10   Location - Side 1 Right   Location - Orientation 1 anterior   Location 1 shoulder   Pain Addressed 1 Reposition;Distraction   Bed Mobility   Bed Mobility yes   Sit to Supine Maximum Assistance  (x 1 trial in bed, assist required for trunk and B LE mgmt)   Transfers   Transfer yes   Sit to Stand   Sit <> Stand Assistance Maximum Assistance   Sit <> Stand Assistive Device No Assistive Device;Other (see comments)  (parallel bars)   Trials/Comments Pt performed x 6 trials, pt required maximum physical assistance to complete sit to stand transfer. Pt demonstrate strong posterior lean when performing this transfer    Stand to Sit   Assistance Maximum Assistance   Assistive Device No Assistive Device;Other (see comments)  (parallel bars)   Trials/Comments Pt performed x 6 trials, assistance required to control descent into sitting in wheelchair   Chair to Bed   Technique Stand Pivot   Assistance Maximum Assistance   Assistive Device No Assistive Device   Trials/Comments x 1 trial, pt required lifting and lowering assistance to complete sit to stand and stand to sit transfer, pt required maximum assistance for pivot portion of transfer    Wheelchair Activities   Propulsion Yes   Propulsion Type 1 Manual   Level 1 " Level tile   Method 1 Left upper extremity;Left lower extremity   Level of Assistance 1 Minimum assistance;Moderate assistance   Description/ Details 1 Pt propelled self in wheelchair 60 feet total with minimal to moderat assistance required to appropriately navigate around obstacles. Pt required numerous seated rest breaks due to fatigue   Gait   Gait Yes   Weight Bearing Status WBAT to R LE   Gait 1   Surface 1 Level tile   Gait Assistive Device Parallel bars   Other Apparatus 1 Wheelchair follow   Assistance 1 Maximum assistance;Total assistance   Gait Distance Pt ambulated 4 feet in parallel bars, assistance required to advance R LE during swing phase. Decreased L LE step length observed due to decreased weight bearing to R LE during stance phase.    Gait Pattern swing-through gait   Gait Deviation(s) decreased lissette;increased time in double stance;decreased velocity of limb motion;decreased step length;decreased stride length;decreased toe-to-floor clearance;decreased weight-shifting ability;forward lean   Impairments Contributing to Gait Deviations impaired balance;decreased strength;decreased ROM;impaired postural control   Static Standing Balance   Static Standing-Balance Support Right upper extremity supported;Left upper extremity supported  (in parallel bars)   Static Standing-Level of Assistance Moderate assistance   Static Standing-Comment/# of Minutes Pt tolerated static standing in parallel bars: 1st trial: 1 min 27 seconds, 2nd trial: 56 seconds. Posterior lean observed pt required assistance to maintain balance during transfer    Supine   Supine-Exercises Lower extremity;Specific exercises   Supine-Exercise Type Heel slides   Supine-Exercise Comments Pt performed 3 x 10 reps with active assist required   Seated   Seated-Exercises Lower extremity;Specific exercises   Seated-Exercise Type Ankle pumps;Hip flexion;Long arc quads;Glut sets   Seated-Exercise Comments Pt performed 3 x 15 reps to B LE with  active assist   Other Activities   Comments Patient performed stand pivot transfer from wheelchair to bedside commode and bedside commode to wheelchair with maximum assistance    Activity Tolerance   Activity Tolerance Patient tolerated treatment well;Patient limited by fatigue   After Treatment   Patient Position After Treatment Supine in bed   Patient after treatment left call button in reach   Assessment   Prognosis Good   Problem List Decreased strength;Decreased range of motion;Decreased endurance;Impaired balance;Decreased mobility;Pain;Orthopedic restrictions;Decreased skin integrity;Impaired vision   Session Assessment progressing toward goals   Assessment Pt continues to remain limited by R LE and R UE pain as well as poor endurance for activity. Pt was able to increase steps in parallel bars but continues to demonstrate poor R LE weight bearing during stance phase. Pt continues to require maximum assistance for transfers. Pt will continue to benefit from further skilled PT intervention in order to address these above impairments and to improve pt's functional independence with mobility.    Level of Motivation/Participation good   Barriers to Discharge Inaccessible home environment;Decreased caregiver support   Barriers to Discharge Comments Pt has 5 steps to enter home   Discharge Recommendations   Equipment Needed After Discharge 3-in-1 commode;bath bench;wheelchair   Discharge Facility/Level Of Care Needs home health PT   Plan   Planned Therapy Intervention Continue with current plan   Therapy Frequency 2 times/day;daily;Monday-Friday;Saturday or Sunday   Physical Therapy Follow-up   PT Follow-up? Yes   PT - Next Visit Date 11/03/17   Treatment/Billable Minutes   Therapeutic Activity 60   Therapeutic Exercise 30   Total Time 90     Ronda Templeton, PT  11/2/2017

## 2017-11-02 NOTE — PLAN OF CARE
Problem: Patient Care Overview  Goal: Plan of Care Review  Outcome: Ongoing (interventions implemented as appropriate)    * Closed intertrochanteric fracture of right hip, initial encounter     Inadequate oral intake   Related to (etiology):   Reduced appetite post op     Signs and Symptoms (as evidenced by):   PO of 50-75% in the presence of increased needs     Interventions/Recommendations (treatment strategy):Boost plus bid, RD to follow     Nutrition Diagnosis Status:   New         Goals: PO to meet 70% of EEN by 11/06/17  Nutrition Goal Status: new  Communication of RD Recs: reviewed with physician

## 2017-11-03 LAB
ANION GAP SERPL CALC-SCNC: 9 MMOL/L
BACTERIA UR CULT: NORMAL
BASOPHILS # BLD AUTO: 0.04 K/UL
BASOPHILS NFR BLD: 0.3 %
BUN SERPL-MCNC: 23 MG/DL
CALCIUM SERPL-MCNC: 9.1 MG/DL
CHLORIDE SERPL-SCNC: 105 MMOL/L
CO2 SERPL-SCNC: 22 MMOL/L
CREAT SERPL-MCNC: 0.7 MG/DL
DIFFERENTIAL METHOD: ABNORMAL
EOSINOPHIL # BLD AUTO: 0.2 K/UL
EOSINOPHIL NFR BLD: 1.1 %
ERYTHROCYTE [DISTWIDTH] IN BLOOD BY AUTOMATED COUNT: 16.7 %
EST. GFR  (AFRICAN AMERICAN): >60 ML/MIN/1.73 M^2
EST. GFR  (NON AFRICAN AMERICAN): >60 ML/MIN/1.73 M^2
GLUCOSE SERPL-MCNC: 100 MG/DL
HCT VFR BLD AUTO: 25 %
HGB BLD-MCNC: 7.7 G/DL
LYMPHOCYTES # BLD AUTO: 1.5 K/UL
LYMPHOCYTES NFR BLD: 11.5 %
MCH RBC QN AUTO: 28.5 PG
MCHC RBC AUTO-ENTMCNC: 30.8 G/DL
MCV RBC AUTO: 93 FL
MONOCYTES # BLD AUTO: 1.4 K/UL
MONOCYTES NFR BLD: 11 %
NEUTROPHILS # BLD AUTO: 9.9 K/UL
NEUTROPHILS NFR BLD: 76.1 %
PLATELET # BLD AUTO: 349 K/UL
PMV BLD AUTO: 11.3 FL
POTASSIUM SERPL-SCNC: 4 MMOL/L
RBC # BLD AUTO: 2.7 M/UL
SODIUM SERPL-SCNC: 136 MMOL/L
WBC # BLD AUTO: 13.05 K/UL

## 2017-11-03 PROCEDURE — 97530 THERAPEUTIC ACTIVITIES: CPT

## 2017-11-03 PROCEDURE — 25000003 PHARM REV CODE 250: Performed by: PHYSICAL MEDICINE & REHABILITATION

## 2017-11-03 PROCEDURE — 85025 COMPLETE CBC W/AUTO DIFF WBC: CPT

## 2017-11-03 PROCEDURE — 97150 GROUP THERAPEUTIC PROCEDURES: CPT

## 2017-11-03 PROCEDURE — 99233 SBSQ HOSP IP/OBS HIGH 50: CPT | Mod: ,,, | Performed by: PHYSICAL MEDICINE & REHABILITATION

## 2017-11-03 PROCEDURE — 97110 THERAPEUTIC EXERCISES: CPT

## 2017-11-03 PROCEDURE — 36415 COLL VENOUS BLD VENIPUNCTURE: CPT

## 2017-11-03 PROCEDURE — 12800000 HC REHAB SEMI-PRIVATE ROOM

## 2017-11-03 PROCEDURE — 80048 BASIC METABOLIC PNL TOTAL CA: CPT

## 2017-11-03 PROCEDURE — 25000003 PHARM REV CODE 250: Performed by: HOSPITALIST

## 2017-11-03 PROCEDURE — 63600175 PHARM REV CODE 636 W HCPCS: Performed by: HOSPITALIST

## 2017-11-03 RX ORDER — OXYCODONE AND ACETAMINOPHEN 5; 325 MG/1; MG/1
1 TABLET ORAL EVERY 4 HOURS PRN
Status: DISCONTINUED | OUTPATIENT
Start: 2017-11-03 | End: 2017-11-14 | Stop reason: HOSPADM

## 2017-11-03 RX ORDER — OXYCODONE AND ACETAMINOPHEN 5; 325 MG/1; MG/1
1 TABLET ORAL 2 TIMES DAILY
Status: DISCONTINUED | OUTPATIENT
Start: 2017-11-03 | End: 2017-11-06

## 2017-11-03 RX ADMIN — CIPROFLOXACIN HYDROCHLORIDE 500 MG: 500 TABLET, FILM COATED ORAL at 07:11

## 2017-11-03 RX ADMIN — OXYCODONE HYDROCHLORIDE 5 MG: 5 TABLET ORAL at 07:11

## 2017-11-03 RX ADMIN — LOSARTAN POTASSIUM 100 MG: 50 TABLET, FILM COATED ORAL at 08:11

## 2017-11-03 RX ADMIN — ATENOLOL 25 MG: 25 TABLET ORAL at 07:11

## 2017-11-03 RX ADMIN — ACETAMINOPHEN 650 MG: 325 TABLET ORAL at 07:11

## 2017-11-03 RX ADMIN — OXYCODONE HYDROCHLORIDE AND ACETAMINOPHEN 1 TABLET: 5; 325 TABLET ORAL at 12:11

## 2017-11-03 RX ADMIN — OXYBUTYNIN CHLORIDE 5 MG: 5 TABLET, FILM COATED, EXTENDED RELEASE ORAL at 08:11

## 2017-11-03 RX ADMIN — ATENOLOL 25 MG: 25 TABLET ORAL at 08:11

## 2017-11-03 RX ADMIN — AMLODIPINE BESYLATE 5 MG: 5 TABLET ORAL at 07:11

## 2017-11-03 RX ADMIN — CIPROFLOXACIN HYDROCHLORIDE 500 MG: 500 TABLET, FILM COATED ORAL at 08:11

## 2017-11-03 RX ADMIN — PRAVASTATIN SODIUM 10 MG: 10 TABLET ORAL at 07:11

## 2017-11-03 RX ADMIN — ENOXAPARIN SODIUM 40 MG: 100 INJECTION SUBCUTANEOUS at 04:11

## 2017-11-03 RX ADMIN — ACETAMINOPHEN 650 MG: 325 TABLET ORAL at 04:11

## 2017-11-03 RX ADMIN — DOCUSATE SODIUM 100 MG: 100 CAPSULE, LIQUID FILLED ORAL at 07:11

## 2017-11-03 RX ADMIN — POLYETHYLENE GLYCOL 3350 17 G: 17 POWDER, FOR SOLUTION ORAL at 07:11

## 2017-11-03 NOTE — PLAN OF CARE
Problem: Patient Care Overview  Goal: Plan of Care Review  Outcome: Ongoing     Fall Risk (Adult)  Goal: COmplete Patient Rounds and assist patient in order to prevent falls. Keep Bed alarm on  Outcome: Patient did not fall during this shift so far.    Pressure Ulcer (Adult)  Goal: Prevent Sheer/Friction Injuries  Outcome: Patient turned per protocol to decrease risk of forming pressure ulcer. No new skin breakdown noted.    Pain Acute (Adult)  Goal: Monitor/Manage Analgesia  Outcome: Patient complained of pain at the beginning of the shift and requested prn pain medication. One hour later patient's pain level was assessed and she stated she felt better.

## 2017-11-03 NOTE — ASSESSMENT & PLAN NOTE
- resolved, Na 136 on 11/3    BMP  Lab Results   Component Value Date     11/03/2017    K 4.0 11/03/2017     11/03/2017    CO2 22 (L) 11/03/2017    BUN 23 11/03/2017    CREATININE 0.7 11/03/2017    CALCIUM 9.1 11/03/2017    ANIONGAP 9 11/03/2017    ESTGFRAFRICA >60.0 11/03/2017    EGFRNONAA >60.0 11/03/2017

## 2017-11-03 NOTE — PROGRESS NOTES
Physical Therapy   Daily Physical Therapy FIM Scores    An Hazel   MRN: [id number]        11/03/17 1600   Transfers   Bed/Chair/WC 2   Locomotion   Distance Wheelchair 1   Wheelchair 1   Mode C     Ronda Templeton, PT  11/3/2017

## 2017-11-03 NOTE — PROGRESS NOTES
Occupational Therapy   FIM scores    An Hazel  MRN: [id number]  Room/Bed: E278/E278 A       11/03/17 1400   Transfers   Bed/Chair/WC 2       HUSSEIN Lagos  11/3/2017    LEGEND:   CGA: Contact Guard Assist   EOB: Edge of Bed   HHA: Hand Held Assist   HOB: Head of Bed   (I): Independent-patient performs task in a timely manner   Max (A): Maximal Assist-patient performs 25-49% of task   Min (A): Minimal Assist- patient performs 75% or more of task   Mod (A): Moderate Assist- patient performs 50-74% of task   NA: Not applicable   NT: Not tested   OOB: Out of Bed   PTA: Prior to admit   QC: Quad Cane   RW: Rolling Walker   (S): Supervision- patient requires cues, coaxing, prompting   SBA: Stand By Assist   SC: Straight Cane   SW: Standard Walker   TBA: To be assessed   Total (A): Total Assist- patient performs less than 25% of task   WC: Wheelchair   WFL: Within Functional Limits   WNL: Within Normal Limits

## 2017-11-03 NOTE — SUBJECTIVE & OBJECTIVE
Interval History: No acute events overnight. Patient is tolerating therapy. Would prefer percocet to oxycodone. Had multiple BMs overnight after being constipated for 5 days. Prelim ucx +GNR, started on empiric cipro.      Scheduled Medications:    acetaminophen  650 mg Oral BID    amLODIPine  5 mg Oral Daily    atenolol  25 mg Oral BID    ciprofloxacin HCl  500 mg Oral Q12H    docusate sodium  100 mg Oral BID    enoxaparin  40 mg Subcutaneous Daily    losartan  100 mg Oral QHS    oxybutynin  5 mg Oral QHS    polyethylene glycol  17 g Oral Daily    pravastatin  10 mg Oral Daily       PRN Medications: acetaminophen, bisacodyl, lactulose, ondansetron, oxyCODONE-acetaminophen, polyethylene glycol, ramelteon    Review of Systems   Constitutional: Negative for unexpected weight change.   HENT: Negative for congestion and ear pain.    Eyes: Negative for discharge.   Respiratory: Negative for shortness of breath.    Cardiovascular: Negative for chest pain.   Gastrointestinal: Negative for abdominal pain and vomiting.   Endocrine: Negative for cold intolerance.   Genitourinary: Negative for flank pain.   Neurological: Positive for weakness.   Psychiatric/Behavioral: Negative for hallucinations.     Objective:     Vital Signs (Most Recent):  Temp: 98.4 °F (36.9 °C) (11/03/17 0700)  Pulse: 80 (11/03/17 0700)  Resp: 18 (11/03/17 0700)  BP: 138/64 (11/03/17 0700)  SpO2: (!) 94 % (11/03/17 0700)    Vital Signs (24h Range):  Temp:  [98.1 °F (36.7 °C)-98.4 °F (36.9 °C)] 98.4 °F (36.9 °C)  Pulse:  [80-87] 80  Resp:  [16-18] 18  SpO2:  [92 %-94 %] 94 %  BP: (138-144)/(64-65) 138/64     Physical Exam   Constitutional: She appears well-developed and well-nourished.   HENT:   Head: Normocephalic and atraumatic.   Eyes: EOM are normal.   Cardiovascular: Normal rate.    Pulmonary/Chest: Effort normal. No respiratory distress.   Abdominal: Soft. She exhibits no distension. There is no tenderness.   Musculoskeletal:   RUE in  sling, + pulse,  WNL  LUE: SA, EF/EE  WFL  RLE: HF/KE 1/5, DF/PF 3/5  LLE: HF, KE, DF/PF 4/5    Neurological: She is alert.   Skin: Skin is warm.   Psychiatric: She has a normal mood and affect.   Vitals reviewed.    NEUROLOGICAL EXAMINATION:     CRANIAL NERVES     CN III, IV, VI   Extraocular motions are normal.

## 2017-11-03 NOTE — PROGRESS NOTES
"Physical Therapy   Treatment    [facility] Aleksander Luz   MRN: [id number]      11/03/17 0858   PT Time Calculation   PT Start Time 0858   PT Stop Time 1028   PT Total Time (min) 90 min   Treatment   Treatment Type Treatment   PT/PTA PT   General   PT Received On 11/03/17   Family/Caregiver Present No   Patient Found (position) Seated in wheelchair   Pt found with (sling to R UE)   Precautions   General Precautions fall   Orthopedic Yes   RUE Weight Bearing NWB   RLE Weight Bearing WBAT   Required Braces or Orthoses Yes   RUE Brace/Splint sling   Visual/Auditory Vision impaired;Hearing impaired   Subjective   Patient states "I had a tough morning sleeping"   Pain/Comfort   Pain Rating 1 4/10   Location - Side 1 Right   Location - Orientation 1 anterior   Location 1 shoulder   Pain Addressed 1 Reposition;Distraction   Pain Rating Post-Intervention 1 4/10   Bed Mobility   Bed Mobility yes   Supine to Sit Maximum Assistance   Supine to Sit Comments x 1 trial on mat, assist required for trunk and B LE management    Sit to Supine Maximum Assistance  (x 1 trial on mat )   Transfers   Transfer yes   Sit to Stand   Sit <> Stand Assistance Maximum Assistance   Sit <> Stand Assistive Device Other (see comments)  (parallel bars)   Trials/Comments Pt performed x 4 trials, maximum physical assist required from PT to elevate hips to complete transfer.    Stand to Sit   Assistance Moderate Assistance;Maximum Assistance   Assistive Device Other (see comments)  (parallel bars)   Trials/Comments Pt performed x 4 trials, assistance from PT required to control descent into wheelchalir   Chair to Mat   Chair<> Mat Technique Stand Pivot   Chair<>Mat Assistance Maximum Assistance   Chair <> Mat Assistive Device No Assistive Device   Trials/Comments x 1 trial, pt required lifting and lowering assist to perform sit to stand and stand to sit portion of transfer. Pt required moderate assist for pivot portion of transfer   Mat to Chair   Technique " Stand Pivot   Assistance Maximum Assistance   Assistive Device No Assistive Device   Trials/Comments x 1 trial, pt required lifting and lowering assistance to complete sit to stand and stand to sit portion of transfer. Pt required moderate assistance for pivot portion of transfer   Wheelchair Activities   Propulsion Yes   Propulsion Type 1 Manual   Level 1 Level tile   Method 1 Left upper extremity;Left lower extremity   Level of Assistance 1 Minimum assistance   Description/ Details 1 Pt propelled self in wheelchair 2 trials of approximately 40 feet with multiple rest breaks required due to pt fatigue.    Balance   Balance Yes   Static Standing Balance   Static Standing-Balance Support Left upper extremity supported  (in parallel bars)   Static Standing-Level of Assistance Moderate assistance   Static Standing-Comment/# of Minutes Pt tolerated static standing x 4 trials: 1st trial: 1 min 13 seconds, 2nd trial: 1 min 9 seconds, 3rd trial: 49 seconds, 4th trial: 50 seconds with B weight shifting performed to improve pt's R LE weight bearing during stance phase   Supine   Supine-Exercises Lower extremity;Specific exercises   Supine-Exercise Type Ankle pumps;Glut sets;Short arc quads;Heel slides;ABD/ADD   Supine-Exercise Comments Pt performed 3 x 15 reps to B LE with active assist required for R LE hip ABD and heel slides.    Other Activities   Comments PT repositioned pt's R UE sling and educated pt again on importance of maintaining R UE NWB during any activity.    Activity Tolerance   Activity Tolerance Patient tolerated treatment well;Patient limited by pain   After Treatment   Patient Position After Treatment Seated in wheelchair   Patient after treatment left call button in reach  (posey belt donned)   Assessment   Prognosis Fair   Problem List Decreased strength;Impaired balance;Decreased range of motion;Decreased endurance;Decreased mobility;Pain;Decreased skin integrity   Session Assessment progressing toward  goals   Assessment Pt continues to demonstrate posterior lean during all sit to stand and transfers. Pt requires frequent rest breaks throughout activity due to fatigue and SOB. Pt continues to require significant 24 hour care at this time and will likely require this upon D/C.    Level of Motivation/Participation good   Barriers to Discharge Inaccessible home environment   Barriers to Discharge Comments Pt has 5 [employer] Home   Discharge Recommendations   Equipment Needed After Discharge 3-in-1 commode;bath bench;wheelchair   Discharge Facility/Level Of Care Needs home health PT   Plan   Planned Therapy Intervention Continue with current plan   Therapy Frequency 2 times/day;daily;Monday-Friday;Saturday or Sunday   Physical Therapy Follow-up   PT Follow-up? Yes   Treatment/Billable Minutes   Therapeutic Activity 45   Therapeutic Exercise 45   Total Time 90     Ronda Templeton, PT  11/3/2017

## 2017-11-03 NOTE — PROGRESS NOTES
NURSE TECH FIM  REPORT    EATING  [x] Pt. opens packages, cuts food, pours liquids, and feeds self.  Regular consistency (7)  [] Pt. needs dentures, swallow technique, special foods or drink consistency (6)  [] Pt. needs supervision (cueing), set up (open containers, cut food, etc. (5)  [] Pt. needs assist with occasional scooping or checking for food pocketing (75-99%) (4)  [] Pt. needs assist to load each bite on utensils, pt.brings food to mouth (50-74%) (3)  [] Pt. needs assist to scoop, bring food to mouth (hand over hand) (25-49%) (2)  [] Pt. Is receiving IV fluids for hydration or tube feedings  (0-24%) (1)      GROOMING   [] Pt. performs all tasks independently and safely (7)  [] Pt. obtains all articles.  Needs adaptive/assistive device (such as wash mitt) (6)  [] Pt. needs supervision (cueing), set up (helper obtains articles, applies toothpaste, plugs razor, hands items to patient, opens containers, adjusts water temperature) (5)       [] Pt. doing 3 out of 4,  or 4 out of 5 tasks.  Needs assist to put in or remove dentures.  Needs steadying assist.(Pt. Doing 75-99%. (4)                                                                 [] Pt. doing 2 out of 4, or 3 out of 5 tasks (50-74%) (3)  [] Pt. doing 1 out of 4, or 2 out of 5 tasks (25-49%) (2)  [] Pt. doing 0 out of 4, or 1 out of 5 tasks or needs assistance of 2 helpers (0-24%) (1)  [x] Activity done with OT      BATHING  [] Pt. safely bathes whole body (10 parts of 10) (7)  [] Pt. doing 10 out of 10 body parts.  Uses adaptive devices (such as wash mitt, long handled sponge, etc.) (6)  []  Pt. doing 8-9 out of 10 body parts , or pt. needs steadying assistance. (75-99%) (4)  []  Pt. doing 10 out of 10 body parts buts needs supervision or set up (5)  [] Pt. doing 5-7 out of 10 body parts (50-74%) (3)  [] Pt. doing 3-4 of out 10 body parts (25-49%) (2)  [] Pt. doing 0-2 out of 10 body parts or needs assist of two helpers. (0-24%) (1)    [] Activity  done with OT      DRESSING UPPER BODY  [] Pt. dresses independently and undresses independently, obtaining clothes from          storage area (7)  [] Pt. needs adaptive devices (such as Velcro fastener, reacher, button hook, etc.)          Applies abdominal binder or any orthotic.  Uses  walker for steadying. (6)  [] Pt. needs supervision (cueing), set up (helper brings clothes or applies orthotics (such as abdominal binder, TLSO, cervical collar) (5)  [] Pt. doing 75-99%. (4)  [] Pt. doing 50-74%. (3)  [] Pt. doing 25-49%. (2)  [] Pt. doing 0-24%, or needs assistance of 2 helpers. (1)  [x] Activity done with OT.      DRESSING LOWER BODY  [] Pt. independent with all parts of dressing lower body. (7)  [] Pt. needs adaptive devices ( such as reacher, long handled shoe horn, sock aid) (6)  [] Pt. needs supervision (cueing), set up(helper brings clothes or applies orthotic, such   as ELIZABETH hose, AFO) (5)  [] Pt. doing 75-99%.  Needs steadying assistance: buttoning pants, zipping a zipper,        fastening a belt, tying shoelaces, applying one sock/shoe. (4)  [] Pt. doing 50-74%. (3)  [] Pt. doing 25-49%. (2)  [] Pt. Doing 0-24%, or needs assistance of 2 helpers. (1)  [] Activity done with OT.      TOILETING  [] Pt. doing 3 out of 3 tasks independently (pulling down clothes, cleansing dona area,  pulling up clothes) (7)  [] Pt. doing 3 out of 3 tasks, but needs assistive device (grab bars, etc.) (6)  [] Pt. needs supervision (cueing), or set up (obtaining supplies for dona care.) (5)  [] Pt. doing 3 out of 3 tasks, but needs steadying assistance with one or more parts. (75-90%) (4)  [] Pt. doing 2 out of 3 tasks (50-74%) (3)  [] Pt. doing out of 3 tasks (25-49%) (2)  [] Pt. needs assist (more than steadying) with all 3 tasks.  Needs assist of 2 helpers.    Incontinent of B/B today. (0-24%) (1)  [x] Activity did not occur.      BLADDER  [] Pt.  uses toilet (7)  [] Pt. is on dialysis - does not urinate (7)  [] Pt. uses  bedside commode (5)  [x] Pt. uses bedpan - staff does __75__% of tasks(includes rolling on/off, holding bedpan in place, hygiene, and emptying pan)  [] Pt. uses urinal - staff empties (5)                     []  Pt. needs help with positioning the urinal (4)                     []  Pt. needs help holding the urinal (1)  [] Pt. has call catheter/suprapubic catheter/concom cath - staff empties (1)  [] Pt. is on ICP:                     []  Pt. does catheterization (6)                     []  Staff does catheterization (1)  [] Pt. is incontinent - staff does ____% of tasks (includes clothes management,  hygiene, and changing diaper)  [x] Number of accidents during this shift _1___       BOWEL  [] Pt. uses toilet (7)  [] Pt. uses bedside commode (5)  [] Pt. uses bedpan - staff does ____% of tasks (includes rolling on/off, holding bedpan                                                          In place, hygiene, and emptying pan)  [] Pt. on bowel program:                    [] Pt.inserts suppository (6)                    [] Nurse inserts suppository (4)                    []  Nurse does dig. stim (1)  [] Pt. has colostomy - staff maintains care and empties (1)                    [] Pt. does ____% of care for colostomy  [x] Pt. is incontinent - staff does __80__% of tasks (includes clothes management,  hygiene, and changing diaper)  [x] Number of accidents during this shift_1___  [] No bowel movement this shift      TRANSFERS:  BED/CHAIR/WHEELCHAIR  [] Pt. transfers without assistive device into and out of wheelchair/bed/chair (7)  [] Pt. uses assistive/adaptive devices (grab bars, sliding board, walker, bed rails,  wheelchair armrests, etc.) (6)  [] Pt. needs supervision, cues, or head of bed raised by staff (5)  [] Pt. needs steadying assist with any or all parts of transfer, or needs assist with one  leg during transfer (4)  [] Pt. needs lifting or lowering, or needs assist with 2 legs during transfer (3)  [] Pt.  needs lifting and lowering during transfer (2)  [] Pt. needs assist of 2 helpers or mechanical lift (1)  [x] Activity did not occur   ________________________________________________________________  Includes lying to seated position at edge of bed.  Check assist level needed:  [] Used bedrails              []  Supervision                   []   Min. A  [] Mod A                          []  Max A                            []  Total A    If in wheelchair:  Check assist level needed:  [] Lock brakes                 []  Lifts/lowers footrests       []  Removes w/c arm                                                                                (for slide board transfers)    TRANSFER:  TOILET/BEDSIDE COMMODE   [] Pt. transfers from wheelchair or walking without assistive device to toilet.  Gets on and off a standard toilet. (7)  [] Pt. uses assistive/adaptive device (commode, grab bars, sliding board, walker prosthesis, orthotic, raised toilet seat) (6)  [] Pt. needs supervision, cues, or set up (needs assist to lock brakes, remove leg or arm rest, position sliding board) (5)  [] Pt. needs steadying assist with any or all parts of transfer or needs assist with one leg during transfer. (4)  [] Pt. needs lifting or lowering or needs assist with two legs during transfer. (3)  [] Pt. needs lifting and lowering during transfer. (2)  [] Pt. needs assist of 2 helpers or mechanical lift. (1)  [x] Activity did not occur.      TRANSFER:  SHOWER  [] Pt. transfers without assistive device into and out of shower. (7)  [] Pt. uses assistive/adaptive device (shower chair/bench, grab bars, sliding board,   walker, prothesis, orthotic, raised toilet seat. (6)  [] Pt. needs supervision, cues, or set up (needs assist to lock brakes, remove leg or armrest, position sliding board) (5)  [] Pt. needs steadying assist with any or all parts of transfer or needs assist with one      leg during transfer. (4)  [] Pt. needs lifting or lowering  or needs assist with two legs during transfer. (3)  [] Pt. needs lifting and lowering during transfer. (2)  [] Pt. needs assist of 2 helpers.  [place] pushes shower chair on wheels in and out of   shower. (1)  [x] Activity did not occur.

## 2017-11-03 NOTE — ASSESSMENT & PLAN NOTE
Other Rehab Plan/Continue to monitor:   Nutrition:    - Prealbumin 9   - Nutritionist on board    - GI ppx: none  Bladder Management: monitor for continence   - restart home Oxybutinin for nocturnal urgency  - UTI: prelim ucx +GNR, started on empiric cipro 11/2 pending c/s  Bowel Management: monitor for continence   - Colace and Miralax scheduled; lactulose and Suppository PRN   - Will monitor for regularity - was previously constipated but had multiple BMs overnight 11/2   Mood: stable   Sleep: monitor; ramelteon PRN   Skin: Monitor   Pain: Tylenol PRN mild pain, change oxycodone to Percocet PRN severe pain on 11/3  DVT ppx: Lovenox 40mg daily

## 2017-11-03 NOTE — PROGRESS NOTES
Pt discussed during treatment team staffing.  Expected discharge date is 11/14/17.  Pt and daughter Maryann informed and agree with discharge date.  Discharge plan is home with daughters.   Maryann will be in town until 12/7/17 to assist after discharge.

## 2017-11-03 NOTE — PROGRESS NOTES
Physical Therapy   Volleyball Group    Antiarra Hazel   MRN: [id number]        11/03/17 1415   PT Time Calculation   PT Start Time 1415   PT Stop Time 1500   PT Total Time (min) 45 min   Treatment   Treatment Type Treatment  (Volleyball Group)   PT/PTA PT   General   PT Received On 11/03/17   Family/Caregiver Present No   Patient Found (position) Seated in wheelchair   Pt found with (sling to R UE)   Precautions   General Precautions fall   Orthopedic Yes   RUE Weight Bearing NWB   RLE Weight Bearing WBAT   Required Braces or Orthoses Yes   RUE Brace/Splint sling   Visual/Auditory Hearing impaired;Vision impaired   Subjective   Patient states Pt agreeable to participate in group treatment session   Pain/Comfort   Pain Rating 1 5/10   Location - Side 1 Right   Location - Orientation 1 generalized   Location 1 shoulder   Pain Addressed 1 Reposition;Distraction   Pain Rating Post-Intervention 1 5/10   Other Activities   Comments   Objective:   -The patient performed volleyball group at w/c level with minimal assist.  Patient performed activity using bilateral upper extremities to volley the ball, lateral arm movement noted throughout the activity.  Endurance 10 on the RPE scale. no pain complaints voiced or observed.  -Social Interaction: (choose FIM score rating below)  - Interacts appropriately with others with medication or extra time(anti-anxiety, antidepressant)  (6)    Goal:   pts endurance will improve to a RPE of 12 (choose one)      Assessment:  Patient participated in a 45 minute volleyball group activity.  The group activity challenged dynamic standing/sitting balance, core strengthening, bilateral upper extremity strengthening, cardiovascular and respiratory capacity, hand -eye coordination, visual scanning and social affect/mood.        After Treatment   Patient Position After Treatment Supine in bed   Patient after treatment left call button in reach   Treatment/Billable Minutes   Therapeutic  Activity Group 45   Total Time 45     Ronda Templeton, PT  11/3/2017

## 2017-11-03 NOTE — ASSESSMENT & PLAN NOTE
"- cont to monitor BP/HR  - cont current BP meds, titrate prn    Vitals:    11/02/17 0700 11/02/17 1314 11/02/17 1838 11/03/17 0700   BP: (!) 149/65  (!) 144/65 138/64   BP Location: Right arm  Left arm Left arm   Patient Position: Lying  Lying Lying   Pulse: 81  87 80   Resp: 16  16 18   Temp: 98.7 °F (37.1 °C)  98.1 °F (36.7 °C) 98.4 °F (36.9 °C)   TempSrc: Oral  Oral Oral   SpO2: (!) 94%  (!) 92% (!) 94%   Weight:  47.6 kg (104 lb 15 oz)     Height:  5' 2" (1.575 m)         "

## 2017-11-03 NOTE — PROGRESS NOTES
[facility]  Physical Medicine & Rehab  Progress Note    Patient Name: An Hazel  MRN: [id number]  Patient Class: IP- Rehab   Admission Date: 10/31/2017  Length of Stay: 3 days  Attending Physician: Verna Mc MD  Primary Care Provider: Alcon Thomas Jr, MD    Subjective:     Principal Problem:Closed intertrochanteric fracture of right hip, initial encounter    Interval History: No acute events overnight. Patient is tolerating therapy. Would prefer percocet to oxycodone. Had multiple BMs overnight after being constipated for 5 days. Prelim ucx +GNR, started on empiric cipro.      Scheduled Medications:    acetaminophen  650 mg Oral BID    amLODIPine  5 mg Oral Daily    atenolol  25 mg Oral BID    ciprofloxacin HCl  500 mg Oral Q12H    docusate sodium  100 mg Oral BID    enoxaparin  40 mg Subcutaneous Daily    losartan  100 mg Oral QHS    oxybutynin  5 mg Oral QHS    polyethylene glycol  17 g Oral Daily    pravastatin  10 mg Oral Daily       PRN Medications: acetaminophen, bisacodyl, lactulose, ondansetron, oxyCODONE-acetaminophen, polyethylene glycol, ramelteon    Review of Systems   Constitutional: Negative for unexpected weight change.   HENT: Negative for congestion and ear pain.    Eyes: Negative for discharge.   Respiratory: Negative for shortness of breath.    Cardiovascular: Negative for chest pain.   Gastrointestinal: Negative for abdominal pain and vomiting.   Endocrine: Negative for cold intolerance.   Genitourinary: Negative for flank pain.   Neurological: Positive for weakness.   Psychiatric/Behavioral: Negative for hallucinations.     Objective:     Vital Signs (Most Recent):  Temp: 98.4 °F (36.9 °C) (11/03/17 0700)  Pulse: 80 (11/03/17 0700)  Resp: 18 (11/03/17 0700)  BP: 138/64 (11/03/17 0700)  SpO2: (!) 94 % (11/03/17 0700)    Vital Signs (24h Range):  Temp:  [98.1 °F (36.7 °C)-98.4 °F (36.9 °C)] 98.4 °F (36.9 °C)  Pulse:  [80-87] 80  Resp:  [16-18]  18  SpO2:  [92 %-94 %] 94 %  BP: (138-144)/(64-65) 138/64     Physical Exam   Constitutional: She appears well-developed and well-nourished.   HENT:   Head: Normocephalic and atraumatic.   Eyes: EOM are normal.   Cardiovascular: Normal rate.    Pulmonary/Chest: Effort normal. No respiratory distress.   Abdominal: Soft. She exhibits no distension. There is no tenderness.   Musculoskeletal:   RUE in sling, + pulse,  WNL  LUE: SA, EF/EE  WFL  RLE: HF/KE 1/5, DF/PF 3/5  LLE: HF, KE, DF/PF 4/5    Neurological: She is alert.   Skin: Skin is warm.   Psychiatric: She has a normal mood and affect.   Vitals reviewed.    NEUROLOGICAL EXAMINATION:     CRANIAL NERVES     CN III, IV, VI   Extraocular motions are normal.       Assessment/Plan:      An Hazel is a 89 y.o. female admitted to inpatient rehabilitation on 10/31/2017 for Closed intertrochanteric fracture of right hip, initial encounter with impaired mobility and ADLs. Patient remains appropriate for PT, OT, and as required Speech therapy. Patient continues to require 24 hour nursing care as well as daily Physician assessment.    * Closed intertrochanteric fracture of right hip, initial encounter    ORIF of the right hip on 10/27, WBAT. Arm was treated with a sling and swathe.   - Ortho precautions: Non weight bearing RUE for 2 weeks.    - Sling and swathe for comfort. Weight bearing as tolerated RLE.  - Surgical wound: Dressing changes every 3 days. Remove staples on POD #14.  - PT/OT: Dep for gait, Max A for transfers, Max A for UB/LB dsg        Hyponatremia    - resolved, Na 136 on 11/3    BMP  Lab Results   Component Value Date     11/03/2017    K 4.0 11/03/2017     11/03/2017    CO2 22 (L) 11/03/2017    BUN 23 11/03/2017    CREATININE 0.7 11/03/2017    CALCIUM 9.1 11/03/2017    ANIONGAP 9 11/03/2017    ESTGFRAFRICA >60.0 11/03/2017    EGFRNONAA >60.0 11/03/2017             Impaired mobility and ADLs    Other Rehab Plan/Continue to  "monitor:   Nutrition:    - Prealbumin 9   - Nutritionist on board    - GI ppx: none  Bladder Management: monitor for continence   - restart home Oxybutinin for nocturnal urgency  - UTI: prelim ucx +GNR, started on empiric cipro 11/2 pending c/s  Bowel Management: monitor for continence   - Colace and Miralax scheduled; lactulose and Suppository PRN   - Will monitor for regularity - was previously constipated but had multiple BMs overnight 11/2   Mood: stable   Sleep: monitor; ramelteon PRN   Skin: Monitor   Pain: Tylenol PRN mild pain, change oxycodone to Percocet PRN severe pain on 11/3  DVT ppx: Lovenox 40mg daily           Essential hypertension    - cont to monitor BP/HR  - cont current BP meds, titrate prn    Vitals:    11/02/17 0700 11/02/17 1314 11/02/17 1838 11/03/17 0700   BP: (!) 149/65  (!) 144/65 138/64   BP Location: Right arm  Left arm Left arm   Patient Position: Lying  Lying Lying   Pulse: 81  87 80   Resp: 16  16 18   Temp: 98.7 °F (37.1 °C)  98.1 °F (36.7 °C) 98.4 °F (36.9 °C)   TempSrc: Oral  Oral Oral   SpO2: (!) 94%  (!) 92% (!) 94%   Weight:  47.6 kg (104 lb 15 oz)     Height:  5' 2" (1.575 m)               Anemia    - Likely post-op, last Hgb 7.8, will cont to monitor            DISCHARGE PLANNING:  Tentative Discharge Date:     Future Appointments  Date Time Provider Department Center   11/13/2017 1:20 PM Alcon Thomas Jr., MD [facility]       Romario Singleton MD  Department of Physical Medicine & Rehab   [facility]  "

## 2017-11-03 NOTE — PROGRESS NOTES
"Occupational Therapy   Treatment    [facility] Aleksander Luz  MRN: [id number]  Room/Bed: E278/E278 A       11/03/17 1119   OT Time Calculation   OT Start Time 1119   OT Stop Time 1204   OT Total Time (min) 45 min   General   OT Date of Treatment 11/03/17   Family/Caregiver Present No   Patient Found (position) Seated in wheelchair   Precautions   General Precautions fall   RUE Weight Bearing NWB   RLE Weight Bearing WBAT   RUE Brace/Splint sling   Visual/Auditory Vision impaired;Hearing impaired   Subjective   Patient states "So what are we going to do?"    Pain/Comfort   Pain Rating 6/10   Location - Side Right   Location arm  (leg)   Pain Addressed Pre-medicate for activity;Distraction   Sit to Stand   Sit <> Stand Assistance Maximum Assistance   Sit <> Stand Assistive Device No Assistive Device   Stand to Sit   Assistance Maximum Assistance;Moderate Assistance   Assistive Device No Assistive Device   Chair to Mat   Chair <>Mat Technique Squat Pivot   Chair <> Mat Assistance Maximum Assistance   Chair<> Mat Assistive Device No Assistive Device   Mat to Chair   Technique Stand Pivot   Assistance Maximum Assistance   Assistive Device No Assistive Device   Exercise Tools   Akira 2.5#- 100 reps total with LUE only (unable to tolerate 5 # wt.)   Additional Activities   Additional Activities Comments AM:  Participated in functional standing at table with CGA x 6:46 min. while completing wt. shifting L and R.  Pt. was able to use LUE once standing to complete Rate of Manipulation Board.  T/f to mat with Max A.  Participated in t/f trng., sit to stand from elevated mat to increase (I) and safety with t/fs, LE strength.  Performed 7 reps with Mod A from elevated mat.  Returned to w/c with Max A.  LUE ther-ex with akira to increase UE strength.  FM coordination with L hand (non-dominant hand) with small nuts and bolts to increase L hand coordination.     Activity Tolerance   Activity Tolerance Patient limited by pain "   After Treatment   Patient Position After Treatment Seated in wheelchair  (Safety belt in place)   Patient after treatment left call button in reach   Assessment   Prognosis Fair   Problem List Decreased Self Care skills;Decreased upper extremity range of motion;Decreased upper extremity strength;Decreased safe judgment during ADL;Decreased endurance;Decreased fine motor control;Decreased functional mobility;Decreased gross motor control;Decreased IADLs;Decreased Function of right upper extremity;Decreased trunk control for functional activities   Assessment Participation in therapy limited by pain to RUE/RLE, difficulty with sit to stand t/fs. Sling does not provide much support for pt. and pt. tends to attempt to use RUE. Pt. Will need 24 hour assist at d/c. Pt. will benefit from continued OT to progress towards goals.    Level of Motivation/Participation Fair/good   Discharge Recommendations   Equipment Needed After Discharge 3-in-1 commode;bath bench;wheelchair   Discharge Facility/Level Of Care Needs home health OT   Plan   Plan Continue with current plan   Therapy Frequency 2 times/day;Monday-Friday   Occupational Therapy Follow-up   OT Follow-up? Yes   Treatment/Billable Minutes   Therapeutic Activity 30   Therapeutic Exercise 15   Total Time 45       HUSSEIN Lagos  11/3/2017

## 2017-11-03 NOTE — NURSING
"Patient complained of feeling nauseated and feeling "extremely tired" after therapy. Patient was given zofran and encouraged to rest.    Patient had two large bowel movements and stated she felt better afterward.  "

## 2017-11-04 PROCEDURE — 97150 GROUP THERAPEUTIC PROCEDURES: CPT

## 2017-11-04 PROCEDURE — 63600175 PHARM REV CODE 636 W HCPCS: Performed by: HOSPITALIST

## 2017-11-04 PROCEDURE — 97110 THERAPEUTIC EXERCISES: CPT

## 2017-11-04 PROCEDURE — 25000003 PHARM REV CODE 250: Performed by: HOSPITALIST

## 2017-11-04 PROCEDURE — 99232 SBSQ HOSP IP/OBS MODERATE 35: CPT | Mod: ,,, | Performed by: PHYSICAL MEDICINE & REHABILITATION

## 2017-11-04 PROCEDURE — 25000003 PHARM REV CODE 250: Performed by: PHYSICAL MEDICINE & REHABILITATION

## 2017-11-04 PROCEDURE — 97530 THERAPEUTIC ACTIVITIES: CPT

## 2017-11-04 PROCEDURE — 12800000 HC REHAB SEMI-PRIVATE ROOM

## 2017-11-04 PROCEDURE — 97535 SELF CARE MNGMENT TRAINING: CPT

## 2017-11-04 PROCEDURE — 97116 GAIT TRAINING THERAPY: CPT

## 2017-11-04 RX ADMIN — DOCUSATE SODIUM 100 MG: 100 CAPSULE, LIQUID FILLED ORAL at 08:11

## 2017-11-04 RX ADMIN — ENOXAPARIN SODIUM 40 MG: 100 INJECTION SUBCUTANEOUS at 06:11

## 2017-11-04 RX ADMIN — OXYCODONE HYDROCHLORIDE AND ACETAMINOPHEN 1 TABLET: 5; 325 TABLET ORAL at 08:11

## 2017-11-04 RX ADMIN — ATENOLOL 25 MG: 25 TABLET ORAL at 08:11

## 2017-11-04 RX ADMIN — Medication 1 CAPSULE: at 12:11

## 2017-11-04 RX ADMIN — OXYBUTYNIN CHLORIDE 5 MG: 5 TABLET, FILM COATED, EXTENDED RELEASE ORAL at 08:11

## 2017-11-04 RX ADMIN — AMLODIPINE BESYLATE 5 MG: 5 TABLET ORAL at 08:11

## 2017-11-04 RX ADMIN — CIPROFLOXACIN HYDROCHLORIDE 500 MG: 500 TABLET, FILM COATED ORAL at 08:11

## 2017-11-04 RX ADMIN — POLYETHYLENE GLYCOL 3350 17 G: 17 POWDER, FOR SOLUTION ORAL at 08:11

## 2017-11-04 RX ADMIN — LOSARTAN POTASSIUM 100 MG: 50 TABLET, FILM COATED ORAL at 08:11

## 2017-11-04 RX ADMIN — OXYCODONE HYDROCHLORIDE AND ACETAMINOPHEN 1 TABLET: 5; 325 TABLET ORAL at 06:11

## 2017-11-04 RX ADMIN — PRAVASTATIN SODIUM 10 MG: 10 TABLET ORAL at 08:11

## 2017-11-04 NOTE — PROGRESS NOTES
Occupational Therapy  [facility]    An Mehta Aleksander Luz  MRN: [id number]  Room/Bed: E278/E278 A       11/04/17 0915   Transfers   Bed/Chair/WC 2   Self Care   Bathing 4   Dressing-Upper 3   Dressing-Lower 2   Toileting 2       Trina Moore OT  11/4/2017

## 2017-11-04 NOTE — PROGRESS NOTES
"Occupational Therapy  AM Treatment/Reassessment  An Hazel   MRN: [id number]   Room/Bed: E278/E278 A       11/04/17 0915   OT Time Calculation   OT Start Time 0915   OT Stop Time 1005   OT Total Time (min) 50 min   General   OT Date of Treatment 11/04/17   Family/Caregiver Present No   Patient Found (position) Seated in wheelchair   Precautions   General Precautions fall   Orthopedic Yes   RUE Weight Bearing NWB   RLE Weight Bearing WBAT   Required Braces or Orthoses Yes   RUE Brace/Splint sling   Visual/Auditory Hearing impaired;Vision impaired   Subjective   Patient states "It's been a hassle for me to do all this." Pt referring to bathing and dressing.    Pain/Comfort   Pain Rating 7/10   Location - Side Right   Location foot   Pain Addressed Pre-medicate for activity   Transfers   Transfer yes   Sit to Stand   Sit <> Stand Assistance Maximum Assistance   Sit <> Stand Assistive Device Grab bars   Trials/Comments Max (A) for lifting with grab bar with LUE   Stand to Sit   Assistance Maximum Assistance   Assistive Device Grab bars   Trials/Comments Max (A) for lowering with grab bar with LUE   Bathing   Bathing Adaptive Equipment Long-handled sponge   Bathing Level of Assistance Minimum assistance   Bathing Where Assessed Wheelchair   Bathing Comments Min (A) for bathing R foot and LUE as pt performed 8/10 body parts; pt used long handled sponge for BLE   UE Dressing   UE Dressing Level of Assistance Moderate assistance   UE Dressing Where Assessed Wheelchair   UE Dressing Comments Mod (A) for 2/4 steps with button shirt to button and thread RUE   LE Dressing   LE Dressing Yes   LE Dressing Adaptive Equipment Reacher;Sock aide;Dressing stick   LE Dressing Level of Assistance Maximum assistance   Sock Level of Assistance Maximum assistance  (with sock aide)   LE Dressing Where Assessed Wheelchair   LE Dressing Comments Max (A) for threading RLE and donning over buttocks; Max (A) for placement for B feet " with sock aide   Activity Tolerance   Activity Tolerance Patient tolerated treatment well   After Treatment   Patient Position After Treatment Seated in wheelchair   Patient after treatment left call button in reach  (posey belt donned)   Assessment   Prognosis Fair   Problem List Decreased Self Care skills;Decreased upper extremity range of motion;Decreased upper extremity strength;Decreased safe judgment during ADL;Decreased endurance;Decreased fine motor control;Decreased functional mobility;Decreased gross motor control;Decreased IADLs;Decreased Function of right upper extremity;Decreased trunk control for functional activities   Assessment Pt has met 1/4 STG upon reassesment today. Pt is slowly progressing with UB and LB dressing as well as bathing. Pt demonstrates some self-limiting behavior, requiring encouragement and reassurance to perform sit > stand t/fs. Pt requires VCs for postural control and LUE placement for t/fing. Pt would benefit from continued skilled OT services to increase (I) with ADLs/IADLs.   Level of Motivation/Participation fair/good   Barriers to Discharge Home environment accessibility limitations   Short Term Goals   Additional Documentation yes   Pt Will Transfer Bed/Chair With minimal assist   Transfer Bed/Chair - Met/Not Met Not Met   Pt Will Perform Bathing With minimal assistance   Bathing - Met/Not Met Met   Pt Will Perform LE Dressing With moderate assistance   LE Dressing - Met/Not Met Not Met   Pt Will Perform Toileting With moderate assistance   Toileting-Met/Not Met Not Met   Discharge Recommendations   Equipment Needed After Discharge 3-in-1 commode;bath bench;wheelchair   Discharge Facility/Level Of Care Needs home health OT   Plan   Plan Continue with current plan   Therapy Frequency 2 times/day   Occupational Therapy Follow-up   OT Follow-up? Yes   Treatment/Billable Minutes   Self Care/Home Management 50   Total Time 50       Trina Moore OT  11/4/2017    LEGEND:    CGA: Contact Guard Assist   EOB: Edgeof Bed   HHA: Hand Held Assist   HOB: Head of Bed   (I): Independent-patient performs task in a timely manner   Max (A): Maximal Assist-patient performs 25-49% of task   Min (A): Minimal Assist- patient performs 75% or more of task   Mod (A): Moderate Assist- patient performs 50-74% of task   NA: Not applicable   NT: Not tested   OOB: Out of Bed   PTA: Prior to admit   QC: Quad Cane   RW: Rolling Walker   (S): Supervision- patient requires cues, coaxing, prompting   SBA: Stand By Assist   SC: Straight Cane   SW: Standard Walker   TBA: To be assessed   Total (A): Total Assist- patient performs less than 25% of task   WC: Wheelchair   WFL: Within Functional Limits   WNL: Within Normal Limits

## 2017-11-04 NOTE — PROGRESS NOTES
[facility]  Physical Medicine & Rehab  Progress Note    Patient Name: An Hazel  MRN: [id number]  Patient Class: IP- Rehab   Admission Date: 10/31/2017  Length of Stay: 4 days  Attending Physician: Verna Mc MD  Primary Care Provider: Alcon Thomas Jr, MD    Subjective:     Principal Problem:Closed intertrochanteric fracture of right hip, initial encounter    Interval History: Pt without new c/o's.  I have reviewed the HPI and PMFSH and there are no changes from admission.       Scheduled Medications:    amLODIPine  5 mg Oral Daily    atenolol  25 mg Oral BID    ciprofloxacin HCl  500 mg Oral Q12H    docusate sodium  100 mg Oral BID    enoxaparin  40 mg Subcutaneous Daily    Lactobacillus rhamnosus GG  1 capsule Oral Daily    losartan  100 mg Oral QHS    oxybutynin  5 mg Oral QHS    oxyCODONE-acetaminophen  1 tablet Oral BID    polyethylene glycol  17 g Oral Daily    pravastatin  10 mg Oral Daily       PRN Medications: acetaminophen, bisacodyl, lactulose, ondansetron, oxyCODONE-acetaminophen, polyethylene glycol, ramelteon    Review of Systems   Constitutional: Negative for unexpected weight change.   HENT: Negative for congestion and ear pain.    Eyes: Negative for discharge.   Respiratory: Negative for shortness of breath.    Cardiovascular: Negative for chest pain.   Gastrointestinal: Negative for abdominal pain and vomiting.   Endocrine: Negative for cold intolerance.   Genitourinary: Negative for flank pain.   Neurological: Positive for weakness.   Psychiatric/Behavioral: Negative for hallucinations.     Objective:     Vital Signs (Most Recent):  Temp: 98 °F (36.7 °C) (11/04/17 0700)  Pulse: 75 (11/04/17 0700)  Resp: 18 (11/04/17 0700)  BP: (!) 153/67 (11/04/17 0700)  SpO2: 99 % (11/04/17 0700)    Vital Signs (24h Range):  Temp:  [98 °F (36.7 °C)] 98 °F (36.7 °C)  Pulse:  [73-75] 75  Resp:  [17-18] 18  SpO2:  [97 %-99 %] 99 %  BP: (128-153)/(56-67) 153/67      Physical Exam   Constitutional: She appears well-developed and well-nourished.   HENT:   Head: Normocephalic and atraumatic.   Eyes: EOM are normal.   Cardiovascular: Normal rate.    Pulmonary/Chest: Effort normal. No respiratory distress.   Abdominal: Soft. She exhibits no distension. There is no tenderness.   Musculoskeletal:   RUE in sling, + pulse,  WNL  LUE: SA, EF/EE  WFL  RLE: HF/KE 1/5, DF/PF 3/5  LLE: HF, KE, DF/PF 4/5    Neurological: She is alert.   Skin: Skin is warm.   Psychiatric: She has a normal mood and affect.   Vitals reviewed.    NEUROLOGICAL EXAMINATION:     CRANIAL NERVES     CN III, IV, VI   Extraocular motions are normal.       Assessment/Plan:      An Hazel is a 89 y.o. female admitted to inpatient rehabilitation on 10/31/2017 for Closed intertrochanteric fracture of right hip, initial encounter with impaired mobility and ADLs. Patient remains appropriate for PT, OT, and as required Speech therapy. Patient continues to require 24 hour nursing care as well as daily Physician assessment.    * Closed intertrochanteric fracture of right hip, initial encounter    ORIF of the right hip on 10/27, WBAT. Arm was treated with a sling and swathe.   - Ortho precautions: Non weight bearing RUE for 2 weeks.    - Sling and swathe for comfort. Weight bearing as tolerated RLE.  - Surgical wound: Dressing changes every 3 days. Remove staples on POD #14.  - PT/OT: Dep for gait, Max A for transfers, Max A for UB/LB dsg        Hyponatremia    - resolved, Na 136 on 11/3    BMP  Lab Results   Component Value Date     11/03/2017    K 4.0 11/03/2017     11/03/2017    CO2 22 (L) 11/03/2017    BUN 23 11/03/2017    CREATININE 0.7 11/03/2017    CALCIUM 9.1 11/03/2017    ANIONGAP 9 11/03/2017    ESTGFRAFRICA >60.0 11/03/2017    EGFRNONAA >60.0 11/03/2017             Impaired mobility and ADLs    Other Rehab Plan/Continue to monitor:   Nutrition:    - Prealbumin 9   - Nutritionist  "on board    - GI ppx: none  Bladder Management: monitor for continence   - restart home Oxybutinin for nocturnal urgency  - UTI: prelim ucx +GNR, started on empiric cipro 11/2 pending c/s  Bowel Management: monitor for continence   - Colace and Miralax scheduled; lactulose and Suppository PRN   - Will monitor for regularity - was previously constipated but had multiple BMs overnight 11/2   Mood: stable   Sleep: monitor; ramelteon PRN   Skin: Monitor   Pain: Tylenol PRN mild pain, change oxycodone to Percocet PRN severe pain on 11/3  DVT ppx: Lovenox 40mg daily           Essential hypertension    - cont to monitor BP/HR  - cont current BP meds, titrate prn    Vitals:    11/02/17 0700 11/02/17 1314 11/02/17 1838 11/03/17 0700   BP: (!) 149/65  (!) 144/65 138/64   BP Location: Right arm  Left arm Left arm   Patient Position: Lying  Lying Lying   Pulse: 81  87 80   Resp: 16  16 18   Temp: 98.7 °F (37.1 °C)  98.1 °F (36.7 °C) 98.4 °F (36.9 °C)   TempSrc: Oral  Oral Oral   SpO2: (!) 94%  (!) 92% (!) 94%   Weight:  47.6 kg (104 lb 15 oz)     Height:  5' 2" (1.575 m)               Anemia    - Likely post-op, last Hgb 7.8, will cont to monitor            DISCHARGE PLANNING:  Tentative Discharge Date: 11/14/2017    Future Appointments  Date Time Provider Department Center   11/13/2017 1:20 PM Alcon Thomas Jr., MD [facility] David Guajardo MD  Department of Physical Medicine & Rehab   [facility]  "

## 2017-11-04 NOTE — PROGRESS NOTES
"Physical Therapy   Treatment/Transfer Group    An Hazel   MRN: [id number]                    11/04/17 1110   PT Time Calculation   PT Start Time 1110   PT Stop Time 1200   PT Total Time (min) 50 min   Treatment   Treatment Type Treatment;Other (see comments)  (Transfer Group)   PT/PTA PT   General   PT Received On 11/04/17   Family/Caregiver Present No   Patient Found (position) Seated on bedside commode   Pt found with (sling to R UE)   Precautions   General Precautions fall   Orthopedic Yes   RUE Weight Bearing NWB   RLE Weight Bearing WBAT   Required Braces or Orthoses Yes   RUE Brace/Splint sling   Visual/Auditory Hearing impaired;Vision impaired   Subjective   Patient states " I'm trying to have a movement."   Pain/Comfort   Pain Rating 1 8/10   Location - Side 1 Right   Location - Orientation 1 generalized   Location 1 shoulder  (and hip)   Pain Addressed 1 Reposition;Distraction   Pain Rating Post-Intervention 1 8/10   Other Activities   Other Activities Other (Comment)  (stand pivot transfer bedside commode to w/c with max A)   Activity Tolerance   Activity Tolerance Patient tolerated treatment well;Patient limited by pain   After Treatment   Patient Position After Treatment Seated in wheelchair;Other (comment)  (posey belt in place)   Patient after treatment left call button in reach   Assessment   Prognosis Fair   Plan   Planned Therapy Intervention Continue with current plan   Therapy Frequency 2 times/day;daily;Monday-Friday;Saturday or Sunday   Physical Therapy Follow-up   PT Follow-up? Yes   PT - Next Visit Date 11/04/17   Treatment/Billable Minutes   Therapeutic Activity Group 50   Total Time 50         Patient participated in 45 minute functional transfers group. The group activity challenged bilateral upper extremity and lower extremity strengthening. In addition, cardiovascular and functional endurance were challenged during this group.  Patient performed selected L UE and LE ex from " w/c level and tapped beach ball with L UE for improved endurance. Slideboard transfers w/c> mat with max A x 2 trials; mod A mat to w/c with slideboard x 2 trials.  Patient completed bed mobility from supine to sitting edge of mat with _mod__ assistance( pt rising from R side~ max A sit to supine lowering to R side). Pt completed tub bench transfer with slideboard and max A.   PT adjusted pt's R UE sling appropriately. Patient demonstrated appropriate safety awareness with functional transfers. Educated patient on compensatory and adaptive techniques for functional home transfers. Patient demonstrated increased independence with all transfers (or list specific transfers). These activities were performed in a group setting to encourage increased participation with peers and social interaction skills.            Jack Weaver, PT 11/4/2017

## 2017-11-04 NOTE — SUBJECTIVE & OBJECTIVE
Interval History: Pt without new c/o's.  I have reviewed the HPI and PMFSH and there are no changes from admission.       Scheduled Medications:    amLODIPine  5 mg Oral Daily    atenolol  25 mg Oral BID    ciprofloxacin HCl  500 mg Oral Q12H    docusate sodium  100 mg Oral BID    enoxaparin  40 mg Subcutaneous Daily    Lactobacillus rhamnosus GG  1 capsule Oral Daily    losartan  100 mg Oral QHS    oxybutynin  5 mg Oral QHS    oxyCODONE-acetaminophen  1 tablet Oral BID    polyethylene glycol  17 g Oral Daily    pravastatin  10 mg Oral Daily       PRN Medications: acetaminophen, bisacodyl, lactulose, ondansetron, oxyCODONE-acetaminophen, polyethylene glycol, ramelteon    Review of Systems   Constitutional: Negative for unexpected weight change.   HENT: Negative for congestion and ear pain.    Eyes: Negative for discharge.   Respiratory: Negative for shortness of breath.    Cardiovascular: Negative for chest pain.   Gastrointestinal: Negative for abdominal pain and vomiting.   Endocrine: Negative for cold intolerance.   Genitourinary: Negative for flank pain.   Neurological: Positive for weakness.   Psychiatric/Behavioral: Negative for hallucinations.     Objective:     Vital Signs (Most Recent):  Temp: 98 °F (36.7 °C) (11/04/17 0700)  Pulse: 75 (11/04/17 0700)  Resp: 18 (11/04/17 0700)  BP: (!) 153/67 (11/04/17 0700)  SpO2: 99 % (11/04/17 0700)    Vital Signs (24h Range):  Temp:  [98 °F (36.7 °C)] 98 °F (36.7 °C)  Pulse:  [73-75] 75  Resp:  [17-18] 18  SpO2:  [97 %-99 %] 99 %  BP: (128-153)/(56-67) 153/67     Physical Exam   Constitutional: She appears well-developed and well-nourished.   HENT:   Head: Normocephalic and atraumatic.   Eyes: EOM are normal.   Cardiovascular: Normal rate.    Pulmonary/Chest: Effort normal. No respiratory distress.   Abdominal: Soft. She exhibits no distension. There is no tenderness.   Musculoskeletal:   RUE in sling, + pulse,  WNL  LUE: SA, EF/EE  WFL  RLE: HF/KE  1/5, DF/PF 3/5  LLE: HF, KE, DF/PF 4/5    Neurological: She is alert.   Skin: Skin is warm.   Psychiatric: She has a normal mood and affect.   Vitals reviewed.    NEUROLOGICAL EXAMINATION:     CRANIAL NERVES     CN III, IV, VI   Extraocular motions are normal.

## 2017-11-04 NOTE — ASSESSMENT & PLAN NOTE
ORIF of the right hip on 10/27, WBAT. Arm was treated with a sling and swathe.   - Ortho precautions: Non weight bearing RUE for 2 weeks.    - Sling and swathe for comfort. Weight bearing as tolerated RLE.  - Surgical wound: Dressing changes every 3 days. Remove staples on POD #14.  - PT/OT: Dep for gait, Max A for transfers, Max A for UB/LB dsg

## 2017-11-04 NOTE — PROGRESS NOTES
"Physical Therapy   Treatment/Reassessment    An Hazel   MRN: [id number]                11/04/17 1351   PT Time Calculation   PT Start Time 1351   PT Stop Time 1527   PT Total Time (min) 96 min   Treatment   Treatment Type Treatment;Other (see comments)  (Reassessment)   PT/PTA PT   General   PT Received On 11/04/17   Family/Caregiver Present No   Patient Found (position) Seated in wheelchair;Other (comment)  (in room)   Pt found with (sling to R UE and posey belt)   Precautions   General Precautions fall   Orthopedic Yes   RUE Weight Bearing NWB   RLE Weight Bearing WBAT   Required Braces or Orthoses Yes   RUE Brace/Splint sling   Visual/Auditory Hearing impaired;Vision impaired   Subjective   Patient states " I know what I've got," referring to her pain.   Pain/Comfort   Pain Rating 1 4/10   Location - Side 1 Right   Location - Orientation 1 generalized   Location 1 hip   Pain Addressed 1 Reposition;Distraction;Cessation of Activity   Pain Rating Post-Intervention 1 5/10   Bed Mobility   Bed Mobility yes   Supine to Sit Moderate Assistance   Supine to Sit Comments rising from R side of mat x 1 trial   Sit to Supine Maximum Assistance;Other (see comments)  (lowering to R side of mat x 1 trial and L side of bed x 1)   Transfers   Transfer yes   Sit to Stand   Sit <> Stand Assistance Moderate Assistance;Maximum Assistance   Sit <> Stand Assistive Device Other (see comments)  (parallel bars)   Trials/Comments 4 trials   Stand to Sit   Assistance Moderate Assistance   Assistive Device Other (see comments)  (parallel bars)   Trials/Comments 4 trials   Chair to Bed   Technique Slide Board   Assistance Maximum Assistance   Assistive Device Slide Board   Trials/Comments 1 trial   Chair to Mat   Chair<> Mat Technique Slide Board   Chair<>Mat Assistance Maximum Assistance   Chair <> Mat Assistive Device Slide Board   Trials/Comments 1 trial   Mat to Chair   Technique Slide Board   Assistance Moderate Assistance "   Assistive Device Slide Board   Trials/Comments 1 trial   Tub Bench Transfer   Trials/Comments pt performed during transfer group at max A level   Wheelchair Activities   Propulsion Yes   Propulsion Type 1 Manual   Level 1 Level tile   Method 1 Left upper extremity;Left lower extremity   Level of Assistance 1 Moderate assistance   Description/ Details 1 pt propels w/c x 100 feet with cues for steering and technique   Gait   Gait Yes   Weight Bearing Status WBAT R LE   Gait 1   Gait Assistive Device Parallel bars   Other Apparatus 1 Wheelchair follow   Assistance 1 Moderate assistance   Gait Distance pt ambulates length of bars x 3 trials   Gait Pattern swing-to gait   Gait Deviation(s) decreased lissette;increased time in double stance;decreased velocity of limb motion;decreased step length;decreased stride length;decreased toe-to-floor clearance;decreased weight-shifting ability;decreased swing-to-stance ratio;lateral lean   Impairments Contributing to Gait Deviations impaired balance;impaired postural control;decreased strength;pain   Stairs   Stairs No   Balance   Balance Yes   Static Standing Balance   Static Standing-Balance Support Left upper extremity supported   Static Standing-Level of Assistance Contact guard   Static Standing-Comment/# of Minutes pt stands in parallel bars x 6:26   Supine   Supine-Exercises Lower extremity   Supine-Exercise Type Ankle pumps;Quad sets;Glut sets;Short arc quads;ABD/ADD;Heel slides   Supine-Exercise Comments pt performs above exercises with A/AAROM x 20 reps each; 2 # ankle weight to L LE   Other Activities   Other Activities Other (Comment)  (pt performed transfer from commode to w/c with max A)   Comments pt performed above at beginning of transfer group session   Activity Tolerance   Activity Tolerance Patient tolerated treatment well   Other Comments   Comments pt taps beach ball back and forth with PT student x 5 minutes  (pt uses L UE to perform above task)   After  Treatment   Patient Position After Treatment Supine in bed;Other (comment)  (pt spent approx. 5 minutes arranging pillows for R UE/B LE)   Patient after treatment left call button in reach   Assessment   Prognosis Fair   Problem List Decreased strength;Decreased range of motion;Decreased endurance;Impaired balance;Decreased mobility;Orthopedic restrictions;Pain;Decreased skin integrity   Session Assessment progressing toward goals   Assessment Pt did well with introduction of slideboard during transfer group and today's individual PT session.  Due to pt's continued struggles with stand or squat pivot transfers, PT felt slideboard may give pt the best chance to improve her FIM scores and level of assistance needed.  Pt is challenged by NWB status to R UE, decreased ability to weight bear through R LE, limited posture and advanced age.  Despite this, pt has potential to improve due to intact cognition and motivation to do more for herself.  Pt remains appropriate for further inpatient rehab.  See below for goal achievement.   Short Term Goals   Supine to Sit-Met/Not Met Met   Sit to Supine - Met/Not Met Not Met   Transfer Sit to stand - Met/Not Met Not Met   Transfer Bed/Chair - Met/Not Met Not Met   Ambulate - Met/Not Met Met  (with use of parallel bars)   Stand - Met/Not Met Met   Tolerate Exercise - Met/Not Met Met   Propel Wheelchair - Met/Not Met Not met   Other Goal - Progress Not Met   Long Term Goals   Pt Will Perform Sit to Supine Revised goal;With moderate assist   Sit to Supine - Met/Not Met Not Met   Pt Will Transfer Sit to Stand Revised goal;With moderate assist  (inside parallel bars)   Pt Will Transfer Bed/Chair Revised goal;Slide Board;With moderate assist   Transfer Bed/Chair - Met/Not Met Not Met   Pt Will Ambulate Revised goal;11-30 feet;With moderate assist  (with appropriate AD)   Pt Will Go Up / Down Stairs Discontinued (comment)   Other Goal Revised goal~ slideboard transfers w/c<> drop arm  commode with mod A   Other Goal - Progress Not Met   Other Goal 2 discontinued   Discharge Recommendations   Equipment Needed After Discharge slide board   Discharge Facility/Level Of Care Needs home health PT   Plan   Planned Therapy Intervention Continue with current plan;Transfer training;Bed mobility training;Gait training;Balance Training;ROM;Strengthening;Postural Re-education;Wheelchair Management/Propulsion   Therapy Frequency 2 times/day;daily;Monday-Friday;Saturday or Sunday   Physical Therapy Follow-up   PT Follow-up? Yes   PT - Next Visit Date 11/06/17   Treatment/Billable Minutes   Gait training 15   Therapeutic Activity 61   Therapeutic Exercise 20   Total Time 96           Jack Weaver, PT 11/4/2017

## 2017-11-04 NOTE — PROGRESS NOTES
Physical Therapy   FIM scores    An Mehta Aleksander Luz   MRN: [id number]                11/04/17 1512   Transfers   Bed/Chair/WC 2   Toilet 2   Tub 2   Locomotion   Distance Walked 1   Distance Wheelchair 2   Walk 1   Wheelchair 2   Mode C           Jack Weaver, PT 11/4/2017

## 2017-11-05 PROCEDURE — 25000003 PHARM REV CODE 250: Performed by: PHYSICAL MEDICINE & REHABILITATION

## 2017-11-05 PROCEDURE — 63600175 PHARM REV CODE 636 W HCPCS: Performed by: HOSPITALIST

## 2017-11-05 PROCEDURE — 25000003 PHARM REV CODE 250: Performed by: HOSPITALIST

## 2017-11-05 PROCEDURE — 99232 SBSQ HOSP IP/OBS MODERATE 35: CPT | Mod: ,,, | Performed by: PHYSICAL MEDICINE & REHABILITATION

## 2017-11-05 PROCEDURE — 12800000 HC REHAB SEMI-PRIVATE ROOM

## 2017-11-05 RX ADMIN — CIPROFLOXACIN HYDROCHLORIDE 500 MG: 500 TABLET, FILM COATED ORAL at 08:11

## 2017-11-05 RX ADMIN — LOSARTAN POTASSIUM 100 MG: 50 TABLET, FILM COATED ORAL at 08:11

## 2017-11-05 RX ADMIN — PRAVASTATIN SODIUM 10 MG: 10 TABLET ORAL at 08:11

## 2017-11-05 RX ADMIN — ATENOLOL 25 MG: 25 TABLET ORAL at 08:11

## 2017-11-05 RX ADMIN — AMLODIPINE BESYLATE 5 MG: 5 TABLET ORAL at 08:11

## 2017-11-05 RX ADMIN — DOCUSATE SODIUM 100 MG: 100 CAPSULE, LIQUID FILLED ORAL at 08:11

## 2017-11-05 RX ADMIN — OXYBUTYNIN CHLORIDE 5 MG: 5 TABLET, FILM COATED, EXTENDED RELEASE ORAL at 08:11

## 2017-11-05 RX ADMIN — BISACODYL 10 MG: 10 SUPPOSITORY RECTAL at 08:11

## 2017-11-05 RX ADMIN — OXYCODONE HYDROCHLORIDE AND ACETAMINOPHEN 1 TABLET: 5; 325 TABLET ORAL at 08:11

## 2017-11-05 RX ADMIN — POLYETHYLENE GLYCOL 3350 17 G: 17 POWDER, FOR SOLUTION ORAL at 08:11

## 2017-11-05 RX ADMIN — ENOXAPARIN SODIUM 40 MG: 100 INJECTION SUBCUTANEOUS at 05:11

## 2017-11-05 RX ADMIN — Medication 1 CAPSULE: at 08:11

## 2017-11-05 NOTE — PROGRESS NOTES
[facility]  Physical Medicine & Rehab  Progress Note    Patient Name: An Hazel  MRN: [id number]  Patient Class: IP- Rehab   Admission Date: 10/31/2017  Length of Stay: 5 days  Attending Physician: Verna Mc MD  Primary Care Provider: Alcon Thomas Jr, MD    Subjective:     Principal Problem:Closed intertrochanteric fracture of right hip, initial encounter    Interval History: Pt without new c/o's.  I have reviewed the HPI and PMFSH and there are no changes from admission.       Scheduled Medications:    amLODIPine  5 mg Oral Daily    atenolol  25 mg Oral BID    ciprofloxacin HCl  500 mg Oral Q12H    docusate sodium  100 mg Oral BID    enoxaparin  40 mg Subcutaneous Daily    Lactobacillus rhamnosus GG  1 capsule Oral Daily    losartan  100 mg Oral QHS    oxybutynin  5 mg Oral QHS    oxyCODONE-acetaminophen  1 tablet Oral BID    polyethylene glycol  17 g Oral Daily    pravastatin  10 mg Oral Daily       PRN Medications: acetaminophen, bisacodyl, lactulose, ondansetron, oxyCODONE-acetaminophen, polyethylene glycol, ramelteon    Review of Systems   Constitutional: Negative for unexpected weight change.   HENT: Negative for congestion and ear pain.    Eyes: Negative for discharge.   Respiratory: Negative for shortness of breath.    Cardiovascular: Negative for chest pain.   Gastrointestinal: Negative for abdominal pain and vomiting.   Endocrine: Negative for cold intolerance.   Genitourinary: Negative for flank pain.   Neurological: Positive for weakness.   Psychiatric/Behavioral: Negative for hallucinations.     Objective:     Vital Signs (Most Recent):  Temp: 98.4 °F (36.9 °C) (11/05/17 0700)  Pulse: 71 (11/05/17 0700)  Resp: 18 (11/05/17 0700)  BP: (!) 143/65 (11/05/17 0700)  SpO2: (!) 93 % (11/05/17 0700)    Vital Signs (24h Range):  Temp:  [98.2 °F (36.8 °C)-98.4 °F (36.9 °C)] 98.4 °F (36.9 °C)  Pulse:  [71-81] 71  Resp:  [18] 18  SpO2:  [93 %-95 %] 93 %  BP:  (139-143)/(60-65) 143/65     Physical Exam   Constitutional: She appears well-developed and well-nourished.   HENT:   Head: Normocephalic and atraumatic.   Eyes: EOM are normal.   Cardiovascular: Normal rate.    Pulmonary/Chest: Effort normal. No respiratory distress.   Abdominal: Soft. She exhibits no distension. There is no tenderness.   Musculoskeletal:   RUE in sling, + pulse,  WNL  LUE: SA, EF/EE  WFL  RLE: HF/KE 1/5, DF/PF 3/5  LLE: HF, KE, DF/PF 4/5    Neurological: She is alert.   Skin: Skin is warm.   Psychiatric: She has a normal mood and affect.   Vitals reviewed.    NEUROLOGICAL EXAMINATION:     CRANIAL NERVES     CN III, IV, VI   Extraocular motions are normal.       Assessment/Plan:      An Hazel is a 89 y.o. female admitted to inpatient rehabilitation on 10/31/2017 for Closed intertrochanteric fracture of right hip, initial encounter with impaired mobility and ADLs. Patient remains appropriate for PT, OT, and as required Speech therapy. Patient continues to require 24 hour nursing care as well as daily Physician assessment.    * Closed intertrochanteric fracture of right hip, initial encounter    ORIF of the right hip on 10/27, WBAT. Arm was treated with a sling and swathe.   - Ortho precautions: Non weight bearing RUE for 2 weeks.    - Sling and swathe for comfort. Weight bearing as tolerated RLE.  - Surgical wound: Dressing changes every 3 days. Remove staples on POD #14.  - PT/OT: Dep for gait, Max A for transfers, Max A for UB/LB dsg        Hyponatremia    - resolved, Na 136 on 11/3    BMP  Lab Results   Component Value Date     11/03/2017    K 4.0 11/03/2017     11/03/2017    CO2 22 (L) 11/03/2017    BUN 23 11/03/2017    CREATININE 0.7 11/03/2017    CALCIUM 9.1 11/03/2017    ANIONGAP 9 11/03/2017    ESTGFRAFRICA >60.0 11/03/2017    EGFRNONAA >60.0 11/03/2017             Impaired mobility and ADLs    Other Rehab Plan/Continue to monitor:   Nutrition:    -  "Prealbumin 9   - Nutritionist on board    - GI ppx: none  Bladder Management: monitor for continence   - restart home Oxybutinin for nocturnal urgency  - UTI: prelim ucx +GNR, started on empiric cipro 11/2 pending c/s  Bowel Management: monitor for continence   - Colace and Miralax scheduled; lactulose and Suppository PRN   - Will monitor for regularity - was previously constipated but had multiple BMs overnight 11/2   Mood: stable   Sleep: monitor; ramelteon PRN   Skin: Monitor   Pain: Tylenol PRN mild pain, change oxycodone to Percocet PRN severe pain on 11/3  DVT ppx: Lovenox 40mg daily           Essential hypertension    - cont to monitor BP/HR  - cont current BP meds, titrate prn    Vitals:    11/02/17 0700 11/02/17 1314 11/02/17 1838 11/03/17 0700   BP: (!) 149/65  (!) 144/65 138/64   BP Location: Right arm  Left arm Left arm   Patient Position: Lying  Lying Lying   Pulse: 81  87 80   Resp: 16  16 18   Temp: 98.7 °F (37.1 °C)  98.1 °F (36.7 °C) 98.4 °F (36.9 °C)   TempSrc: Oral  Oral Oral   SpO2: (!) 94%  (!) 92% (!) 94%   Weight:  47.6 kg (104 lb 15 oz)     Height:  5' 2" (1.575 m)               Anemia    - Likely post-op, last Hgb 7.8, will cont to monitor            DISCHARGE PLANNING:  Tentative Discharge Date: 11/14/2017    Future Appointments  Date Time Provider Department Center   11/13/2017 1:20 PM Alcon Thomas Jr., MD [facility] David Guajardo MD  Department of Physical Medicine & Rehab   [facility]  "

## 2017-11-05 NOTE — SUBJECTIVE & OBJECTIVE
Interval History: Pt without new c/o's.  I have reviewed the HPI and PMFSH and there are no changes from admission.       Scheduled Medications:    amLODIPine  5 mg Oral Daily    atenolol  25 mg Oral BID    ciprofloxacin HCl  500 mg Oral Q12H    docusate sodium  100 mg Oral BID    enoxaparin  40 mg Subcutaneous Daily    Lactobacillus rhamnosus GG  1 capsule Oral Daily    losartan  100 mg Oral QHS    oxybutynin  5 mg Oral QHS    oxyCODONE-acetaminophen  1 tablet Oral BID    polyethylene glycol  17 g Oral Daily    pravastatin  10 mg Oral Daily       PRN Medications: acetaminophen, bisacodyl, lactulose, ondansetron, oxyCODONE-acetaminophen, polyethylene glycol, ramelteon    Review of Systems   Constitutional: Negative for unexpected weight change.   HENT: Negative for congestion and ear pain.    Eyes: Negative for discharge.   Respiratory: Negative for shortness of breath.    Cardiovascular: Negative for chest pain.   Gastrointestinal: Negative for abdominal pain and vomiting.   Endocrine: Negative for cold intolerance.   Genitourinary: Negative for flank pain.   Neurological: Positive for weakness.   Psychiatric/Behavioral: Negative for hallucinations.     Objective:     Vital Signs (Most Recent):  Temp: 98.4 °F (36.9 °C) (11/05/17 0700)  Pulse: 71 (11/05/17 0700)  Resp: 18 (11/05/17 0700)  BP: (!) 143/65 (11/05/17 0700)  SpO2: (!) 93 % (11/05/17 0700)    Vital Signs (24h Range):  Temp:  [98.2 °F (36.8 °C)-98.4 °F (36.9 °C)] 98.4 °F (36.9 °C)  Pulse:  [71-81] 71  Resp:  [18] 18  SpO2:  [93 %-95 %] 93 %  BP: (139-143)/(60-65) 143/65     Physical Exam   Constitutional: She appears well-developed and well-nourished.   HENT:   Head: Normocephalic and atraumatic.   Eyes: EOM are normal.   Cardiovascular: Normal rate.    Pulmonary/Chest: Effort normal. No respiratory distress.   Abdominal: Soft. She exhibits no distension. There is no tenderness.   Musculoskeletal:   RUE in sling, + pulse,  WNL  LUE: SA,  EF/EE  WFL  RLE: HF/KE 1/5, DF/PF 3/5  LLE: HF, KE, DF/PF 4/5    Neurological: She is alert.   Skin: Skin is warm.   Psychiatric: She has a normal mood and affect.   Vitals reviewed.    NEUROLOGICAL EXAMINATION:     CRANIAL NERVES     CN III, IV, VI   Extraocular motions are normal.

## 2017-11-06 LAB
ANION GAP SERPL CALC-SCNC: 5 MMOL/L
BASOPHILS # BLD AUTO: 0.04 K/UL
BASOPHILS NFR BLD: 0.4 %
BUN SERPL-MCNC: 25 MG/DL
CALCIUM SERPL-MCNC: 8.8 MG/DL
CHLORIDE SERPL-SCNC: 104 MMOL/L
CO2 SERPL-SCNC: 26 MMOL/L
CREAT SERPL-MCNC: 0.8 MG/DL
DIFFERENTIAL METHOD: ABNORMAL
EOSINOPHIL # BLD AUTO: 0.3 K/UL
EOSINOPHIL NFR BLD: 2.7 %
ERYTHROCYTE [DISTWIDTH] IN BLOOD BY AUTOMATED COUNT: 17.8 %
EST. GFR  (AFRICAN AMERICAN): >60 ML/MIN/1.73 M^2
EST. GFR  (NON AFRICAN AMERICAN): >60 ML/MIN/1.73 M^2
GLUCOSE SERPL-MCNC: 104 MG/DL
HCT VFR BLD AUTO: 26.4 %
HGB BLD-MCNC: 8.1 G/DL
LYMPHOCYTES # BLD AUTO: 1.7 K/UL
LYMPHOCYTES NFR BLD: 17.8 %
MCH RBC QN AUTO: 28.7 PG
MCHC RBC AUTO-ENTMCNC: 30.7 G/DL
MCV RBC AUTO: 94 FL
MONOCYTES # BLD AUTO: 1.3 K/UL
MONOCYTES NFR BLD: 13.7 %
NEUTROPHILS # BLD AUTO: 6.2 K/UL
NEUTROPHILS NFR BLD: 65.4 %
PLATELET # BLD AUTO: 477 K/UL
PMV BLD AUTO: 10.6 FL
POTASSIUM SERPL-SCNC: 4.4 MMOL/L
RBC # BLD AUTO: 2.82 M/UL
SODIUM SERPL-SCNC: 135 MMOL/L
WBC # BLD AUTO: 9.48 K/UL

## 2017-11-06 PROCEDURE — 36415 COLL VENOUS BLD VENIPUNCTURE: CPT

## 2017-11-06 PROCEDURE — 97110 THERAPEUTIC EXERCISES: CPT

## 2017-11-06 PROCEDURE — 97116 GAIT TRAINING THERAPY: CPT

## 2017-11-06 PROCEDURE — 97150 GROUP THERAPEUTIC PROCEDURES: CPT

## 2017-11-06 PROCEDURE — 12800000 HC REHAB SEMI-PRIVATE ROOM

## 2017-11-06 PROCEDURE — 25000003 PHARM REV CODE 250: Performed by: PHYSICAL MEDICINE & REHABILITATION

## 2017-11-06 PROCEDURE — 97542 WHEELCHAIR MNGMENT TRAINING: CPT

## 2017-11-06 PROCEDURE — 63600175 PHARM REV CODE 636 W HCPCS: Performed by: HOSPITALIST

## 2017-11-06 PROCEDURE — 97803 MED NUTRITION INDIV SUBSEQ: CPT

## 2017-11-06 PROCEDURE — 85025 COMPLETE CBC W/AUTO DIFF WBC: CPT

## 2017-11-06 PROCEDURE — 80048 BASIC METABOLIC PNL TOTAL CA: CPT

## 2017-11-06 PROCEDURE — 25000003 PHARM REV CODE 250: Performed by: HOSPITALIST

## 2017-11-06 PROCEDURE — 99233 SBSQ HOSP IP/OBS HIGH 50: CPT | Mod: ,,, | Performed by: PHYSICAL MEDICINE & REHABILITATION

## 2017-11-06 PROCEDURE — 97530 THERAPEUTIC ACTIVITIES: CPT

## 2017-11-06 RX ORDER — LACTULOSE 10 G/15ML
20 SOLUTION ORAL DAILY PRN
Status: DISCONTINUED | OUTPATIENT
Start: 2017-11-06 | End: 2017-11-09

## 2017-11-06 RX ORDER — OXYCODONE AND ACETAMINOPHEN 5; 325 MG/1; MG/1
1 TABLET ORAL 2 TIMES DAILY
Status: DISCONTINUED | OUTPATIENT
Start: 2017-11-06 | End: 2017-11-14 | Stop reason: HOSPADM

## 2017-11-06 RX ADMIN — ONDANSETRON 8 MG: 8 TABLET, ORALLY DISINTEGRATING ORAL at 11:11

## 2017-11-06 RX ADMIN — OXYCODONE HYDROCHLORIDE AND ACETAMINOPHEN 1 TABLET: 5; 325 TABLET ORAL at 08:11

## 2017-11-06 RX ADMIN — ATENOLOL 25 MG: 25 TABLET ORAL at 08:11

## 2017-11-06 RX ADMIN — AMLODIPINE BESYLATE 5 MG: 5 TABLET ORAL at 08:11

## 2017-11-06 RX ADMIN — DOCUSATE SODIUM 100 MG: 100 CAPSULE, LIQUID FILLED ORAL at 08:11

## 2017-11-06 RX ADMIN — ENOXAPARIN SODIUM 40 MG: 100 INJECTION SUBCUTANEOUS at 05:11

## 2017-11-06 RX ADMIN — POLYETHYLENE GLYCOL 3350 17 G: 17 POWDER, FOR SOLUTION ORAL at 08:11

## 2017-11-06 RX ADMIN — CIPROFLOXACIN HYDROCHLORIDE 500 MG: 500 TABLET, FILM COATED ORAL at 08:11

## 2017-11-06 RX ADMIN — PRAVASTATIN SODIUM 10 MG: 10 TABLET ORAL at 08:11

## 2017-11-06 RX ADMIN — OXYBUTYNIN CHLORIDE 5 MG: 5 TABLET, FILM COATED, EXTENDED RELEASE ORAL at 09:11

## 2017-11-06 RX ADMIN — OXYCODONE HYDROCHLORIDE AND ACETAMINOPHEN 1 TABLET: 5; 325 TABLET ORAL at 03:11

## 2017-11-06 RX ADMIN — LOSARTAN POTASSIUM 100 MG: 50 TABLET, FILM COATED ORAL at 09:11

## 2017-11-06 RX ADMIN — Medication 1 CAPSULE: at 08:11

## 2017-11-06 NOTE — ASSESSMENT & PLAN NOTE
ORIF of the right hip on 10/27, WBAT. Arm was treated with a sling and swathe.   - Ortho precautions: Non weight bearing RUE for 2 weeks.    - Sling and swathe for comfort. Weight bearing as tolerated RLE.  - Surgical wound: Dressing changes every 3 days. Remove staples on POD #14.  - Pain: Cont scheduled and PRN percocet.  - PT/OT: Dep for gait, Max A for transfers, Max A for bed mobility, Max A for UB/LB dsg

## 2017-11-06 NOTE — ASSESSMENT & PLAN NOTE
Other Rehab Plan/Continue to monitor:   Nutrition:    - Prealbumin 9   - Nutritionist on board    - GI ppx: none  Bladder Management: monitor for continence   - restart home Oxybutinin for nocturnal urgency  - UTI: enterobacter cloacae, cont cipro per c/s  Bowel Management: monitor for continence   - Colace and Miralax scheduled; lactulose and Suppository PRN   - Will monitor for regularity - was previously constipated but had multiple BMs overnight 11/2   Mood: stable   Sleep: monitor; ramelteon PRN   Skin: Monitor   Pain: Tylenol PRN mild pain, change oxycodone to Percocet PRN severe pain on 11/3  DVT ppx: Lovenox 40mg daily

## 2017-11-06 NOTE — PROGRESS NOTES
Occupational Therapy  AM Group Session  An Hazel   MRN: [id number]      11/06/17 1115   OT Time Calculation   OT Start Time 1115   OT Stop Time 1200   OT Total Time (min) 45 min   General   OT Date of Treatment 11/06/17   Treatment/Billable Minutes   Therapeutic Group 45   Total Time 45     Pt participated in 45 kitchen mobility group. The group activity reviewed safety techniques, energy conservation and work simplification for kitchen accessibility and safe mobility. The patient performed kitchen mobility in ADL Therapy Kitchen. Patient completed kitchen mobility (standing with RW or at w/c level) with _MAX___ assistance. Patient demonstrated appropriate safety awareness with item-retrieval from a variety of surfaces within kitchen. Patient able to successfully transfer items from kitchen to table with __MIN__ assistance. Patient completed setting table appropriately with efficient timing. Patient demonstrated increased awareness with compensatory techniques with any home barriers within kitchen.     These activities were performed in a group setting to encourage increased participation with peers and social interaction skills.             The HUSSEIN and DEEDEE have collaborated and discussed the patient's status, treatment plan and progress toward established goals.     Donovan Crabtree, SABRINA 11/6/2017

## 2017-11-06 NOTE — SUBJECTIVE & OBJECTIVE
Interval History: No acute events over the weekend. On cipro for UTI. Current pain meds beneficial.     Scheduled Medications:    amLODIPine  5 mg Oral Daily    atenolol  25 mg Oral BID    ciprofloxacin HCl  500 mg Oral Q12H    docusate sodium  100 mg Oral BID    enoxaparin  40 mg Subcutaneous Daily    Lactobacillus rhamnosus GG  1 capsule Oral Daily    losartan  100 mg Oral QHS    oxybutynin  5 mg Oral QHS    oxyCODONE-acetaminophen  1 tablet Oral BID    polyethylene glycol  17 g Oral Daily    pravastatin  10 mg Oral Daily       PRN Medications: acetaminophen, bisacodyl, lactulose, ondansetron, oxyCODONE-acetaminophen, polyethylene glycol, ramelteon    Review of Systems   Constitutional: Negative for unexpected weight change.   HENT: Negative for congestion and ear pain.    Eyes: Negative for discharge.   Respiratory: Negative for cough and shortness of breath.    Cardiovascular: Negative for chest pain and palpitations.   Gastrointestinal: Negative for abdominal pain, constipation, diarrhea, nausea and vomiting.   Endocrine: Negative for cold intolerance.   Genitourinary: Positive for frequency. Negative for dysuria and flank pain.   Musculoskeletal: Positive for arthralgias and gait problem.   Neurological: Positive for weakness.   Psychiatric/Behavioral: Negative for agitation and hallucinations.     Objective:     Vital Signs (Most Recent):  Temp: 98 °F (36.7 °C) (11/06/17 0700)  Pulse: 70 (11/06/17 0700)  Resp: 18 (11/06/17 0700)  BP: (!) 165/70 (11/06/17 0700)  SpO2: 96 % (11/06/17 0700)    Vital Signs (24h Range):  Temp:  [98 °F (36.7 °C)-98.5 °F (36.9 °C)] 98 °F (36.7 °C)  Pulse:  [66-70] 70  Resp:  [18] 18  SpO2:  [95 %-96 %] 96 %  BP: (139-165)/(64-70) 165/70     Physical Exam   Constitutional: She appears well-developed and well-nourished.   HENT:   Head: Normocephalic and atraumatic.   Eyes: EOM are normal.   Cardiovascular: Normal rate.    Pulmonary/Chest: Effort normal. No respiratory  distress.   Abdominal: Soft. She exhibits no distension. There is no tenderness.   Musculoskeletal: She exhibits no edema.   RUE in sling, + pulse,  WNL  LUE: SA, EF/EE  WFL  RLE: HF/KE 1/5, DF/PF 3/5  LLE: HF, KE, DF/PF 4/5    Neurological: She is alert.   Skin: Skin is warm.   Psychiatric: She has a normal mood and affect.   Vitals reviewed.    NEUROLOGICAL EXAMINATION:     CRANIAL NERVES     CN III, IV, VI   Extraocular motions are normal.

## 2017-11-06 NOTE — ASSESSMENT & PLAN NOTE
- Likely post-op  - Cont to monitor    Lab Results   Component Value Date    WBC 9.48 11/06/2017    HGB 8.1 (L) 11/06/2017    HCT 26.4 (L) 11/06/2017    MCV 94 11/06/2017     (H) 11/06/2017

## 2017-11-06 NOTE — PROGRESS NOTES
"Physical Therapy   Treatment    [facility] Aleksander Luz   MRN: [id number]      11/06/17 0900   PT Time Calculation   PT Start Time 0900   PT Stop Time 1030   PT Total Time (min) 90 min   Treatment   Treatment Type Treatment   PT/PTA PT   General   PT Received On 11/06/17   Missed Time Reason Not applicable   Family/Caregiver Present No   Patient Found (position) Seated in wheelchair   Precautions   General Precautions fall   Orthopedic Yes   RUE Weight Bearing NWB   RLE Weight Bearing WBAT   Required Braces or Orthoses Yes   RUE Brace/Splint sling and swath   Visual/Auditory Hearing impaired   Subjective   Patient states "I did well last night, but this morning has been rough"   Pain/Comfort   Pain Rating 1 5/10   Location - Side 1 Right   Location - Orientation 1 generalized   Location 1 hip   Pain Addressed 1 Distraction   Pain Rating Post-Intervention 1 5/10   Transfers   Transfer yes   Sit to Stand   Sit <> Stand Assistance Maximum Assistance   Sit <> Stand Assistive Device Other (see comments)  (// bars)   Trials/Comments x 3 trials in // bars with Max A for hip elevation   Stand to Sit   Assistance Moderate Assistance   Assistive Device Other (see comments)  (// bars)   Trials/Comments x 3 trials in // bars with Mod A for control of descent   Chair to Mat   Chair<> Mat Technique Slide Board   Chair<>Mat Assistance Maximum Assistance   Chair <> Mat Assistive Device Slide Board   Trials/Comments x 1 with Max A for lateral scooting   Mat to Chair   Technique Slide Board   Assistance Maximum Assistance   Assistive Device Slide Board   Trials/Comments x 1 with Max A for lateral scooting   Wheelchair Activities   Propulsion Yes   Propulsion Type 1 Manual   Level 1 Level tile   Method 1 Left upper extremity;Left lower extremity   Level of Assistance 1 Moderate assistance   Description/ Details 1 pt propels wheelchair 120 ft, limited by fatigue; Mod A for navigation and propulsion   Gait   Gait Yes   Weight Bearing " "Status WBAT R LE   Gait 1   Surface 1 Level tile   Gait Assistive Device Parallel bars   Other Apparatus 1 Wheelchair follow   Assistance 1 Moderate assistance   Gait Distance pt ambulated 2 trials in // bars with Mod A for balance and stability   Gait Pattern swing-through gait   Gait Deviation(s) decreased lissette;increased time in double stance;decreased velocity of limb motion;decreased step length;decreased stride length;decreased weight-shifting ability   Impairments Contributing to Gait Deviations impaired balance;decreased strength;impaired postural control;pain   Stairs   Stairs No   Balance   Balance Yes   Static Standing Balance   Static Standing-Balance Support Left upper extremity supported   Static Standing-Level of Assistance Contact guard   Static Standing-Comment/# of Minutes Pt stands in // bars with CGA for 7'05"   Seated   Seated-Exercises Lower extremity   Seated-Exercise Type Hip flexion;Long arc quads;ADduction   Seated-Exercise Comments 3 x 15 reps for all; HF with AAROM; hip add with green theraball   Activity Tolerance   Activity Tolerance Patient tolerated treatment well   Other Comments   Comments Pt performed wheelchair <> BSC transfer with STPT and Max A. Pt was incontinent of bowel, with BM in brief before she could make it to commode. Pt required Total A x 2 for LB dressing and pericare.     PT spent ~8 minutes adjusting patient's sling and swath, as it was not donned properly. Pt was seated at EOM during this activity.    After Treatment   Patient Position After Treatment Seated in wheelchair   Patient after treatment left call button in reach   Assessment   Prognosis Fair   Problem List Decreased strength;Decreased endurance;Impaired balance;Decreased mobility;Decreased safety awareness;Pain   Session Assessment progressing toward goals   Assessment Pt continues to make progress towards goals, albeit slowly. Pt is limited by NWB restrictions to R UE, hesitance to put full weight on " R LE, advanced age, and overall decreased mobility. Pt is very pleasant and is willing to participate in all activity. Pt was able to increase static standing balance time in // bars. Pt remains an appropriate rehab candidate to further address mobility and strength impairments.    Level of Motivation/Participation good   Barriers to Discharge Inaccessible home environment   Barriers to Discharge Comments 5 [employer] and daughter works during the day   Discharge Recommendations   Equipment Needed After Discharge slide board   Discharge Facility/Level Of Care Needs home health PT   Plan   Planned Therapy Intervention Continue with current plan   Therapy Frequency 2 times/day;Monday-Friday;Saturday or Sunday   Physical Therapy Follow-up   PT Follow-up? Yes   PT - Next Visit Date 11/07/17   Treatment/Billable Minutes   Gait training 10   Therapeutic Activity 40   Therapeutic Exercise 30   Train/Wheelchair Management 10   Total Time 90   Lyn Gordillo DPT  11/6/2017

## 2017-11-06 NOTE — PROGRESS NOTES
Physical Therapy   Care Plan/FIM    An Yanesisabella Sukh   MRN: [id number]      11/06/17 1200   Transfers   Toilet 1   Locomotion   Distance Walked 1   Distance Wheelchair 2   Walk 1   Wheelchair 2   Mode C   Lyn Gordillo DPT  11/6/2017

## 2017-11-06 NOTE — ASSESSMENT & PLAN NOTE
- continue to monitor    BMP  Lab Results   Component Value Date     (L) 11/06/2017    K 4.4 11/06/2017     11/06/2017    CO2 26 11/06/2017    BUN 25 (H) 11/06/2017    CREATININE 0.8 11/06/2017    CALCIUM 8.8 11/06/2017    ANIONGAP 5 (L) 11/06/2017    ESTGFRAFRICA >60.0 11/06/2017    EGFRNONAA >60.0 11/06/2017

## 2017-11-06 NOTE — PROGRESS NOTES
"Occupational Therapy   Treatment    [facility] Aleksander Luz  MRN: [id number]  Room/Bed: E278/E278 A       11/06/17 1500   OT Time Calculation   OT Start Time 1500   OT Stop Time 1548   OT Total Time (min) 48 min   General   OT Date of Treatment 11/06/17   Family/Caregiver Present No   Patient Found (position) Seated in wheelchair   Precautions   General Precautions fall   RUE Weight Bearing NWB   RLE Weight Bearing WBAT   RUE Brace/Splint sling   Visual/Auditory Hearing impaired;Vision impaired   Subjective   Patient states "I guess I should have taken a pain pill.  I don't think I can do it."    Pain/Comfort   Pain Rating 9/10   Location - Side Right   Location hip  (R arm)   Pain Addressed Nurse notified  (OT requested pain meds)   Bed Mobility   Supine to Sit Maximum Assistance  (Assist for trunk elevation, and RLE)   Sit to Supine Moderate Assistance   Sit to Supine Comments VCs and physical assist   Sit to Stand   Sit <> Stand Assistance Total Assistance   Sit <> Stand Assistive Device No Assistive Device   Trials/Comments Pt. with increased c/o pain this pm.  Total A to stand from w/c.    Stand to Sit   Assistance Maximum Assistance   Assistive Device No Assistive Device   Chair to Mat   Chair <>Mat Technique Slide Board   Chair <> Mat Assistance Total Assistance   Chair<> Mat Assistive Device Slide Board   Trials/Comments from w/c to slightly elevated mat.     Mat to Chair   Technique Slide Board   Assistance Maximum Assistance;Moderate Assistance   Assistive Device Slide Board   Trials/Comments from higher surface to lower chair   Grooming   Hair Grooming Level of Assistance Minimum assistance  (to comb back of hair with LUE)   Grooming Where Assessed Wheelchair   Exercise Tools   Other Exercise Tool 1 LUE ther-ex with 1# wt.- 20 reps of biceps curls, triceps extension; pron/supin.;    Other Exercise Tool 2 LE ther-ex- 20 reps of Hip flexion (Assist with RLE); SAQ   Additional Activities   Additional Activities " Comments PM:  Participated in t/f trng- with/without slide board; scooting L and R on mat to increase (I) with t/fs; Bed mobility trng- supine<> sit.  AROM to RUE- 20 reps of finger flexion, wrist extension, pron/supin.     Activity Tolerance   Activity Tolerance Patient limited by pain   After Treatment   Patient Position After Treatment Seated in wheelchair   Patient after treatment left call button in reach   Assessment   Prognosis Fair   Problem List Decreased Self Care skills;Decreased upper extremity range of motion;Decreased upper extremity strength;Decreased safe judgment during ADL;Decreased endurance;Decreased fine motor control;Decreased functional mobility;Decreased gross motor control;Decreased IADLs;Decreased Function of right upper extremity;Decreased trunk control for functional activities   Assessment Participation in OT session limited by pain (9/10) to R hip, and R arm which interfered with her performance in OT.  Pt. required Max A with most t/fs and bed mobility.  Progress remains slow.  Pt. will need 24 hour assist at d/c.    Level of Motivation/Participation Fair/good    Barriers to Discharge Home environment accessibility limitations   Discharge Recommendations   Equipment Needed After Discharge slide board   Discharge Facility/Level Of Care Needs home health OT   Plan   Plan Continue with current plan   Therapy Frequency 2 times/day;Monday-Friday   Occupational Therapy Follow-up   OT Follow-up? Yes   Treatment/Billable Minutes   Therapeutic Activity 25   Therapeutic Exercise 23   Total Time 48       HUSSEIN Lagos  11/6/2017    LEGEND:   CGA: Contact Guard Assist   EOB: Edge of Bed   HHA: Hand Held Assist   HOB: Head of Bed   (I): Independent-patient performs task in a timely manner   Max (A): Maximal Assist-patient performs 25-49% of task   Min (A): Minimal Assist- patient performs 75% or more of task   Mod (A): Moderate Assist- patient performs 50-74% of task   NA: Not applicable   NT:  Not tested   OOB: Out of Bed   PTA: Prior to admit   QC: Quad Cane   RW: Rolling Walker   (S): Supervision- patient requires cues, coaxing, prompting   SBA: Stand By Assist   SC: Straight Cane   SW: Standard Walker   TBA: To be assessed   Total (A): Total Assist- patient performs less than 25% of task   WC: Wheelchair   WFL: Within Functional Limits   WNL: Within Normal Limits

## 2017-11-06 NOTE — PROGRESS NOTES
[facility]  Physical Medicine & Rehab  Progress Note    Patient Name: An Hazel  MRN: [id number]  Patient Class: IP- Rehab   Admission Date: 10/31/2017  Length of Stay: 6 days  Attending Physician: Verna Mc MD  Primary Care Provider: Alcon Thomas Jr, MD    Subjective:     Principal Problem:Closed intertrochanteric fracture of right hip, initial encounter    Interval History: No acute events over the weekend. On cipro for UTI. Current pain meds beneficial.     Scheduled Medications:    amLODIPine  5 mg Oral Daily    atenolol  25 mg Oral BID    ciprofloxacin HCl  500 mg Oral Q12H    docusate sodium  100 mg Oral BID    enoxaparin  40 mg Subcutaneous Daily    Lactobacillus rhamnosus GG  1 capsule Oral Daily    losartan  100 mg Oral QHS    oxybutynin  5 mg Oral QHS    oxyCODONE-acetaminophen  1 tablet Oral BID    polyethylene glycol  17 g Oral Daily    pravastatin  10 mg Oral Daily       PRN Medications: acetaminophen, bisacodyl, lactulose, ondansetron, oxyCODONE-acetaminophen, polyethylene glycol, ramelteon    Review of Systems   Constitutional: Negative for unexpected weight change.   HENT: Negative for congestion and ear pain.    Eyes: Negative for discharge.   Respiratory: Negative for cough and shortness of breath.    Cardiovascular: Negative for chest pain and palpitations.   Gastrointestinal: Negative for abdominal pain, constipation, diarrhea, nausea and vomiting.   Endocrine: Negative for cold intolerance.   Genitourinary: Positive for frequency. Negative for dysuria and flank pain.   Musculoskeletal: Positive for arthralgias and gait problem.   Neurological: Positive for weakness.   Psychiatric/Behavioral: Negative for agitation and hallucinations.     Objective:     Vital Signs (Most Recent):  Temp: 98 °F (36.7 °C) (11/06/17 0700)  Pulse: 70 (11/06/17 0700)  Resp: 18 (11/06/17 0700)  BP: (!) 165/70 (11/06/17 0700)  SpO2: 96 % (11/06/17 0700)    Vital Signs  (24h Range):  Temp:  [98 °F (36.7 °C)-98.5 °F (36.9 °C)] 98 °F (36.7 °C)  Pulse:  [66-70] 70  Resp:  [18] 18  SpO2:  [95 %-96 %] 96 %  BP: (139-165)/(64-70) 165/70     Physical Exam   Constitutional: She appears well-developed and well-nourished.   HENT:   Head: Normocephalic and atraumatic.   Eyes: EOM are normal.   Cardiovascular: Normal rate.    Pulmonary/Chest: Effort normal. No respiratory distress.   Abdominal: Soft. She exhibits no distension. There is no tenderness.   Musculoskeletal: She exhibits no edema.   RUE in sling, + pulse,  WNL  LUE: SA, EF/EE  WFL  RLE: HF/KE 1/5, DF/PF 3/5  LLE: HF, KE, DF/PF 4/5    Neurological: She is alert.   Skin: Skin is warm.   Psychiatric: She has a normal mood and affect.   Vitals reviewed.    NEUROLOGICAL EXAMINATION:     CRANIAL NERVES     CN III, IV, VI   Extraocular motions are normal.       Assessment/Plan:      An Hazel is a 89 y.o. female admitted to inpatient rehabilitation on 10/31/2017 for Closed intertrochanteric fracture of right hip, initial encounter with impaired mobility and ADLs. Patient remains appropriate for PT, OT, and as required Speech therapy. Patient continues to require 24 hour nursing care as well as daily Physician assessment.    * Closed intertrochanteric fracture of right hip, initial encounter    ORIF of the right hip on 10/27, WBAT. Arm was treated with a sling and swathe.   - Ortho precautions: Non weight bearing RUE for 2 weeks.    - Sling and swathe for comfort. Weight bearing as tolerated RLE.  - Surgical wound: Dressing changes every 3 days. Remove staples on POD #14.  - Pain: Cont scheduled and PRN percocet.  - PT/OT: Dep for gait, Max A for transfers, Max A for bed mobility, Max A for UB/LB dsg        Hyponatremia    - continue to monitor    BMP  Lab Results   Component Value Date     (L) 11/06/2017    K 4.4 11/06/2017     11/06/2017    CO2 26 11/06/2017    BUN 25 (H) 11/06/2017    CREATININE 0.8  11/06/2017    CALCIUM 8.8 11/06/2017    ANIONGAP 5 (L) 11/06/2017    ESTGFRAFRICA >60.0 11/06/2017    EGFRNONAA >60.0 11/06/2017             Impaired mobility and ADLs    Other Rehab Plan/Continue to monitor:   Nutrition:    - Prealbumin 9   - Nutritionist on board    - GI ppx: none  Bladder Management: monitor for continence   - restart home Oxybutinin for nocturnal urgency  - UTI: enterobacter cloacae, cont cipro per c/s  Bowel Management: monitor for continence   - Colace and Miralax scheduled; lactulose and Suppository PRN   - Will monitor for regularity - was previously constipated but had multiple BMs overnight 11/2   Mood: stable   Sleep: monitor; ramelteon PRN   Skin: Monitor   Pain: Tylenol PRN mild pain, change oxycodone to Percocet PRN severe pain on 11/3  DVT ppx: Lovenox 40mg daily           Essential hypertension    - cont to monitor BP/HR  - cont current BP meds, titrate prn    Vitals:    11/04/17 1850 11/05/17 0700 11/05/17 1911 11/06/17 0700   BP: 139/60 (!) 143/65 139/64 (!) 165/70   BP Location: Left arm Left arm Left arm Left arm   Patient Position: Sitting Sitting Sitting Lying   Pulse: 81 71 66 70   Resp: 18 18 18 18   Temp: 98.2 °F (36.8 °C) 98.4 °F (36.9 °C) 98.5 °F (36.9 °C) 98 °F (36.7 °C)   TempSrc: Oral Oral Oral Oral   SpO2: 95% (!) 93% 95% 96%   Weight:       Height:                 Anemia    - Likely post-op  - Cont to monitor    Lab Results   Component Value Date    WBC 9.48 11/06/2017    HGB 8.1 (L) 11/06/2017    HCT 26.4 (L) 11/06/2017    MCV 94 11/06/2017     (H) 11/06/2017                 DISCHARGE PLANNING:  Tentative Discharge Date: 11/14/2017    Future Appointments  Date Time Provider Department Center   11/13/2017 1:20 PM Alcon Thomas Jr., MD [facility]       Romario Singleton MD  Department of Physical Medicine & Rehab   [facility]

## 2017-11-06 NOTE — PROGRESS NOTES
Occupational Therapy   FIm scores    An Hazel  MRN: [id number]  Room/Bed: E278/E278 A       11/06/17 1600   Transfers   Bed/Chair/WC 1   Self Care   Grooming 4       HUSSEIN Lagos  11/6/2017    LEGEND:   CGA: Contact Guard Assist   EOB: Edge of Bed   HHA: Hand Held Assist   HOB: Head of Bed   (I): Independent-patient performs task in a timely manner   Max (A): Maximal Assist-patient performs 25-49% of task   Min (A): Minimal Assist- patient performs 75% or more of task   Mod (A): Moderate Assist- patient performs 50-74% of task   NA: Not applicable   NT: Not tested   OOB: Out of Bed   PTA: Prior to admit   QC: Quad Cane   RW: Rolling Walker   (S): Supervision- patient requires cues, coaxing, prompting   SBA: Stand By Assist   SC: Straight Cane   SW: Standard Walker   TBA: To be assessed   Total (A): Total Assist- patient performs less than 25% of task   WC: Wheelchair   WFL: Within Functional Limits   WNL: Within Normal Limits

## 2017-11-06 NOTE — PLAN OF CARE
Problem: Patient Care Overview  Goal: Plan of Care Review  Outcome: Ongoing (interventions implemented as appropriate)  * Closed intertrochanteric fracture of right hip, initial encounter     Inadequate oral intake   Related to (etiology):   Reduced appetite post op     Signs and Symptoms (as evidenced by):   PO of 50-75% in the presence of increased needs     Interventions/Recommendations (treatment strategy):Boost plus bid, RD to follow     Nutrition Diagnosis Status: ongoing           Recommendation/Intervention: Continue dental soft diet, continue boost plus supplement  Goals: PO to meet 70% of EEN by 11/14/17  Nutrition Goal Status: goal not met

## 2017-11-06 NOTE — PLAN OF CARE
Problem: Patient Care Overview  Goal: Plan of Care Review  Outcome: Ongoing (interventions implemented as appropriate)  Patient AAOx4 with complaints of pain to left arm and hip. Pain medication administered as prescribed. Vs stable, instructed patient to use call light for assistance and before getting up. Call light within reach.  Will continue to monitor patient.

## 2017-11-06 NOTE — ASSESSMENT & PLAN NOTE
- cont to monitor BP/HR  - cont current BP meds, titrate prn    Vitals:    11/04/17 1850 11/05/17 0700 11/05/17 1911 11/06/17 0700   BP: 139/60 (!) 143/65 139/64 (!) 165/70   BP Location: Left arm Left arm Left arm Left arm   Patient Position: Sitting Sitting Sitting Lying   Pulse: 81 71 66 70   Resp: 18 18 18 18   Temp: 98.2 °F (36.8 °C) 98.4 °F (36.9 °C) 98.5 °F (36.9 °C) 98 °F (36.7 °C)   TempSrc: Oral Oral Oral Oral   SpO2: 95% (!) 93% 95% 96%   Weight:       Height:

## 2017-11-06 NOTE — PROGRESS NOTES
"[facility]  Adult Nutrition  Progress Note    SUMMARY     Recommendations    Recommendation/Intervention: Continue dental soft diet, continue boost plus supplement  Goals: PO to meet 70% of EEN by 11/06/17  Nutrition Goal Status: goal not met  Communication of RD Recs: other (comment)      Reason for Assessment    Reason for Assessment: RD follow-up  Relevent Medical History: HTN, HLD, Hypercalcemia, anemia   Interdisciplinary Rounds: did not attend     General Information Comments: PO 50%    Nutrition Discharge Planning: DC on dental soft diet , finely chopped     Nutrition Prescription Ordered    Current Diet Order: Dental soft, finely chopped meats  Nutrition Order Comments: Push fluids           Oral Nutrition Supplement: Boost plus bid vanilla     Evaluation of Received Nutrients/Fluid Intake           Energy Calories Required: not meeting needs            Protein Required: not meeting needs             Fluid Required: not meeting needs  Comments: supplement intake could make up the difference in needs, will encourage po intake and fluid increase  Tolerance: tolerating     % Intake of Estimated Energy Needs: 50 - 75 %  % Meal Intake: 50%     Nutrition Risk Screen     Nutrition Risk Screen: no indicators present    Nutrition/Diet History    Patient Reported Diet/Restrictions/Preferences: general     Food Preferences: No cultural or Orthodoxy food preferences                         Labs/Tests/Procedures/Meds       Pertinent Labs Reviewed: reviewed, pertinent  Pertinent Labs Comments: Na 135, BUN 25,   Pertinent Medications Reviewed: reviewed, pertinent  Pertinent Medications Comments: Cipro    Physical Findings    Overall Physical Appearance: loss of muscle mass, loss of subcutaneous fat, weak  Tubes:  (-)  Oral/Mouth Cavity: WDL  Skin: incision    Anthropometrics    Temp: 98 °F (36.7 °C)     Height: 5' 2" (157.5 cm)  Weight Method: Stated  Weight: 47.6 kg (104 lb 15 oz)     Ideal Body Weight " (IBW), Female: 110 lb     % Ideal Body Weight, Female (lb): 95.4 lb  BMI (Calculated): 19.2  BMI Grade: 17 - 18.4 protein-energy malnutrition grade I                            Estimated/Assessed Needs    Weight Used For Calorie Calculations: 47.6 kg (104 lb 15 oz)   Height (cm): 157 cm     Energy Need Method: Higden-St Jeor       RMR (Higden-St. Jeor Equation): 849.25        Weight Used For Protein Calculations: 47.6 kg (104 lb 15 oz)  Protein Requirements: 62g (1.3gm/kg)       Fluid Need Method: RDA Method             Assessment and Plan      * Closed intertrochanteric fracture of right hip, initial encounter     Inadequate oral intake   Related to (etiology):   Reduced appetite post op     Signs and Symptoms (as evidenced by):   PO of 50-75% in the presence of increased needs     Interventions/Recommendations (treatment strategy):Boost plus bid, RD to follow     Nutrition Diagnosis Status: ongoing         Monitor and Evaluation  Food and Nutrient Intake: energy intake   Knowledge/Beliefs/Attitudes: food and nutrition knowledge/skill   Biochemical Data, Medical Tests and Procedures: electrolyte and renal panel   Nutrition Risk  Level of Risk:  (follow one time per week)    Nutrition Follow-Up    RD Follow-up?: Yes

## 2017-11-06 NOTE — PROGRESS NOTES
Recreational Therapy   schedule conflict    An Tyson Luz  MRN: [id number]      Pt not seen today 2* schedule conflict with meeting.  Pt informed will reschedule visit.      Jennifer Diaz, CTRS, CTRS  11/6/2017

## 2017-11-07 PROCEDURE — 25000003 PHARM REV CODE 250: Performed by: HOSPITALIST

## 2017-11-07 PROCEDURE — 97150 GROUP THERAPEUTIC PROCEDURES: CPT

## 2017-11-07 PROCEDURE — 63600175 PHARM REV CODE 636 W HCPCS: Performed by: HOSPITALIST

## 2017-11-07 PROCEDURE — 97110 THERAPEUTIC EXERCISES: CPT

## 2017-11-07 PROCEDURE — 99233 SBSQ HOSP IP/OBS HIGH 50: CPT | Mod: ,,, | Performed by: PHYSICAL MEDICINE & REHABILITATION

## 2017-11-07 PROCEDURE — 97542 WHEELCHAIR MNGMENT TRAINING: CPT

## 2017-11-07 PROCEDURE — 12800000 HC REHAB SEMI-PRIVATE ROOM

## 2017-11-07 PROCEDURE — 97116 GAIT TRAINING THERAPY: CPT

## 2017-11-07 PROCEDURE — 25000003 PHARM REV CODE 250: Performed by: PHYSICAL MEDICINE & REHABILITATION

## 2017-11-07 PROCEDURE — 97535 SELF CARE MNGMENT TRAINING: CPT

## 2017-11-07 PROCEDURE — 97530 THERAPEUTIC ACTIVITIES: CPT

## 2017-11-07 RX ORDER — AMLODIPINE BESYLATE 10 MG/1
10 TABLET ORAL DAILY
Status: DISCONTINUED | OUTPATIENT
Start: 2017-11-08 | End: 2017-11-14 | Stop reason: HOSPADM

## 2017-11-07 RX ADMIN — PRAVASTATIN SODIUM 10 MG: 10 TABLET ORAL at 08:11

## 2017-11-07 RX ADMIN — OXYCODONE HYDROCHLORIDE AND ACETAMINOPHEN 1 TABLET: 5; 325 TABLET ORAL at 08:11

## 2017-11-07 RX ADMIN — CIPROFLOXACIN HYDROCHLORIDE 500 MG: 500 TABLET, FILM COATED ORAL at 08:11

## 2017-11-07 RX ADMIN — ENOXAPARIN SODIUM 40 MG: 100 INJECTION SUBCUTANEOUS at 04:11

## 2017-11-07 RX ADMIN — AMLODIPINE BESYLATE 5 MG: 5 TABLET ORAL at 08:11

## 2017-11-07 RX ADMIN — ACETAMINOPHEN 325 MG: 325 TABLET ORAL at 12:11

## 2017-11-07 RX ADMIN — LOSARTAN POTASSIUM 100 MG: 50 TABLET, FILM COATED ORAL at 08:11

## 2017-11-07 RX ADMIN — ATENOLOL 25 MG: 25 TABLET ORAL at 08:11

## 2017-11-07 RX ADMIN — OXYCODONE HYDROCHLORIDE AND ACETAMINOPHEN 1 TABLET: 5; 325 TABLET ORAL at 12:11

## 2017-11-07 RX ADMIN — ONDANSETRON 8 MG: 8 TABLET, ORALLY DISINTEGRATING ORAL at 12:11

## 2017-11-07 RX ADMIN — DOCUSATE SODIUM 100 MG: 100 CAPSULE, LIQUID FILLED ORAL at 08:11

## 2017-11-07 RX ADMIN — OXYCODONE HYDROCHLORIDE AND ACETAMINOPHEN 1 TABLET: 5; 325 TABLET ORAL at 07:11

## 2017-11-07 RX ADMIN — OXYBUTYNIN CHLORIDE 5 MG: 5 TABLET, FILM COATED, EXTENDED RELEASE ORAL at 08:11

## 2017-11-07 RX ADMIN — Medication 1 CAPSULE: at 08:11

## 2017-11-07 NOTE — PROGRESS NOTES
Physical Therapy   Wheelchair Mobility Group    An  Aleksander Luz   MRN: [id number]        11/07/17 1455   PT Time Calculation   PT Start Time 1455   PT Stop Time 1540   PT Total Time (min) 45 min   Treatment   Treatment Type Treatment  (Wheelchair Mobility Group)   PT/PTA PT   General   PT Received On 11/07/17   Family/Caregiver Present No   Patient Found (position) Seated in wheelchair   Precautions   General Precautions fall   Orthopedic Yes   RUE Weight Bearing NWB   RLE Weight Bearing WBAT   Required Braces or Orthoses Yes   RUE Brace/Splint sling   Visual/Auditory Hearing impaired;Vision impaired   Subjective   Patient states Pt agreeable to participate in group treatment session   Pain/Comfort   Pain Rating 1 4/10   Location - Side 1 Right   Location - Orientation 1 generalized   Location 1 hip   Pain Addressed 1 Distraction;Reposition   Pain Rating Post-Intervention 1 4/10   Other Activities   Comments Patient participated in a wheelchair management and mobility 45 min group session.  The patients were involved in group activities with emphasis on education, patients propulsion of wheelchair, management of all wheelchair parts and endurance building. The patient performed 2 trials of six minutes of a wheelchair endurance activity with a distance of 150 feet each trial with minimal assist(300 feet total) . Patient performed donning and doffing of leg rests with maximum assist and arm rests with maximum assist. Patient propelled wheelchair weaving in and out of cones for 60 feet with supervision assist. Patient practiced up and down 3 % grade incline with moderate assist.  Patient also practiced parallel parking  while  in the wheelchair with minimal assist. These activities were performed in a group setting to encourage increased participation with peers and social interaction. Patient was educated on appropriate pressure relief techniques to perform while seated in wheelchair including: lateral weight  shifts and wheelchair pushups.       Activity Tolerance   Activity Tolerance Patient tolerated treatment well   After Treatment   Patient Position After Treatment Seated in wheelchair   Patient after treatment left call button in reach   Discharge Recommendations   Equipment Needed After Discharge slide board   Discharge Facility/Level Of Care Needs home health PT   Plan   Planned Therapy Intervention Continue with current plan   Therapy Frequency 2 times/day;daily;Monday-Friday;Saturday or Sunday   Physical Therapy Follow-up   PT Follow-up? Yes   PT - Next Visit Date 11/08/17   Treatment/Billable Minutes   Therapeutic Activity Group 45   Total Time 45     Ronda Templeton, PT  11/7/2017

## 2017-11-07 NOTE — PROGRESS NOTES
"Occupational Therapy   Treatment    [facility] Aleksander Luz  MRN: [id number]  Room/Bed: E278/E278 A       11/07/17 1301   OT Time Calculation   OT Start Time 1301   OT Stop Time 1347   OT Total Time (min) 46 min   General   OT Date of Treatment 11/07/17   Family/Caregiver Present Yes  (Daughter present )   Patient Found (position) Seated in wheelchair   Precautions   General Precautions fall   RUE Weight Bearing NWB   RLE Weight Bearing WBAT   RUE Brace/Splint sling   Visual/Auditory Hearing impaired;Vision impaired   Subjective   Patient states "I don't want to take the shirt off right now.  My arm is comfortable."    Pain/Comfort   Pain Rating 6/10   Location - Side Right   Location hip   Sit to Stand   Sit <> Stand Assistance Maximum Assistance   Sit <> Stand Assistive Device No Assistive Device   Stand to Sit   Assistance Moderate Assistance;Minimum Assistance   Assistive Device No Assistive Device   UE Dressing   UE Dressing Comments Pt. declined to participate in UB dressing trng 2* arm comfortable- not in pain at this time.  Pt. did not want to disturb RUE.    (OT educated pt. and daughter in technique for doffing/donning shirt)   LE Dressing   LE Dressing Adaptive Equipment Reacher;Sock aide;Dressing stick   LE Dressing  Level of Assistance Maximum assistance;Total assistance   LE Dressing Level of Assistance Maximum assistance   Sock Level of Assistance Maximum assistance  (with sock aide)   LE Dressing Where Assessed Wheelchair   LE Dressing Comments Pt. and daughter educated in use of AE for LB dressing.     Additional Activities   Additional Activities Comments PM:  AROM exercises to RUE- 20 reps of finger flexion/extension, wrist flexion/extension, pron./supin.     Activity Tolerance   Activity Tolerance Patient limited by fatigue;Patient limited by pain   After Treatment   Patient Position After Treatment Seated in wheelchair  (with family present)   Assessment   Prognosis Fair   Problem List Decreased " Self Care skills;Decreased upper extremity range of motion;Decreased upper extremity strength;Decreased safe judgment during ADL;Decreased endurance;Decreased fine motor control;Decreased functional mobility;Decreased gross motor control;Decreased IADLs;Decreased Function of right upper extremity;Decreased trunk control for functional activities   Assessment Tolerated therapy fair.  Pt. limited by pain to RUE/RLE, decreased balance and trunk control, weakness to RLE.  Also limited by fatigue and decreased endurance.     Level of Motivation/Participation Fair/good    Barriers to Discharge Home environment accessibility limitations   Discharge Recommendations   Discharge Facility/Level Of Care Needs home health OT   Plan   Plan Continue with current plan   Therapy Frequency 2 times/day;Monday-Friday   Occupational Therapy Follow-up   OT Follow-up? Yes   Treatment/Billable Minutes   Self Care/Home Management 46   Total Time 46       HUSSEIN Lagos  11/7/2017

## 2017-11-07 NOTE — PROGRESS NOTES
"Physical Therapy  Treatment  An  Aleksander Luz   MRN: [id number]    11/07/17 0900   PT Time Calculation   PT Start Time 0900   PT Stop Time 1030   PT Total Time (min) 90 min   Treatment   Treatment Type Treatment   PT/PTA PT   General   PT Received On 11/07/17   Missed Time Reason Not applicable   Family/Caregiver Present No   Patient Found (position) Seated in wheelchair   Precautions   General Precautions fall   Orthopedic Yes   RUE Weight Bearing NWB   RLE Weight Bearing WBAT   Required Braces or Orthoses Yes   RUE Brace/Splint sling and swath   Visual/Auditory Hearing impaired;Vision impaired   Subjective   Patient states "I am having a better day today than yesterday"   Pain/Comfort   Pain Rating 1 4/10   Location - Side 1 Right   Location - Orientation 1 generalized   Location 1 hip   Pain Addressed 1 Distraction   Pain Rating Post-Intervention 1 4/10   Transfers   Transfer yes   Sit to Stand   Sit <> Stand Assistance Maximum Assistance   Sit <> Stand Assistive Device Other (see comments)  (// bars)   Trials/Comments x 6 trials in // bars with Max A for hip elevation   Stand to Sit   Assistance Moderate Assistance   Assistive Device Other (see comments)  (// bars)   Trials/Comments x 6 trials in // bars with Mod A for control of descent   Wheelchair Activities   Propulsion Yes   Propulsion Type 1 Manual   Level 1 Level tile   Method 1 Left lower extremity;Left upper extremity   Level of Assistance 1 Moderate assistance   Description/ Details 1 150ft wtih Mod A for navigation and propulsion   Gait   Gait Yes   Weight Bearing Status WBAT R LE   Gait 1   Surface 1 Level tile   Gait Assistive Device Parallel bars   Other Apparatus 1 Wheelchair follow   Assistance 1 Total assistance;Moderate assistance   Gait Distance Pt ambulated 4 trials in // bars with wheelchair follow by rehab tech, Mod A for weight shift to R LE during stance   Gait Pattern swing-through gait   Gait Deviation(s) decreased lissette;decreased " "velocity of limb motion;increased time in double stance;decreased step length;decreased stride length;decreased weight-shifting ability   Impairments Contributing to Gait Deviations impaired balance;decreased strength;impaired postural control;pain   Stairs   Stairs No   Balance   Balance Yes   Static Standing Balance   Static Standing-Balance Support Left upper extremity supported   Static Standing-Level of Assistance Contact guard   Static Standing-Comment/# of Minutes Pt stood in // bars for two trials of 4'13" and 4'21" with seated rest break between trials due to fatigue and pain   Seated   Seated-Exercises Lower extremity   Seated-Exercise Type Hip flexion;ADduction;ABduction   Seated-Exercise Comments 3 x 15 reps, hip abd with green theraball and hip abd with red theraband; AAROM for HF   Activity Tolerance   Activity Tolerance Patient tolerated treatment well   After Treatment   Patient Position After Treatment Seated in wheelchair   Patient after treatment left call button in reach   Assessment   Prognosis Fair   Problem List Decreased strength;Decreased endurance;Impaired balance;Decreased mobility;Decreased safety awareness;Impaired vision;Pain   Session Assessment progressing toward goals   Assessment Pt remains Max A for sit > stand transfers, and hasn't made much progress in those transfers. Pt remains only appropriate to ambulate in // bars due to decreased balance and weight shift to R LE during stance phase. Pt remains limited by decreased endurance, advanced age, orthopedic restrictions, and overall impaired mobility. Pt remains an appropriate rehab candidate. Continue PT POC.   Level of Motivation/Participation good   Barriers to Discharge Inaccessible home environment   Barriers to Discharge Comments 5 [employer] and daughter works during the day   Discharge Recommendations   Equipment Needed After Discharge slide board   Discharge Facility/Level Of Care Needs home health PT   Plan   Planned Therapy " Intervention Continue with current plan   Therapy Frequency 2 times/day;Monday-Friday;Saturday or Sunday   Physical Therapy Follow-up   PT Follow-up? Yes   PT - Next Visit Date 11/08/17   Treatment/Billable Minutes   Gait training 20   Therapeutic Activity 35   Therapeutic Exercise 25   Train/Wheelchair Management 10   Total Time 90   Lyn Gordillo DPT  11/7/2017

## 2017-11-07 NOTE — PLAN OF CARE
Problem: Patient Care Overview  Goal: Plan of Care Review  Outcome: Ongoing (interventions implemented as appropriate)  Patient AAOx4 with complaints of pain to left arm and hip. Pain medication administered as prescribed. Vs stable, instructed patient to use call light for assistance and before getting up. Call light within reach.  Will continue to monitor patient.       Problem: Fractured Hip (Adult)  Goal: Signs and Symptoms of Listed Potential Problems Will be Absent, Minimized or Managed (Fractured Hip)  Signs and symptoms of listed potential problems will be absent, minimized or managed by discharge/transition of care (reference Fractured Hip (Adult) CPG).   Outcome: Ongoing (interventions implemented as appropriate)

## 2017-11-07 NOTE — PROGRESS NOTES
[facility]  Physical Medicine & Rehab  Progress Note    Patient Name: An Hazel  MRN: [id number]  Patient Class: IP- Rehab   Admission Date: 10/31/2017  Length of Stay: 7 days  Attending Physician: Verna Mc MD  Primary Care Provider: Alcon Thomas Jr, MD    Subjective:     Principal Problem:Closed intertrochanteric fracture of right hip, initial encounter    Interval History: No acute events overnight. On cipro for UTI. Current pain meds beneficial. Admits to some nausea if pain is bad, getting PRN zofran. BP slightly elevated.    Scheduled Medications:    amLODIPine  5 mg Oral Daily    atenolol  25 mg Oral BID    ciprofloxacin HCl  500 mg Oral Q12H    docusate sodium  100 mg Oral BID    enoxaparin  40 mg Subcutaneous Daily    Lactobacillus rhamnosus GG  1 capsule Oral Daily    losartan  100 mg Oral QHS    oxybutynin  5 mg Oral QHS    oxyCODONE-acetaminophen  1 tablet Oral BID    polyethylene glycol  17 g Oral Daily    pravastatin  10 mg Oral Daily       PRN Medications: acetaminophen, bisacodyl, lactulose, ondansetron, oxyCODONE-acetaminophen, polyethylene glycol, ramelteon    Review of Systems   Constitutional: Negative for unexpected weight change.   HENT: Negative for congestion and ear pain.    Eyes: Negative for discharge.   Respiratory: Negative for cough and shortness of breath.    Cardiovascular: Negative for chest pain and palpitations.   Gastrointestinal: Positive for nausea. Negative for abdominal pain, constipation, diarrhea and vomiting.   Endocrine: Negative for cold intolerance.   Genitourinary: Positive for frequency. Negative for dysuria and flank pain.   Musculoskeletal: Positive for arthralgias and gait problem.   Neurological: Positive for weakness.   Psychiatric/Behavioral: Negative for agitation and hallucinations.     Objective:     Vital Signs (Most Recent):  Temp: 98.1 °F (36.7 °C) (11/07/17 0645)  Pulse: 72 (11/07/17 0703)  Resp: 20  (11/07/17 0645)  BP: (!) 162/70 (11/07/17 0645)  SpO2: 95 % (11/07/17 0703)    Vital Signs (24h Range):  Temp:  [97.9 °F (36.6 °C)-98.1 °F (36.7 °C)] 98.1 °F (36.7 °C)  Pulse:  [68-72] 72  Resp:  [18-20] 20  SpO2:  [92 %-96 %] 95 %  BP: (133-162)/(65-70) 162/70     Physical Exam   Constitutional: She appears well-developed and well-nourished.   HENT:   Head: Normocephalic and atraumatic.   Eyes: EOM are normal.   Cardiovascular: Normal rate.    Pulmonary/Chest: Effort normal. No respiratory distress.   Abdominal: Soft. She exhibits no distension. There is no tenderness.   Musculoskeletal: She exhibits no edema.   RUE in sling, + pulse,  WNL  LUE: SA, EF/EE  WFL  RLE: HF/KE 1/5, DF/PF 3/5  LLE: HF, KE, DF/PF 4/5    Neurological: She is alert.   Skin: Skin is warm.   Psychiatric: She has a normal mood and affect.   Vitals reviewed.    NEUROLOGICAL EXAMINATION:     CRANIAL NERVES     CN III, IV, VI   Extraocular motions are normal.       Assessment/Plan:      An Hazel is a 89 y.o. female admitted to inpatient rehabilitation on 10/31/2017 for Closed intertrochanteric fracture of right hip, initial encounter with impaired mobility and ADLs. Patient remains appropriate for PT, OT, and as required Speech therapy. Patient continues to require 24 hour nursing care as well as daily Physician assessment.    * Closed intertrochanteric fracture of right hip, initial encounter    ORIF of the right hip on 10/27, WBAT. Arm was treated with a sling and swathe.   - Ortho precautions: Non weight bearing RUE for 2 weeks.    - Sling and swathe for comfort. Weight bearing as tolerated RLE.  - Surgical wound: Dressing changes every 3 days. Remove staples on POD #14.  - Pain: Cont scheduled and PRN percocet.  - PT/OT: Dep for gait, Max A for transfers, Max A for bed mobility, Max A for UB/LB dsg        Hyponatremia    - continue to monitor    BMP  Lab Results   Component Value Date     (L) 11/06/2017    K 4.4  11/06/2017     11/06/2017    CO2 26 11/06/2017    BUN 25 (H) 11/06/2017    CREATININE 0.8 11/06/2017    CALCIUM 8.8 11/06/2017    ANIONGAP 5 (L) 11/06/2017    ESTGFRAFRICA >60.0 11/06/2017    EGFRNONAA >60.0 11/06/2017             Impaired mobility and ADLs    Other Rehab Plan/Continue to monitor:   Nutrition:    - Prealbumin 9   - Nutritionist on board    - GI ppx: none  Bladder Management: monitor for continence   - restart home Oxybutinin for nocturnal urgency  - UTI: enterobacter cloacae, cont cipro per c/s  Bowel Management: monitor for continence   - Colace and Miralax scheduled; lactulose and Suppository PRN   - Will monitor for regularity - was previously constipated but had multiple BMs overnight 11/2   Mood: stable   Sleep: monitor; ramelteon PRN   Skin: Monitor   Pain: Tylenol PRN mild pain, change oxycodone to Percocet PRN severe pain on 11/3  DVT ppx: Lovenox 40mg daily   Continue Zofran PRN          Essential hypertension    - increase amlodipine to 10mg daily on 11/7 d/t elevated BP, cont atenolol  - cont to monitor BP/HR, adjust meds prn    Vitals:    11/06/17 0700 11/06/17 1910 11/07/17 0645 11/07/17 0703   BP: (!) 165/70 133/65 (!) 162/70    BP Location: Left arm Left arm     Patient Position: Lying Sitting     Pulse: 70 68 68 72   Resp: 18 18 20    Temp: 98 °F (36.7 °C) 97.9 °F (36.6 °C) 98.1 °F (36.7 °C)    TempSrc: Oral Oral     SpO2: 96% 96% (!) 92% 95%   Weight:       Height:                 Anemia    - Likely post-op  - Cont to monitor    Lab Results   Component Value Date    WBC 9.48 11/06/2017    HGB 8.1 (L) 11/06/2017    HCT 26.4 (L) 11/06/2017    MCV 94 11/06/2017     (H) 11/06/2017                 DISCHARGE PLANNING:  Tentative Discharge Date: 11/14/2017    Future Appointments  Date Time Provider Department Center   11/13/2017 1:20 PM Alcon Thomas Jr., MD [facility]       Romario Singleton MD  Department of Physical Medicine & Rehab   [facility]

## 2017-11-07 NOTE — PLAN OF CARE
Problem: Patient Care Overview  Goal: Plan of Care Review  Outcome: Ongoing (interventions implemented as appropriate)  Patient has complained of pain that has be addressed with pain medication with mild results. Patient is up in chair with no falls or injuries noted.Plan of care explained to patient and patient verbally acknowledged understanding.Patient is stable with no distress noted.  Patient is resting with call light in reach, bed in low position. Will continue to monitor patient.

## 2017-11-07 NOTE — PROGRESS NOTES
Patient stated that night before last and tonight she has felt like the cipro pill gets stuck and just sits there. Gave patient applesauce, pudding, jello and patient stated it will be ok. Patient then ask if she could take 325 mg acetaminophen tablet to help her sleep. Patient stated that is what she takes at home. Explained to patient she had a pill to help her sleep but patient refused it stated she dont like taking too many pills. Administered 325 mg of acetaminophen tablet to patient.

## 2017-11-07 NOTE — SUBJECTIVE & OBJECTIVE
Interval History: No acute events overnight. On cipro for UTI. Current pain meds beneficial. Admits to some nausea if pain is bad, getting PRN zofran. BP slightly elevated.    Scheduled Medications:    amLODIPine  5 mg Oral Daily    atenolol  25 mg Oral BID    ciprofloxacin HCl  500 mg Oral Q12H    docusate sodium  100 mg Oral BID    enoxaparin  40 mg Subcutaneous Daily    Lactobacillus rhamnosus GG  1 capsule Oral Daily    losartan  100 mg Oral QHS    oxybutynin  5 mg Oral QHS    oxyCODONE-acetaminophen  1 tablet Oral BID    polyethylene glycol  17 g Oral Daily    pravastatin  10 mg Oral Daily       PRN Medications: acetaminophen, bisacodyl, lactulose, ondansetron, oxyCODONE-acetaminophen, polyethylene glycol, ramelteon    Review of Systems   Constitutional: Negative for unexpected weight change.   HENT: Negative for congestion and ear pain.    Eyes: Negative for discharge.   Respiratory: Negative for cough and shortness of breath.    Cardiovascular: Negative for chest pain and palpitations.   Gastrointestinal: Positive for nausea. Negative for abdominal pain, constipation, diarrhea and vomiting.   Endocrine: Negative for cold intolerance.   Genitourinary: Positive for frequency. Negative for dysuria and flank pain.   Musculoskeletal: Positive for arthralgias and gait problem.   Neurological: Positive for weakness.   Psychiatric/Behavioral: Negative for agitation and hallucinations.     Objective:     Vital Signs (Most Recent):  Temp: 98.1 °F (36.7 °C) (11/07/17 0645)  Pulse: 72 (11/07/17 0703)  Resp: 20 (11/07/17 0645)  BP: (!) 162/70 (11/07/17 0645)  SpO2: 95 % (11/07/17 0703)    Vital Signs (24h Range):  Temp:  [97.9 °F (36.6 °C)-98.1 °F (36.7 °C)] 98.1 °F (36.7 °C)  Pulse:  [68-72] 72  Resp:  [18-20] 20  SpO2:  [92 %-96 %] 95 %  BP: (133-162)/(65-70) 162/70     Physical Exam   Constitutional: She appears well-developed and well-nourished.   HENT:   Head: Normocephalic and atraumatic.   Eyes: EOM are  normal.   Cardiovascular: Normal rate.    Pulmonary/Chest: Effort normal. No respiratory distress.   Abdominal: Soft. She exhibits no distension. There is no tenderness.   Musculoskeletal: She exhibits no edema.   RUE in sling, + pulse,  WNL  LUE: SA, EF/EE  WFL  RLE: HF/KE 1/5, DF/PF 3/5  LLE: HF, KE, DF/PF 4/5    Neurological: She is alert.   Skin: Skin is warm.   Psychiatric: She has a normal mood and affect.   Vitals reviewed.    NEUROLOGICAL EXAMINATION:     CRANIAL NERVES     CN III, IV, VI   Extraocular motions are normal.

## 2017-11-07 NOTE — ASSESSMENT & PLAN NOTE
- increase amlodipine to 10mg daily on 11/7 d/t elevated BP, cont atenolol  - cont to monitor BP/HR, adjust meds prn    Vitals:    11/06/17 0700 11/06/17 1910 11/07/17 0645 11/07/17 0703   BP: (!) 165/70 133/65 (!) 162/70    BP Location: Left arm Left arm     Patient Position: Lying Sitting     Pulse: 70 68 68 72   Resp: 18 18 20    Temp: 98 °F (36.7 °C) 97.9 °F (36.6 °C) 98.1 °F (36.7 °C)    TempSrc: Oral Oral     SpO2: 96% 96% (!) 92% 95%   Weight:       Height:

## 2017-11-07 NOTE — PROGRESS NOTES
Physical Therapy  Care Plan/FIM    An Yanesisabella Sukh   MRN: [id number]      11/07/17 1500   Locomotion   Distance Walked 1   Distance Wheelchair 3   Walk 1   Wheelchair 3   Mode C   Lyn Gordillo DPT  11/7/2017

## 2017-11-07 NOTE — PROGRESS NOTES
Occupational Therapy   FIM scores    An Hazel  MRN: [id number]  Room/Bed: E278/E278 A       11/07/17 1600   Self Care   Dressing-Lower 2       HUSSEIN Lagos  11/7/2017

## 2017-11-07 NOTE — ASSESSMENT & PLAN NOTE
Other Rehab Plan/Continue to monitor:   Nutrition:    - Prealbumin 9   - Nutritionist on board    - GI ppx: none  Bladder Management: monitor for continence   - restart home Oxybutinin for nocturnal urgency  - UTI: enterobacter cloacae, cont cipro per c/s  Bowel Management: monitor for continence   - Colace and Miralax scheduled; lactulose and Suppository PRN   - Will monitor for regularity - was previously constipated but had multiple BMs overnight 11/2   Mood: stable   Sleep: monitor; ramelteon PRN   Skin: Monitor   Pain: Tylenol PRN mild pain, change oxycodone to Percocet PRN severe pain on 11/3  DVT ppx: Lovenox 40mg daily   Continue Zofran PRN

## 2017-11-08 PROCEDURE — 97110 THERAPEUTIC EXERCISES: CPT

## 2017-11-08 PROCEDURE — 97535 SELF CARE MNGMENT TRAINING: CPT

## 2017-11-08 PROCEDURE — 25000003 PHARM REV CODE 250: Performed by: PHYSICAL MEDICINE & REHABILITATION

## 2017-11-08 PROCEDURE — 97530 THERAPEUTIC ACTIVITIES: CPT

## 2017-11-08 PROCEDURE — 12800000 HC REHAB SEMI-PRIVATE ROOM

## 2017-11-08 PROCEDURE — 97150 GROUP THERAPEUTIC PROCEDURES: CPT

## 2017-11-08 PROCEDURE — 63600175 PHARM REV CODE 636 W HCPCS: Performed by: HOSPITALIST

## 2017-11-08 PROCEDURE — 99232 SBSQ HOSP IP/OBS MODERATE 35: CPT | Mod: ,,, | Performed by: PHYSICAL MEDICINE & REHABILITATION

## 2017-11-08 PROCEDURE — 25000003 PHARM REV CODE 250: Performed by: HOSPITALIST

## 2017-11-08 PROCEDURE — 97116 GAIT TRAINING THERAPY: CPT

## 2017-11-08 RX ADMIN — CIPROFLOXACIN HYDROCHLORIDE 500 MG: 500 TABLET, FILM COATED ORAL at 08:11

## 2017-11-08 RX ADMIN — DOCUSATE SODIUM 100 MG: 100 CAPSULE, LIQUID FILLED ORAL at 08:11

## 2017-11-08 RX ADMIN — OXYCODONE HYDROCHLORIDE AND ACETAMINOPHEN 1 TABLET: 5; 325 TABLET ORAL at 12:11

## 2017-11-08 RX ADMIN — Medication 1 CAPSULE: at 08:11

## 2017-11-08 RX ADMIN — OXYCODONE HYDROCHLORIDE AND ACETAMINOPHEN 1 TABLET: 5; 325 TABLET ORAL at 08:11

## 2017-11-08 RX ADMIN — ENOXAPARIN SODIUM 40 MG: 100 INJECTION SUBCUTANEOUS at 04:11

## 2017-11-08 RX ADMIN — OXYCODONE HYDROCHLORIDE AND ACETAMINOPHEN 1 TABLET: 5; 325 TABLET ORAL at 07:11

## 2017-11-08 RX ADMIN — ATENOLOL 25 MG: 25 TABLET ORAL at 08:11

## 2017-11-08 RX ADMIN — PRAVASTATIN SODIUM 10 MG: 10 TABLET ORAL at 08:11

## 2017-11-08 RX ADMIN — AMLODIPINE BESYLATE 10 MG: 10 TABLET ORAL at 08:11

## 2017-11-08 RX ADMIN — OXYBUTYNIN CHLORIDE 5 MG: 5 TABLET, FILM COATED, EXTENDED RELEASE ORAL at 08:11

## 2017-11-08 RX ADMIN — LOSARTAN POTASSIUM 100 MG: 50 TABLET, FILM COATED ORAL at 08:11

## 2017-11-08 NOTE — PROGRESS NOTES
[facility]  Physical Medicine & Rehab  Progress Note    Patient Name: An Hazel  MRN: [id number]  Patient Class: IP- Rehab   Admission Date: 10/31/2017  Length of Stay: 8 days  Attending Physician: Verna Mc MD  Primary Care Provider: Alcon Thomas Jr, MD    Subjective:     Principal Problem:Closed intertrochanteric fracture of right hip, initial encounter    Interval History: No acute events overnight. No new complaints. Pain controlled with current meds. On cipro for UTI.    Scheduled Medications:    amLODIPine  10 mg Oral Daily    atenolol  25 mg Oral BID    ciprofloxacin HCl  500 mg Oral Q12H    docusate sodium  100 mg Oral BID    enoxaparin  40 mg Subcutaneous Daily    Lactobacillus rhamnosus GG  1 capsule Oral Daily    losartan  100 mg Oral QHS    oxybutynin  5 mg Oral QHS    oxyCODONE-acetaminophen  1 tablet Oral BID    polyethylene glycol  17 g Oral Daily    pravastatin  10 mg Oral Daily       PRN Medications: acetaminophen, bisacodyl, lactulose, ondansetron, oxyCODONE-acetaminophen, polyethylene glycol, ramelteon    Review of Systems   Constitutional: Negative for unexpected weight change.   HENT: Negative for congestion and ear pain.    Eyes: Negative for discharge.   Respiratory: Negative for cough and shortness of breath.    Cardiovascular: Negative for chest pain and palpitations.   Gastrointestinal: Negative for abdominal pain, constipation, diarrhea, nausea and vomiting.   Endocrine: Negative for cold intolerance.   Genitourinary: Positive for frequency. Negative for dysuria and flank pain.   Musculoskeletal: Positive for arthralgias and gait problem.   Neurological: Positive for weakness.   Psychiatric/Behavioral: Negative for agitation and hallucinations.     Objective:     Vital Signs (Most Recent):  Temp: 98 °F (36.7 °C) (11/08/17 0610)  Pulse: 66 (11/08/17 0610)  Resp: 20 (11/08/17 0610)  BP: (!) 153/67 (11/08/17 0610)  SpO2: 96 % (11/08/17  0610)    Vital Signs (24h Range):  Temp:  [98 °F (36.7 °C)-98.3 °F (36.8 °C)] 98 °F (36.7 °C)  Pulse:  [66-74] 66  Resp:  [12-20] 20  SpO2:  [95 %-96 %] 96 %  BP: (124-153)/(50-67) 153/67     Physical Exam   Constitutional: She appears well-developed and well-nourished.   HENT:   Head: Normocephalic and atraumatic.   Eyes: EOM are normal.   Cardiovascular: Normal rate.    Pulmonary/Chest: Effort normal. No respiratory distress.   Abdominal: Soft. She exhibits no distension. There is no tenderness.   Musculoskeletal: She exhibits no edema.   RUE in sling, + pulse,  WNL  LUE: SA, EF/EE  WFL  RLE: HF/KE 1/5, DF/PF 3/5  LLE: HF, KE, DF/PF 4/5    Neurological: She is alert.   Skin: Skin is warm.   Psychiatric: She has a normal mood and affect.   Vitals reviewed.    NEUROLOGICAL EXAMINATION:     CRANIAL NERVES     CN III, IV, VI   Extraocular motions are normal.       Assessment/Plan:      An Hazel is a 89 y.o. female admitted to inpatient rehabilitation on 10/31/2017 for Closed intertrochanteric fracture of right hip, initial encounter with impaired mobility and ADLs. Patient remains appropriate for PT, OT, and as required Speech therapy. Patient continues to require 24 hour nursing care as well as daily Physician assessment.    * Closed intertrochanteric fracture of right hip, initial encounter    ORIF of the right hip on 10/27, WBAT. Arm was treated with a sling and swathe.   - Ortho precautions: Non weight bearing RUE for 2 weeks.    - Sling and swathe for comfort. Weight bearing as tolerated RLE.  - Surgical wound: Dressing changes every 3 days. Remove staples on POD #14.  - Pain: Cont scheduled and PRN percocet.  - PT/OT: Dep for gait, Mod A for w/c, Max A for transfers, Max A for bed mobility, Max A for UB/LB dsg        Hyponatremia    - continue to monitor    BMP  Lab Results   Component Value Date     (L) 11/06/2017    K 4.4 11/06/2017     11/06/2017    CO2 26 11/06/2017    BUN  25 (H) 11/06/2017    CREATININE 0.8 11/06/2017    CALCIUM 8.8 11/06/2017    ANIONGAP 5 (L) 11/06/2017    ESTGFRAFRICA >60.0 11/06/2017    EGFRNONAA >60.0 11/06/2017             Impaired mobility and ADLs    Other Rehab Plan/Continue to monitor:   Nutrition:    - Prealbumin 9   - Nutritionist on board    - GI ppx: none  Bladder Management: monitor for continence   - restart home Oxybutinin for nocturnal urgency  - UTI: enterobacter cloacae, cont cipro per c/s  Bowel Management: monitor for continence   - Colace and Miralax scheduled; lactulose and Suppository PRN   - Will monitor for regularity - was previously constipated but had multiple BMs overnight 11/2, now having regular BMs   Mood: stable   Sleep: monitor; ramelteon PRN   Skin: Monitor   Pain: Tylenol PRN mild pain, changed oxycodone to Percocet PRN severe pain on 11/3  DVT ppx: Lovenox 40mg daily   Continue Zofran PRN          Essential hypertension    - increased amlodipine to 10mg daily on 11/7 d/t elevated BP, cont atenolol  - cont to monitor BP/HR, adjust meds prn    Vitals:    11/07/17 0703 11/07/17 1212 11/07/17 1920 11/08/17 0610   BP:  (!) 134/59 (!) 124/50 (!) 153/67   BP Location:  Left arm Left arm Left arm   Patient Position:  Sitting Lying Lying   Pulse: 72 74 66 66   Resp:  12 16 20   Temp:  98.2 °F (36.8 °C) 98.3 °F (36.8 °C) 98 °F (36.7 °C)   TempSrc:  Oral Oral Oral   SpO2: 95%  95% 96%   Weight:       Height:                 Anemia    - Likely post-op  - Cont to monitor    Lab Results   Component Value Date    WBC 9.48 11/06/2017    HGB 8.1 (L) 11/06/2017    HCT 26.4 (L) 11/06/2017    MCV 94 11/06/2017     (H) 11/06/2017                 DISCHARGE PLANNING:  Tentative Discharge Date: 11/14/2017    Future Appointments  Date Time Provider Department Center   11/13/2017 1:20 PM Alcon Thomas Jr., MD [facility] David Singleton MD  Department of Physical Medicine & Rehab   [facility]

## 2017-11-08 NOTE — SUBJECTIVE & OBJECTIVE
Interval History: No acute events overnight. No new complaints. Pain controlled with current meds. On cipro for UTI.    Scheduled Medications:    amLODIPine  10 mg Oral Daily    atenolol  25 mg Oral BID    ciprofloxacin HCl  500 mg Oral Q12H    docusate sodium  100 mg Oral BID    enoxaparin  40 mg Subcutaneous Daily    Lactobacillus rhamnosus GG  1 capsule Oral Daily    losartan  100 mg Oral QHS    oxybutynin  5 mg Oral QHS    oxyCODONE-acetaminophen  1 tablet Oral BID    polyethylene glycol  17 g Oral Daily    pravastatin  10 mg Oral Daily       PRN Medications: acetaminophen, bisacodyl, lactulose, ondansetron, oxyCODONE-acetaminophen, polyethylene glycol, ramelteon    Review of Systems   Constitutional: Negative for unexpected weight change.   HENT: Negative for congestion and ear pain.    Eyes: Negative for discharge.   Respiratory: Negative for cough and shortness of breath.    Cardiovascular: Negative for chest pain and palpitations.   Gastrointestinal: Negative for abdominal pain, constipation, diarrhea, nausea and vomiting.   Endocrine: Negative for cold intolerance.   Genitourinary: Positive for frequency. Negative for dysuria and flank pain.   Musculoskeletal: Positive for arthralgias and gait problem.   Neurological: Positive for weakness.   Psychiatric/Behavioral: Negative for agitation and hallucinations.     Objective:     Vital Signs (Most Recent):  Temp: 98 °F (36.7 °C) (11/08/17 0610)  Pulse: 66 (11/08/17 0610)  Resp: 20 (11/08/17 0610)  BP: (!) 153/67 (11/08/17 0610)  SpO2: 96 % (11/08/17 0610)    Vital Signs (24h Range):  Temp:  [98 °F (36.7 °C)-98.3 °F (36.8 °C)] 98 °F (36.7 °C)  Pulse:  [66-74] 66  Resp:  [12-20] 20  SpO2:  [95 %-96 %] 96 %  BP: (124-153)/(50-67) 153/67     Physical Exam   Constitutional: She appears well-developed and well-nourished.   HENT:   Head: Normocephalic and atraumatic.   Eyes: EOM are normal.   Cardiovascular: Normal rate.    Pulmonary/Chest: Effort normal.  No respiratory distress.   Abdominal: Soft. She exhibits no distension. There is no tenderness.   Musculoskeletal: She exhibits no edema.   RUE in sling, + pulse,  WNL  LUE: SA, EF/EE  WFL  RLE: HF/KE 1/5, DF/PF 3/5  LLE: HF, KE, DF/PF 4/5    Neurological: She is alert.   Skin: Skin is warm.   Psychiatric: She has a normal mood and affect.   Vitals reviewed.    NEUROLOGICAL EXAMINATION:     CRANIAL NERVES     CN III, IV, VI   Extraocular motions are normal.

## 2017-11-08 NOTE — ASSESSMENT & PLAN NOTE
Other Rehab Plan/Continue to monitor:   Nutrition:    - Prealbumin 9   - Nutritionist on board    - GI ppx: none  Bladder Management: monitor for continence   - restart home Oxybutinin for nocturnal urgency  - UTI: enterobacter cloacae, cont cipro per c/s  Bowel Management: monitor for continence   - Colace and Miralax scheduled; lactulose and Suppository PRN   - Will monitor for regularity - was previously constipated but had multiple BMs overnight 11/2, now having regular BMs   Mood: stable   Sleep: monitor; ramelteon PRN   Skin: Monitor   Pain: Tylenol PRN mild pain, changed oxycodone to Percocet PRN severe pain on 11/3  DVT ppx: Lovenox 40mg daily   Continue Zofran PRN

## 2017-11-08 NOTE — PROGRESS NOTES
Physical Therapy   Daily Physical Therapy FIM Scores    An Hazel   MRN: [id number]        11/08/17 1600   Locomotion   Distance Walked 1   Walk 1   Mode C     Ronda Templeton, PT  11/8/2017

## 2017-11-08 NOTE — PROGRESS NOTES
Occupational Therapy  [place] FIM  An Mehta Aleksander Luz   MRN: [id number]    11/08/17 1500   Transfers   Bed/Chair/WC 2   Self Care   Dressing-Lower 2     SABRINA Vegas 11/8/2017

## 2017-11-08 NOTE — PROGRESS NOTES
Physical Therapy   PT SE Group    An  Aleksander Luz   MRN: [id number]        11/08/17 1011   PT Time Calculation   PT Start Time 1011   PT Stop Time 1056   PT Total Time (min) 45 min   Treatment   Treatment Type Treatment   PT/PTA PT   General   PT Received On 11/08/17   Family/Caregiver Present No   Patient Found (position) Seated in wheelchair   Precautions   General Precautions fall   Orthopedic Yes   RUE Weight Bearing NWB   RLE Weight Bearing WBAT   Required Braces or Orthoses Yes   RUE Brace/Splint sling   Visual/Auditory Hearing impaired   Subjective   Patient states Pt is agreeable to participate PT group   Pain/Comfort   Pain Rating 1 6/10   Location - Side 1 Right   Location - Orientation 1 generalized   Location 1 hip   Pain Addressed 1 Reposition;Distraction   Pain Rating Post-Intervention 1 4/10   Other Activities   Comments Patient participated in a 45 minute seated therapeutic exercises program with SBA assist. This group activity challenged the patient's upper and lower extremity strength, seated balance, and endurance to improve functional mobility, decrease risk of falls, and increase activity tolerance. Patient required no rest breaks during this activity. Patient performed the following exercises: glute sets, LAQs, hip circumduction, hip flexion, elbow flexion/extension, bicep curls, over head reaches, slight trunk rotation. Pt also tolerated some combination movements with verbal cueing. Endurance 13 (6-20) on the RPE scale. mild pain complaints voiced or observed. Repositioning or changing activity assisted with pain levels.     -Social Interaction: 5  Interacts appropriately with others - No medications needed. Patient asleep all shift (7)   Interacts appropriately with others with medication or extra time(anti-anxiety, antidepressant) (6)   Interacts appropriately 90% of time - needs monitoring or encouragement for participation or interaction (5)   Interacts appropriately 75-89% of time  - needs redirection for appropriate language or to initiate interaction (4)   Interacts appropriately 50-74% of time - May be physically or verbally inappropriate (3)   Interacts appropriately 25-49% of time - Needs frequent redirection (2)   Interacts appropriately less than 25% of time - May be withdrawn or combative (1)      Assessment:   Patient participated in a 45 minute seated endurance activity. The group activity challenged dynamic sitting balance, core strengthening, bilateral upper and lower extremity strengthening, cardiovascular and respiratory capacity, and social affect/mood. Patient will improve activity tolerance by requiring no rest breaks and maintain a RPE of 14 throughout the session.    Plan   Planned Therapy Intervention Continue with current plan   Physical Therapy Follow-up   PT Follow-up? Yes   Treatment/Billable Minutes   Therapeutic Activity Group 45   Total Time 45     Allegra Gipson, PT, DPT   11/8/2017

## 2017-11-08 NOTE — ASSESSMENT & PLAN NOTE
ORIF of the right hip on 10/27, WBAT. Arm was treated with a sling and swathe.   - Ortho precautions: Non weight bearing RUE for 2 weeks.    - Sling and swathe for comfort. Weight bearing as tolerated RLE.  - Surgical wound: Dressing changes every 3 days. Remove staples on POD #14.  - Pain: Cont scheduled and PRN percocet.  - PT/OT: Dep for gait, Mod A for w/c, Max A for transfers, Max A for bed mobility, Max A for UB/LB dsg

## 2017-11-08 NOTE — PROGRESS NOTES
"Occupational Therapy  PM Treatment Session  An Hazel   MRN: [id number]      11/08/17 1300   OT Time Calculation   OT Start Time 1300   OT Stop Time 1430   OT Total Time (min) 90 min   General   OT Date of Treatment 11/08/17   Family/Caregiver Present Yes  (daughter at begining of session)   Patient Found (position) Seated in wheelchair   Precautions   General Precautions fall   Orthopedic Yes   RUE Weight Bearing NWB   RLE Weight Bearing WBAT   Required Braces or Orthoses Yes   RUE Brace/Splint sling   Visual/Auditory Hearing impaired   Subjective   Patient states "Its hard to get the back" re: LB Dressing referring to center back   Pain/Comfort   Pain Rating 6/10   Location - Side Right   Location shoulder  (R - thigh)   Pain Addressed Distraction   Bed Mobility   Scooting/Bridging Contact Guard Assistance   Scooting/Bridging Comments to EOM   Sit to Stand   Sit <> Stand Assistance Maximum Assistance   Sit <> Stand Assistive Device No Assistive Device   Trials/Comments for trunk elevation   Stand to Sit   Assistance Moderate Assistance   Assistive Device No Assistive Device   Trials/Comments to control descent; cue to reach back using LUE   LE Dressing   LE Dressing Adaptive Equipment Reacher;Sock aide;Dressing stick   LE Dressing  Level of Assistance Maximum assistance   LE Dressing Level of Assistance Maximum assistance  ((A) to thread RLE and manage over hip (center back))   Sock Level of Assistance Maximum assistance  ((A) for placing Sock onto AE and pulling onto (B) foot)   LE Dressing Where Assessed Wheelchair;Other (Comment)   LE Dressing Comments Pt (MAX A) for LB Dressing @ wc level using AE with cues for technique/ 1 hand / and in stance with no AD for clothing management over hips   Additional Activities   Additional Activities Other (Comment)   Additional Activities Comments standing activity: standing @ edge of table using no AD x3 trials: 4:37min; 4:08min; 4:11min (rate of manipulation, " erica respectively) with MIN A in stance for safety; Therex: yellow reistance band (attached to wc) for  LUE AROM (tricep extension) 3/10x reps; green resistance band @ wc arm rest for BUE AROM (bicep curls, overhead arm raises) 3/10x reps for UE strengthening to facilitate sit>stand.  (Pt required rest beaks between standing trials)   Activity Tolerance   Activity Tolerance Patient tolerated treatment well   After Treatment   Patient Position After Treatment Seated in wheelchair   Patient after treatment left call button in reach   Assessment   Prognosis Fair   Problem List Decreased Self Care skills;Decreased upper extremity range of motion;Decreased upper extremity strength;Decreased safe judgment during ADL;Decreased endurance;Decreased fine motor control;Decreased functional mobility;Decreased gross motor control;Decreased IADLs;Decreased Function of right upper extremity;Decreased trunk control for functional activities   Assessment Pt polite and uses as good effort as possible despite 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 Pt does tolerate standing trials well once in stance. Pt with good learning of using AE strategies for lower body dress with v/c's for technique. Pt would cont. to benefit from OT services to increase functional mobility.   Level of Motivation/Participation good   Barriers to Discharge Home environment accessibility limitations   Discharge Recommendations   Equipment Needed After Discharge slide board   Discharge Facility/Level Of Care Needs home with home health   Plan   Plan Continue with current plan   Therapy Frequency 2 times/day;Monday-Friday;Saturday or Sunday   Treatment/Billable Minutes   Self Care/Home Management 35   Therapeutic Activity 25   Therapeutic Exercise 30   Total Time 90       The HUSSEIN and MARK have collaborated and discussed the patient's status, treatment plan and progress toward established goals.     SABRINA Vegas 11/8/2017

## 2017-11-08 NOTE — ASSESSMENT & PLAN NOTE
- increased amlodipine to 10mg daily on 11/7 d/t elevated BP, cont atenolol  - cont to monitor BP/HR, adjust meds prn    Vitals:    11/07/17 0703 11/07/17 1212 11/07/17 1920 11/08/17 0610   BP:  (!) 134/59 (!) 124/50 (!) 153/67   BP Location:  Left arm Left arm Left arm   Patient Position:  Sitting Lying Lying   Pulse: 72 74 66 66   Resp:  12 16 20   Temp:  98.2 °F (36.8 °C) 98.3 °F (36.8 °C) 98 °F (36.7 °C)   TempSrc:  Oral Oral Oral   SpO2: 95%  95% 96%   Weight:       Height:

## 2017-11-09 LAB
ANION GAP SERPL CALC-SCNC: 5 MMOL/L
BASOPHILS # BLD AUTO: 0.07 K/UL
BASOPHILS NFR BLD: 0.7 %
BUN SERPL-MCNC: 24 MG/DL
CALCIUM SERPL-MCNC: 9.1 MG/DL
CHLORIDE SERPL-SCNC: 105 MMOL/L
CO2 SERPL-SCNC: 26 MMOL/L
CREAT SERPL-MCNC: 0.8 MG/DL
DIFFERENTIAL METHOD: ABNORMAL
EOSINOPHIL # BLD AUTO: 0.3 K/UL
EOSINOPHIL NFR BLD: 2.5 %
ERYTHROCYTE [DISTWIDTH] IN BLOOD BY AUTOMATED COUNT: 18.8 %
EST. GFR  (AFRICAN AMERICAN): >60 ML/MIN/1.73 M^2
EST. GFR  (NON AFRICAN AMERICAN): >60 ML/MIN/1.73 M^2
GLUCOSE SERPL-MCNC: 100 MG/DL
HCT VFR BLD AUTO: 26.8 %
HGB BLD-MCNC: 8.2 G/DL
LYMPHOCYTES # BLD AUTO: 1.4 K/UL
LYMPHOCYTES NFR BLD: 14.4 %
MCH RBC QN AUTO: 29.3 PG
MCHC RBC AUTO-ENTMCNC: 30.6 G/DL
MCV RBC AUTO: 96 FL
MONOCYTES # BLD AUTO: 1.5 K/UL
MONOCYTES NFR BLD: 15.7 %
NEUTROPHILS # BLD AUTO: 6.6 K/UL
NEUTROPHILS NFR BLD: 66.7 %
PLATELET # BLD AUTO: 504 K/UL
PMV BLD AUTO: 10.4 FL
POTASSIUM SERPL-SCNC: 4.6 MMOL/L
RBC # BLD AUTO: 2.8 M/UL
SODIUM SERPL-SCNC: 136 MMOL/L
WBC # BLD AUTO: 9.83 K/UL

## 2017-11-09 PROCEDURE — 85025 COMPLETE CBC W/AUTO DIFF WBC: CPT

## 2017-11-09 PROCEDURE — 97116 GAIT TRAINING THERAPY: CPT

## 2017-11-09 PROCEDURE — 97530 THERAPEUTIC ACTIVITIES: CPT

## 2017-11-09 PROCEDURE — 63600175 PHARM REV CODE 636 W HCPCS: Performed by: HOSPITALIST

## 2017-11-09 PROCEDURE — 25000003 PHARM REV CODE 250: Performed by: HOSPITALIST

## 2017-11-09 PROCEDURE — 97110 THERAPEUTIC EXERCISES: CPT

## 2017-11-09 PROCEDURE — 99232 SBSQ HOSP IP/OBS MODERATE 35: CPT | Mod: ,,, | Performed by: PHYSICAL MEDICINE & REHABILITATION

## 2017-11-09 PROCEDURE — 25000003 PHARM REV CODE 250: Performed by: PHYSICAL MEDICINE & REHABILITATION

## 2017-11-09 PROCEDURE — 97150 GROUP THERAPEUTIC PROCEDURES: CPT

## 2017-11-09 PROCEDURE — 80048 BASIC METABOLIC PNL TOTAL CA: CPT

## 2017-11-09 PROCEDURE — 12800000 HC REHAB SEMI-PRIVATE ROOM

## 2017-11-09 PROCEDURE — 97535 SELF CARE MNGMENT TRAINING: CPT

## 2017-11-09 PROCEDURE — 36415 COLL VENOUS BLD VENIPUNCTURE: CPT

## 2017-11-09 RX ADMIN — CIPROFLOXACIN HYDROCHLORIDE 500 MG: 500 TABLET, FILM COATED ORAL at 08:11

## 2017-11-09 RX ADMIN — ATENOLOL 25 MG: 25 TABLET ORAL at 08:11

## 2017-11-09 RX ADMIN — OXYCODONE HYDROCHLORIDE AND ACETAMINOPHEN 1 TABLET: 5; 325 TABLET ORAL at 08:11

## 2017-11-09 RX ADMIN — AMLODIPINE BESYLATE 10 MG: 10 TABLET ORAL at 08:11

## 2017-11-09 RX ADMIN — POLYETHYLENE GLYCOL 3350 17 G: 17 POWDER, FOR SOLUTION ORAL at 08:11

## 2017-11-09 RX ADMIN — OXYCODONE HYDROCHLORIDE AND ACETAMINOPHEN 1 TABLET: 5; 325 TABLET ORAL at 03:11

## 2017-11-09 RX ADMIN — Medication 1 CAPSULE: at 08:11

## 2017-11-09 RX ADMIN — DOCUSATE SODIUM 100 MG: 100 CAPSULE, LIQUID FILLED ORAL at 08:11

## 2017-11-09 RX ADMIN — ENOXAPARIN SODIUM 40 MG: 100 INJECTION SUBCUTANEOUS at 04:11

## 2017-11-09 RX ADMIN — PRAVASTATIN SODIUM 10 MG: 10 TABLET ORAL at 08:11

## 2017-11-09 RX ADMIN — LOSARTAN POTASSIUM 100 MG: 50 TABLET, FILM COATED ORAL at 08:11

## 2017-11-09 RX ADMIN — OXYBUTYNIN CHLORIDE 5 MG: 5 TABLET, FILM COATED, EXTENDED RELEASE ORAL at 08:11

## 2017-11-09 NOTE — PROGRESS NOTES
Physical Therapy   Daily Physical Therapy FIM Scores    An Dickluz maria Sukh   MRN: [id number]        11/09/17 1200   Transfers   Bed/Chair/WC 2   Locomotion   Distance Walked 1   Walk 1     Ronda Templeton, PT  11/9/2017

## 2017-11-09 NOTE — ASSESSMENT & PLAN NOTE
Other Rehab Plan/Continue to monitor:   Nutrition:    - Prealbumin 9   - Nutritionist on board    - GI ppx: none  Bladder Management: monitor for continence   - restart home Oxybutinin for nocturnal urgency  - UTI: enterobacter cloacae, cont cipro per c/s  Bowel Management: monitor for continence   - Colace and Miralax scheduled; Suppository PRN   - Will monitor for regularity - was previously constipated but had multiple BMs overnight 11/2, was having regular BMs after but is constipated again 11/9, will adjust bowel meds to promote BM  Mood: stable   Sleep: monitor; ramelteon PRN   Skin: Monitor   Pain: Tylenol PRN mild pain, changed oxycodone to Percocet PRN severe pain on 11/3  DVT ppx: Lovenox 40mg daily   Continue Zofran PRN

## 2017-11-09 NOTE — ASSESSMENT & PLAN NOTE
- increased amlodipine to 10mg daily on 11/7 d/t elevated BP, now improved  - cont atenolol  - cont to monitor BP/HR, adjust meds prn    Vitals:    11/07/17 1920 11/08/17 0610 11/08/17 1855 11/09/17 0700   BP: (!) 124/50 (!) 153/67 134/60 128/62   BP Location: Left arm Left arm Left arm Left arm   Patient Position: Lying Lying Sitting Lying   Pulse: 66 66 68 70   Resp: 16 20 18 18   Temp: 98.3 °F (36.8 °C) 98 °F (36.7 °C) 98.2 °F (36.8 °C) 98 °F (36.7 °C)   TempSrc: Oral Oral Oral Oral   SpO2: 95% 96% 95% 95%   Weight:       Height:

## 2017-11-09 NOTE — PROGRESS NOTES
Physical Therapy  Transfer Group Treatment    [facility] Aleksander Luz   MRN: [id number]      11/09/17 1408   PT Time Calculation   PT Start Time 1408   PT Stop Time 1453   PT Total Time (min) 45 min   Treatment   Treatment Type Treatment   PT/PTA PT   General   PT Received On 11/09/17   Missed Time Reason Not applicable   Family/Caregiver Present No   Patient Found (position) Seated in wheelchair   Precautions   General Precautions fall   Orthopedic Yes   RUE Weight Bearing NWB   RLE Weight Bearing WBAT   Required Braces or Orthoses Yes   RUE Brace/Splint sling   Visual/Auditory Hearing impaired   Subjective   Patient states Pt agreeable to PT treatment   Pain/Comfort   Pain Rating 1 6/10   Location - Side 1 Right   Location - Orientation 1 generalized   Location 1 hip   Pain Addressed 1 Reposition   Pain Rating Post-Intervention 1 6/10   Other Comments   Comments Patient participated in 45 minute functional transfers group. The group activity challenged bilateral upper extremity and lower extremity strengthening. In addition, cardiovascular and functional endurance were challenged during this group. Patient performed wheelchair <> mat transfer with slide board and Max A. Patient completed bed mobility from supine to sitting edge of mat with Max A. Pt required Min A for w/c management parts. Patient completed toilet transfer with slide board and Max A. Patient demonstrated appropriate safety awareness with functional transfers. Educated patient on compensatory and adaptive techniques for functional home transfers. Patient demonstrated increased independence with all transfers. These activities were performed in a group setting to encourage increased participation with peers and social interaction skills.   After Treatment   Patient Position After Treatment Seated in wheelchair   Patient after treatment left call button in reach   Plan   Planned Therapy Intervention Continue with current plan   Therapy Frequency 2  times/day;Monday-Friday;Saturday or Sunday   Physical Therapy Follow-up   PT Follow-up? Yes   PT - Next Visit Date 11/10/17   Treatment/Billable Minutes   Therapeutic Activity Group 45   Total Time 45   Lyn Gordillo DPT  11/9/2017

## 2017-11-09 NOTE — PROGRESS NOTES
[facility]  Physical Medicine & Rehab  Progress Note    Patient Name: An Hazel  MRN: [id number]  Patient Class: IP- Rehab   Admission Date: 10/31/2017  Length of Stay: 9 days  Attending Physician: Verna Mc MD  Primary Care Provider: Alcon Thomas Jr, MD    Subjective:     Principal Problem:Closed intertrochanteric fracture of right hip, initial encounter    Interval History: No acute events overnight. Admits to some foot cramping this AM for first time. Pain controlled with current meds. On cipro for UTI. Labs stable. Denies any nausea. BP control improved. Admits to abdominal distension and constipation.    Scheduled Medications:    amLODIPine  10 mg Oral Daily    atenolol  25 mg Oral BID    ciprofloxacin HCl  500 mg Oral Q12H    docusate sodium  100 mg Oral BID    enoxaparin  40 mg Subcutaneous Daily    Lactobacillus rhamnosus GG  1 capsule Oral Daily    losartan  100 mg Oral QHS    oxybutynin  5 mg Oral QHS    oxyCODONE-acetaminophen  1 tablet Oral BID    polyethylene glycol  17 g Oral Daily    pravastatin  10 mg Oral Daily       PRN Medications: acetaminophen, bisacodyl, lactulose, ondansetron, oxyCODONE-acetaminophen, polyethylene glycol, ramelteon    Review of Systems   Constitutional: Negative for unexpected weight change.   HENT: Negative for congestion and ear pain.    Eyes: Negative for discharge.   Respiratory: Negative for cough and shortness of breath.    Cardiovascular: Negative for chest pain and palpitations.   Gastrointestinal: Positive for abdominal distention and constipation. Negative for abdominal pain, diarrhea, nausea and vomiting.   Endocrine: Negative for cold intolerance.   Genitourinary: Positive for frequency. Negative for dysuria and flank pain.   Musculoskeletal: Positive for arthralgias and gait problem.   Neurological: Positive for weakness.   Psychiatric/Behavioral: Negative for agitation and hallucinations.     Objective:      Vital Signs (Most Recent):  Temp: 98 °F (36.7 °C) (11/09/17 0700)  Pulse: 70 (11/09/17 0700)  Resp: 18 (11/09/17 0700)  BP: 128/62 (11/09/17 0700)  SpO2: 95 % (11/09/17 0700)    Vital Signs (24h Range):  Temp:  [98 °F (36.7 °C)-98.2 °F (36.8 °C)] 98 °F (36.7 °C)  Pulse:  [68-70] 70  Resp:  [18] 18  SpO2:  [95 %] 95 %  BP: (128-134)/(60-62) 128/62     Physical Exam   Constitutional: She appears well-developed and well-nourished.   HENT:   Head: Normocephalic and atraumatic.   Eyes: EOM are normal.   Cardiovascular: Normal rate.    Pulmonary/Chest: Effort normal. No respiratory distress.   Abdominal: Soft. She exhibits distension. There is no tenderness.   Musculoskeletal: She exhibits no edema.   RUE in sling, + pulse,  WNL  LUE: SA, EF/EE  WFL  RLE: HF/KE 1/5, DF/PF 3/5  LLE: HF, KE, DF/PF 4/5    Neurological: She is alert.   Skin: Skin is warm.   Psychiatric: She has a normal mood and affect.   Vitals reviewed.    NEUROLOGICAL EXAMINATION:     CRANIAL NERVES     CN III, IV, VI   Extraocular motions are normal.       Assessment/Plan:      An Hazel is a 89 y.o. female admitted to inpatient rehabilitation on 10/31/2017 for Closed intertrochanteric fracture of right hip, initial encounter with impaired mobility and ADLs. Patient remains appropriate for PT, OT, and as required Speech therapy. Patient continues to require 24 hour nursing care as well as daily Physician assessment.    * Closed intertrochanteric fracture of right hip, initial encounter    ORIF of the right hip on 10/27, WBAT. Arm was treated with a sling and swathe.   - Ortho precautions: Non weight bearing RUE for 2 weeks.    - Sling and swathe for comfort. Weight bearing as tolerated RLE.  - Surgical wound: Dressing changes every 3 days. Remove staples on POD #14.  - Pain: Cont scheduled and PRN percocet.  - PT/OT: Dep for gait, Mod A for w/c, Max A for transfers, Max A for bed mobility, Max A for UB/LB dsg        Hyponatremia     - continue to monitor    BMP  Lab Results   Component Value Date     11/09/2017    K 4.6 11/09/2017     11/09/2017    CO2 26 11/09/2017    BUN 24 (H) 11/09/2017    CREATININE 0.8 11/09/2017    CALCIUM 9.1 11/09/2017    ANIONGAP 5 (L) 11/09/2017    ESTGFRAFRICA >60.0 11/09/2017    EGFRNONAA >60.0 11/09/2017             Impaired mobility and ADLs    Other Rehab Plan/Continue to monitor:   Nutrition:    - Prealbumin 9   - Nutritionist on board    - GI ppx: none  Bladder Management: monitor for continence   - restart home Oxybutinin for nocturnal urgency  - UTI: enterobacter cloacae, cont cipro per c/s  Bowel Management: monitor for continence   - Colace and Miralax scheduled; Suppository PRN   - Will monitor for regularity - was previously constipated but had multiple BMs overnight 11/2, was having regular BMs after but is constipated again 11/9, will adjust bowel meds to promote BM  Mood: stable   Sleep: monitor; ramelteon PRN   Skin: Monitor   Pain: Tylenol PRN mild pain, changed oxycodone to Percocet PRN severe pain on 11/3  DVT ppx: Lovenox 40mg daily   Continue Zofran PRN          Essential hypertension    - increased amlodipine to 10mg daily on 11/7 d/t elevated BP, now improved  - cont atenolol  - cont to monitor BP/HR, adjust meds prn    Vitals:    11/07/17 1920 11/08/17 0610 11/08/17 1855 11/09/17 0700   BP: (!) 124/50 (!) 153/67 134/60 128/62   BP Location: Left arm Left arm Left arm Left arm   Patient Position: Lying Lying Sitting Lying   Pulse: 66 66 68 70   Resp: 16 20 18 18   Temp: 98.3 °F (36.8 °C) 98 °F (36.7 °C) 98.2 °F (36.8 °C) 98 °F (36.7 °C)   TempSrc: Oral Oral Oral Oral   SpO2: 95% 96% 95% 95%   Weight:       Height:                 Anemia    - Likely post-op  - Cont to monitor    Lab Results   Component Value Date    WBC 9.83 11/09/2017    HGB 8.2 (L) 11/09/2017    HCT 26.8 (L) 11/09/2017    MCV 96 11/09/2017     (H) 11/09/2017                 DISCHARGE PLANNING:  Tentative  Discharge Date: 11/14/2017    Future Appointments  Date Time Provider Department Center   11/13/2017 1:20 PM Alcon Thomas Jr., MD [facility] David kenny [facility]       Romario Singleton MD  Department of Physical Medicine & Rehab   [facility]

## 2017-11-09 NOTE — PROGRESS NOTES
Occupational Therapy   FIM scores    An Hazel  MRN: [id number]  Room/Bed: E278/E278 A       11/09/17 1600   Transfers   Bed/Chair/WC 2   Self Care   Dressing-Upper 2       HUSSEIN Lagos  11/9/2017

## 2017-11-09 NOTE — PROGRESS NOTES
"Physical Therapy   Treatment    [facility] Aleksander Luz   MRN: [id number]      11/09/17 1030   PT Time Calculation   PT Start Time 1030   PT Stop Time 1200   PT Total Time (min) 90 min   Treatment   Treatment Type Treatment   PT/PTA PT   General   PT Received On 11/09/17   Family/Caregiver Present No   Patient Found (position) Seated in wheelchair   Pt found with (posey belt donned, sling to R UE)   Precautions   General Precautions fall   Orthopedic Yes   RUE Weight Bearing NWB   RLE Weight Bearing WBAT   Required Braces or Orthoses Yes   RUE Brace/Splint sling   Visual/Auditory Hearing impaired;Vision impaired   Subjective   Patient states "I am doing good my feet are cramping"   Pain/Comfort   Pain Rating 1 5/10   Location - Side 1 Right   Location - Orientation 1 generalized   Location 1 hip   Pain Addressed 1 Reposition;Distraction   Pain Rating Post-Intervention 1 5/10   Bed Mobility   Bed Mobility yes   Supine to Sit Moderate Assistance   Supine to Sit Comments x 1 trial on mat, assistance required for R LE management and trunk elevation   Sit to Supine Minimum Assistance  (x 1 trial, assist of R LE management)   Transfers   Transfer yes   Sit to Stand   Sit <> Stand Assistance Moderate Assistance;Maximum Assistance   Sit <> Stand Assistive Device Quad Cane   Trials/Comments Pt performed x 4 trials, assistance required for pt to elevate hips off elevated mat    Stand to Sit   Assistance Moderate Assistance   Assistive Device Quad Cane   Trials/Comments Pt performed x 4 trials, assistance required for controlling descent into wheelchair    Chair to Mat   Chair<> Mat Technique Stand Pivot   Chair<>Mat Assistance Maximum Assistance   Chair <> Mat Assistive Device No Assistive Device   Trials/Comments x 1 trial, pt required liftng and lowering assistance to complete sit to stand and stand to sit portion of transfer. Pt required moderate assistance for pivot portion of transfer due to posterior lean    Mat to Chair "   Technique Slide Board   Assistance Moderate Assistance   Assistive Device Slide Board   Trials/Comments x 1 trial, pt required moderate assistance for management of hips across slide board to complete transfer    Gait   Gait Yes   Weight Bearing Status WBAT to R LE   Gait 1   Surface 1 Level tile   Gait Assistive Device LBQC   Assistance 1 Moderate assistance   Gait Distance Pt ambulated 2 trials of 6 feet each with LBQC. Moderate assistance required for pt to maintain balance due to posterior lean. Pt required seated rest breaks between trials due to fatigue   Gait Pattern swing-through gait   Gait Deviation(s) decreased lissette;increased time in double stance;decreased velocity of limb motion;decreased step length;decreased stride length;decreased toe-to-floor clearance;decreased weight-shifting ability;forward lean;toe drag   Impairments Contributing to Gait Deviations impaired balance;decreased strength;decreased ROM;pain;decreased flexibility   Balance   Balance Yes   Static Standing Balance   Static Standing-Balance Support Left upper extremity supported  (on QC)   Static Standing-Level of Assistance Minimum assistance   Static Standing-Comment/# of Minutes Pt tolerated static standing with QC: 1st trial: 1 min 47 seconds, 2nd trial: 2 min 13 seconds. Pt required seated rest breaks between trials. Assistance required to increase weight bearing to R LE during stance phase   Supine   Supine-Exercises Lower extremity;Specific exercises   Supine-Exercise Type Ankle pumps;Glut sets;Short arc quads;Heel slides;ABD/ADD   Supine-Exercise Comments Pt performed 3 x 15 reps to B LE, active assist required for R LE   Seated   Seated-Exercises Lower extremity;Specific exercises   Seated-Exercise Type Hip flexion;Long arc quads   Seated-Exercise Comments Pt performed 3 x 15 reps with active assist required for R LE    Other Activities   Comments Patient required numerous rest breaks due to fatigue and R shoulder pain.     Activity Tolerance   Activity Tolerance Patient tolerated treatment well   After Treatment   Patient Position After Treatment Seated in wheelchair   Patient after treatment left call button in reach  (posey belt donned)   Assessment   Prognosis Fair   Problem List Decreased strength;Decreased endurance;Impaired balance;Decreased mobility;Pain;Impaired vision;Decreased safety awareness   Session Assessment progressing toward goals   Assessment Pt was able to progress to ambulating a short distance with QC with moderate assistance. Pt was able to perform more weight acceptance to R LE during standing and ambulation trials. Pt will continue to benefit from further skilled PT intervention in order to address these above impairments and to improve pt's functional independence with mobility.    Level of Motivation/Participation good   Barriers to Discharge Decreased caregiver support;Inaccessible home environment   Barriers to Discharge Comments 5 [facility] home, pt lives alone   Discharge Recommendations   Equipment Needed After Discharge slide board   Discharge Facility/Level Of Care Needs home health PT   Plan   Planned Therapy Intervention Continue with current plan   Therapy Frequency 2 times/day;daily;Monday-Friday;Saturday or Sunday   Physical Therapy Follow-up   PT Follow-up? Yes   PT - Next Visit Date 11/10/17   Treatment/Billable Minutes   Gait training 10   Therapeutic Activity 30   Therapeutic Exercise 50   Total Time 90     Ronda Templeton, PT  11/9/2017

## 2017-11-09 NOTE — PLAN OF CARE
Problem: Patient Care Overview  Goal: Plan of Care Review  Outcome: Ongoing (interventions implemented as appropriate)  Ms Membreno received a minimal amt of assistance with transferring from w/c to toilet to attempt BM.  Steadying assistance with provided with adjusting clothing before and after voiding, but Ms Membreno was able to clean herself. She had a lg, brown, and formed BM. Safety measures maintained. Pt was placed in recliner, call light is within reach, and daughter remains at bedside, will continue with plan of care.

## 2017-11-09 NOTE — PROGRESS NOTES
"Occupational Therapy   Treatment    [facility] Aleksander Luz  MRN: [id number]  Room/Bed: E278/E278 A       11/09/17 1500   OT Time Calculation   OT Start Time 1500   OT Stop Time 1545   OT Total Time (min) 45 min   General   OT Date of Treatment 11/09/17   Precautions   General Precautions fall   RUE Weight Bearing NWB   RLE Weight Bearing WBAT   RUE Brace/Splint sling   Visual/Auditory Hearing impaired;Vision impaired   Subjective   Patient states "I need something for pain."    Pain/Comfort   Pain Rating 7/10   Location - Side Right   Location arm  (leg)   Pain Addressed Nurse notified  (Notified Nurse Joyce.  Requested pain meds.  )   Pain Comment OT educated pt. in the need to request pain meds prior to therapy to be able to full participate in therapy session.    Sit to Stand   Sit <> Stand Assistance Maximum Assistance   Sit <> Stand Assistive Device No Assistive Device   Stand to Sit   Assistance Moderate Assistance;Maximum Assistance   Assistive Device No Assistive Device   UE Dressing   UE Dressing Level of Assistance Maximum assistance;Moderate assistance   UE Dressing Where Assessed Wheelchair   UE Dressing Comments Educated pt. in technique for donning/doffing shirt.  Pt. requires assist to thread over RUE, CGA to guide around back , CGA to button shirt with button hook.  OT educated pt. in use of button hook for UB dressing.  OT also issued pt. larger sling.     Exercise Tools   Rickshaw 2.5#- 20 x 6 reps (Pt. was unable to tolerate wt. > 2.5 #.     Activity Tolerance   Activity Tolerance Patient limited by pain;Patient limited by fatigue   After Treatment   Patient Position After Treatment Seated in wheelchair  (Pt. left with PCT to be weighed. )   Assessment   Prognosis Fair   Problem List Decreased Self Care skills;Decreased upper extremity range of motion;Decreased upper extremity strength;Decreased safe judgment during ADL;Decreased endurance;Decreased fine motor control;Decreased functional " mobility;Decreased gross motor control;Decreased IADLs;Decreased Function of right upper extremity;Decreased trunk control for functional activities   Assessment Progress with t/fs and ADLs has been minimal.  Believe pt. will require longer stay to progress with therapy.  Pt. may benefit more from SNF as progress has been slow.  Pt. limited by pain, weakness to BLEs, decreased trunk control.  Pt.  will need 24 hour assist at d/c.    Level of Motivation/Participation Good    Barriers to Discharge Home environment accessibility limitations   Discharge Recommendations   Equipment Needed After Discharge slide board   Discharge Facility/Level Of Care Needs home health OT   Plan   Plan Continue with current plan   Therapy Frequency 2 times/day;Monday-Friday   Occupational Therapy Follow-up   OT Follow-up? Yes   Treatment/Billable Minutes   Self Care/Home Management 35   Therapeutic Exercise 10   Total Time 45       HUSSEIN Lagos  11/9/2017

## 2017-11-09 NOTE — SUBJECTIVE & OBJECTIVE
Interval History: No acute events overnight. Admits to some foot cramping this AM for first time. Pain controlled with current meds. On cipro for UTI. Labs stable. Denies any nausea. BP control improved. Admits to abdominal distension and constipation.    Scheduled Medications:    amLODIPine  10 mg Oral Daily    atenolol  25 mg Oral BID    ciprofloxacin HCl  500 mg Oral Q12H    docusate sodium  100 mg Oral BID    enoxaparin  40 mg Subcutaneous Daily    Lactobacillus rhamnosus GG  1 capsule Oral Daily    losartan  100 mg Oral QHS    oxybutynin  5 mg Oral QHS    oxyCODONE-acetaminophen  1 tablet Oral BID    polyethylene glycol  17 g Oral Daily    pravastatin  10 mg Oral Daily       PRN Medications: acetaminophen, bisacodyl, lactulose, ondansetron, oxyCODONE-acetaminophen, polyethylene glycol, ramelteon    Review of Systems   Constitutional: Negative for unexpected weight change.   HENT: Negative for congestion and ear pain.    Eyes: Negative for discharge.   Respiratory: Negative for cough and shortness of breath.    Cardiovascular: Negative for chest pain and palpitations.   Gastrointestinal: Positive for abdominal distention and constipation. Negative for abdominal pain, diarrhea, nausea and vomiting.   Endocrine: Negative for cold intolerance.   Genitourinary: Positive for frequency. Negative for dysuria and flank pain.   Musculoskeletal: Positive for arthralgias and gait problem.   Neurological: Positive for weakness.   Psychiatric/Behavioral: Negative for agitation and hallucinations.     Objective:     Vital Signs (Most Recent):  Temp: 98 °F (36.7 °C) (11/09/17 0700)  Pulse: 70 (11/09/17 0700)  Resp: 18 (11/09/17 0700)  BP: 128/62 (11/09/17 0700)  SpO2: 95 % (11/09/17 0700)    Vital Signs (24h Range):  Temp:  [98 °F (36.7 °C)-98.2 °F (36.8 °C)] 98 °F (36.7 °C)  Pulse:  [68-70] 70  Resp:  [18] 18  SpO2:  [95 %] 95 %  BP: (128-134)/(60-62) 128/62     Physical Exam   Constitutional: She appears  well-developed and well-nourished.   HENT:   Head: Normocephalic and atraumatic.   Eyes: EOM are normal.   Cardiovascular: Normal rate.    Pulmonary/Chest: Effort normal. No respiratory distress.   Abdominal: Soft. She exhibits distension. There is no tenderness.   Musculoskeletal: She exhibits no edema.   RUE in sling, + pulse,  WNL  LUE: SA, EF/EE  WFL  RLE: HF/KE 1/5, DF/PF 3/5  LLE: HF, KE, DF/PF 4/5    Neurological: She is alert.   Skin: Skin is warm.   Psychiatric: She has a normal mood and affect.   Vitals reviewed.    NEUROLOGICAL EXAMINATION:     CRANIAL NERVES     CN III, IV, VI   Extraocular motions are normal.

## 2017-11-09 NOTE — ASSESSMENT & PLAN NOTE
- Likely post-op  - Cont to monitor    Lab Results   Component Value Date    WBC 9.83 11/09/2017    HGB 8.2 (L) 11/09/2017    HCT 26.8 (L) 11/09/2017    MCV 96 11/09/2017     (H) 11/09/2017

## 2017-11-09 NOTE — ASSESSMENT & PLAN NOTE
- continue to monitor    BMP  Lab Results   Component Value Date     11/09/2017    K 4.6 11/09/2017     11/09/2017    CO2 26 11/09/2017    BUN 24 (H) 11/09/2017    CREATININE 0.8 11/09/2017    CALCIUM 9.1 11/09/2017    ANIONGAP 5 (L) 11/09/2017    ESTGFRAFRICA >60.0 11/09/2017    EGFRNONAA >60.0 11/09/2017

## 2017-11-09 NOTE — PROGRESS NOTES
Recreational Therapy   Treatment    An  Aleksander Luz  MRN: [id number]         11/09/17 1115   Rec Therapy Time Calculation   Rec Start Time 0115   Rec Stop Time 0200   Rec Total Time (min) 45 min   General   Primary Diagnosis Interochanteric Fx   Subjective   Patient states Hello, me and my daughter was talking politics   Precautions   General Precautions fall   RUE Weight Bearing NWB   RLE Weight Bearing WBAT   RUE Brace/Splint sling   Visual/Auditory Vision impaired   Attendance   Activity Cards   Participation Active participation   Therapeutic Recreation   Rec Therapy Group Assistance Min Assistance   Assessment   Assessment Pt seen for Rt session, pt was seated in w/c.  Pt edyta engaged in card game activity,Pt was able to recall the game rules.  Pt used adaptive card lara throughout the activity.Pt's endurance fair, social affect pleasant, interactive well throughout the activity.  No pain voice, but pt did state she need to rest before lunch, pt was return to room 9 mins early. Barriers: endurarnce   Plan   Planned Therapy Intervention Continue with current plan   PT Frequency Minimum of 1 visit per week   Time   Treatment time 3 units                Jennifer Diaz, LISAS,   11/9/2017

## 2017-11-10 PROBLEM — E87.1 HYPONATREMIA: Status: RESOLVED | Noted: 2017-11-02 | Resolved: 2017-11-10

## 2017-11-10 PROCEDURE — 25000003 PHARM REV CODE 250: Performed by: PHYSICAL MEDICINE & REHABILITATION

## 2017-11-10 PROCEDURE — 12800000 HC REHAB SEMI-PRIVATE ROOM

## 2017-11-10 PROCEDURE — 97150 GROUP THERAPEUTIC PROCEDURES: CPT

## 2017-11-10 PROCEDURE — 25000003 PHARM REV CODE 250: Performed by: HOSPITALIST

## 2017-11-10 PROCEDURE — 97530 THERAPEUTIC ACTIVITIES: CPT

## 2017-11-10 PROCEDURE — 97110 THERAPEUTIC EXERCISES: CPT

## 2017-11-10 PROCEDURE — 99232 SBSQ HOSP IP/OBS MODERATE 35: CPT | Mod: ,,, | Performed by: PHYSICAL MEDICINE & REHABILITATION

## 2017-11-10 PROCEDURE — 97116 GAIT TRAINING THERAPY: CPT

## 2017-11-10 PROCEDURE — 97535 SELF CARE MNGMENT TRAINING: CPT

## 2017-11-10 PROCEDURE — 63600175 PHARM REV CODE 636 W HCPCS: Performed by: HOSPITALIST

## 2017-11-10 RX ORDER — AMOXICILLIN 250 MG
2 CAPSULE ORAL 2 TIMES DAILY
Status: DISCONTINUED | OUTPATIENT
Start: 2017-11-10 | End: 2017-11-14 | Stop reason: HOSPADM

## 2017-11-10 RX ADMIN — DOCUSATE SODIUM 100 MG: 100 CAPSULE, LIQUID FILLED ORAL at 08:11

## 2017-11-10 RX ADMIN — Medication 1 CAPSULE: at 08:11

## 2017-11-10 RX ADMIN — ENOXAPARIN SODIUM 40 MG: 100 INJECTION SUBCUTANEOUS at 05:11

## 2017-11-10 RX ADMIN — STANDARDIZED SENNA CONCENTRATE AND DOCUSATE SODIUM 2 TABLET: 8.6; 5 TABLET, FILM COATED ORAL at 08:11

## 2017-11-10 RX ADMIN — ATENOLOL 25 MG: 25 TABLET ORAL at 08:11

## 2017-11-10 RX ADMIN — OXYCODONE HYDROCHLORIDE AND ACETAMINOPHEN 1 TABLET: 5; 325 TABLET ORAL at 12:11

## 2017-11-10 RX ADMIN — CIPROFLOXACIN HYDROCHLORIDE 500 MG: 500 TABLET, FILM COATED ORAL at 08:11

## 2017-11-10 RX ADMIN — ACETAMINOPHEN 650 MG: 325 TABLET ORAL at 08:11

## 2017-11-10 RX ADMIN — PRAVASTATIN SODIUM 10 MG: 10 TABLET ORAL at 08:11

## 2017-11-10 RX ADMIN — BISACODYL 10 MG: 10 SUPPOSITORY RECTAL at 04:11

## 2017-11-10 RX ADMIN — LOSARTAN POTASSIUM 100 MG: 50 TABLET, FILM COATED ORAL at 08:11

## 2017-11-10 RX ADMIN — POLYETHYLENE GLYCOL 3350 17 G: 17 POWDER, FOR SOLUTION ORAL at 08:11

## 2017-11-10 RX ADMIN — OXYCODONE HYDROCHLORIDE AND ACETAMINOPHEN 1 TABLET: 5; 325 TABLET ORAL at 07:11

## 2017-11-10 RX ADMIN — AMLODIPINE BESYLATE 10 MG: 10 TABLET ORAL at 08:11

## 2017-11-10 RX ADMIN — OXYBUTYNIN CHLORIDE 5 MG: 5 TABLET, FILM COATED, EXTENDED RELEASE ORAL at 08:11

## 2017-11-10 NOTE — PROGRESS NOTES
"Occupational Therapy   Volleyball Group    An Hazel  MRN: [id number]  Room/Bed: E278/E278 A       11/10/17 1120   OT Time Calculation   OT Start Time 1120         *Pt. Handed off to OT group from OT (Parul)   OT Stop Time 1205    OT Total Time (min) 45 min   General   OT Date of Treatment 11/10/17   Family/Caregiver Present No   Patient Found (position) Seated in wheelchair   Precautions   General Precautions fall   RUE Weight Bearing NWB   RLE Weight Bearing WBAT   RUE Brace/Splint sling   Subjective   Patient states "I'm OK. "   Pain/Comfort   Pain Rating 5/10   Location - Side Right   Location arm   Pain Addressed Pre-medicate for activity;Distraction   OT Therapeutic Groups   OT Therapeutic (  Objective:   -The patient performed volleyball group at w/c level with __Min____assist.  The patient required _several rest breaks during this activity.  Patient performed activity using bilateral upper extremities to volley the ball, lateral arm movement noted throughout the activity.  Endurance 11_on the RPE scale. no pain complaints voiced or observed.  -Social Interaction: (choose FIM score rating below)- 5    - Interacts appropriately with others - No medications needed. Patient asleep all shift (7)  - Interacts appropriately with others with medication or extra time(anti-anxiety, antidepressant)  (6)  - Interacts appropriately 90% of time - needs monitoring or encouragement for participation or interaction  (5)  - Interacts appropriately 75-89% of time - needs redirection for appropriate language or to initiate interaction  (4)  - Interacts appropriately 50-74% of time - May be physically or verbally inappropriate   (3)  - Interacts appropriately 25-49% of time - Needs frequent redirection  (2)  - Interacts appropriately less than 25% of time - May be withdrawn or combative  (1)    Goal:  pt. will improve social interaction to a FIM of __6__OR pts endurance will improve to a RPE of ___13_____ (choose " one)      Assessment:  Patient participated in a 45 minute volleyball group activity.  The group activity challenged dynamic standing/sitting balance, core strengthening, bilateral upper extremity strengthening, cardiovascular and respiratory capacity, hand -eye coordination, visual scanning and social affect/mood.   )   Treatment/Billable Minutes   Therapeutic Group 45   Total Time 45       HUSSEIN Lagos  11/10/2017

## 2017-11-10 NOTE — PROGRESS NOTES
"Occupational Therapy  AM Treatment  Antiarra Hazel   MRN: [id number]   Room/Bed: E278/E278 A     11/10/17 1035 11/10/17 1205   OT Time Calculation   OT Start Time 1035 1205   OT Stop Time 1120 1210   OT Total Time (min) 45 min 5 min   General   OT Date of Treatment 11/10/17 11/10/17   Family/Caregiver Present No No   Patient Found (position) Seated in wheelchair Seated in wheelchair   Precautions   General Precautions fall --    Orthopedic Yes --    RUE Weight Bearing NWB --    RLE Weight Bearing WBAT --    Required Braces or Orthoses Yes --    RUE Brace/Splint sling --    Subjective   Patient states "I hope I can go home soon. My daughter will be coming in town to stay with me."  --    Pain/Comfort   Pain Rating 5/10 --    Location - Side Right --    Location arm --    Pain Addressed Pre-medicate for activity;Distraction --    Transfers   Transfer yes --    Sit to Stand   Sit <> Stand Assistance Maximum Assistance --    Sit <> Stand Assistive Device Quad Cane --    Trials/Comments max (A) for trunk elevation from w/c --    Stand to Sit   Assistance Moderate Assistance --    Assistive Device Quad Cane --    Trials/Comments Mod (A) to lower to w/c --    Grooming   Additional Grooming yes --    Grooming Level of Assistance Modified independent --    Oral Hygeine Level of Assistance Modified independent --    Hair Grooming Level of Assistance Modified independent --    Grooming Where Assessed Wheelchair --    Grooming Comments Mod (I) for extra time to complete oral hygiene and hair combing --    LE Dressing   LE Dressing Yes --    LE Dressing Adaptive Equipment Reacher;Sock aide --    LE Dressing  Level of Assistance Moderate assistance --    Sock Level of Assistance Moderate assistance --    LE Dressing Where Assessed Wheelchair --    LE Dressing Comments Mod (A) for placement of socks for B feet on sock aide (for adjustment)  --    Additional Activities   Additional Activities Other (Comment) --    Additional " Activities Comments Pt completed dynamic standing ther ax while completing MRMT coins with R hand (just moving hand and wrist with UE stabilized) in standing with quad cane to increase dynamic standing tolerance, endurance, and weight-shifting. Pt completes standing for x3 minutes with a seated rest break.  --    Activity Tolerance   Activity Tolerance Patient tolerated treatment well --    After Treatment   Patient Position After Treatment Seated in wheelchair Seated in wheelchair   Patient after treatment left (posey belt donned) call button in reach  (daughter present; posey belt donned)   Assessment   Prognosis Fair --    Problem List Decreased Self Care skills;Decreased upper extremity range of motion;Decreased upper extremity strength;Decreased safe judgment during ADL;Decreased endurance;Decreased fine motor control;Decreased functional mobility;Decreased gross motor control;Decreased IADLs;Decreased Function of right upper extremity;Decreased trunk control for functional activities --    Assessment Pt tolerated tx session well today with some c/o pain in RUE. Pt still continues with slow progress towards goals. Pt required max VCs for use with sock aide and Mod (A) for placement for sock on sock aide for adjustment. Pt continues to require Max (A) from sit > stand from w/c, but standing with improved tolerance/endurance. Pt would continue to benefit from skilled OT services for increased (I) with ADLs/IADLs. OT assisted pt back to room and set up with lunch after group treatment.    Level of Motivation/Participation good --    Barriers to Discharge Home environment accessibility limitations --    Discharge Recommendations   Equipment Needed After Discharge slide board --    Discharge Facility/Level Of Care Needs home health OT --    Plan   Plan Continue with current plan --    Therapy Frequency 2 times/day --    Occupational Therapy Follow-up   OT Follow-up? Yes --    Treatment/Billable Minutes   Self  Care/Home Management 35 5   Therapeutic Activity 10 --    Total Time 45 5       Trina Oscar OT  11/10/2017    LEGEND:   CGA: Contact Guard Assist   EOB: Edge[place] Bed   HHA: Hand Held Assist   HOB: Head of Bed   (I): Independent-patient performs task in a timely manner   Max (A): Maximal Assist-patient performs 25-49% of task   Min (A): Minimal Assist- patient performs 75% or more of task   Mod (A): Moderate Assist- patient performs 50-74% of task   NA: Not applicable   NT: Not tested   OOB: Out of Bed   PTA: Prior to admit   QC: Quad Cane   RW: Rolling Walker   (S): Supervision- patient requires cues, coaxing, prompting   SBA: Stand By Assist   SC: Straight Cane   SW: Standard Walker   TBA: To be assessed   Total (A): Total Assist- patient performs less than 25% of task   WC: Wheelchair   WFL: Within Functional Limits   WNL: Within Normal Limits

## 2017-11-10 NOTE — PROGRESS NOTES
Occupational Therapy  [facility]    An Yanesisabella Luz  MRN: [id number]  Room/Bed: E278/E278 A       11/10/17 1035   Self Care   Grooming 6   Dressing-Lower 3       Trina Moore OT  11/10/2017

## 2017-11-10 NOTE — PLAN OF CARE
Problem: Patient Care Overview  Goal: Plan of Care Review  Outcome: Ongoing (interventions implemented as appropriate)  Frequent rounds to assess pain & safety.    Problem: Fall Risk (Adult)  Goal: Absence of Falls  Patient will demonstrate the desired outcomes by discharge/transition of care.   Outcome: Ongoing (interventions implemented as appropriate)  Patient remains free of fall or injury, safety measures maintained. She is compliant about calling for assist before trying to get out of bed.     Problem: Pressure Ulcer Risk (Lucho Scale) (Adult,Obstetrics,Pediatric)  Goal: Skin Integrity  Patient will demonstrate the desired outcomes by discharge/transition of care.   Outcome: Ongoing (interventions implemented as appropriate)  No new skin breakdown noted this shift. Assistance is being provided to turn & reposition as needed.    Problem: Pain, Acute (Adult)  Goal: Acceptable Pain Control/Comfort Level  Patient will demonstrate the desired outcomes by discharge/transition of care.   Outcome: Ongoing (interventions implemented as appropriate)  Pain is being controlled by scheduled & prn meds.     Problem: Mobility, Physical Impaired (Adult)  Goal: Enhanced Mobility Skills  Patient will demonstrate the desired outcomes by discharge/transition of care.   Outcome: Ongoing (interventions implemented as appropriate)  Patient is starting to move around more in bed

## 2017-11-10 NOTE — SUBJECTIVE & OBJECTIVE
Interval History: No acute events overnight. Given suppository yesterday for constipation, still no BM. On cipro for UTI.     Scheduled Medications:    amLODIPine  10 mg Oral Daily    atenolol  25 mg Oral BID    ciprofloxacin HCl  500 mg Oral Q12H    docusate sodium  100 mg Oral BID    enoxaparin  40 mg Subcutaneous Daily    Lactobacillus rhamnosus GG  1 capsule Oral Daily    losartan  100 mg Oral QHS    oxybutynin  5 mg Oral QHS    oxyCODONE-acetaminophen  1 tablet Oral BID    polyethylene glycol  17 g Oral Daily    pravastatin  10 mg Oral Daily       PRN Medications: acetaminophen, bisacodyl, ondansetron, oxyCODONE-acetaminophen, polyethylene glycol, ramelteon, sodium phosphates    Review of Systems   Constitutional: Negative for unexpected weight change.   HENT: Negative for congestion and ear pain.    Eyes: Negative for discharge.   Respiratory: Negative for cough and shortness of breath.    Cardiovascular: Negative for chest pain and palpitations.   Gastrointestinal: Positive for abdominal distention and constipation. Negative for abdominal pain, diarrhea, nausea and vomiting.   Endocrine: Negative for cold intolerance.   Genitourinary: Positive for frequency. Negative for dysuria and flank pain.   Musculoskeletal: Positive for arthralgias and gait problem.   Neurological: Positive for weakness.   Psychiatric/Behavioral: Negative for agitation and hallucinations.     Objective:     Vital Signs (Most Recent):  Temp: 98.6 °F (37 °C) (11/09/17 1955)  Pulse: 66 (11/09/17 1955)  Resp: 16 (11/09/17 1955)  BP: (!) 140/63 (11/09/17 1955)  SpO2: 97 % (11/09/17 1955)    Vital Signs (24h Range):  Temp:  [98.6 °F (37 °C)] 98.6 °F (37 °C)  Pulse:  [66] 66  Resp:  [16] 16  SpO2:  [97 %] 97 %  BP: (140)/(63) 140/63     Physical Exam   Constitutional: She appears well-developed and well-nourished.   HENT:   Head: Normocephalic and atraumatic.   Eyes: EOM are normal.   Cardiovascular: Normal rate.    Pulmonary/Chest:  Effort normal. No respiratory distress.   Abdominal: Soft. She exhibits distension. There is no tenderness.   Musculoskeletal: She exhibits no edema.   RUE in sling, + pulse,  WNL  LUE: SA, EF/EE  WFL  RLE: HF/KE 1/5, DF/PF 3/5  LLE: HF, KE, DF/PF 4/5    Neurological: She is alert.   Skin: Skin is warm.   Psychiatric: She has a normal mood and affect.   Vitals reviewed.    NEUROLOGICAL EXAMINATION:     CRANIAL NERVES     CN III, IV, VI   Extraocular motions are normal.

## 2017-11-10 NOTE — PROGRESS NOTES
Physical Therapy   Daily [facility]    An Yanesisabella Luz   MRN: [id number]      11/10/17 1500   Transfers   Bed/Chair/WC 2   Locomotion   Distance Walked 1   Distance Wheelchair 0   Walk 1   Mode C   Stairs 2         Kaylan Reinoso, PTA  11/10/2017

## 2017-11-10 NOTE — ASSESSMENT & PLAN NOTE
- increased amlodipine to 10mg daily on 11/7 d/t elevated BP, now improved  - cont atenolol  - cont to monitor BP/HR, adjust meds prn    Vitals:    11/08/17 1855 11/09/17 0700 11/09/17 1703 11/09/17 1955   BP: 134/60 128/62  (!) 140/63   BP Location: Left arm Left arm  Left arm   Patient Position: Sitting Lying  Sitting   Pulse: 68 70  66   Resp: 18 18  16   Temp: 98.2 °F (36.8 °C) 98 °F (36.7 °C)  98.6 °F (37 °C)   TempSrc: Oral Oral  Oral   SpO2: 95% 95%  97%   Weight:   49.3 kg (108 lb 9.6 oz)    Height:

## 2017-11-10 NOTE — PROGRESS NOTES
[facility]  Physical Medicine & Rehab  Progress Note    Patient Name: An Hazel  MRN: [id number]  Patient Class: IP- Rehab   Admission Date: 10/31/2017  Length of Stay: 10 days  Attending Physician: Verna Mc MD  Primary Care Provider: Alcon Thomas Jr, MD    Subjective:     Principal Problem:Closed intertrochanteric fracture of right hip, initial encounter    Interval History: No acute events overnight. Given suppository yesterday for constipation, still no BM. On cipro for UTI.     Scheduled Medications:    amLODIPine  10 mg Oral Daily    atenolol  25 mg Oral BID    ciprofloxacin HCl  500 mg Oral Q12H    docusate sodium  100 mg Oral BID    enoxaparin  40 mg Subcutaneous Daily    Lactobacillus rhamnosus GG  1 capsule Oral Daily    losartan  100 mg Oral QHS    oxybutynin  5 mg Oral QHS    oxyCODONE-acetaminophen  1 tablet Oral BID    polyethylene glycol  17 g Oral Daily    pravastatin  10 mg Oral Daily       PRN Medications: acetaminophen, bisacodyl, ondansetron, oxyCODONE-acetaminophen, polyethylene glycol, ramelteon, sodium phosphates    Review of Systems   Constitutional: Negative for unexpected weight change.   HENT: Negative for congestion and ear pain.    Eyes: Negative for discharge.   Respiratory: Negative for cough and shortness of breath.    Cardiovascular: Negative for chest pain and palpitations.   Gastrointestinal: Positive for abdominal distention and constipation. Negative for abdominal pain, diarrhea, nausea and vomiting.   Endocrine: Negative for cold intolerance.   Genitourinary: Positive for frequency. Negative for dysuria and flank pain.   Musculoskeletal: Positive for arthralgias and gait problem.   Neurological: Positive for weakness.   Psychiatric/Behavioral: Negative for agitation and hallucinations.     Objective:     Vital Signs (Most Recent):  Temp: 98.6 °F (37 °C) (11/09/17 1955)  Pulse: 66 (11/09/17 1955)  Resp: 16 (11/09/17 1955)  BP:  (!) 140/63 (11/09/17 1955)  SpO2: 97 % (11/09/17 1955)    Vital Signs (24h Range):  Temp:  [98.6 °F (37 °C)] 98.6 °F (37 °C)  Pulse:  [66] 66  Resp:  [16] 16  SpO2:  [97 %] 97 %  BP: (140)/(63) 140/63     Physical Exam   Constitutional: She appears well-developed and well-nourished.   HENT:   Head: Normocephalic and atraumatic.   Eyes: EOM are normal.   Cardiovascular: Normal rate.    Pulmonary/Chest: Effort normal. No respiratory distress.   Abdominal: Soft. She exhibits distension. There is no tenderness.   Musculoskeletal: She exhibits no edema.   RUE in sling, + pulse,  WNL  LUE: SA, EF/EE  WFL  RLE: HF/KE 1/5, DF/PF 3/5  LLE: HF, KE, DF/PF 4/5    Neurological: She is alert.   Skin: Skin is warm.   Psychiatric: She has a normal mood and affect.   Vitals reviewed.    NEUROLOGICAL EXAMINATION:     CRANIAL NERVES     CN III, IV, VI   Extraocular motions are normal.       Assessment/Plan:      An Hazel is a 89 y.o. female admitted to inpatient rehabilitation on 10/31/2017 for Closed intertrochanteric fracture of right hip, initial encounter with impaired mobility and ADLs. Patient remains appropriate for PT, OT, and as required Speech therapy. Patient continues to require 24 hour nursing care as well as daily Physician assessment.    * Closed intertrochanteric fracture of right hip, initial encounter    ORIF of the right hip on 10/27, WBAT. Arm was treated with a sling and swathe.   - Ortho precautions: Non weight bearing RUE for 2 weeks.    - Sling and swathe for comfort. Weight bearing as tolerated RLE.  - Surgical wound: Dressing changes every 3 days. Remove staples on POD #14.  - Pain: Cont scheduled and PRN percocet.  - PT/OT: Dep for gait, Mod A for w/c, Max A for transfers, Max A for bed mobility, Max A for UB/LB dsg        Impaired mobility and ADLs    Other Rehab Plan/Continue to monitor:   Nutrition:    - Prealbumin 9   - Nutritionist on board    - GI ppx: none  Bladder Management:  monitor for continence   - restart home Oxybutinin for nocturnal urgency  - UTI: enterobacter cloacae, cont cipro per c/s  Bowel Management: monitor for continence   - PeriColace and Miralax scheduled; Suppository PRN   - Will monitor for regularity - was previously constipated but had multiple BMs overnight 11/2, was having regular BMs after but is constipated again 11/9, given dulcolax suppository 11/9, 11/10 change colace 100mg BID to pericolace 2 tabs BID, nursing communication to give enema in no BM by end of day 11/10, will monitor for BM and adjust bowel meds as needed  Mood: stable   Sleep: monitor; ramelteon PRN   Skin: Monitor   Pain: Tylenol PRN mild pain, changed oxycodone to Percocet PRN severe pain on 11/3  DVT ppx: Lovenox 40mg daily   Continue Zofran PRN          Essential hypertension    - increased amlodipine to 10mg daily on 11/7 d/t elevated BP, now improved  - cont atenolol  - cont to monitor BP/HR, adjust meds prn    Vitals:    11/08/17 1855 11/09/17 0700 11/09/17 1703 11/09/17 1955   BP: 134/60 128/62  (!) 140/63   BP Location: Left arm Left arm  Left arm   Patient Position: Sitting Lying  Sitting   Pulse: 68 70  66   Resp: 18 18  16   Temp: 98.2 °F (36.8 °C) 98 °F (36.7 °C)  98.6 °F (37 °C)   TempSrc: Oral Oral  Oral   SpO2: 95% 95%  97%   Weight:   49.3 kg (108 lb 9.6 oz)    Height:                 Anemia    - Likely post-op  - Cont to monitor    Lab Results   Component Value Date    WBC 9.83 11/09/2017    HGB 8.2 (L) 11/09/2017    HCT 26.8 (L) 11/09/2017    MCV 96 11/09/2017     (H) 11/09/2017                 DISCHARGE PLANNING:  Tentative Discharge Date: 11/14/2017    Future Appointments  Date Time Provider Department Center   1/10/2018 8:20 AM Alcon Thomas Jr., MD [facility]       Romario Singleton MD  Department of Physical Medicine & Rehab   [facility]

## 2017-11-10 NOTE — ASSESSMENT & PLAN NOTE
Other Rehab Plan/Continue to monitor:   Nutrition:    - Prealbumin 9   - Nutritionist on board    - GI ppx: none  Bladder Management: monitor for continence   - restart home Oxybutinin for nocturnal urgency  - UTI: enterobacter cloacae, cont cipro per c/s  Bowel Management: monitor for continence   - PeriColace and Miralax scheduled; Suppository PRN   - Will monitor for regularity - was previously constipated but had multiple BMs overnight 11/2, was having regular BMs after but is constipated again 11/9, given dulcolax suppository 11/9, 11/10 change colace 100mg BID to pericolace 2 tabs BID, nursing communication to give enema in no BM by end of day 11/10, will monitor for BM and adjust bowel meds as needed  Mood: stable   Sleep: monitor; ramelteon PRN   Skin: Monitor   Pain: Tylenol PRN mild pain, changed oxycodone to Percocet PRN severe pain on 11/3  DVT ppx: Lovenox 40mg daily   Continue Zofran PRN

## 2017-11-10 NOTE — PROGRESS NOTES
Physical Therapy   Treatment    Anluz maria Hazel   MRN: [id number]   PTA visit #: 1   11/10/17 1500   PT Time Calculation   PT Start Time 1500   PT Stop Time 1630   PT Total Time (min) 90 min   Treatment   Treatment Type Treatment   PT/PTA PTA   PTA Visit Number 1   General   PT Received On 11/10/17   Family/Caregiver Present Yes  (dtg present during session)   Patient Found (position) Seated in wheelchair   Pt found with (seat belt, sling R UE)   Precautions   General Precautions fall   Orthopedic Yes   RUE Weight Bearing NWB   RLE Weight Bearing WBAT   Required Braces or Orthoses Yes   RUE Brace/Splint sling   Visual/Auditory Hearing impaired;Vision impaired   Subjective   Patient states Pt agreeable to tx   Pain/Comfort   Pain Rating 1 3/10   Location - Side 1 Right   Location 1 hip   Pain Addressed 1 Distraction   Pain Rating 2 3/10   Location - Side 2 Right   Location 2 shoulder   Pain Addressed 2 Distraction   Bed Mobility   Bed Mobility yes   Supine to Sit Moderate Assistance   Supine to Sit Comments mat   Sit to Supine Minimum Assistance   Transfers   Transfer yes   Sit to Stand   Sit <> Stand Assistance Moderate Assistance;Maximum Assistance   Sit <> Stand Assistive Device Quad Cane   Trials/Comments x several trials   Stand to Sit   Assistance Moderate Assistance   Assistive Device Quad Cane   Chair to Mat   Chair<> Mat Technique Stand Pivot   Chair<>Mat Assistance Moderate Assistance   Chair <> Mat Assistive Device Quad Cane   Mat to Chair   Technique Stand Pivot   Assistance Moderate Assistance   Assistive Device Quad Cane   Car Transfer   Car Transfer Technique Stand Pivot   Car Transfer Assistance Maximum Assistance   Car Transfer Assistive Device Other (see comments)  (QC)   Trials/Comments x 1 trial   Wheelchair Activities   Propulsion No   Gait   Gait Yes   Weight Bearing Status WBAT R LE   Gait 1   Surface 1 Level tile   Gait Assistive Device LBQC   Assistance 1 Moderate assistance  (vc)   Gait  Distance 45' X 1 trial   Gait Pattern swing-through gait   Gait Deviation(s) decreased lissette;increased time in double stance;decreased velocity of limb motion;decreased step length;decreased stride length;decreased swing-to-stance ratio;forward lean   Impairments Contributing to Gait Deviations impaired balance;impaired coordination;decreased flexibility;pain;impaired postural control;decreased strength   Stairs   Stairs Yes   Stairs/Curb Step   Rails 1 Left   Device 1 No device   Assistance 1 Moderate assistance   Comment/# Steps 1 up and down 4 stairs   Supine   Supine-Exercises Lower extremity;Specific exercises   Supine-Exercise Type Ankle pumps;Quad sets;Glut sets;Short arc quads;Bridging with bolster;ABD/ADD;Heel slides   Supine-Exercise Comments B LE x 15 reps with min A R LE   Seated   Seated-Exercises Lower extremity;Specific exercises   Seated-Exercise Type Ankle pumps;Long arc quads;ADduction   Seated-Exercise Comments B LE x 15 reps   Activity Tolerance   Activity Tolerance Patient tolerated treatment well   After Treatment   Patient Position After Treatment Seated in wheelchair   Patient after treatment left (dtg present)   Assessment   Prognosis Fair   Problem List Decreased strength;Decreased endurance;Impaired balance;Decreased mobility;Impaired hearing;Orthopedic restrictions;Pain   Session Assessment progressing toward goals   Assessment Pt yaima tx very well today with increased amb distance and less pain., however pt continues to req max A for sit to stand.  Dtg present throughout session and observed and asked appropriate questions.  Dtg with good understanding of pts needs and will be living with pt after discharge for ~ 2 weeks.  Pt with all equipment already in place and second dtg lives with pt.  Pt yaima tx very well, cont POC   Level of Motivation/Participation good   Barriers to Discharge Inaccessible home environment;Decreased caregiver support   Discharge Recommendations   Discharge  Facility/Level Of Care Needs home health PT   Plan   Planned Therapy Intervention Continue with current plan   Therapy Frequency 2 times/day;Monday-Friday;Saturday or Sunday   Physical Therapy Follow-up   PT Follow-up? Yes   Treatment/Billable Minutes   Gait training 30   Therapeutic Activity 30   Therapeutic Exercise 30   Total Time 90       Kaylan Reinoso, GAURAV  11/10/2017

## 2017-11-11 PROCEDURE — 25000003 PHARM REV CODE 250: Performed by: PHYSICAL MEDICINE & REHABILITATION

## 2017-11-11 PROCEDURE — 12800000 HC REHAB SEMI-PRIVATE ROOM

## 2017-11-11 PROCEDURE — 63600175 PHARM REV CODE 636 W HCPCS: Performed by: HOSPITALIST

## 2017-11-11 PROCEDURE — 25000003 PHARM REV CODE 250: Performed by: HOSPITALIST

## 2017-11-11 PROCEDURE — 99232 SBSQ HOSP IP/OBS MODERATE 35: CPT | Mod: ,,, | Performed by: PHYSICAL MEDICINE & REHABILITATION

## 2017-11-11 RX ADMIN — Medication 1 CAPSULE: at 08:11

## 2017-11-11 RX ADMIN — AMLODIPINE BESYLATE 10 MG: 10 TABLET ORAL at 08:11

## 2017-11-11 RX ADMIN — ATENOLOL 25 MG: 25 TABLET ORAL at 08:11

## 2017-11-11 RX ADMIN — ACETAMINOPHEN 650 MG: 325 TABLET ORAL at 08:11

## 2017-11-11 RX ADMIN — STANDARDIZED SENNA CONCENTRATE AND DOCUSATE SODIUM 2 TABLET: 8.6; 5 TABLET, FILM COATED ORAL at 08:11

## 2017-11-11 RX ADMIN — OXYBUTYNIN CHLORIDE 5 MG: 5 TABLET, FILM COATED, EXTENDED RELEASE ORAL at 08:11

## 2017-11-11 RX ADMIN — LOSARTAN POTASSIUM 100 MG: 50 TABLET, FILM COATED ORAL at 08:11

## 2017-11-11 RX ADMIN — ACETAMINOPHEN 650 MG: 325 TABLET ORAL at 03:11

## 2017-11-11 RX ADMIN — PRAVASTATIN SODIUM 10 MG: 10 TABLET ORAL at 08:11

## 2017-11-11 RX ADMIN — ENOXAPARIN SODIUM 40 MG: 100 INJECTION SUBCUTANEOUS at 05:11

## 2017-11-11 NOTE — ASSESSMENT & PLAN NOTE
Other Rehab Plan/Continue to monitor:   Nutrition:    - Prealbumin 9   - Nutritionist on board    - GI ppx: none  Bladder Management: monitor for continence   - restart home Oxybutinin for nocturnal urgency  - UTI: enterobacter cloacae, completed Cipro course  Bowel Management: monitor for continence   - PeriColace and Miralax scheduled; Suppository PRN   - Will monitor for regularity    Mood: stable   Sleep: monitor; ramelteon PRN   Skin: Monitor   DVT ppx: Lovenox 40mg daily   Continue Zofran PRN

## 2017-11-11 NOTE — SUBJECTIVE & OBJECTIVE
Interval History: No acute events over night. Patient is tolerating therapy. Pain controlled/Slept well overnight.      Scheduled Medications:    amLODIPine  10 mg Oral Daily    atenolol  25 mg Oral BID    ciprofloxacin HCl  500 mg Oral Q12H    enoxaparin  40 mg Subcutaneous Daily    Lactobacillus rhamnosus GG  1 capsule Oral Daily    losartan  100 mg Oral QHS    oxybutynin  5 mg Oral QHS    oxyCODONE-acetaminophen  1 tablet Oral BID    polyethylene glycol  17 g Oral Daily    pravastatin  10 mg Oral Daily    senna-docusate 8.6-50 mg  2 tablet Oral BID       PRN Medications: acetaminophen, bisacodyl, ondansetron, oxyCODONE-acetaminophen, polyethylene glycol, ramelteon, sodium phosphates    Review of Systems   Constitutional: Negative for unexpected weight change.   HENT: Negative for congestion and ear pain.    Eyes: Negative for discharge.   Respiratory: Negative for cough and shortness of breath.    Cardiovascular: Negative for chest pain and palpitations.   Gastrointestinal: Negative for abdominal pain, diarrhea, nausea and vomiting.   Endocrine: Negative for cold intolerance.   Genitourinary: Positive for frequency. Negative for dysuria and flank pain.   Musculoskeletal: Positive for arthralgias and gait problem.   Neurological: Positive for weakness.   Psychiatric/Behavioral: Negative for agitation and hallucinations.     Objective:     Vital Signs (Most Recent):  Temp: 97.8 °F (36.6 °C) (11/11/17 0700)  Pulse: 70 (11/11/17 0700)  Resp: 18 (11/11/17 0700)  BP: (!) 178/74 (11/11/17 0700)  SpO2: 95 % (11/11/17 0700)    Vital Signs (24h Range):  Temp:  [97.8 °F (36.6 °C)-98.2 °F (36.8 °C)] 97.8 °F (36.6 °C)  Pulse:  [68-70] 70  Resp:  [16-18] 18  SpO2:  [95 %] 95 %  BP: (131-178)/(62-74) 178/74     Physical Exam   Constitutional: She appears well-developed and well-nourished.   HENT:   Head: Normocephalic and atraumatic.   Eyes: EOM are normal.   Cardiovascular: Normal rate.    Pulmonary/Chest: Effort  normal. No respiratory distress.   Abdominal: Soft. There is no tenderness.   Musculoskeletal: She exhibits no edema.   RUE in sling, + pulse,  WNL  LUE: SA, EF/EE  WFL  RLE: HF/KE 1/5, DF/PF 3/5  LLE: HF, KE, DF/PF 4/5    Neurological: She is alert.   Skin: Skin is warm.   Psychiatric: She has a normal mood and affect.   Vitals reviewed.    NEUROLOGICAL EXAMINATION:     CRANIAL NERVES     CN III, IV, VI   Extraocular motions are normal.

## 2017-11-11 NOTE — ASSESSMENT & PLAN NOTE
- increased amlodipine to 10mg daily on 11/7   - cont atenolol  - cont to monitor BP/HR, adjust meds prn    Vitals:    11/09/17 1955 11/10/17 1100 11/10/17 1955 11/11/17 0700   BP: (!) 140/63 136/64 131/62 (!) 178/74   BP Location: Left arm Left arm Left arm Left arm   Patient Position: Sitting Sitting Sitting Sitting   Pulse: 66 70 68 70   Resp: 16 16 16 18   Temp: 98.6 °F (37 °C) 98 °F (36.7 °C) 98.2 °F (36.8 °C) 97.8 °F (36.6 °C)   TempSrc: Oral Oral Oral Oral   SpO2: 97% 97% 95% 95%   Weight:       Height:

## 2017-11-11 NOTE — ASSESSMENT & PLAN NOTE
ORIF of the right hip on 10/27, WBAT. Arm was treated with a sling and swathe.   - Ortho precautions: Non weight bearing RUE for 2 weeks.    - Sling and swathe for comfort. Weight bearing as tolerated RLE.  - Surgical wound: Dressing changes every 3 days. Remove staples on POD #14.  - Pain: Cont scheduled and PRN percocet.    Functional: Dep for gait, Mod A for w/c, Max A for transfers, Max A for bed mobility, Max A for UB/LB dsg

## 2017-11-11 NOTE — PROGRESS NOTES
[facility]  Physical Medicine & Rehab  Progress Note    Patient Name: An Hazel  MRN: [id number]  Patient Class: IP- Rehab   Admission Date: 10/31/2017  Length of Stay: 11 days  Attending Physician: Verna Mc MD  Primary Care Provider: Alcon Thomas Jr, MD    Subjective:     Principal Problem:Closed intertrochanteric fracture of right hip, initial encounter    Interval History: No acute events over night. Patient is tolerating therapy. Pain controlled/Slept well overnight.      Scheduled Medications:    amLODIPine  10 mg Oral Daily    atenolol  25 mg Oral BID    ciprofloxacin HCl  500 mg Oral Q12H    enoxaparin  40 mg Subcutaneous Daily    Lactobacillus rhamnosus GG  1 capsule Oral Daily    losartan  100 mg Oral QHS    oxybutynin  5 mg Oral QHS    oxyCODONE-acetaminophen  1 tablet Oral BID    polyethylene glycol  17 g Oral Daily    pravastatin  10 mg Oral Daily    senna-docusate 8.6-50 mg  2 tablet Oral BID       PRN Medications: acetaminophen, bisacodyl, ondansetron, oxyCODONE-acetaminophen, polyethylene glycol, ramelteon, sodium phosphates    Review of Systems   Constitutional: Negative for unexpected weight change.   HENT: Negative for congestion and ear pain.    Eyes: Negative for discharge.   Respiratory: Negative for cough and shortness of breath.    Cardiovascular: Negative for chest pain and palpitations.   Gastrointestinal: Negative for abdominal pain, diarrhea, nausea and vomiting.   Endocrine: Negative for cold intolerance.   Genitourinary: Positive for frequency. Negative for dysuria and flank pain.   Musculoskeletal: Positive for arthralgias and gait problem.   Neurological: Positive for weakness.   Psychiatric/Behavioral: Negative for agitation and hallucinations.     Objective:     Vital Signs (Most Recent):  Temp: 97.8 °F (36.6 °C) (11/11/17 0700)  Pulse: 70 (11/11/17 0700)  Resp: 18 (11/11/17 0700)  BP: (!) 178/74 (11/11/17 0700)  SpO2: 95 %  (11/11/17 0700)    Vital Signs (24h Range):  Temp:  [97.8 °F (36.6 °C)-98.2 °F (36.8 °C)] 97.8 °F (36.6 °C)  Pulse:  [68-70] 70  Resp:  [16-18] 18  SpO2:  [95 %] 95 %  BP: (131-178)/(62-74) 178/74     Physical Exam   Constitutional: She appears well-developed and well-nourished.   HENT:   Head: Normocephalic and atraumatic.   Eyes: EOM are normal.   Cardiovascular: Normal rate.    Pulmonary/Chest: Effort normal. No respiratory distress.   Abdominal: Soft. There is no tenderness.   Musculoskeletal: She exhibits no edema.   RUE in sling, + pulse,  WNL  LUE: SA, EF/EE  WFL  RLE: HF/KE 1/5, DF/PF 3/5  LLE: HF, KE, DF/PF 4/5    Neurological: She is alert.   Skin: Skin is warm.   Psychiatric: She has a normal mood and affect.   Vitals reviewed.    NEUROLOGICAL EXAMINATION:     CRANIAL NERVES     CN III, IV, VI   Extraocular motions are normal.       Assessment/Plan:      An Hazel is a 89 y.o. female admitted to inpatient rehabilitation on 10/31/2017 for Closed intertrochanteric fracture of right hip, initial encounter with impaired mobility and ADLs. Patient remains appropriate for PT, OT, and as required Speech therapy. Patient continues to require 24 hour nursing care as well as daily Physician assessment.    * Closed intertrochanteric fracture of right hip, initial encounter    ORIF of the right hip on 10/27, WBAT. Arm was treated with a sling and swathe.   - Ortho precautions: Non weight bearing RUE for 2 weeks.    - Sling and swathe for comfort. Weight bearing as tolerated RLE.  - Surgical wound: Dressing changes every 3 days. Remove staples on POD #14.  - Pain: Cont scheduled and PRN percocet.    Functional: Dep for gait, Mod A for w/c, Max A for transfers, Max A for bed mobility, Max A for UB/LB dsg        Essential hypertension    - increased amlodipine to 10mg daily on 11/7   - cont atenolol  - cont to monitor BP/HR, adjust meds prn    Vitals:    11/09/17 1955 11/10/17 1100 11/10/17 1955  11/11/17 0700   BP: (!) 140/63 136/64 131/62 (!) 178/74   BP Location: Left arm Left arm Left arm Left arm   Patient Position: Sitting Sitting Sitting Sitting   Pulse: 66 70 68 70   Resp: 16 16 16 18   Temp: 98.6 °F (37 °C) 98 °F (36.7 °C) 98.2 °F (36.8 °C) 97.8 °F (36.6 °C)   TempSrc: Oral Oral Oral Oral   SpO2: 97% 97% 95% 95%   Weight:       Height:                 Impaired mobility and ADLs    Other Rehab Plan/Continue to monitor:   Nutrition:    - Prealbumin 9   - Nutritionist on board    - GI ppx: none  Bladder Management: monitor for continence   - restart home Oxybutinin for nocturnal urgency  - UTI: enterobacter cloacae, completed Cipro course  Bowel Management: monitor for continence   - PeriColace and Miralax scheduled; Suppository PRN   - Will monitor for regularity    Mood: stable   Sleep: monitor; ramelteon PRN   Skin: Monitor   DVT ppx: Lovenox 40mg daily   Continue Zofran PRN          Anemia    - Likely post-op  - Cont to monitor    Lab Results   Component Value Date    WBC 9.83 11/09/2017    HGB 8.2 (L) 11/09/2017    HCT 26.8 (L) 11/09/2017    MCV 96 11/09/2017     (H) 11/09/2017                 DISCHARGE PLANNING:  Tentative Discharge Date: 11/14/2017    Future Appointments  Date Time Provider Department Center   1/10/2018 8:20 AM Alcon Thomas Jr., MD [facility]       Verna Mc MD  Department of Physical Medicine & Rehab   [facility]

## 2017-11-11 NOTE — PLAN OF CARE
Problem: Patient Care Overview  Goal: Plan of Care Review  Outcome: Ongoing (interventions implemented as appropriate)  Frequent rounds to assess pain & safety.    Problem: Fall Risk (Adult)  Goal: Absence of Falls  Patient will demonstrate the desired outcomes by discharge/transition of care.   Outcome: Ongoing (interventions implemented as appropriate)  Patient remains free of fall or injury tus far this shift, safety measures maintained.    Problem: Pressure Ulcer Risk (Lucho Scale) (Adult,Obstetrics,Pediatric)  Goal: Skin Integrity  Patient will demonstrate the desired outcomes by discharge/transition of care.   Outcome: Ongoing (interventions implemented as appropriate)  No new skin breakdown noted this shift. Assistance is being provided to turn & reposition as needed.     Problem: Pain, Acute (Adult)  Goal: Acceptable Pain Control/Comfort Level  Patient will demonstrate the desired outcomes by discharge/transition of care.   Outcome: Ongoing (interventions implemented as appropriate)  Pain is being controlled with scheduled & PRN meds.     Problem: Mobility, Physical Impaired (Adult)  Goal: Enhanced Mobility Skills  Patient will demonstrate the desired outcomes by discharge/transition of care.   Outcome: Ongoing (interventions implemented as appropriate)  Mobility is improving

## 2017-11-12 PROCEDURE — 25000003 PHARM REV CODE 250: Performed by: HOSPITALIST

## 2017-11-12 PROCEDURE — 25000003 PHARM REV CODE 250: Performed by: PHYSICAL MEDICINE & REHABILITATION

## 2017-11-12 PROCEDURE — 99232 SBSQ HOSP IP/OBS MODERATE 35: CPT | Mod: ,,, | Performed by: PHYSICAL MEDICINE & REHABILITATION

## 2017-11-12 PROCEDURE — 63600175 PHARM REV CODE 636 W HCPCS: Performed by: HOSPITALIST

## 2017-11-12 PROCEDURE — 12800000 HC REHAB SEMI-PRIVATE ROOM

## 2017-11-12 RX ADMIN — OXYBUTYNIN CHLORIDE 5 MG: 5 TABLET, FILM COATED, EXTENDED RELEASE ORAL at 08:11

## 2017-11-12 RX ADMIN — PRAVASTATIN SODIUM 10 MG: 10 TABLET ORAL at 08:11

## 2017-11-12 RX ADMIN — ACETAMINOPHEN 650 MG: 325 TABLET ORAL at 08:11

## 2017-11-12 RX ADMIN — ACETAMINOPHEN 325 MG: 325 TABLET ORAL at 02:11

## 2017-11-12 RX ADMIN — LOSARTAN POTASSIUM 100 MG: 50 TABLET, FILM COATED ORAL at 08:11

## 2017-11-12 RX ADMIN — STANDARDIZED SENNA CONCENTRATE AND DOCUSATE SODIUM 2 TABLET: 8.6; 5 TABLET, FILM COATED ORAL at 08:11

## 2017-11-12 RX ADMIN — ATENOLOL 25 MG: 25 TABLET ORAL at 08:11

## 2017-11-12 RX ADMIN — ENOXAPARIN SODIUM 40 MG: 100 INJECTION SUBCUTANEOUS at 04:11

## 2017-11-12 RX ADMIN — Medication 1 CAPSULE: at 08:11

## 2017-11-12 RX ADMIN — AMLODIPINE BESYLATE 10 MG: 10 TABLET ORAL at 08:11

## 2017-11-12 NOTE — PROGRESS NOTES
[facility]  Physical Medicine & Rehab  Progress Note    Patient Name: An Hazel  MRN: [id number]  Patient Class: IP- Rehab   Admission Date: 10/31/2017  Length of Stay: 12 days  Attending Physician: Verna Mc MD  Primary Care Provider: Alcon Thomas Jr, MD    Subjective:     Principal Problem:Closed intertrochanteric fracture of right hip, initial encounter    Interval History: No acute events over night.  Pain controlled/Slept well overnight. Had several questions regarding DC planning.       Scheduled Medications:    amLODIPine  10 mg Oral Daily    atenolol  25 mg Oral BID    enoxaparin  40 mg Subcutaneous Daily    Lactobacillus rhamnosus GG  1 capsule Oral Daily    losartan  100 mg Oral QHS    oxybutynin  5 mg Oral QHS    oxyCODONE-acetaminophen  1 tablet Oral BID    polyethylene glycol  17 g Oral Daily    pravastatin  10 mg Oral Daily    senna-docusate 8.6-50 mg  2 tablet Oral BID       PRN Medications: acetaminophen, bisacodyl, ondansetron, oxyCODONE-acetaminophen, polyethylene glycol, ramelteon, sodium phosphates    Review of Systems   Constitutional: Negative for unexpected weight change.   HENT: Negative for congestion and ear pain.    Eyes: Negative for discharge.   Respiratory: Negative for cough and shortness of breath.    Cardiovascular: Negative for chest pain and palpitations.   Gastrointestinal: Negative for abdominal pain, diarrhea, nausea and vomiting.   Endocrine: Negative for cold intolerance.   Genitourinary: Positive for frequency. Negative for dysuria and flank pain.   Musculoskeletal: Positive for arthralgias and gait problem.   Neurological: Positive for weakness.   Psychiatric/Behavioral: Negative for agitation and hallucinations.     Objective:     Vital Signs (Most Recent):  Temp: 97.9 °F (36.6 °C) (11/12/17 0700)  Pulse: 65 (11/12/17 0700)  Resp: 16 (11/12/17 0700)  BP: 138/66 (11/12/17 1100)  SpO2: (!) 94 % (11/12/17 0700)    Vital  Signs (24h Range):  Temp:  [97.9 °F (36.6 °C)-98.4 °F (36.9 °C)] 97.9 °F (36.6 °C)  Pulse:  [60-65] 65  Resp:  [16-17] 16  SpO2:  [94 %] 94 %  BP: (131-179)/(55-74) 138/66     Physical Exam   Constitutional: She appears well-developed and well-nourished.   HENT:   Head: Normocephalic and atraumatic.   Eyes: EOM are normal.   Cardiovascular: Normal rate.    Pulmonary/Chest: Effort normal. No respiratory distress.   Abdominal: Soft. There is no tenderness.   Musculoskeletal: She exhibits no edema.   RUE in sling, + pulse,  WNL  LUE: SA, EF/EE  WFL  RLE: HF/KE 1/5, DF/PF 3/5  LLE: HF, KE, DF/PF 4/5    Neurological: She is alert.   Skin: Skin is warm.   Psychiatric: She has a normal mood and affect.   Vitals reviewed.    NEUROLOGICAL EXAMINATION:     CRANIAL NERVES     CN III, IV, VI   Extraocular motions are normal.       Assessment/Plan:      An Hazel is a 89 y.o. female admitted to inpatient rehabilitation on 10/31/2017 for Closed intertrochanteric fracture of right hip, initial encounter with impaired mobility and ADLs. Patient remains appropriate for PT, OT, and as required Speech therapy. Patient continues to require 24 hour nursing care as well as daily Physician assessment.    * Closed intertrochanteric fracture of right hip, initial encounter    ORIF of the right hip on 10/27, WBAT. Arm was treated with a sling and swathe.   - Ortho precautions: Non weight bearing RUE for 2 weeks.    - Sling and swathe for comfort. Weight bearing as tolerated RLE.  - Surgical wound: Dressing changes every 3 days. Remove staples on POD #14.  - Pain: Cont scheduled and PRN percocet.    Functional: Dep for gait, Mod A for w/c, Max A for transfers, Max A for bed mobility, Max A for UB/LB dsg        Essential hypertension    - increased amlodipine to 10mg daily on 11/7   - cont atenolol  - cont to monitor BP/HR, adjust meds prn    Vitals:    11/11/17 1455 11/1935 11/12/17 0700 11/12/17 1100   BP: (!)  131/55 (!) 147/60 (!) 179/74 138/66   BP Location: Left arm Left arm Left arm Left arm   Patient Position: Sitting Sitting Sitting Sitting   Pulse:  60 65    Resp:  17 16    Temp:  98.4 °F (36.9 °C) 97.9 °F (36.6 °C)    TempSrc:  Oral Oral    SpO2:  (!) 94% (!) 94%    Weight:       Height:                 Impaired mobility and ADLs    Other Rehab Plan/Continue to monitor:   Nutrition:    - Prealbumin 9   - Nutritionist on board    - GI ppx: none  Bladder Management: monitor for continence   - restart home Oxybutinin for nocturnal urgency  - UTI: enterobacter cloacae, completed Cipro course  Bowel Management: monitor for continence   - PeriColace and Miralax scheduled; Suppository PRN   - Will monitor for regularity    Mood: stable   Sleep: monitor; ramelteon PRN   Skin: Monitor   DVT ppx: Lovenox 40mg daily   Continue Zofran PRN          Anemia    - Likely post-op  - Cont to monitor    Lab Results   Component Value Date    WBC 9.83 11/09/2017    HGB 8.2 (L) 11/09/2017    HCT 26.8 (L) 11/09/2017    MCV 96 11/09/2017     (H) 11/09/2017                 DISCHARGE PLANNING:  Tentative Discharge Date: 11/14/2017    Future Appointments  Date Time Provider Department Center   1/10/2018 8:20 AM Alcon Thomas Jr., MD [facility] David Mc MD  Department of Physical Medicine & Rehab   [facility]

## 2017-11-12 NOTE — SUBJECTIVE & OBJECTIVE
Interval History: No acute events over night.  Pain controlled/Slept well overnight. Had several questions regarding DC planning.       Scheduled Medications:    amLODIPine  10 mg Oral Daily    atenolol  25 mg Oral BID    enoxaparin  40 mg Subcutaneous Daily    Lactobacillus rhamnosus GG  1 capsule Oral Daily    losartan  100 mg Oral QHS    oxybutynin  5 mg Oral QHS    oxyCODONE-acetaminophen  1 tablet Oral BID    polyethylene glycol  17 g Oral Daily    pravastatin  10 mg Oral Daily    senna-docusate 8.6-50 mg  2 tablet Oral BID       PRN Medications: acetaminophen, bisacodyl, ondansetron, oxyCODONE-acetaminophen, polyethylene glycol, ramelteon, sodium phosphates    Review of Systems   Constitutional: Negative for unexpected weight change.   HENT: Negative for congestion and ear pain.    Eyes: Negative for discharge.   Respiratory: Negative for cough and shortness of breath.    Cardiovascular: Negative for chest pain and palpitations.   Gastrointestinal: Negative for abdominal pain, diarrhea, nausea and vomiting.   Endocrine: Negative for cold intolerance.   Genitourinary: Positive for frequency. Negative for dysuria and flank pain.   Musculoskeletal: Positive for arthralgias and gait problem.   Neurological: Positive for weakness.   Psychiatric/Behavioral: Negative for agitation and hallucinations.     Objective:     Vital Signs (Most Recent):  Temp: 97.9 °F (36.6 °C) (11/12/17 0700)  Pulse: 65 (11/12/17 0700)  Resp: 16 (11/12/17 0700)  BP: 138/66 (11/12/17 1100)  SpO2: (!) 94 % (11/12/17 0700)    Vital Signs (24h Range):  Temp:  [97.9 °F (36.6 °C)-98.4 °F (36.9 °C)] 97.9 °F (36.6 °C)  Pulse:  [60-65] 65  Resp:  [16-17] 16  SpO2:  [94 %] 94 %  BP: (131-179)/(55-74) 138/66     Physical Exam   Constitutional: She appears well-developed and well-nourished.   HENT:   Head: Normocephalic and atraumatic.   Eyes: EOM are normal.   Cardiovascular: Normal rate.    Pulmonary/Chest: Effort normal. No respiratory  distress.   Abdominal: Soft. There is no tenderness.   Musculoskeletal: She exhibits no edema.   RUE in sling, + pulse,  WNL  LUE: SA, EF/EE  WFL  RLE: HF/KE 1/5, DF/PF 3/5  LLE: HF, KE, DF/PF 4/5    Neurological: She is alert.   Skin: Skin is warm.   Psychiatric: She has a normal mood and affect.   Vitals reviewed.    NEUROLOGICAL EXAMINATION:     CRANIAL NERVES     CN III, IV, VI   Extraocular motions are normal.

## 2017-11-12 NOTE — ASSESSMENT & PLAN NOTE
- increased amlodipine to 10mg daily on 11/7   - cont atenolol  - cont to monitor BP/HR, adjust meds prn    Vitals:    11/11/17 1455 11/1935 11/12/17 0700 11/12/17 1100   BP: (!) 131/55 (!) 147/60 (!) 179/74 138/66   BP Location: Left arm Left arm Left arm Left arm   Patient Position: Sitting Sitting Sitting Sitting   Pulse:  60 65    Resp:  17 16    Temp:  98.4 °F (36.9 °C) 97.9 °F (36.6 °C)    TempSrc:  Oral Oral    SpO2:  (!) 94% (!) 94%    Weight:       Height:

## 2017-11-12 NOTE — PLAN OF CARE
Problem: Patient Care Overview  Goal: Plan of Care Review  Outcome: Ongoing (interventions implemented as appropriate)  Patient alert and oriented. Assisted to bedside commode and in wheel chair. Pain level of 4. When given her acetaminophen her pain level went down to a 0. Patient in good spirits. Patient washed face and brushed teeth. Patient ate breakfast and lunch.

## 2017-11-12 NOTE — PLAN OF CARE
Problem: Patient Care Overview  Goal: Plan of Care Review  Outcome: Ongoing (interventions implemented as appropriate)  Patient AAOX4 with no complaints. Patient had a bowel movement today. Patient up out of bed. Instructed patient to use call light for assistance. Will continue to monitor patient.

## 2017-11-13 ENCOUNTER — TELEPHONE (OUTPATIENT)
Dept: ORTHOPEDICS | Facility: CLINIC | Age: 82
End: 2017-11-13

## 2017-11-13 LAB
ANION GAP SERPL CALC-SCNC: 6 MMOL/L
BASOPHILS # BLD AUTO: 0.06 K/UL
BASOPHILS NFR BLD: 1 %
BUN SERPL-MCNC: 20 MG/DL
CALCIUM SERPL-MCNC: 9.1 MG/DL
CHLORIDE SERPL-SCNC: 106 MMOL/L
CO2 SERPL-SCNC: 27 MMOL/L
CREAT SERPL-MCNC: 0.7 MG/DL
DIFFERENTIAL METHOD: ABNORMAL
EOSINOPHIL # BLD AUTO: 0.1 K/UL
EOSINOPHIL NFR BLD: 2.4 %
ERYTHROCYTE [DISTWIDTH] IN BLOOD BY AUTOMATED COUNT: 19 %
EST. GFR  (AFRICAN AMERICAN): >60 ML/MIN/1.73 M^2
EST. GFR  (NON AFRICAN AMERICAN): >60 ML/MIN/1.73 M^2
GLUCOSE SERPL-MCNC: 100 MG/DL
HCT VFR BLD AUTO: 30.3 %
HGB BLD-MCNC: 9.1 G/DL
LYMPHOCYTES # BLD AUTO: 1.4 K/UL
LYMPHOCYTES NFR BLD: 23.5 %
MCH RBC QN AUTO: 28.6 PG
MCHC RBC AUTO-ENTMCNC: 30 G/DL
MCV RBC AUTO: 95 FL
MONOCYTES # BLD AUTO: 0.7 K/UL
MONOCYTES NFR BLD: 12.8 %
NEUTROPHILS # BLD AUTO: 3.5 K/UL
NEUTROPHILS NFR BLD: 60.3 %
PLATELET # BLD AUTO: 556 K/UL
PMV BLD AUTO: 10.5 FL
POTASSIUM SERPL-SCNC: 4 MMOL/L
RBC # BLD AUTO: 3.18 M/UL
SODIUM SERPL-SCNC: 139 MMOL/L
WBC # BLD AUTO: 5.79 K/UL

## 2017-11-13 PROCEDURE — 12800000 HC REHAB SEMI-PRIVATE ROOM

## 2017-11-13 PROCEDURE — 25000003 PHARM REV CODE 250: Performed by: HOSPITALIST

## 2017-11-13 PROCEDURE — 97530 THERAPEUTIC ACTIVITIES: CPT

## 2017-11-13 PROCEDURE — 97116 GAIT TRAINING THERAPY: CPT

## 2017-11-13 PROCEDURE — 85025 COMPLETE CBC W/AUTO DIFF WBC: CPT

## 2017-11-13 PROCEDURE — 25000003 PHARM REV CODE 250: Performed by: PHYSICAL MEDICINE & REHABILITATION

## 2017-11-13 PROCEDURE — 36415 COLL VENOUS BLD VENIPUNCTURE: CPT

## 2017-11-13 PROCEDURE — 63600175 PHARM REV CODE 636 W HCPCS: Performed by: HOSPITALIST

## 2017-11-13 PROCEDURE — 80048 BASIC METABOLIC PNL TOTAL CA: CPT

## 2017-11-13 PROCEDURE — 99233 SBSQ HOSP IP/OBS HIGH 50: CPT | Mod: ,,, | Performed by: PHYSICAL MEDICINE & REHABILITATION

## 2017-11-13 PROCEDURE — 97150 GROUP THERAPEUTIC PROCEDURES: CPT

## 2017-11-13 PROCEDURE — 97537 COMMUNITY/WORK REINTEGRATION: CPT

## 2017-11-13 RX ADMIN — Medication 1 CAPSULE: at 05:11

## 2017-11-13 RX ADMIN — OXYCODONE HYDROCHLORIDE AND ACETAMINOPHEN 1 TABLET: 5; 325 TABLET ORAL at 01:11

## 2017-11-13 RX ADMIN — ATENOLOL 25 MG: 25 TABLET ORAL at 07:11

## 2017-11-13 RX ADMIN — STANDARDIZED SENNA CONCENTRATE AND DOCUSATE SODIUM 2 TABLET: 8.6; 5 TABLET, FILM COATED ORAL at 08:11

## 2017-11-13 RX ADMIN — LOSARTAN POTASSIUM 100 MG: 50 TABLET, FILM COATED ORAL at 08:11

## 2017-11-13 RX ADMIN — ACETAMINOPHEN 650 MG: 325 TABLET ORAL at 08:11

## 2017-11-13 RX ADMIN — OXYCODONE HYDROCHLORIDE AND ACETAMINOPHEN 1 TABLET: 5; 325 TABLET ORAL at 07:11

## 2017-11-13 RX ADMIN — STANDARDIZED SENNA CONCENTRATE AND DOCUSATE SODIUM 2 TABLET: 8.6; 5 TABLET, FILM COATED ORAL at 07:11

## 2017-11-13 RX ADMIN — ATENOLOL 25 MG: 25 TABLET ORAL at 08:11

## 2017-11-13 RX ADMIN — OXYBUTYNIN CHLORIDE 5 MG: 5 TABLET, FILM COATED, EXTENDED RELEASE ORAL at 08:11

## 2017-11-13 RX ADMIN — PRAVASTATIN SODIUM 10 MG: 10 TABLET ORAL at 07:11

## 2017-11-13 RX ADMIN — ENOXAPARIN SODIUM 40 MG: 100 INJECTION SUBCUTANEOUS at 05:11

## 2017-11-13 RX ADMIN — AMLODIPINE BESYLATE 10 MG: 10 TABLET ORAL at 07:11

## 2017-11-13 NOTE — TELEPHONE ENCOUNTER
----- Message from Caridad Quinn MA sent at 11/10/2017  4:06 PM CST -----  Contact: Rose/VictorM Vasquez  Tried to call rose back but she did not answer please try again Monday morning   ----- Message -----  From: Tejinder Membreno  Sent: 11/10/2017   3:21 PM  To: Tnea Kowalski Staff    [facility] called in stating that the pt needs a hospital f/u in the fx clinic for a rt hip fx. Rose can be reached at ext [phone] or Kaycee at ext [phone].

## 2017-11-13 NOTE — PLAN OF CARE
Problem: Patient Care Overview  Goal: Plan of Care Review  Outcome: Ongoing (interventions implemented as appropriate)  Frequent rounds to assess pain & safety.    Problem: Fall Risk (Adult)  Goal: Absence of Falls  Patient will demonstrate the desired outcomes by discharge/transition of care.   Patient remains free of fall or injury thus far this shift, safety measures maintained.    Problem: Pressure Ulcer Risk (Lucho Scale) (Adult,Obstetrics,Pediatric)  Goal: Skin Integrity  Patient will demonstrate the desired outcomes by discharge/transition of care.   Outcome: Ongoing (interventions implemented as appropriate)  No new skin breakdown noted this shift, assistance is provided to turn & reposition often.     Problem: Pain, Acute (Adult)  Goal: Acceptable Pain Control/Comfort Level  Patient will demonstrate the desired outcomes by discharge/transition of care.   Outcome: Ongoing (interventions implemented as appropriate)  Pain is being managed with scheduled & PRN meds.     Problem: Mobility, Physical Impaired (Adult)  Goal: Enhanced Functionality Ability  Patient will demonstrate the desired outcomes by discharge/transition of care.   Outcome: Ongoing (interventions implemented as appropriate)  Patient is mod - max assist with transfers.

## 2017-11-13 NOTE — PROGRESS NOTES
Occupational Therapy  DISCHARGE [facility]    An Hazel  MRN: [id number]  Room/Bed: E278/E278 A       11/13/17 1243   Transfers   Bed/Chair/WC 4   Toilet 4   Shower 4   Self Care   Eating 5   Grooming 6   Bathing 4   Dressing-Upper 4   Dressing-Lower 3   Toileting 4   Communication   Comprehension 6   Mode B   Expression 6   Mode B   Social Cognition   Social Interaction 6   Problem Solving 6   Memory 6       HUSSEIN Sanchez  11/13/2017

## 2017-11-13 NOTE — PROGRESS NOTES
Pt's daughter participated in family training on Friday.  Pt and daughter stated they are ready for discharge home tomorrow with home health.

## 2017-11-13 NOTE — TREATMENT PLAN
"[facility] DME recommendations  DME to be delivered home unless otherwise specified.         [] NO DME NEEDED _______________________________    []Walker:(can only select one of these) []Adult (5'4"-6'6") []King (4'4"-5'7")  [] Wide/ Heavy Duty         []Edison        []  Rollator       [] knee walker   Accessories/Other:______________   Wheels:(must complete) [] Yes  [] No     []Crutches:  []Axillary []Loft strand    [x]Cane:              []Straight []Narrow based quad   [x]Wide based quad         [] Other:____________________________________________    [] Transfer Devices:   []Franklin Lift    []Slide Board ( [] with cut out  []26  []29)    ______________________________________________________________________    []Wheelchair: (please check)   Number of hours up in wheelchair per day:______     Style:  [] Standard [] Pediatric  [] Light weight  []Reclining     []Amputee [] Edison [] POV []Custom     Custom Vendor:________________________________________                                                      Seat Width: []18 (standard adult)    []16" (small adult)   []14(child)      [] 20  [] 22 [] 24         Seat Depth:   []16    []18  []20      Back Height:    []Standard      []Edison          []Other     Leg Support: []Standard   []Heel loops   []Elevating leg rest    []Articulating    []Swing Away     Arm Height:  []Detachable []Full   [] Desk  []Swing Away [] Adjustable Height       Lap Belt: []Velcro []Buckle                               Accessories: [] Front brakes          [] Brake Extensions  []Anti-tippers                          []Safety Belt    Other:_______________________________________________     Cushion: []Basic []Foam []Gel  []Roho []Lacho I       Justification for Cushion(Must complete):________________________________      For wheelchair order: (please choose all that apply)  - Caregiver is capable and willing to operate wheelchair safely. []  - Patients upper body strength is sufficient " for propulsion. []   - The patient has a cast, brace, or musculoskeletal condition which prevents 90 degree flexion of the knee. []  - The patient has significant edema of the lower extremities that requires elevating leg rests.  []  - A reclining back is ordered. []  - The patient requires the use of a wheel chair for ADLs within the home. []  - Patient mobility limitations cannot be sufficiently resolved by the use of other ambulatory therapies. []      []3 in 1 commode: []Standard     []Wide-Heavy Duty           []Drop arm                                      []Heavy Duty Drop Arm                              []Tub bench:  []Padded      [](Unpadded=standard)     []Commode Opening                                          [] Heavy Duty    []Shower Chair: []With back   []Without back      []Hospital Bed: []Semi Electric    []Manual    []Electric   []Heavy Duty  []Extra Heavy Duty(>600#)  Patient requires a bed height different than a fixed height hospital bed to permit transfers to chair, wheel chair or standing. []Yes   []N/A     []Accessories: [] Gel Overlay Mattress     []Low Air Mattress   []Alternating Pressure Pad                              []Trapeze    [] Hip Kit:  [] Short Horn     [] Long Horn       [x]Other:____Button Hook______________________    ________________________________________________________________________    [x]Home Health:  [x]OT [x]PT []SLP   [] MSW   [] Aide    []SN        []Outpatient:  []OT []PT []SLP [] MSW   [] CM      [x] Internal Referral      [] External Referral  ________________________________________________________________________    Mata Washburn 11/13/2017

## 2017-11-13 NOTE — SUBJECTIVE & OBJECTIVE
Interval History: No acute events over the weekend. Pain controlled. Sleeping well. Constipation resolved.    Scheduled Medications:    amLODIPine  10 mg Oral Daily    atenolol  25 mg Oral BID    enoxaparin  40 mg Subcutaneous Daily    Lactobacillus rhamnosus GG  1 capsule Oral Daily    losartan  100 mg Oral QHS    oxybutynin  5 mg Oral QHS    oxyCODONE-acetaminophen  1 tablet Oral BID    polyethylene glycol  17 g Oral Daily    pravastatin  10 mg Oral Daily    senna-docusate 8.6-50 mg  2 tablet Oral BID       PRN Medications: acetaminophen, bisacodyl, ondansetron, oxyCODONE-acetaminophen, polyethylene glycol, ramelteon, sodium phosphates    Review of Systems   Constitutional: Negative for unexpected weight change.   HENT: Negative for congestion and ear pain.    Eyes: Negative for discharge.   Respiratory: Negative for cough and shortness of breath.    Cardiovascular: Negative for chest pain and palpitations.   Gastrointestinal: Negative for abdominal distention, abdominal pain, diarrhea, nausea and vomiting.   Endocrine: Negative for cold intolerance.   Genitourinary: Positive for frequency. Negative for dysuria and flank pain.   Musculoskeletal: Positive for arthralgias and gait problem.   Neurological: Positive for weakness.   Psychiatric/Behavioral: Negative for agitation and hallucinations.     Objective:     Vital Signs (Most Recent):  Temp: 98 °F (36.7 °C) (11/13/17 0700)  Pulse: 69 (11/13/17 0700)  Resp: 18 (11/13/17 0700)  BP: (!) 169/74 (11/13/17 0700)  SpO2: 97 % (11/13/17 0700)    Vital Signs (24h Range):  Temp:  [98 °F (36.7 °C)-98.9 °F (37.2 °C)] 98 °F (36.7 °C)  Pulse:  [66-69] 69  Resp:  [17-18] 18  SpO2:  [95 %-97 %] 97 %  BP: (138-169)/(66-74) 169/74     Physical Exam   Constitutional: She appears well-developed and well-nourished.   HENT:   Head: Normocephalic and atraumatic.   Eyes: EOM are normal.   Cardiovascular: Normal rate.    Pulmonary/Chest: Effort normal. No respiratory  distress.   Abdominal: Soft. She exhibits no distension. There is no tenderness.   Musculoskeletal: She exhibits no edema.   RUE in sling, + pulse,  WNL  LUE: SA, EF/EE  WFL  RLE: HF/KE 1/5, DF/PF 3/5  LLE: HF, KE, DF/PF 4/5    Neurological: She is alert.   Skin: Skin is warm.   Psychiatric: She has a normal mood and affect.   Vitals reviewed.    NEUROLOGICAL EXAMINATION:     CRANIAL NERVES     CN III, IV, VI   Extraocular motions are normal.

## 2017-11-13 NOTE — ASSESSMENT & PLAN NOTE
Other Rehab Plan/Continue to monitor:   Nutrition:    - Prealbumin 9   - Nutritionist on board    - GI ppx: none  Bladder Management: monitor for continence   - restarted home Oxybutinin for nocturnal urgency  - UTI: enterobacter cloacae, completed Cipro course  Bowel Management: monitor for continence   - PeriColace and Miralax scheduled; Suppository PRN   - Will monitor for regularity    Mood: stable   Sleep: monitor; ramelteon PRN   Skin: Monitor   DVT ppx: Lovenox 40mg daily   Continue Zofran PRN

## 2017-11-13 NOTE — PROGRESS NOTES
Physical Therapy   Daily Physical Therapy FIM Scores    An Mehta Aleksander Luz   MRN: [id number]        11/13/17 1200   Transfers   Bed/Chair/WC 3   Toilet 3   Tub 2   Locomotion   Distance Walked 1   Distance Wheelchair 3   Walk 1   Wheelchair 4   Mode C   Stairs 2     Ronda Templeton, PT  11/13/2017

## 2017-11-13 NOTE — ASSESSMENT & PLAN NOTE
- Likely post-op  - Cont to monitor    Lab Results   Component Value Date    WBC 5.79 11/13/2017    HGB 9.1 (L) 11/13/2017    HCT 30.3 (L) 11/13/2017    MCV 95 11/13/2017     (H) 11/13/2017

## 2017-11-13 NOTE — TREATMENT PLAN
"Rehab Services' DME recommendations  DME to be delivered home unless otherwise specified.         [] NO DME NEEDED ________________________________________________________________________    []Walker:(can only select one of these)  []Adult (5'4"-6'6") []King (4'4"-5'7")     [] Wide/ Heavy Duty         []Edison                  []  Rollator                     [] knee walker       Accessories/Other:____________________________________     Wheels:(must complete) [] Yes  [] No     []Crutches:  []Axillary []Loft strand    [x]Cane:              []Straight []Narrow based quad   [x]Wide based quad         [] Other:____________________________________________    [] Transfer Devices:   []Franklin Lift    []Slide Board ( [] with cut out  []26  []29)    ______________________________________________________________________    []Wheelchair: (please check)    Number of hours up in wheelchair per     day:_____________     Style:  [] Standard [] Pediatric  [] Light weight  []Reclining     []Amputee [] Edison [] POV []Custom     Custom Vendor:________________________________________                                                      Seat Width: []18 (standard adult)    []16" (small adult)   []14(child)      [] 20  [] 22 [] 24         Seat Depth:   []16    []18  []20      Back Height:    []Standard      []Edison          []Other     Leg Support: []Standard []Heel loops []Elevating leg rest    []Articulating              []Swing Away     Arm Height:  []Detachable []Full   [] Desk  []Swing Away [] Adjustable Height          Lap Belt: []Velcro []Buckle                               Accessories: [] Front brakes          [] Brake Extensions  []Anti-tippers                          []Safety Belt    Other:_______________________________________________     Cushion: []Basic []Foam []Gel  []Roho []Lacho I      Justification for Cushion(Must " complete):________________________________    ______________________________________________________________________        For wheelchair order: (please choose all that apply)  - Caregiver is capable and willing to operate wheelchair safely. []  - Patients upper body strength is sufficient for propulsion. []   - The patient has a cast, brace, or musculoskeletal condition which prevents 90 degree flexion of the knee. []  - The patient has significant edema of the lower extremities that requires elevating leg rests.  []  - A reclining back is ordered. []  - The patient requires the use of a wheel chair for ADLs within the home. []  - Patient mobility limitations cannot be sufficiently resolved by the use of other ambulatory therapies. []         __    []3 in 1 commode: []Standard     []Wide-Heavy Duty           []Drop arm                                      []Heavy Duty Drop Arm                              []Tub bench:  []Padded      [](Unpadded=standard)     []Commode Opening                                          [] Heavy Duty    []Shower Chair: []With back   []Without back      []Hospital Bed: []Semi Electric    []Manual    []Electric   []Heavy Duty  []Extra Heavy Duty(>600#)  Patient requires a bed height different than a fixed height hospital bed to permit transfers to chair, wheel chair or standing. []Yes         []N/A     []Accessories: [] Gel Overlay Mattress     []Low Air Mattress   []Alternating Pressure Pad                              []Trapeze    [] Hip Kit:  [] Short Horn     [] Long Horn         [x]Other:____Button Hook___________________________________________________    ________________________________________________________________________    [x]Home Health:  [x]OT [x]PT []SLP   [] MSW   [] Aide    []SN        []Outpatient:  []OT []PT []SLP [] MSW   [] CM      [] Internal Referral      [] External Referral  ________________________________________________________________________    Jeison  HUSSEIN Reina 11/13/2017

## 2017-11-13 NOTE — ASSESSMENT & PLAN NOTE
ORIF of the right hip on 10/27, WBAT. Arm was treated with a sling and swathe.   - Ortho precautions: Non weight bearing RUE for 2 weeks.    - Sling and swathe for comfort. Weight bearing as tolerated RLE.  - Surgical wound: Dressing changes every 3 days. Remove staples on POD #14.  - Pain: Cont scheduled and PRN percocet.    Functional: Dep for gait, Mod A for w/c, Max A for transfers, Max A for bed mobility, Mod A for LB dsg, grooming Mod I, stairs Max A

## 2017-11-13 NOTE — PROGRESS NOTES
"Physical Therapy   Treatment/ Patient Discharge Summary    An Hazel   MRN: [id number]      11/13/17 1047   PT Time Calculation   PT Start Time 1047   PT Stop Time 1132   PT Total Time (min) 45 min   Treatment   Treatment Type Treatment  (Patient Discharge Summary)   PT/PTA PT   General   PT Received On 11/13/17   Family/Caregiver Present Yes  (Pt's daughter present)   Patient Found (position) Seated in wheelchair   Pt found with (posey belt donned, with R UE sling)   Precautions   General Precautions fall   Orthopedic Yes   RUE Weight Bearing NWB   RLE Weight Bearing WBAT   Required Braces or Orthoses Yes   RUE Brace/Splint sling   Visual/Auditory Hearing impaired;Vision impaired   Subjective   Patient states "I am doing pretty good"   Pain/Comfort   Pain Rating 1 4/10   Location - Side 1 Right   Location - Orientation 1 generalized   Location 1 hip   Pain Addressed 1 Distraction;Reposition   Pain Rating Post-Intervention 1 4/10   Bed Mobility   Bed Mobility yes   Rolling/Turning to Left Minimum assistance  (x 1 trial on mat, assist required for hip)   Rolling/Turning Right (not performed due to NWB to R LE)   Supine to Sit Moderate Assistance   Supine to Sit Comments x 1 trial on mat, for elevation of trunk   Sit to Supine Minimum Assistance  (x 1 trial on mat, assist required for R LE management)   Transfers   Transfer yes   Sit to Stand   Sit <> Stand Assistance Moderate Assistance   Sit <> Stand Assistive Device Quad Cane   Trials/Comments Pt performed x 4 trials, lifting assistance from PT required to elevate hips out of wheelchair    Stand to Sit   Assistance Minimum Assistance   Assistive Device Quad Cane   Trials/Comments Pt performed x 3 trials, verbal cues required for L UE placement during transfer    Chair to Mat   Chair<> Mat Technique Slide Board   Chair<>Mat Assistance Moderate Assistance   Chair <> Mat Assistive Device Slide Board   Trials/Comments x 1 trial, PT educated pt's daughter on " appropriate placement of slide board. Pt required verbal cues to maintain NWB to R UE during transfer    Mat to Chair   Technique Slide Board   Assistance Moderate Assistance   Assistive Device Slide Board   Trials/Comments x 1 trial    Tub Bench Transfer   Technique Stand Pivot   Assistance Maximum Assistance   Assistive Device Quad Cane   Trials/Comments x 1 trial, pt required lifting and lowering assistance for sit to stand and stand to sit portion of transfer. Pt required minimal assistance for pivot portion of transfer. Pt required assistance to bring R LE into and out of tub to complete transfer    Car Transfer   Car Transfer Technique Stand Pivot   Car Transfer Assistance Maximum Assistance   Car Transfer Assistive Device Other (see comments)  (Quad cane)   Trials/Comments x 1 trial, pt required lifting and lowering assistance for sit to stand and stand to sit portion of transfer, pt required minimal assistance for pivot portion of transfer. Pt required assistance for R LE management into and out of car to complete transfer   Wheelchair Activities   Propulsion Yes   Propulsion Type 1 Manual   Level 1 Level tile   Method 1 Left upper extremity;Left lower extremity   Level of Assistance 1 Minimum assistance   Description/ Details 1 150 feet, pt required minimal assistance for appropriate navigation around obstacles. Pt had difficulty coordinating L UE and L LE.    Gait   Gait Yes   Weight Bearing Status WBAT to R LE   Gait 1   Surface 1 Level tile   Gait Assistive Device LBQC   Assistance 1 Minimum assistance   Gait Distance Pt ambulated x 1 trial of 34 feet, verbal cues required for appropriate sequence of gait with LBQC. Pt required seated rest break due to R LE pain and fatigue. Decreased weight acceptance to R LE observed during stance phase    Gait Pattern swing-through gait   Gait Deviation(s) decreased lissette;increased time in double stance;decreased velocity of limb motion;decreased step  "length;decreased stride length;decreased toe-to-floor clearance;decreased weight-shifting ability;forward lean   Impairments Contributing to Gait Deviations impaired balance;decreased strength;decreased ROM;impaired postural control;pain   Stairs   Stairs Yes   Stairs/Curb Step   Rails 1 Left   Device 1 No device   Assistance 1 Minimum assistance   Comment/# Steps 1 Pt ascended and descended 4 6" steps with non reciprocal LE gait pattern x 1 trial. Pt required seated rest break due to R LE pain and fatigue   Stairs/ Curb Step  2   Rails 2 None (Comment)   Device 2 LBQC   Assistance 2 Moderate assistance   Comment/# Steps 2 Pt ascended and descended 2" curb step, verbal cues required for appropriate sequence of activity. Posterior lean observed which required assistance from PT to maintain pt's balance    Other Activities   Comments Patient performed slide board transfer from wheelchair to bedside commode x 1 trial of moderate assistance required.     PT deferred performing picking up object and ambulating over uneven surfaces due to safety concerns.    Activity Tolerance   Activity Tolerance Patient tolerated treatment well   After Treatment   Patient Position After Treatment Seated in wheelchair   Patient after treatment left call button in reach  (posey belt donned)   Assessment   Prognosis Fair   Problem List Decreased strength;Decreased endurance;Impaired balance;Decreased mobility;Orthopedic restrictions;Pain;Impaired vision;Decreased safety awareness   Session Assessment progressing toward goals   Assessment Pt has made steady progress during her stay on IP Rehab. Pt continues to remain limited during functional mobility by pt's orthopedic restrictions. Pt and pt's daughter reported feeling comfortable with pt's discharge home. Pt continues to require between moderate and maximum assistance at times for functional mobility. Pt will benefit from further home health PT upon D/C in order to improve pt's " functional independence with mobility. Pt was able to achieve 5 of 7 long term goals for physical therapy.    Level of Motivation/Participation good   Barriers to Discharge Inaccessible home environment   Barriers to Discharge Comments 5 [facility] home   Long Term Goals   Supine to Sit-Met/Not Met Not Met   Sit to Supine - Met/Not Met Met   Transfer Sit to stand - Met/Not Met Met   Transfer Bed/Chair - Met/Not Met Met   Ambulate - Met/Not Met Met   Propel Wheelchair - Met/Not Met Not met   Other Goal - Progress Met   Discharge Recommendations   Equipment Needed After Discharge wheelchair   Discharge Facility/Level Of Care Needs home health PT   Plan   Planned Therapy Intervention Continue with current plan   Therapy Frequency 2 times/day;daily;Monday-Friday;Saturday or Sunday   Physical Therapy Follow-up   PT Follow-up? Yes   PT - Next Visit Date 11/14/17   Treatment/Billable Minutes   Gait training 15   Therapeutic Activity 30   Total Time 45     Ronda Templeton, PT  11/13/2017

## 2017-11-13 NOTE — PROGRESS NOTES
"Occupational Therapy  Discharge Summary/AM Treatment  An Hazel   MRN: [id number]   Room/Bed: E278/E278 A     11/13/17 0915   OT Time Calculation   OT Start Time 0915   OT Stop Time 1045   OT Total Time (min) 90 min   General   OT Date of Treatment 11/13/17   Family/Caregiver Present No   Patient Found (position) Supine in bed   Precautions   General Precautions fall   RUE Weight Bearing NWB   RLE Weight Bearing WBAT   RUE Brace/Splint sling   Visual/Auditory Hearing impaired   Subjective   Patient states "I have a hard time using just one hand."   Pain/Comfort   Pain Rating 4/10   Location - Side Right   Location leg   Pain Addressed Pre-medicate for activity   Pain Comment during ADLs and with movement of LLE   Bed Mobility   Scooting/Bridging Stand by Assistance   Scooting/Bridging Comments to scoot to EOB   Supine to Sit Stand by Assistance   Supine to Sit Comments to EOB with use of rail   Sit to Stand   Sit <> Stand Assistance Minimum Assistance   Sit <> Stand Assistive Device Quad Cane   Trials/Comments from EOB   Stand to Sit   Assistance Minimum Assistance   Assistive Device Quad Cane   Trials/Comments to EOB   Bed to Chair   Bed <> Chair Technique Stand Pivot   Bed <> Chair Transfer Assistance Minimum Assistance   Bed <> Chair Assistive Device Quad Cane   Trials/Comments from EOB>wc   Tub Bench Transfer   Tub Transfer Technique Stand Pivot   Tub Bench Transfer Assistance Minimum Assistance   Tub Transfer Assistive Device Quad Cane   Trials/Comments from wc<>TTB   Toilet Transfer   Toilet Transfer Technique Stand Pivot   Toilet Transfer Assistance Minimum Assistance   Toilet Transfer Assistive Device Quad Cane   Trials/Comments from wc>BSC   Feeding   Feeding Level of Assistance Set-up Assistance   Feeding Where Assessed Wheelchair   Grooming   Grooming Level of Assistance Modified independent   Grooming Where Assessed Wheelchair   Grooming Comments increased time to complete   Bathing   Bathing " Adaptive Equipment Long-handled sponge   Bathing Level of Assistance Minimum assistance   Bathing Where Assessed Edge of bed   Bathing Comments steadying assist from stance for pericare and some assist with drying B feet for thoroughness.   UE Dressing   UE Dressing Adaptive Equipment Button hook   UE Dressing Level of Assistance Minimum assistance   UE Dressing Where Assessed Edge of bed   UE Dressing Comments assist with managing clothing around back   LE Dressing   LE Dressing Adaptive Equipment Reacher;Sock aide   LE Dressing  Level of Assistance Moderate assistance   LE Dressing Where Assessed Edge of bed   LE Dressing Comments assist with B socks, threading pants with reacher and B shoes and steadying from stance and clothing mangaement over R hip   Toileting   Toileting Level of Assistance Minimum assistance   Toileting Where Assessed Bedside commode   Toileting Comments assist with clothing management over R hip   Exercise Tools   Exercise Tools Yes   Rickshaw 5# x 100 reps to increase LUE stregth in prep for ADLs and t/fs   Bilateral Luis 3# x 100 reps flat forward/back to increase B UE strength in prep for ADLs.   Additional Activities   Additional Activities Family training   Additional Activities Comments Daughter was present during tx session and voiced that she understood how to care for her mother.   Activity Tolerance   Activity Tolerance Patient tolerated treatment well   After Treatment   Patient Position After Treatment Seated in wheelchair   Patient after treatment left (daughter and PT present)   Assessment   Prognosis Fair   Problem List Decreased Self Care skills;Decreased upper extremity range of motion;Decreased upper extremity strength;Decreased safe judgment during ADL;Decreased endurance;Decreased fine motor control;Decreased functional mobility;Decreased gross motor control;Decreased IADLs;Decreased Function of right upper extremity;Decreased trunk control for functional activities    Assessment Pt and daughter participated fully in tx session. Pt continues to require assist with ADLs but it is at a managable level for daughter. Pt is OK to dischage from in-pt rehab OT services and continue with HHOT.   Level of Motivation/Participation Good   Long Term Goals   Pt Will Transfer Bed/Chair With contact guard assistance   Transfer Bed/Chair - Met/Not Met Not Met   Pt Will Transfer To Bedside Commode With contact guard assist   Transfer Bedside Commode -Met/Not Met Not Met   Pt Will Transfer To Shower With contact guard assist   Transfer to Shower-Met/Not Met Not Met   Pt Will Perform Eating With modified independence   Eating - Met/Not Met Not Met   Pt Will Perform Grooming Independently   Grooming - Met/Not Met Not Met   Pt Will Perform Bathing With minimal assistance   Bathing - Met/Not Met Met   Pt Will Perform UE Dressing Independently   UE Dressing - Met/Not Met Not Met   Pt Will Perform LE Dressing With contact guard assistance   LE Dressing - Met/Not Met Not Met   Pt Will Perform Toileting With contact guard assistance   Toileting-Met/Not Met Not Met   Discharge Recommendations   Equipment Needed After Discharge wheelchair;cane, quad  (button hook)   Discharge Facility/Level Of Care Needs home with home health   Plan   Plan Alter current plan   Plan Comments continue with HHOT   Occupational Therapy Follow-up   OT Follow-up? No   Treatment/Billable Minutes   Community Work Re-entry 90   Total Time 90       HUSSEIN Sanchez  11/13/2017    LEGEND:   CGA: Contact Guard Assist   EOB: Edgeof Bed   HHA: Hand Held Assist   HOB: Head of Bed   (I): Independent-patient performs task in a timely manner   Max (A): Maximal Assist-patient performs 25-49% of task   Min (A): Minimal Assist- patient performs 75% or more of task   Mod (A): Moderate Assist- patient performs 50-74% of task   NA: Not applicable   NT: Not tested   OOB: Out of Bed   PTA: Prior to admit   QC: Quad Cane   RW: Rolling Walker    (S): Supervision- patient requires cues, coaxing, prompting   SBA: Stand By Assist   SC: Straight Cane   SW: Standard Walker   TBA: To be assessed   Total (A): Total Assist- patient performs less than 25% of task   WC: Wheelchair   WFL: Within Functional Limits   WNL: Within Normal Limits

## 2017-11-13 NOTE — PROGRESS NOTES
[facility]  Physical Medicine & Rehab  Progress Note    Patient Name: An Hazel  MRN: [id number]  Patient Class: IP- Rehab   Admission Date: 10/31/2017  Length of Stay: 13 days  Attending Physician: Verna Mc MD  Primary Care Provider: Alcon Thomas Jr, MD    Subjective:     Principal Problem:Closed intertrochanteric fracture of right hip, initial encounter    Interval History: No acute events over the weekend. Pain controlled. Sleeping well. Constipation resolved.    Scheduled Medications:    amLODIPine  10 mg Oral Daily    atenolol  25 mg Oral BID    enoxaparin  40 mg Subcutaneous Daily    Lactobacillus rhamnosus GG  1 capsule Oral Daily    losartan  100 mg Oral QHS    oxybutynin  5 mg Oral QHS    oxyCODONE-acetaminophen  1 tablet Oral BID    polyethylene glycol  17 g Oral Daily    pravastatin  10 mg Oral Daily    senna-docusate 8.6-50 mg  2 tablet Oral BID       PRN Medications: acetaminophen, bisacodyl, ondansetron, oxyCODONE-acetaminophen, polyethylene glycol, ramelteon, sodium phosphates    Review of Systems   Constitutional: Negative for unexpected weight change.   HENT: Negative for congestion and ear pain.    Eyes: Negative for discharge.   Respiratory: Negative for cough and shortness of breath.    Cardiovascular: Negative for chest pain and palpitations.   Gastrointestinal: Negative for abdominal distention, abdominal pain, diarrhea, nausea and vomiting.   Endocrine: Negative for cold intolerance.   Genitourinary: Positive for frequency. Negative for dysuria and flank pain.   Musculoskeletal: Positive for arthralgias and gait problem.   Neurological: Positive for weakness.   Psychiatric/Behavioral: Negative for agitation and hallucinations.     Objective:     Vital Signs (Most Recent):  Temp: 98 °F (36.7 °C) (11/13/17 0700)  Pulse: 69 (11/13/17 0700)  Resp: 18 (11/13/17 0700)  BP: (!) 169/74 (11/13/17 0700)  SpO2: 97 % (11/13/17 0700)    Vital Signs (24h  Range):  Temp:  [98 °F (36.7 °C)-98.9 °F (37.2 °C)] 98 °F (36.7 °C)  Pulse:  [66-69] 69  Resp:  [17-18] 18  SpO2:  [95 %-97 %] 97 %  BP: (138-169)/(66-74) 169/74     Physical Exam   Constitutional: She appears well-developed and well-nourished.   HENT:   Head: Normocephalic and atraumatic.   Eyes: EOM are normal.   Cardiovascular: Normal rate.    Pulmonary/Chest: Effort normal. No respiratory distress.   Abdominal: Soft. She exhibits no distension. There is no tenderness.   Musculoskeletal: She exhibits no edema.   RUE in sling, + pulse,  WNL  LUE: SA, EF/EE  WFL  RLE: HF/KE 1/5, DF/PF 3/5  LLE: HF, KE, DF/PF 4/5    Neurological: She is alert.   Skin: Skin is warm.   Psychiatric: She has a normal mood and affect.   Vitals reviewed.    NEUROLOGICAL EXAMINATION:     CRANIAL NERVES     CN III, IV, VI   Extraocular motions are normal.       Assessment/Plan:      An Hazel is a 89 y.o. female admitted to inpatient rehabilitation on 10/31/2017 for Closed intertrochanteric fracture of right hip, initial encounter with impaired mobility and ADLs. Patient remains appropriate for PT, OT, and as required Speech therapy. Patient continues to require 24 hour nursing care as well as daily Physician assessment.    * Closed intertrochanteric fracture of right hip, initial encounter    ORIF of the right hip on 10/27, WBAT. Arm was treated with a sling and swathe.   - Ortho precautions: Non weight bearing RUE for 2 weeks.    - Sling and swathe for comfort. Weight bearing as tolerated RLE.  - Surgical wound: Dressing changes every 3 days. Remove staples on POD #14.  - Pain: Cont scheduled and PRN percocet.    Functional: Dep for gait, Mod A for w/c, Max A for transfers, Max A for bed mobility, Mod A for LB dsg, grooming Mod I, stairs Max A        Impaired mobility and ADLs    Other Rehab Plan/Continue to monitor:   Nutrition:    - Prealbumin 9   - Nutritionist on board    - GI ppx: none  Bladder Management:  monitor for continence   - restarted home Oxybutinin for nocturnal urgency  - UTI: enterobacter cloacae, completed Cipro course  Bowel Management: monitor for continence   - PeriColace and Miralax scheduled; Suppository PRN   - Will monitor for regularity    Mood: stable   Sleep: monitor; ramelteon PRN   Skin: Monitor   DVT ppx: Lovenox 40mg daily   Continue Zofran PRN          Essential hypertension    - increased amlodipine to 10mg daily on 11/7   - cont atenolol  - cont losartan  - cont to monitor BP/HR, adjust meds prn    Vitals:    11/12/17 0700 11/12/17 1100 11/12/17 1850 11/13/17 0700   BP: (!) 179/74 138/66 (!) 161/67 (!) 169/74   BP Location: Left arm Left arm Left arm Left arm   Patient Position: Sitting Sitting Sitting Lying   Pulse: 65  66 69   Resp: 16  17 18   Temp: 97.9 °F (36.6 °C)  98.9 °F (37.2 °C) 98 °F (36.7 °C)   TempSrc: Oral  Oral Oral   SpO2: (!) 94%  95% 97%   Weight:       Height:                 Anemia    - Likely post-op  - Cont to monitor    Lab Results   Component Value Date    WBC 5.79 11/13/2017    HGB 9.1 (L) 11/13/2017    HCT 30.3 (L) 11/13/2017    MCV 95 11/13/2017     (H) 11/13/2017                 DISCHARGE PLANNING:  Tentative Discharge Date: 11/14/2017    Future Appointments  Date Time Provider Department Center   11/14/2017 1:30 PM [facility] FRACTURE CARE CLINIC [facility] FRA CAR [place]   1/10/2018 8:20 AM Alcon Thomas Jr., MD [facility] [place] PCW       Romario Singleton MD  Department of Physical Medicine & Rehab   [facility]

## 2017-11-13 NOTE — PROGRESS NOTES
Occupational Therapy  PM group session  An Hazel   MRN: [id number]          11/13/17 1526   OT Time Calculation   OT Start Time 1330   OT Stop Time 1415   OT Total Time (min) 45 min   General   OT Date of Treatment 11/13/17   Treatment/Billable Minutes   Therapeutic Group 45   Total Time 45   Pt participated in 45 min functional hemiplegic dressing group. The group activity reviewed safety considerations, energy conservation, and adaptive strategies for upper body and lower body dressing. Patient educated on adaptive equipment available to assist with dressing (button hook, long handled shoe horn, reacher, dressing stick, elastic shoe laces). Patient also educated on dressing tips that promote increased (I) with dressing such as wearing loose fit clothing, using footstool, and adapted clothing available.     UB dressing (pullover shirt, button down shirt, jacket): MOD Assist    LB dressing (socks)  MAX Assist                      (pants)   MAX Assist                      (shoes)  MAX  Assist    These activities were performed in a group setting to encourage participation with peers and social interaction skills.                 The LISAR and MARK have collaborated and discussed the patient's status, treatment plan and progress toward established goals.     Donovan Crabtree, SABRINA 11/13/2017

## 2017-11-14 ENCOUNTER — LAB VISIT (OUTPATIENT)
Dept: LAB | Facility: HOSPITAL | Age: 82
End: 2017-11-14
Payer: MEDICARE

## 2017-11-14 ENCOUNTER — OFFICE VISIT (OUTPATIENT)
Dept: ORTHOPEDICS | Facility: CLINIC | Age: 82
DRG: 560 | End: 2017-11-14
Attending: PHYSICAL MEDICINE & REHABILITATION
Payer: MEDICARE

## 2017-11-14 VITALS
OXYGEN SATURATION: 94 % | HEART RATE: 75 BPM | SYSTOLIC BLOOD PRESSURE: 135 MMHG | RESPIRATION RATE: 18 BRPM | WEIGHT: 108.63 LBS | TEMPERATURE: 98 F | DIASTOLIC BLOOD PRESSURE: 56 MMHG | HEIGHT: 62 IN | BODY MASS INDEX: 19.99 KG/M2

## 2017-11-14 VITALS
BODY MASS INDEX: 20 KG/M2 | WEIGHT: 108.69 LBS | SYSTOLIC BLOOD PRESSURE: 112 MMHG | HEIGHT: 62 IN | HEART RATE: 72 BPM | DIASTOLIC BLOOD PRESSURE: 68 MMHG

## 2017-11-14 DIAGNOSIS — M81.6 LOCALIZED OSTEOPOROSIS OF LEQUESNE: ICD-10-CM

## 2017-11-14 DIAGNOSIS — M80.80XA PATHOLOGICAL FRACTURE DUE TO OSTEOPOROSIS, UNSPECIFIED FRACTURE SITE, UNSPECIFIED OSTEOPOROSIS TYPE, INITIAL ENCOUNTER: Primary | ICD-10-CM

## 2017-11-14 DIAGNOSIS — M80.80XA PATHOLOGICAL FRACTURE DUE TO OSTEOPOROSIS, UNSPECIFIED FRACTURE SITE, UNSPECIFIED OSTEOPOROSIS TYPE, INITIAL ENCOUNTER: ICD-10-CM

## 2017-11-14 LAB
25(OH)D3+25(OH)D2 SERPL-MCNC: 29 NG/ML
ALBUMIN SERPL BCP-MCNC: 2.8 G/DL
ALP SERPL-CCNC: 150 U/L
ALT SERPL W/O P-5'-P-CCNC: 12 U/L
ANION GAP SERPL CALC-SCNC: 8 MMOL/L
AST SERPL-CCNC: 20 U/L
BILIRUB SERPL-MCNC: 0.4 MG/DL
BUN SERPL-MCNC: 20 MG/DL
CALCIUM SERPL-MCNC: 9.2 MG/DL
CHLORIDE SERPL-SCNC: 107 MMOL/L
CO2 SERPL-SCNC: 26 MMOL/L
CREAT SERPL-MCNC: 0.8 MG/DL
EST. GFR  (AFRICAN AMERICAN): >60 ML/MIN/1.73 M^2
EST. GFR  (NON AFRICAN AMERICAN): >60 ML/MIN/1.73 M^2
GLUCOSE SERPL-MCNC: 114 MG/DL
MAGNESIUM SERPL-MCNC: 2 MG/DL
POTASSIUM SERPL-SCNC: 4.1 MMOL/L
PROT SERPL-MCNC: 7.7 G/DL
PTH-INTACT SERPL-MCNC: 114 PG/ML
SODIUM SERPL-SCNC: 141 MMOL/L
T4 FREE SERPL-MCNC: 1.18 NG/DL
TSH SERPL DL<=0.005 MIU/L-ACNC: 1.12 UIU/ML

## 2017-11-14 PROCEDURE — 80053 COMPREHEN METABOLIC PANEL: CPT

## 2017-11-14 PROCEDURE — 25000003 PHARM REV CODE 250: Performed by: HOSPITALIST

## 2017-11-14 PROCEDURE — 25000003 PHARM REV CODE 250: Performed by: PHYSICAL MEDICINE & REHABILITATION

## 2017-11-14 PROCEDURE — 36415 COLL VENOUS BLD VENIPUNCTURE: CPT

## 2017-11-14 PROCEDURE — 84439 ASSAY OF FREE THYROXINE: CPT

## 2017-11-14 PROCEDURE — 99499 UNLISTED E&M SERVICE: CPT | Mod: S$PBB,,, | Performed by: PHYSICIAN ASSISTANT

## 2017-11-14 PROCEDURE — 83735 ASSAY OF MAGNESIUM: CPT

## 2017-11-14 PROCEDURE — 84443 ASSAY THYROID STIM HORMONE: CPT

## 2017-11-14 PROCEDURE — 99213 OFFICE O/P EST LOW 20 MIN: CPT | Mod: PBBFAC | Performed by: PHYSICIAN ASSISTANT

## 2017-11-14 PROCEDURE — 99238 HOSP IP/OBS DSCHRG MGMT 30/<: CPT | Mod: ,,, | Performed by: PHYSICAL MEDICINE & REHABILITATION

## 2017-11-14 PROCEDURE — 82306 VITAMIN D 25 HYDROXY: CPT

## 2017-11-14 PROCEDURE — 99999 PR PBB SHADOW E&M-EST. PATIENT-LVL III: CPT | Mod: PBBFAC,,, | Performed by: PHYSICIAN ASSISTANT

## 2017-11-14 PROCEDURE — 83970 ASSAY OF PARATHORMONE: CPT

## 2017-11-14 RX ORDER — LOSARTAN POTASSIUM 100 MG/1
100 TABLET ORAL NIGHTLY
Qty: 90 TABLET | Refills: 0 | Status: SHIPPED | OUTPATIENT
Start: 2017-11-14 | End: 2018-04-30

## 2017-11-14 RX ORDER — AMLODIPINE BESYLATE 10 MG/1
10 TABLET ORAL DAILY
Qty: 90 TABLET | Refills: 0 | Status: SHIPPED | OUTPATIENT
Start: 2017-11-15 | End: 2018-02-13

## 2017-11-14 RX ORDER — AMOXICILLIN 250 MG
2 CAPSULE ORAL 2 TIMES DAILY
COMMUNITY
Start: 2017-11-14 | End: 2018-06-25

## 2017-11-14 RX ORDER — OXYCODONE AND ACETAMINOPHEN 5; 325 MG/1; MG/1
1 TABLET ORAL EVERY 6 HOURS PRN
Qty: 90 TABLET | Refills: 0 | Status: SHIPPED | OUTPATIENT
Start: 2017-11-14 | End: 2017-11-14

## 2017-11-14 RX ORDER — ONDANSETRON 8 MG/1
8 TABLET, ORALLY DISINTEGRATING ORAL EVERY 8 HOURS PRN
Qty: 30 TABLET | Refills: 1 | Status: SHIPPED | OUTPATIENT
Start: 2017-11-14 | End: 2017-12-14

## 2017-11-14 RX ORDER — OXYCODONE AND ACETAMINOPHEN 5; 325 MG/1; MG/1
1 TABLET ORAL EVERY 6 HOURS PRN
Qty: 30 TABLET | Refills: 0 | Status: SHIPPED | OUTPATIENT
Start: 2017-11-14 | End: 2017-11-22 | Stop reason: SDUPTHER

## 2017-11-14 RX ORDER — POLYETHYLENE GLYCOL 3350 17 G/17G
17 POWDER, FOR SOLUTION ORAL DAILY
Qty: 30 EACH | Refills: 0 | Status: SHIPPED | OUTPATIENT
Start: 2017-11-14 | End: 2017-12-14

## 2017-11-14 RX ADMIN — ACETAMINOPHEN 650 MG: 325 TABLET ORAL at 12:11

## 2017-11-14 RX ADMIN — ATENOLOL 25 MG: 25 TABLET ORAL at 07:11

## 2017-11-14 RX ADMIN — AMLODIPINE BESYLATE 10 MG: 10 TABLET ORAL at 07:11

## 2017-11-14 RX ADMIN — PRAVASTATIN SODIUM 10 MG: 10 TABLET ORAL at 07:11

## 2017-11-14 RX ADMIN — Medication 1 CAPSULE: at 07:11

## 2017-11-14 RX ADMIN — ACETAMINOPHEN 650 MG: 325 TABLET ORAL at 07:11

## 2017-11-14 NOTE — ASSESSMENT & PLAN NOTE
- increased amlodipine to 10mg daily on 11/7   - cont atenolol  - cont losartan  - cont to monitor BP/HR, adjust meds prn    Vitals:    11/12/17 1850 11/13/17 0700 11/13/17 1850 11/14/17 0700   BP: (!) 161/67 (!) 169/74 (!) 148/63 (!) 135/56   BP Location: Left arm Left arm Left arm Left arm   Patient Position: Sitting Lying Sitting Lying   Pulse: 66 69 67 75   Resp: 17 18 17 18   Temp: 98.9 °F (37.2 °C) 98 °F (36.7 °C) 98.8 °F (37.1 °C) 98.3 °F (36.8 °C)   TempSrc: Oral Oral Oral Oral   SpO2: 95% 97% (!) 93% (!) 94%   Weight:       Height:

## 2017-11-14 NOTE — LETTER
November 16, 2017      Siria Gerber MD  [address]  [facility] [address]           [facility] - Orthopedics  [address], [address]  [facility] [address]  Phone: [phone]          Patient: An Hazel   MR Number: [id number]   YOB: 1927   Date of Visit: 11/14/2017       Dear Dr. Siria Gerber:    Thank you for referring An Luz to me for evaluation. Attached you will find relevant portions of my assessment and plan of care.    If you have questions, please do not hesitate to call me. I look forward to following An Luz along with you.    Sincerely,    Daniel Madsen PA-C    Enclosure  CC:  No Recipients    If you would like to receive this communication electronically, please contact [email or web address] or [phone] to request more information on [facility] [employer] access.    For providers and/or their staff who would like to refer a patient to [facility], please contact us through our one-stop-shop provider referral line, [facility] , at [phone].    If you feel you have received this communication in error or would no longer like to receive these types of communications, please e-mail [email or web address]

## 2017-11-14 NOTE — ASSESSMENT & PLAN NOTE
ORIF of the right hip on 10/27, WBAT. Arm was treated with a sling and swathe.   - Ortho precautions: Non weight bearing RUE for 2 weeks.  - Sling and swathe for comfort. Weight bearing as tolerated RLE.  - Surgical wound: Dressing changes every 3 days. Remove staples on POD #14.  - Pain: Cont scheduled and PRN percocet. Cont PRN tylenol. (Consider changing percocet to norco d/t nausea.)  - Ortho clinic f/u 11/14    Functional: Dep for gait, Min A for w/c, Max to Min A for transfers, Min A for UB dsg, Mod A for LB dsg, grooming Mod I, stairs Max A, eating Sup, toileting Min A

## 2017-11-14 NOTE — SUBJECTIVE & OBJECTIVE
Interval History: No acute events overnight. Pt c/o right elbow and right foot pain, percocet helps but makes her nauseous. Constipation resolved, would like to back off bowel meds some. BP better. Has Ortho f/u today.    Scheduled Medications:    amLODIPine  10 mg Oral Daily    atenolol  25 mg Oral BID    enoxaparin  40 mg Subcutaneous Daily    Lactobacillus rhamnosus GG  1 capsule Oral Daily    losartan  100 mg Oral QHS    oxybutynin  5 mg Oral QHS    oxyCODONE-acetaminophen  1 tablet Oral BID    polyethylene glycol  17 g Oral Daily    pravastatin  10 mg Oral Daily    senna-docusate 8.6-50 mg  2 tablet Oral BID       PRN Medications: acetaminophen, bisacodyl, ondansetron, oxyCODONE-acetaminophen, polyethylene glycol, ramelteon, sodium phosphates    Review of Systems   Constitutional: Negative for unexpected weight change.   HENT: Negative for congestion and ear pain.    Eyes: Negative for discharge.   Respiratory: Negative for cough and shortness of breath.    Cardiovascular: Negative for chest pain and palpitations.   Gastrointestinal: Negative for abdominal distention, abdominal pain, diarrhea, nausea and vomiting.   Endocrine: Negative for cold intolerance.   Genitourinary: Positive for frequency. Negative for dysuria and flank pain.   Musculoskeletal: Positive for arthralgias and gait problem.   Neurological: Positive for weakness.   Psychiatric/Behavioral: Negative for agitation and hallucinations.     Objective:     Vital Signs (Most Recent):  Temp: 98.3 °F (36.8 °C) (11/14/17 0700)  Pulse: 75 (11/14/17 0700)  Resp: 18 (11/14/17 0700)  BP: (!) 135/56 (11/14/17 0700)  SpO2: (!) 94 % (11/14/17 0700)    Vital Signs (24h Range):  Temp:  [98.3 °F (36.8 °C)-98.8 °F (37.1 °C)] 98.3 °F (36.8 °C)  Pulse:  [67-75] 75  Resp:  [17-18] 18  SpO2:  [93 %-94 %] 94 %  BP: (135-148)/(56-63) 135/56     Physical Exam   Constitutional: She appears well-developed and well-nourished.   HENT:   Head: Normocephalic and  atraumatic.   Eyes: EOM are normal.   Cardiovascular: Normal rate.    Pulmonary/Chest: Effort normal. No respiratory distress.   Abdominal: Soft. She exhibits no distension. There is no tenderness.   Musculoskeletal: She exhibits no edema.   RUE in sling, + pulse,  WNL  LUE: SA, EF/EE  WFL  RLE: HF/KE 1/5, DF/PF 3/5  LLE: HF, KE, DF/PF 4/5    Neurological: She is alert.   Skin: Skin is warm.   Psychiatric: She has a normal mood and affect.   Vitals reviewed.    NEUROLOGICAL EXAMINATION:     CRANIAL NERVES     CN III, IV, VI   Extraocular motions are normal.

## 2017-11-14 NOTE — DISCHARGE SUMMARY
[facility]  Physical Medicine & Rehab  Discharge Summary    Patient Name: An Hazel  MRN: [id number]  Admission Date: 10/31/2017  Hospital Length of Stay: 14 days  Discharge Date and Time:  11/14/2017 12:55 PM  Attending Physician: Verna Mc MD  Discharging Provider: Romario Singleton MD  Primary Care Physician: Alcon Thomas Jr, MD    HPI: 89 year old  Presented to [facility]  9/29 with her 2 daughters with pain in her right hip and shoulder after falling on her right side. S/p mechanical fall.   X-rays showed an acute comminuted intertrochanteric fracture of the right femur and an acute impacted comminuted fracture of the neck of the humerus with extension into the greater tuberosity.     ORIF of the right hip on 10/27, WBAT.  Her arm was treated with a sling and swath.   - Ortho precautions: Non weight bearing RUE for 2 weeks.  Sling and swathe for comfort.  Weight bearing as tolerated RLE.  - Surgical wound:  Dressing changes every 3 days.  Remove staples on POD #14.     Diet: Solid Diet Level: Dental Soft (finely chopped meat)  Liquid Diet Level: Thin (baseline diet)     Current Functional Status:  Participating with therapy.  Min A w bed mobility, max w transfers, Amb Max/TA t mostly shuffling feet, not wanting to take steps (fear of falling per pt)  UBD/LBD TA     Functional History: Patient lives  Alone. Able to live on first floor w dtr, Ind/Mod I w ADLs/Mobility       Indwelling Lines/Drains at time of discharge:   Lines/Drains/Airways          No matching active lines, drains, or airways          Hospital Course:     An Hazel is a 89 y.o. female admitted to inpatient rehabilitation on 10/31/2017 for Closed intertrochanteric fracture of right hip, initial encounter with impaired mobility and ADLs. Patient remains appropriate for PT, OT, and as required Speech therapy. Patient continues to require 24 hour nursing care as well as daily Physician  assessment.         * Closed intertrochanteric fracture of right hip, initial encounter     ORIF of the right hip on 10/27, WBAT. Arm was treated with a sling and swathe.   - Ortho precautions: Non weight bearing RUE for 2 weeks.    - Sling and swathe for comfort. Weight bearing as tolerated RLE.     - Pain: Cont scheduled and PRN percocet.             Impaired mobility and ADLs     Other Rehab Plan/Continue to monitor:   Nutrition:    - Prealbumin 9   - Nutritionist on board    - GI ppx: none  Bladder Management: monitor for continence   - restarted home Oxybutinin for nocturnal urgency  - UTI: enterobacter cloacae, completed Cipro course  Bowel Management: monitor for continence   - PeriColace and Miralax scheduled; Suppository PRN   - Will monitor for regularity    Mood: stable   Sleep: monitor; ramelteon PRN   Skin: Monitor   DVT ppx: Lovenox 40mg daily   Continue Zofran PRN          Essential hypertension     - increased amlodipine to 10mg daily on 11/7   - cont atenolol  - cont losartan            Vitals:           Anemia     - Likely post-op  - Cont to monitor             Lab Results   Component Value Date     WBC 5.79 11/13/2017     HGB 9.1 (L) 11/13/2017     HCT 30.3 (L) 11/13/2017     MCV 95 11/13/2017      (H) 11/13/2017            Consults         Status Ordering Provider     Inpatient consult to Dietary  Once     Provider:  (Not yet assigned)    Completed LISANDRO MICHEL     IP consult to dietary  Once     Provider:  (Not yet assigned)    Completed LISANDRO MICHEL          Significant Diagnostic Studies:     Lab Results   Component Value Date    WBC 5.79 11/13/2017    HGB 9.1 (L) 11/13/2017    HCT 30.3 (L) 11/13/2017    MCV 95 11/13/2017     (H) 11/13/2017     BMP  Lab Results   Component Value Date     11/13/2017    K 4.0 11/13/2017     11/13/2017    CO2 27 11/13/2017    BUN 20 11/13/2017    CREATININE 0.7 11/13/2017    CALCIUM 9.1 11/13/2017    ANIONGAP 6 (L) 11/13/2017     "ESTGFRAFRICA >60.0 11/13/2017    EGFRNONAA >60.0 11/13/2017       Final Active Diagnoses:    Diagnosis Date Noted POA    PRINCIPAL PROBLEM:  Closed intertrochanteric fracture of right hip, initial encounter [S72.141A] 10/27/2017 Yes     Chronic    Impaired mobility and ADLs [Z74.09] 10/31/2017 Yes    Essential hypertension [I10] 03/09/2016 Yes    Anemia [D64.9] 12/27/2013 Yes      Problems Resolved During this Admission:    Diagnosis Date Noted Date Resolved POA    Hyponatremia [E87.1] 11/02/2017 11/10/2017 Yes       Discharged Condition: good    Disposition: Home or Self Care    Follow Up:  Follow-up Information     Alcon Thomas Jr, MD In 1 month.    Specialty:  Internal Medicine  Contact information:  [address]  [facility] [address]  [phone]             Verna Mc MD In 2 months.    Specialty:  Physical Medicine and Rehabilitation  Why:  As needed  Contact information:  [address]  [facility] [id number] [phone]             Daniel Madsen PA-C On 11/14/2017.    Specialty:  Orthopedic Surgery  Contact information:  [address]  [facility] [id number] [phone]                 Patient Instructions:     CANE FOR HOME USE   Order Specific Question Answer Comments   Type of Cane: Wide Quad    Height: 5' 2" (1.575 m)    Weight: 49.3 kg (108 lb 9.6 oz)    Does patient have medical equipment at home? 3-in-1 commode mechanical tub lift / mechanical tub lift / mechanical tub lift / mechanical tub lift   Does patient have medical equipment at home? bath bench    Does patient have medical equipment at home? wheelchair    Does patient have medical equipment at home? cane, quad    Length of need (1-99 months): 99    Please check all that apply: Patient's condition impairs ambulation.    Please check all that apply: Patient is unable to safely ambulate without equipment.    Please check all that apply: Patient needs help to get in and out of chair.      Referral " to Home health   Referral Priority: Routine Referral Type: Home Health Care   Referral Reason: Specialty Services Required    Requested Specialty: Home Health Services    Number of Visits Requested: 1      Diet general       Medications:  Reconciled Home Medications:   Current Discharge Medication List      START taking these medications    Details   oxyCODONE-acetaminophen (PERCOCET) 5-325 mg per tablet Take 1 tablet by mouth every 6 (six) hours as needed.  Qty: 90 tablet, Refills: 0      polyethylene glycol (GLYCOLAX) 17 gram PwPk Take 17 g by mouth once daily.  Qty: 30 each, Refills: 0      senna-docusate 8.6-50 mg (PERICOLACE) 8.6-50 mg per tablet Take 2 tablets by mouth 2 (two) times daily.         CONTINUE these medications which have CHANGED    Details   amLODIPine (NORVASC) 10 MG tablet Take 1 tablet (10 mg total) by mouth once daily.  Qty: 90 tablet, Refills: 0      losartan (COZAAR) 100 MG tablet Take 1 tablet (100 mg total) by mouth every evening.  Qty: 90 tablet, Refills: 0         CONTINUE these medications which have NOT CHANGED    Details   acetaminophen (TYLENOL) 325 MG tablet Take 2 tablets (650 mg total) by mouth every 6 (six) hours as needed (mild pain). Do NOT exceed 3000 mg in a 24 hour time period and do not take with other medications containing acetaminophen      atenolol (TENORMIN) 25 MG tablet Take 1 tablet (25 mg total) by mouth 2 (two) times daily.  Qty: 90 tablet, Refills: 3      cholecalciferol, vitamin D3, (VITAMIN D3) 1,000 unit capsule Take 1,000 Units by mouth once daily.      FOLIC ACID/MULTIVIT-MIN/LUTEIN (CENTRUM SILVER ORAL) Take 1 tablet by mouth once daily.       oxybutynin (DITROPAN-XL) 5 MG TR24 TAKE 1 TABLET DAILY  Qty: 90 tablet, Refills: 4      pravastatin (PRAVACHOL) 20 MG tablet TAKE ONE-HALF (1/2) TABLET DAILY  Qty: 90 tablet, Refills: 0         STOP taking these medications       enoxaparin (LOVENOX) 40 mg/0.4 mL Syrg Comments:   Reason for Stopping:                Time spent on the discharge of patient: 25 minutes    Romario Singleton MD  Department of Physical Medicine & Rehab  [facility]

## 2017-11-14 NOTE — DISCHARGE INSTRUCTIONS
Discharge Note :  Pt with vss and rr even, unlabored. Discharge instructions given to pt with verbalized understanding expressed.  Preparing to discharge pt to home via w/c and family car.

## 2017-11-14 NOTE — PLAN OF CARE
Problem: Patient Care Overview  Goal: Plan of Care Review  Outcome: Ongoing (interventions implemented as appropriate)  Frequent rounds to assess pain & safety.    Problem: Fall Risk (Adult)  Goal: Absence of Falls  Patient will demonstrate the desired outcomes by discharge/transition of care.   Outcome: Ongoing (interventions implemented as appropriate)  Patient remains free of fall  injury, safety measures maintained.    Problem: Pressure Ulcer Risk (Lucho Scale) (Adult,Obstetrics,Pediatric)  Goal: Skin Integrity  Patient will demonstrate the desired outcomes by discharge/transition of care.   No new skin breakdown noted this shift. Assistance is provided to turn & reposition often    Problem: Pain, Acute (Adult)  Goal: Acceptable Pain Control/Comfort Level  Patient will demonstrate the desired outcomes by discharge/transition of care.   Outcome: Ongoing (interventions implemented as appropriate)  Pain is well controlled with scheduled & PRN meds.    Problem: Mobility, Physical Impaired (Adult)  Goal: Enhanced Mobility Skills  Patient will demonstrate the desired outcomes by discharge/transition of care.   Outcome: Ongoing (interventions implemented as appropriate)  Mobility is improving over last several days.

## 2017-11-15 ENCOUNTER — DOCUMENTATION ONLY (OUTPATIENT)
Dept: ORTHOPEDICS | Facility: CLINIC | Age: 82
End: 2017-11-15

## 2017-11-15 ENCOUNTER — TELEPHONE (OUTPATIENT)
Dept: INTERNAL MEDICINE | Facility: CLINIC | Age: 82
End: 2017-11-15

## 2017-11-15 NOTE — TELEPHONE ENCOUNTER
Spoke to patient and wanted to talk to Dr. Thomas regarding the appt with orthopedics/osteoporosis----patient has appt with Dr. Thomas in December and will wait for that appt to discuss with him

## 2017-11-15 NOTE — TELEPHONE ENCOUNTER
----- Message from Denisha Oconnell sent at 11/15/2017  1:15 PM CST -----  Contact: Call her at   [phone]  She has been release from [facility] from a fall and she want to discuss a few things with you.

## 2017-11-15 NOTE — PROGRESS NOTES
Left message for Verónica with Dr. JANICE Watkins at [facility]  [phone] ext [phone] to return call.  Regarding pt MRN [id number] JANICE Luz 10/08/1927. Pt have a schedule po-op appointment with Dr. JANICE Watkins today 11/15/17.

## 2017-11-16 ENCOUNTER — TELEPHONE (OUTPATIENT)
Dept: INTERNAL MEDICINE | Facility: CLINIC | Age: 82
End: 2017-11-16

## 2017-11-16 DIAGNOSIS — S32.591S CLOSED FRACTURE OF RIGHT INFERIOR PUBIC RAMUS, SEQUELA: ICD-10-CM

## 2017-11-16 DIAGNOSIS — Z74.09 IMPAIRED MOBILITY AND ADLS: Primary | ICD-10-CM

## 2017-11-16 DIAGNOSIS — S42.91XS CLOSED FRACTURE OF RIGHT SHOULDER, SEQUELA: ICD-10-CM

## 2017-11-16 DIAGNOSIS — Z78.9 IMPAIRED MOBILITY AND ADLS: Primary | ICD-10-CM

## 2017-11-16 NOTE — DISCHARGE SUMMARY
[facility]  Physical Medicine & Rehab  Discharge Summary    Patient Name: An Hazel  MRN: [id number]  Admission Date: 10/31/2017  Hospital Length of Stay: 14 days  Discharge Date and Time: 11/14/2017  2:02 PM  Attending Physician: Verna Mc MD  Discharging Provider: Verna Mc MD  Primary Care Physician: Alcon Thomas Jr, MD    HPI:   89 year old  presented to [facility]  9/29 with her 2 daughters with pain in her right hip and shoulder after falling on her right side. S/p mechanical fall.   X-rays showed an acute comminuted intertrochanteric fracture of the right femur and an acute impacted comminuted fracture of the neck of the humerus with extension into the greater tuberosity.     ORIF of the right hip on 10/27, WBAT.  Her arm was treated with a sling and swath.   - Ortho precautions: Non weight bearing RUE for 2 weeks.  Sling and swathe for comfort.  Weight bearing as tolerated RLE.  - Surgical wound:  Dressing changes every 3 days.  Remove staples on POD #14.  Diet: Solid Diet Level: Dental Soft (finely chopped meat)  Liquid Diet Level: Thin (baseline diet)  Admitted to [facility] on 10/31/2017.     Functional status at time of admission: Min A w bed mobility, max w transfers, Amb Max/TA t mostly shuffling feet, not wanting to take steps (fear of falling per pt)  UBD/LBD TA        * No surgery found *     Indwelling Lines/Drains at time of discharge:   Lines/Drains/Airways          No matching active lines, drains, or airways          Hospital Course: While admitted to [facility], medically managed for:    ORIF of the right hip on 10/27, WBAT. Arm was treated with a sling and swathe.   - Ortho precautions: Non weight bearing RUE for 2 weeks. Ortho Appt on 11/16.     Pain was controlled with Tylenol or Percocet PRN. Sling / WB restrictions maintained to RUE. UTI treated with Cipro course. Restarted on home Oxybutinin.   Vitals remained stable, on Amlodipine (increased from  home dose of 5mg to 10mg), Losartan, and Atenolol. Anemia monitored/remained stable.       Functional progress: Patient progressed well with therapy.  She was SBA/Min A with bed mobility, Min A for WC prop, Min A for sit to stand, Mod I w with grooming, Set up for feeding, UBD MIn, Mod A for LBD. Gait: ambulated x 1 trial of 34 feet, verbal cues required for appropriate sequence of gait with LBQC. Pt required seated rest break due to R LE pain and fatigue, Min A w LBQC          Consults         Status Ordering Provider     Inpatient consult to Dietary  Once     Provider:  (Not yet assigned)    Completed LISANDRO MICHEL     IP consult to dietary  Once     Provider:  (Not yet assigned)    Completed LISANDRO MICHEL          Significant Diagnostic Studies: Labs:   BMP:   Recent Labs  Lab 11/14/17  1613   *      K 4.1      CO2 26   BUN 20   CREATININE 0.8   CALCIUM 9.2   MG 2.0       Lab Results   Component Value Date    WBC 5.79 11/13/2017    HGB 9.1 (L) 11/13/2017    HCT 30.3 (L) 11/13/2017    MCV 95 11/13/2017     (H) 11/13/2017         Final Active Diagnoses:    Diagnosis Date Noted POA    PRINCIPAL PROBLEM:  Closed intertrochanteric fracture of right hip, initial encounter [S72.141A] 10/27/2017 Yes     Chronic    Essential hypertension [I10] 03/09/2016 Yes    Impaired mobility and ADLs [Z74.09] 10/31/2017 Yes    Anemia [D64.9] 12/27/2013 Yes      Problems Resolved During this Admission:    Diagnosis Date Noted Date Resolved POA    Hyponatremia [E87.1] 11/02/2017 11/10/2017 Yes       Discharged Condition: stable    Disposition: Home-Health Care [facility]    Follow Up:  Follow-up Information     Alcon Thomas Jr, MD In 1 month.    Specialty:  Internal Medicine  Contact information:  [address]  [place] [id number] [phone]             Lisandro Michel MD In 2 months.    Specialty:  Physical Medicine and Rehabilitation  Why:  As needed  Contact information:  [id number]  [address]  [facility] [address]  [phone]             Daniel Madsen PA-C On 11/14/2017.    Specialty:  Orthopedic Surgery  Contact information:  [address]  [facility] [address]  [phone]                 Patient Instructions:     CANE FOR HOME USE   Order Specific Question Answer Comments   Type of Cane: Wide Quad    Height: 5' 2" (1.575 m)    Weight: 49.3 kg (108 lb 9.6 oz)    Does patient have medical equipment at home? 3-in-1 commode mechanical tub lift / mechanical tub lift / mechanical tub lift / mechanical tub lift   Does patient have medical equipment at home? bath bench    Does patient have medical equipment at home? wheelchair    Does patient have medical equipment at home? cane, quad    Length of need (1-99 months): 99    Please check all that apply: Patient's condition impairs ambulation.    Please check all that apply: Patient is unable to safely ambulate without equipment.    Please check all that apply: Patient needs help to get in and out of chair.    Vendor: [facility]ta pulled from Rehab closet   Expected Date of Delivery: 11/14/2017      Referral to Home health   Referral Priority: Routine Referral Type: Home Health Care   Referral Reason: Specialty Services Required    Requested Specialty: Home Health Services    Number of Visits Requested: 1      Diet general       Medications:  Reconciled Home Medications:   Discharge Medication List as of 11/14/2017  1:35 PM      START taking these medications    Details   ondansetron (ZOFRAN-ODT) 8 MG TbDL Take 1 tablet (8 mg total) by mouth every 8 (eight) hours as needed., Starting Tue 11/14/2017, Until Thu 12/14/2017, Normal      polyethylene glycol (GLYCOLAX) 17 gram PwPk Take 17 g by mouth once daily., Starting Tue 11/14/2017, Until Thu 12/14/2017, Normal      senna-docusate 8.6-50 mg (PERICOLACE) 8.6-50 mg per tablet Take 2 tablets by mouth 2 (two) times daily., Starting Tue 11/14/2017, OTC         CONTINUE these " medications which have CHANGED    Details   amLODIPine (NORVASC) 10 MG tablet Take 1 tablet (10 mg total) by mouth once daily., Starting Wed 11/15/2017, Until Tue 2/13/2018, Normal      losartan (COZAAR) 100 MG tablet Take 1 tablet (100 mg total) by mouth every evening., Starting Tue 11/14/2017, Until Wed 11/14/2018, Normal      oxyCODONE-acetaminophen (PERCOCET) 5-325 mg per tablet Take 1 tablet by mouth every 6 (six) hours as needed., Starting Tue 11/14/2017, Until Thu 12/14/2017, Normal         CONTINUE these medications which have NOT CHANGED    Details   acetaminophen (TYLENOL) 325 MG tablet Take 2 tablets (650 mg total) by mouth every 6 (six) hours as needed (mild pain). Do NOT exceed 3000 mg in a 24 hour time period and do not take with other medications containing acetaminophen, Starting 1/15/2014, Until Discontinued, OTC      atenolol (TENORMIN) 25 MG tablet Take 1 tablet (25 mg total) by mouth 2 (two) times daily., Starting Tue 8/1/2017, Normal      cholecalciferol, vitamin D3, (VITAMIN D3) 1,000 unit capsule Take 1,000 Units by mouth once daily., Until Discontinued, Historical Med      FOLIC ACID/MULTIVIT-MIN/LUTEIN (CENTRUM SILVER ORAL) Take 1 tablet by mouth once daily. , Historical Med      oxybutynin (DITROPAN-XL) 5 MG TR24 TAKE 1 TABLET DAILY, Normal      pravastatin (PRAVACHOL) 20 MG tablet TAKE ONE-HALF (1/2) TABLET DAILY, Normal         STOP taking these medications       enoxaparin (LOVENOX) 40 mg/0.4 mL Syrg Comments:   Reason for Stopping:               Time spent on the discharge of patient: 25 minutes    Verna Mc MD  Department of Physical Medicine & Rehab  [facility]

## 2017-11-16 NOTE — TELEPHONE ENCOUNTER
----- Message from Pancho Lopez sent at 11/16/2017 12:36 PM CST -----  Contact: Sima/ [facility]/ OT/ [phone] cell  The Occupational Therapist is calling to get clarification on the pt's range of motion details.  She would like a call back today, if possible.  Please call and advise.    Thank you

## 2017-11-22 DIAGNOSIS — R52 PAIN: Primary | ICD-10-CM

## 2017-11-22 RX ORDER — OXYCODONE AND ACETAMINOPHEN 5; 325 MG/1; MG/1
1 TABLET ORAL EVERY 6 HOURS PRN
Qty: 30 TABLET | Refills: 0 | Status: SHIPPED | OUTPATIENT
Start: 2017-11-22 | End: 2017-11-22 | Stop reason: SDUPTHER

## 2017-11-22 RX ORDER — OXYCODONE AND ACETAMINOPHEN 5; 325 MG/1; MG/1
1 TABLET ORAL EVERY 6 HOURS PRN
Qty: 30 TABLET | Refills: 0 | Status: SHIPPED | OUTPATIENT
Start: 2017-11-22 | End: 2017-12-04 | Stop reason: SDUPTHER

## 2017-11-22 NOTE — TELEPHONE ENCOUNTER
Dr Jesusita Campos the patient's daughter called asking if her mother can get a refill of the pain medication.  The daughter did state that you ask her mom if she would need more she the patient stated no, but finding that she so need and especially at night for sleep.

## 2017-11-22 NOTE — TELEPHONE ENCOUNTER
Can you please resend this script to [facility].  [facility] have [facility] and canceled the one from earlier today.

## 2017-11-22 NOTE — TELEPHONE ENCOUNTER
----- Message from Coco Martinez sent at 11/22/2017 10:19 AM CST -----  Contact: Dian, Daughter  Ms. Biggs is calling to speak with Staff regarding a prescription refill of oxyCODONE-acetaminophen (PERCOCET) 5-325 mg per tablet.    She can be reached at [phone].    Thank you.

## 2017-11-22 NOTE — TELEPHONE ENCOUNTER
----- Message from Michael Pace sent at 11/22/2017  1:36 PM CST -----  Contact: Dian (daughter) @ [phone]  Dian is asking the refill for oxyCODONE-acetaminophen (PERCOCET) 5-325 mg per tablet be sent to pharmacy [address] [address]  Phone: [phone] Fax: [phone]

## 2017-12-04 ENCOUNTER — TELEPHONE (OUTPATIENT)
Dept: PHYSICAL MEDICINE AND REHAB | Facility: CLINIC | Age: 82
End: 2017-12-04

## 2017-12-04 ENCOUNTER — TELEPHONE (OUTPATIENT)
Dept: INTERNAL MEDICINE | Facility: CLINIC | Age: 82
End: 2017-12-04

## 2017-12-04 DIAGNOSIS — R52 PAIN: ICD-10-CM

## 2017-12-04 RX ORDER — OXYCODONE AND ACETAMINOPHEN 5; 325 MG/1; MG/1
1 TABLET ORAL EVERY 6 HOURS PRN
Qty: 30 TABLET | Refills: 0 | Status: SHIPPED | OUTPATIENT
Start: 2017-12-04 | End: 2017-12-18

## 2017-12-04 NOTE — TELEPHONE ENCOUNTER
Spoke to Viola and states that patient had hip surgery in October----states that the patient has edema in both feet traveling up to thigh, +2, been having the edema but it has only been in the feet and ankles---today it is up to the thighs----Viola states that this has happened before with the other hip surgery and was put on Lasix-----pls call Viola and advise

## 2017-12-04 NOTE — TELEPHONE ENCOUNTER
----- Message from Michael Pace sent at 12/4/2017 11:29 AM CST -----  Contact: Viola ramos/ [facility] @ [phone]  Viola is asking script for oxyCODONE-acetaminophen (PERCOCET) 5-325 mg per tablet be completed today. Viola is asking for call back once it's ready to .

## 2017-12-04 NOTE — TELEPHONE ENCOUNTER
will you refill for the patient she was D/C in October from rehab and have no F/U apt.      Dr. Mc if you do fill this they requesting it be filled today. 2nd call request.    Maria Del Carmen from [facility] states that the patient states she takes meds during day for the pain and reallyno pain at night but tales for sleep .  Maria Del Carmen said she told the patient if she is not in pain at night she should not be taking it to go sleep she suggest maybe something low dose for the patients pain, Maria Del Carmen stated that she also told the patient the thsi medication is short term not long term.  Any question you can call Maria Del Carmen at [phone]

## 2017-12-04 NOTE — TELEPHONE ENCOUNTER
----- Message from Isabel Martinez sent at 12/4/2017 11:46 AM CST -----  Contact: [facility]   Type: Home Health (orders, updates, clarifications, etc.)    Home Health Agency/Nurse: Viola     Phone number: [phone]    Reason for call: home health nurse requesting a call back from the office today     Comments: please advise , thanks

## 2017-12-04 NOTE — TELEPHONE ENCOUNTER
Spoke to Viola ([facility]) and informed----Viola asked that we call daughter Dian to schedule appt-----left mess for Dian to return call

## 2017-12-05 NOTE — TELEPHONE ENCOUNTER
Spoke to patient and scheduled appt with Dr. Wong for tomorrow----patient will discuss with daughter to see if she can bring her in

## 2017-12-06 ENCOUNTER — OFFICE VISIT (OUTPATIENT)
Dept: INTERNAL MEDICINE | Facility: CLINIC | Age: 82
End: 2017-12-06
Payer: MEDICARE

## 2017-12-06 ENCOUNTER — HOSPITAL ENCOUNTER (OUTPATIENT)
Dept: RADIOLOGY | Facility: HOSPITAL | Age: 82
Discharge: HOME | End: 2017-12-06
Attending: INTERNAL MEDICINE
Payer: MEDICARE

## 2017-12-06 VITALS
BODY MASS INDEX: 19.88 KG/M2 | DIASTOLIC BLOOD PRESSURE: 80 MMHG | OXYGEN SATURATION: 96 % | SYSTOLIC BLOOD PRESSURE: 142 MMHG | TEMPERATURE: 98 F | WEIGHT: 108 LBS | HEART RATE: 71 BPM | HEIGHT: 62 IN

## 2017-12-06 DIAGNOSIS — M79.89 PAIN AND SWELLING OF RIGHT LOWER LEG: ICD-10-CM

## 2017-12-06 DIAGNOSIS — L03.115 CELLULITIS OF RIGHT LOWER EXTREMITY: Primary | ICD-10-CM

## 2017-12-06 DIAGNOSIS — Z87.81 HISTORY OF FRACTURE OF FEMUR: ICD-10-CM

## 2017-12-06 DIAGNOSIS — M79.661 PAIN AND SWELLING OF RIGHT LOWER LEG: ICD-10-CM

## 2017-12-06 PROCEDURE — 93971 EXTREMITY STUDY: CPT | Mod: 26,GC,, | Performed by: RADIOLOGY

## 2017-12-06 PROCEDURE — 99213 OFFICE O/P EST LOW 20 MIN: CPT | Mod: PBBFAC,25 | Performed by: INTERNAL MEDICINE

## 2017-12-06 PROCEDURE — 99214 OFFICE O/P EST MOD 30 MIN: CPT | Mod: S$PBB,,, | Performed by: INTERNAL MEDICINE

## 2017-12-06 PROCEDURE — 99999 PR PBB SHADOW E&M-EST. PATIENT-LVL III: CPT | Mod: PBBFAC,,, | Performed by: INTERNAL MEDICINE

## 2017-12-06 PROCEDURE — 93971 EXTREMITY STUDY: CPT | Mod: TC

## 2017-12-06 RX ORDER — CEPHALEXIN 500 MG/1
500 CAPSULE ORAL 4 TIMES DAILY
Qty: 40 CAPSULE | Refills: 0 | Status: SHIPPED | OUTPATIENT
Start: 2017-12-06 | End: 2017-12-18

## 2017-12-07 NOTE — PROGRESS NOTES
Subjective:       Patient ID: An  Aleksander Luz is a 89 y.o. female.    Chief Complaint: Leg Swelling    89 year old lady concerned about swelling and redness in lower right leg.  She had right femoral rodding 10/27/17 as well as a right shoulder fx.  She is getting PT.      Review of Systems   Constitutional: Negative for activity change, chills, fatigue and fever.   HENT: Negative for congestion, ear pain, nosebleeds, postnasal drip, sinus pressure and sore throat.    Eyes: Negative.  Negative for visual disturbance.   Respiratory: Negative for cough, chest tightness, shortness of breath and wheezing.    Cardiovascular: Negative for chest pain.   Gastrointestinal: Negative for abdominal pain, diarrhea, nausea and vomiting.   Genitourinary: Negative for difficulty urinating, dysuria, frequency and urgency.   Musculoskeletal: Negative for arthralgias and neck stiffness.   Skin: Negative for rash.   Neurological: Negative for dizziness, weakness and headaches.   Psychiatric/Behavioral: Negative for sleep disturbance. The patient is not nervous/anxious.        Objective:      Physical Exam   Musculoskeletal:        Legs:      Assessment:       1. Cellulitis of right lower extremity    2. Pain and swelling of right lower leg    3. History of fracture of femur        Plan:   An was seen today for leg swelling.    Diagnoses and all orders for this visit:    Cellulitis of right lower extremity    Pain and swelling of right lower leg  -     US Lower Extremity Veins Right; Future    History of fracture of femur  -     US Lower Extremity Veins Right; Future    Other orders  -     cephALEXin (KEFLEX) 500 MG capsule; Take 1 capsule (500 mg total) by mouth 4 (four) times daily.

## 2017-12-08 ENCOUNTER — TELEPHONE (OUTPATIENT)
Dept: INTERNAL MEDICINE | Facility: CLINIC | Age: 82
End: 2017-12-08

## 2017-12-08 DIAGNOSIS — L03.115 CELLULITIS OF RIGHT LOWER EXTREMITY: Primary | ICD-10-CM

## 2017-12-08 RX ORDER — DOXYCYCLINE HYCLATE 100 MG
100 TABLET ORAL EVERY 12 HOURS
Qty: 20 TABLET | Refills: 0 | Status: SHIPPED | OUTPATIENT
Start: 2017-12-08 | End: 2017-12-18

## 2017-12-08 NOTE — TELEPHONE ENCOUNTER
Reviewed results with patient-she was started on keflex by Dr Wong on 12/6 for cellulitis of the lower extremity.  She says she is not tolerating it well and is having severe nausea.  She would like to know if doxycycline would be an appropriate alternative, and if so-can it be sent in.  Dr Wong out, will route to covering provider.

## 2017-12-08 NOTE — TELEPHONE ENCOUNTER
----- Message from Kaylan Alcala sent at 12/8/2017 10:39 AM CST -----  Contact: self/[phone]  Patient called in regards needing to find out results for X-rays done on 12/06/17. Please call and advise.       Thank you!!!

## 2017-12-09 ENCOUNTER — TELEPHONE (OUTPATIENT)
Dept: INTERNAL MEDICINE | Facility: CLINIC | Age: 82
End: 2017-12-09

## 2017-12-11 NOTE — TELEPHONE ENCOUNTER
Spoke with patient, informed of results. Patient expressed great understanding and had no questions,.

## 2017-12-18 ENCOUNTER — IMMUNIZATION (OUTPATIENT)
Dept: INTERNAL MEDICINE | Facility: CLINIC | Age: 82
End: 2017-12-18
Payer: MEDICARE

## 2017-12-18 ENCOUNTER — OFFICE VISIT (OUTPATIENT)
Dept: INTERNAL MEDICINE | Facility: CLINIC | Age: 82
End: 2017-12-18
Payer: MEDICARE

## 2017-12-18 VITALS
WEIGHT: 100.31 LBS | HEIGHT: 62 IN | HEART RATE: 70 BPM | OXYGEN SATURATION: 98 % | BODY MASS INDEX: 18.46 KG/M2 | SYSTOLIC BLOOD PRESSURE: 114 MMHG | DIASTOLIC BLOOD PRESSURE: 60 MMHG

## 2017-12-18 DIAGNOSIS — M81.0 OSTEOPOROSIS, UNSPECIFIED OSTEOPOROSIS TYPE, UNSPECIFIED PATHOLOGICAL FRACTURE PRESENCE: ICD-10-CM

## 2017-12-18 DIAGNOSIS — I10 ESSENTIAL HYPERTENSION: Primary | ICD-10-CM

## 2017-12-18 DIAGNOSIS — S42.91XS CLOSED FRACTURE OF RIGHT SHOULDER, SEQUELA: ICD-10-CM

## 2017-12-18 PROCEDURE — 99999 PR PBB SHADOW E&M-EST. PATIENT-LVL III: CPT | Mod: PBBFAC,,, | Performed by: INTERNAL MEDICINE

## 2017-12-18 PROCEDURE — G0008 ADMIN INFLUENZA VIRUS VAC: HCPCS | Mod: PBBFAC

## 2017-12-18 PROCEDURE — 99213 OFFICE O/P EST LOW 20 MIN: CPT | Mod: S$PBB,,, | Performed by: INTERNAL MEDICINE

## 2017-12-18 PROCEDURE — 99213 OFFICE O/P EST LOW 20 MIN: CPT | Mod: PBBFAC,25 | Performed by: INTERNAL MEDICINE

## 2017-12-18 RX ORDER — ALENDRONATE SODIUM 70 MG/1
70 TABLET ORAL
Qty: 4 TABLET | Refills: 11 | Status: SHIPPED | OUTPATIENT
Start: 2017-12-18 | End: 2018-12-17 | Stop reason: SDUPTHER

## 2017-12-18 RX ORDER — CYCLOBENZAPRINE HCL 5 MG
5 TABLET ORAL 3 TIMES DAILY PRN
Qty: 50 TABLET | Refills: 0 | Status: SHIPPED | OUTPATIENT
Start: 2017-12-18 | End: 2017-12-28

## 2017-12-18 RX ORDER — CYCLOBENZAPRINE HCL 5 MG
5 TABLET ORAL 3 TIMES DAILY PRN
Qty: 50 TABLET | Refills: 0 | Status: SHIPPED | OUTPATIENT
Start: 2017-12-18 | End: 2017-12-18 | Stop reason: SDUPTHER

## 2017-12-18 RX ORDER — AMLODIPINE BESYLATE 5 MG/1
TABLET ORAL
COMMUNITY
Start: 2017-12-13 | End: 2018-03-19

## 2017-12-18 NOTE — PROGRESS NOTES
Subjective:       Patient ID: An Hazel is a 89 y.o. female.    Chief Complaint: Follow-up    HPI the patient is a 89-year-old female comes in following a fall in which she suffered a right humeral fracture as well as a right hip fracture.  She is status post open reduction and internal fixation of the right hip fracture.  She has steadily been progressing.  She is being cared for now at home by her daughter, Shaniqua.  The patient does report that she continues to have some moderate amounts of pain in her back from her scoliosis.  She is using oxycodone but wishes to discontinue this.  We discussed using a muscle relaxer instead.  Review of Systems   Constitutional: Negative.  Negative for activity change, appetite change and fatigue.   Respiratory: Negative for cough, chest tightness and shortness of breath.    Cardiovascular: Negative for chest pain and palpitations.   Musculoskeletal: Positive for back pain and gait problem.       Objective:      Physical Exam   Constitutional: She appears well-developed and well-nourished. No distress.   Cardiovascular: Normal rate, regular rhythm and normal heart sounds.  Exam reveals no gallop and no friction rub.    No murmur heard.  Pulmonary/Chest: Effort normal and breath sounds normal. No respiratory distress. She has no wheezes. She has no rales.   Skin: She is not diaphoretic.       Assessment:       1. Essential hypertension    2. Closed fracture of right shoulder, sequela    3. Osteoporosis, unspecified osteoporosis type, unspecified pathological fracture presence        Plan:       1.  Discontinue oxycodone  2.  Flexeril 5 mg by mouth 3 times a day when necessary muscle spasm and back pain  3.  Resume alendronate 70 mg by mouth every week  4.  Return to clinic in 3 weeks

## 2018-02-19 RX ORDER — ATENOLOL 25 MG/1
25 TABLET ORAL 2 TIMES DAILY
Qty: 14 TABLET | Refills: 0 | Status: SHIPPED | OUTPATIENT
Start: 2018-02-19 | End: 2018-03-19

## 2018-02-19 RX ORDER — ATENOLOL 25 MG/1
TABLET ORAL
Qty: 180 TABLET | Refills: 3 | Status: SHIPPED | OUTPATIENT
Start: 2018-02-19 | End: 2018-02-19 | Stop reason: SDUPTHER

## 2018-02-19 RX ORDER — ATENOLOL 25 MG/1
25 TABLET ORAL 2 TIMES DAILY
Qty: 90 TABLET | Refills: 3 | Status: SHIPPED | OUTPATIENT
Start: 2018-02-19 | End: 2018-08-20 | Stop reason: SDUPTHER

## 2018-02-19 NOTE — TELEPHONE ENCOUNTER
----- Message from Reta Lind sent at 2/19/2018 11:15 AM CST -----  Contact: Patient [phone]  Rx Name:atenolol (TENORMIN) 25 MG tablet    Refill: yes    Pharmacy:[facility] - [address]    Comments: Also need 14 tablets sent to [facility]. Please fax full Rx to [phone] (fax[facility]     Please call and advise.    Thank You

## 2018-03-06 ENCOUNTER — OFFICE VISIT (OUTPATIENT)
Dept: OPHTHALMOLOGY | Facility: CLINIC | Age: 83
End: 2018-03-06
Payer: MEDICARE

## 2018-03-06 ENCOUNTER — CLINICAL SUPPORT (OUTPATIENT)
Dept: OPHTHALMOLOGY | Facility: CLINIC | Age: 83
End: 2018-03-06
Payer: MEDICARE

## 2018-03-06 DIAGNOSIS — H40.50X0 SECONDARY GLAUCOMA DUE TO COMBINATION MECHANISMS, UNSPECIFIED LATERALITY, STAGE UNSPECIFIED: ICD-10-CM

## 2018-03-06 DIAGNOSIS — H04.123 DRY EYE SYNDROME, BILATERAL: ICD-10-CM

## 2018-03-06 DIAGNOSIS — H02.403 PTOSIS, BILATERAL: ICD-10-CM

## 2018-03-06 DIAGNOSIS — Z96.1 PSEUDOPHAKIA OF BOTH EYES: ICD-10-CM

## 2018-03-06 DIAGNOSIS — G24.5 BLEPHAROSPASM: ICD-10-CM

## 2018-03-06 DIAGNOSIS — H35.362 FAMILIAL DRUSEN, BILATERAL: ICD-10-CM

## 2018-03-06 DIAGNOSIS — H35.361 FAMILIAL DRUSEN, BILATERAL: ICD-10-CM

## 2018-03-06 DIAGNOSIS — H40.013 OPEN ANGLE WITH BORDERLINE FINDINGS AND LOW GLAUCOMA RISK IN BOTH EYES: ICD-10-CM

## 2018-03-06 DIAGNOSIS — H40.013 OPEN ANGLE WITH BORDERLINE FINDINGS AND LOW GLAUCOMA RISK IN BOTH EYES: Primary | ICD-10-CM

## 2018-03-06 DIAGNOSIS — H53.483 GENERALIZED CONTRACTION OF VISUAL FIELD OF BOTH EYES: ICD-10-CM

## 2018-03-06 PROCEDURE — 92083 EXTENDED VISUAL FIELD XM: CPT | Mod: PBBFAC

## 2018-03-06 PROCEDURE — 92083 EXTENDED VISUAL FIELD XM: CPT | Mod: 26,S$PBB,, | Performed by: OPHTHALMOLOGY

## 2018-03-06 PROCEDURE — 99212 OFFICE O/P EST SF 10 MIN: CPT | Mod: PBBFAC,25 | Performed by: OPHTHALMOLOGY

## 2018-03-06 PROCEDURE — 92133 CPTRZD OPH DX IMG PST SGM ON: CPT | Mod: PBBFAC

## 2018-03-06 PROCEDURE — 92133 CPTRZD OPH DX IMG PST SGM ON: CPT | Mod: 26,S$PBB,, | Performed by: OPHTHALMOLOGY

## 2018-03-06 PROCEDURE — 92014 COMPRE OPH EXAM EST PT 1/>: CPT | Mod: S$PBB,,, | Performed by: OPHTHALMOLOGY

## 2018-03-06 PROCEDURE — 99999 PR PBB SHADOW E&M-EST. PATIENT-LVL II: CPT | Mod: PBBFAC,,, | Performed by: OPHTHALMOLOGY

## 2018-03-06 NOTE — PROGRESS NOTES
"HPI     DLS: 4/24/17    Pt here for HVF review;  Pt states she's not seeing up close too good "having a hard time seeing   the newspaper".     Meds: AT's prn ou            Refresh PM qhs ou    1. Open angle with borderline findings and low glaucoma risk in both eyes   2. Dry eye syndrome, bilateral   3. Familial drusen, bilateral   4. Ptosis, bilateral   5. Blepharospasm, both eyes   6. Ptosis, both eyes   7. Unspecified glaucoma       Last edited by Erlinda Sneed on 3/6/2018  2:52 PM. (History)            Assessment /Plan     For exam results, see Encounter Report.    Open angle with borderline findings and low glaucoma risk in both eyes    Blepharospasm - Both Eyes    Familial drusen, bilateral    Dry eye syndrome, bilateral    Ptosis, bilateral    Pseudophakia of both eyes        1. Glaucoma Suspect   First HVF   9/6/2011   First photos   2010   Treatment / Drops started   - none           Family history    Familial drusen OU        Glaucoma meds    none        H/O adverse rxn to glaucoma drops    none        LASERS    none        GLAUCOMA SURGERIES    none        OTHER EYE SURGERIES   PC IOL OD (12/07), OS (4/2010),                                     S/P neuromyectomy, levator repair, ptosis, repair RUL margin defect (4/10/11)        CDR    0.7/0.7        Tbase    12-18/12-18        Tmax    18/18        Ttarget    ??           HVF    6 test 2011 to  2018 - gen dep/? IAD - fluctuates   od // full (POOR RELIABILITY) os        Gonio    +4 ou        CCT   508/508        OCT   5  test 2008 to 2018 - RNFL - nl od // nl os         HRT    3 test 2011 to 2015  - MR -  [place] off od // nl os /// CDR [place] off  od // 0.33 os        Disc photos    2010. 2016     - Ttoday    12/12  - Test done today   HVF / DFE / OCT       2. -  Blepharospasm  - S/P neuromyectomy, levator repair, ptosis, repair RUL margin defect (4/10/11)- Dr Begum  - Residual RUL notch  - Pt used to get Botox injections (last Botox injection OU was in " 2011)  - since retiring she does not care to get more botox - she says her friends know she blinks a lot  - notch in th RUL post surgery with assoc lag    3. Dominant familial drusen OU   Monitor    4.   LUPE / lag - 2/2 above surgery - Lagopthalmous   AT's prn // ointment qhs    5. Ptosis ou    OS >> OD   2/2 #2    6. PCIOL - jerald   OD (12/07), - Chantal   OS (4/2010) - Dr Cornejo    7. Refractive error    Very happy with her glasses from [facility]   Has computer glasses and regular bifocals    8. PVD od  - not having any symptoms    Stable x 3 weeks    Warned of signs of RD     9/ C/O poor near vision    Minimal PCO ou - NVS    Do not recommend yag cap - would be difficult to focus on it    Refer back to [facility] - to see if he can improve near vision with a ne Rx     Plan:  RTC in 9  months --HRT / gonio     I have seen and personally examined the patient.  I agree with the findings, assessment and plan of the resident and/or fellow.     Dian Cornejo MD

## 2018-03-19 ENCOUNTER — OFFICE VISIT (OUTPATIENT)
Dept: INTERNAL MEDICINE | Facility: CLINIC | Age: 83
End: 2018-03-19
Payer: MEDICARE

## 2018-03-19 VITALS
DIASTOLIC BLOOD PRESSURE: 72 MMHG | SYSTOLIC BLOOD PRESSURE: 140 MMHG | HEIGHT: 62 IN | OXYGEN SATURATION: 96 % | WEIGHT: 100.06 LBS | HEART RATE: 68 BPM | BODY MASS INDEX: 18.41 KG/M2

## 2018-03-19 DIAGNOSIS — I10 ESSENTIAL HYPERTENSION: Primary | ICD-10-CM

## 2018-03-19 DIAGNOSIS — S42.91XS CLOSED FRACTURE OF RIGHT SHOULDER, SEQUELA: ICD-10-CM

## 2018-03-19 DIAGNOSIS — Z87.81 HISTORY OF HIP FRACTURE: ICD-10-CM

## 2018-03-19 PROBLEM — S72.009A HIP FRACTURE: Status: RESOLVED | Noted: 2017-10-31 | Resolved: 2018-03-19

## 2018-03-19 PROBLEM — S72.141A CLOSED INTERTROCHANTERIC FRACTURE OF RIGHT HIP, INITIAL ENCOUNTER: Chronic | Status: RESOLVED | Noted: 2017-10-27 | Resolved: 2018-03-19

## 2018-03-19 PROCEDURE — 99213 OFFICE O/P EST LOW 20 MIN: CPT | Mod: PBBFAC | Performed by: INTERNAL MEDICINE

## 2018-03-19 PROCEDURE — 99213 OFFICE O/P EST LOW 20 MIN: CPT | Mod: S$PBB,,, | Performed by: INTERNAL MEDICINE

## 2018-03-19 PROCEDURE — 99999 PR PBB SHADOW E&M-EST. PATIENT-LVL III: CPT | Mod: PBBFAC,,, | Performed by: INTERNAL MEDICINE

## 2018-03-19 RX ORDER — HYDROCHLOROTHIAZIDE 25 MG/1
25 TABLET ORAL DAILY
Qty: 90 TABLET | Refills: 3 | Status: SHIPPED | OUTPATIENT
Start: 2018-03-19 | End: 2018-04-30

## 2018-03-20 NOTE — PROGRESS NOTES
Subjective:       Patient ID: An Hazel is a 89 y.o. female.    Chief Complaint: Follow-up    HPIthe patient is a 89-year-old female comes in for follow-up of her hypertension.  She has been noticing a slight bit of swelling in her ankles bilaterally.  She also has a moderate amount of salt in her diet.  She is not noticing any shortness of breath or chest pain.  She has generally been doing fairly well.  She continues to live independently.  Review of Systems   Constitutional: Negative.  Negative for activity change, appetite change and fatigue.   Respiratory: Negative for cough, chest tightness and shortness of breath.    Cardiovascular: Positive for leg swelling. Negative for chest pain and palpitations.       Objective:      Physical Exam   Constitutional: She appears well-developed and well-nourished. No distress.   Cardiovascular: Normal rate, regular rhythm and normal heart sounds.  Exam reveals no gallop and no friction rub.    No murmur heard.  Pulmonary/Chest: Effort normal and breath sounds normal. No respiratory distress. She has no wheezes. She has no rales.   Musculoskeletal: She exhibits edema.   2+ pedal edema bilaterally   Skin: She is not diaphoretic.     marked hypertrophy of the right paraspinous musculature due toscoliosis  Assessment:       1. Essential hypertension    2. History of hip fracture    3. Closed fracture of right shoulder, sequela        Plan:       1.  Discontinue amlodipine given swelling in ankles  2.  Hydrochlorothiazide 25 mg by mouth daily  3.  Return to clinic in 6 weeks for follow-up of pedal edema and blood pressure

## 2018-04-10 ENCOUNTER — OFFICE VISIT (OUTPATIENT)
Dept: OPTOMETRY | Facility: CLINIC | Age: 83
End: 2018-04-10
Payer: MEDICARE

## 2018-04-10 DIAGNOSIS — Z96.1 PSEUDOPHAKIA OF BOTH EYES: ICD-10-CM

## 2018-04-10 DIAGNOSIS — Z98.42 S/P BILATERAL CATARACT EXTRACTION: ICD-10-CM

## 2018-04-10 DIAGNOSIS — H40.003 GLAUCOMA SUSPECT OF BOTH EYES: ICD-10-CM

## 2018-04-10 DIAGNOSIS — H52.203 ASTIGMATISM OF BOTH EYES, UNSPECIFIED TYPE: ICD-10-CM

## 2018-04-10 DIAGNOSIS — G24.5 BLEPHAROSPASM: Primary | ICD-10-CM

## 2018-04-10 DIAGNOSIS — Z98.41 S/P BILATERAL CATARACT EXTRACTION: ICD-10-CM

## 2018-04-10 PROCEDURE — 92015 DETERMINE REFRACTIVE STATE: CPT | Mod: ,,, | Performed by: OPTOMETRIST

## 2018-04-10 PROCEDURE — 99212 OFFICE O/P EST SF 10 MIN: CPT | Mod: PBBFAC | Performed by: OPTOMETRIST

## 2018-04-10 PROCEDURE — 92012 INTRM OPH EXAM EST PATIENT: CPT | Mod: S$PBB,,, | Performed by: OPTOMETRIST

## 2018-04-10 PROCEDURE — 99999 PR PBB SHADOW E&M-EST. PATIENT-LVL II: CPT | Mod: PBBFAC,,, | Performed by: OPTOMETRIST

## 2018-04-10 NOTE — PATIENT INSTRUCTIONS
Followed by Dr. Cornejo as glaucoma suspect.  S/P cataract surgery in both eyes, with bilateral posterior chamber intraocular lens.  Residual refractive astigmatic refractive error in each eye.  New spectacle lens Rx issued for general use, and a pair for use at [facility].  History of bilateral blepharospasm.  No longer getting Botox injections.   Continue to follow up with Dr. Cornejo as she recommends.  Recheck refraction in one year, or prior, if Dr. Cornejo so recommends.

## 2018-04-10 NOTE — PROGRESS NOTES
HPI     Concerns About Ocular Health    Additional comments: In today for refraction, per Dr. Cornejo.  Notes problems reading with present (ST) bifocal lenses,and feels need to   raise glasses to read better.             Comments   89 yrs old  wf                        Approximate date of last eye examination:  03/06/2018- Dr. Cornejo   Name of last eye doctor seen:  Dr Guerra - 09/11/2013  Wears glasses? yes     If yes, wears full-time or part-time?  Full time    Present glasses are bifocal / S V Distance / S V Reading:  bifocals  Approximate age of present glasses:  3 yrs old   Got new glasses following last exam, or subsequently?:  no   Any problem with VA with glasses?  Yes, pblms seeing words on tv,   computer, would like a new rx for eye glasses.   Wears CLs?:  no  Headaches? no  Eye pain/discomfort?  no                                                                                       Flashes?  no  Floaters?  No  Diplopia/Double vision?  no  Patient's Ocular History:         Any eye surgeries?  S/p cataract surgery OU, s/p ptosis sx         Any eye injury?  no         Any treatment for eye disease?  botox for blepharospasms, glaucoma   suspect  Family history of eye disease?  Mom-blepharitis  Significant patient medical history:         1. Diabetes?  no       If yes, IDDM or NIDDM? n/a   2. HBP?  yes              3. Other (describe):  High cholesterol,    ! OTC eyedrops currently using:  Genteal gel-am and prn-ou-   hydrocortisone adarsh on lids, pm-ou   ! Prescription eye meds currently using:  no   ! Any history of allergy/adverse reaction to any eye meds used   previously?  no    ! Any history of allergy/adverse reaction to eyedrops used during prior   eye exam(s)? no    ! Any history of allergy/adverse reaction to Novacaine or similar meds?   no   ! Any history of allergy/adverse reaction to Epinephrine or similar meds?   no    ! Patient okay with use of anesthetic eyedrops to check eye pressure?  "yes            ! Patient okay with use of eyedrops to dilate pupils today? Patient was   recently dilated with Dr. Cornejo on 03/06/2018.    !  Allergies/Medications/Medical History/Family History reviewed today?    yes    PD =   60/56  Desired reading distance =  12"  Approx computer distance =  17" (per patient:  " I measured it")                                                                    Last edited by Michele Guerra, OD on 4/10/2018  2:09 PM. (History)            Assessment /Plan     For exam results, see Encounter Report.    1. Blepharospasm     2. Glaucoma suspect of both eyes     3. S/P bilateral cataract extraction     4. Pseudophakia of both eyes     5. Astigmatism of both eyes, unspecified type                  Followed by Dr. Cornejo as glaucoma suspect.  S/P cataract surgery in both eyes, with bilateral posterior chamber intraocular lens.  Residual refractive astigmatic refractive error in each eye.  New spectacle lens Rx issued for general use, and a pair for use at computer.  History of bilateral blepharospasm.  No longer getting Botox injections.   Continue to follow up with Dr. Cornejo as she recommends.  Recheck refraction in one year, or prior, if Dr. Cornejo so recommends.        "

## 2018-04-26 RX ORDER — PRAVASTATIN SODIUM 20 MG/1
TABLET ORAL
Qty: 90 TABLET | Refills: 0 | Status: SHIPPED | OUTPATIENT
Start: 2018-04-26 | End: 2018-08-06 | Stop reason: SDUPTHER

## 2018-04-30 ENCOUNTER — OFFICE VISIT (OUTPATIENT)
Dept: INTERNAL MEDICINE | Facility: CLINIC | Age: 83
End: 2018-04-30
Payer: MEDICARE

## 2018-04-30 VITALS
DIASTOLIC BLOOD PRESSURE: 100 MMHG | BODY MASS INDEX: 17.93 KG/M2 | HEART RATE: 72 BPM | HEIGHT: 62 IN | SYSTOLIC BLOOD PRESSURE: 162 MMHG | WEIGHT: 97.44 LBS

## 2018-04-30 DIAGNOSIS — I10 ESSENTIAL HYPERTENSION: ICD-10-CM

## 2018-04-30 DIAGNOSIS — L30.9 DERMATITIS: Primary | ICD-10-CM

## 2018-04-30 PROCEDURE — 99214 OFFICE O/P EST MOD 30 MIN: CPT | Mod: PBBFAC | Performed by: INTERNAL MEDICINE

## 2018-04-30 PROCEDURE — 99213 OFFICE O/P EST LOW 20 MIN: CPT | Mod: S$PBB,GC,, | Performed by: INTERNAL MEDICINE

## 2018-04-30 PROCEDURE — 99999 PR PBB SHADOW E&M-EST. PATIENT-LVL IV: CPT | Mod: PBBFAC,GC,, | Performed by: INTERNAL MEDICINE

## 2018-04-30 RX ORDER — LOSARTAN POTASSIUM 50 MG/1
50 TABLET ORAL DAILY
Qty: 90 TABLET | Refills: 3 | Status: SHIPPED | OUTPATIENT
Start: 2018-04-30 | End: 2018-06-04

## 2018-04-30 NOTE — PROGRESS NOTES
Subjective:       Patient ID: An Hazel is a 89 y.o. female.    Chief Complaint: Follow-up    HPI     This is a 89 year old female with hx of HTN who comes in today for 6 week follow up of BP. Patient was switched to HCTZ 25 mg form norvasc due to lower extremity pain. Patient started taking HCTZ only for few days as she noticed some itching with taking HCTZ. She had also noticed new onset rash overlying her left breast and went to see her dermatologist. Sampling of the rash showed possible infectious etiology and patient received Keflex for 7 days. She had continued not taking HCTZ and has only been on Atenolol 25mg bid. She no longer checks her BP at home. Reports no worsening of her baseline frontal headache.     Review of Systems    Constitutional: no fever or chills  ENT: no nasal congestion or sore throat  Respiratory: no cough or shortness of breath  Cardiovascular: no chest pain or palpitations  Gastrointestinal: no nausea or vomiting, no abdominal pain or abdominal distention   Genitourinary: no hematuria or dysuria  Integument/Breast: + rash/skin lesion and pruritis  Hematologic/Lymphatic: no easy bruising or lymphadenopathy  Musculoskeletal: no arthralgias or myalgias  Neurological: no seizures or tremors  Endocrine: no heat or cold intolerance    Objective:      Physical Exam    General: frail lady who comes in today with alone with a cane, appears to be in NAD  Eyes: conjunctivae/corneas clear. PERRL. EOMI.   Neck: supple, symmetrical, trachea midline, no JVD.   Cardiovascular: Heart: regular rate and rhythm, S1, S2 normal, no murmur, click, rub or gallop.  Lungs: clear to auscultation bilaterally and normal respiratory effort.   Chest Wall: no tenderness  Abdomen/Rectal: Abdomen: Non distended. + BS. No masses. No TTP.   Extremities: no lower extremity edema, has stasis dermatitis, has right middle leg pitting edema. No redness or tenderness in the calves or thighs.   Skin: no pallor.  Has left breast 2x2 Inch scabby lesion with clear demarcation and dried blood.   Musculoskeletal: full range of motion of joints  Lymph Nodes: No cervical or supraclavicular adenopathy  Psych/Behavioral: Normal. Alert and oriented, appropriate affect.    Assessment:       1. Dermatitis    2. Essential hypertension        Plan:         An was seen today for follow-up.    Diagnoses and all orders for this visit:    Dermatitis  -     Ambulatory consult to Dermatology    Essential hypertension     -     Poorly controlled BP with BP of 200/100 on presentation and 162/100 on repeat. She has been previously discharged from rehab on 100mg losartan that was subsequently stopped after seeing her PCP. Will resume this at 50mg daily with BP checks at home. Close follow up with new PCP.       Case and plan d/w Dr. Vicente.     FRAN Finnegan  PGY-3  Internal Medicine  Pager: [phone]

## 2018-05-01 NOTE — PROGRESS NOTES
I have personally taken the history and examined An Hazel and agree with the findings, assessment and plan.

## 2018-05-27 ENCOUNTER — HOSPITAL ENCOUNTER (EMERGENCY)
Facility: OTHER | Age: 83
Discharge: HOME | End: 2018-05-27
Attending: EMERGENCY MEDICINE
Payer: MEDICARE

## 2018-05-27 VITALS
RESPIRATION RATE: 18 BRPM | HEART RATE: 70 BPM | DIASTOLIC BLOOD PRESSURE: 70 MMHG | TEMPERATURE: 98 F | OXYGEN SATURATION: 92 % | HEIGHT: 62 IN | BODY MASS INDEX: 18.4 KG/M2 | SYSTOLIC BLOOD PRESSURE: 168 MMHG | WEIGHT: 100 LBS

## 2018-05-27 DIAGNOSIS — M25.511 RIGHT SHOULDER PAIN: ICD-10-CM

## 2018-05-27 DIAGNOSIS — M25.551 PAIN OF RIGHT HIP JOINT: ICD-10-CM

## 2018-05-27 DIAGNOSIS — M25.519 SHOULDER PAIN: ICD-10-CM

## 2018-05-27 DIAGNOSIS — R07.9 CHEST PAIN: Primary | ICD-10-CM

## 2018-05-27 DIAGNOSIS — R03.0 ELEVATED BLOOD PRESSURE READING: ICD-10-CM

## 2018-05-27 LAB
ALBUMIN SERPL BCP-MCNC: 4 G/DL
ALP SERPL-CCNC: 60 U/L
ALT SERPL W/O P-5'-P-CCNC: 19 U/L
ANION GAP SERPL CALC-SCNC: 13 MMOL/L
AST SERPL-CCNC: 35 U/L
BACTERIA #/AREA URNS HPF: ABNORMAL /HPF
BASOPHILS # BLD AUTO: 0.03 K/UL
BASOPHILS NFR BLD: 0.2 %
BILIRUB SERPL-MCNC: 0.7 MG/DL
BILIRUB UR QL STRIP: NEGATIVE
BUN SERPL-MCNC: 17 MG/DL
CALCIUM SERPL-MCNC: 10.7 MG/DL
CHLORIDE SERPL-SCNC: 103 MMOL/L
CLARITY UR: CLEAR
CO2 SERPL-SCNC: 25 MMOL/L
COLOR UR: YELLOW
CREAT SERPL-MCNC: 0.8 MG/DL
DIFFERENTIAL METHOD: ABNORMAL
EOSINOPHIL # BLD AUTO: 0.1 K/UL
EOSINOPHIL NFR BLD: 0.7 %
ERYTHROCYTE [DISTWIDTH] IN BLOOD BY AUTOMATED COUNT: 14.3 %
EST. GFR  (AFRICAN AMERICAN): >60 ML/MIN/1.73 M^2
EST. GFR  (NON AFRICAN AMERICAN): >60 ML/MIN/1.73 M^2
GLUCOSE SERPL-MCNC: 122 MG/DL
GLUCOSE UR QL STRIP: NEGATIVE
HCT VFR BLD AUTO: 40.3 %
HGB BLD-MCNC: 13.2 G/DL
HGB UR QL STRIP: ABNORMAL
HYALINE CASTS #/AREA URNS LPF: 0 /LPF
KETONES UR QL STRIP: ABNORMAL
LEUKOCYTE ESTERASE UR QL STRIP: NEGATIVE
LYMPHOCYTES # BLD AUTO: 1.5 K/UL
LYMPHOCYTES NFR BLD: 11 %
MCH RBC QN AUTO: 30.7 PG
MCHC RBC AUTO-ENTMCNC: 32.8 G/DL
MCV RBC AUTO: 94 FL
MICROSCOPIC COMMENT: ABNORMAL
MONOCYTES # BLD AUTO: 0.9 K/UL
MONOCYTES NFR BLD: 6.4 %
NEUTROPHILS # BLD AUTO: 11.1 K/UL
NEUTROPHILS NFR BLD: 81.3 %
NITRITE UR QL STRIP: NEGATIVE
PH UR STRIP: 7 [PH] (ref 5–8)
PLATELET # BLD AUTO: 182 K/UL
PMV BLD AUTO: 11.5 FL
POTASSIUM SERPL-SCNC: 4.2 MMOL/L
PROT SERPL-MCNC: 8.6 G/DL
PROT UR QL STRIP: ABNORMAL
RBC # BLD AUTO: 4.3 M/UL
RBC #/AREA URNS HPF: 33 /HPF (ref 0–4)
SODIUM SERPL-SCNC: 141 MMOL/L
SP GR UR STRIP: 1.02 (ref 1–1.03)
SQUAMOUS #/AREA URNS HPF: 2 /HPF
TROPONIN I SERPL DL<=0.01 NG/ML-MCNC: 0.02 NG/ML
TROPONIN I SERPL DL<=0.01 NG/ML-MCNC: 0.02 NG/ML
URN SPEC COLLECT METH UR: ABNORMAL
UROBILINOGEN UR STRIP-ACNC: NEGATIVE EU/DL
WBC # BLD AUTO: 13.66 K/UL
WBC #/AREA URNS HPF: 7 /HPF (ref 0–5)
WBC CLUMPS URNS QL MICRO: ABNORMAL

## 2018-05-27 PROCEDURE — 93005 ELECTROCARDIOGRAM TRACING: CPT

## 2018-05-27 PROCEDURE — 85025 COMPLETE CBC W/AUTO DIFF WBC: CPT

## 2018-05-27 PROCEDURE — 81000 URINALYSIS NONAUTO W/SCOPE: CPT

## 2018-05-27 PROCEDURE — 93010 ELECTROCARDIOGRAM REPORT: CPT | Mod: ,,, | Performed by: INTERNAL MEDICINE

## 2018-05-27 PROCEDURE — 84484 ASSAY OF TROPONIN QUANT: CPT | Mod: 91

## 2018-05-27 PROCEDURE — 80053 COMPREHEN METABOLIC PANEL: CPT

## 2018-05-27 PROCEDURE — 96374 THER/PROPH/DIAG INJ IV PUSH: CPT

## 2018-05-27 PROCEDURE — 99284 EMERGENCY DEPT VISIT MOD MDM: CPT | Mod: 25

## 2018-05-27 PROCEDURE — 25000003 PHARM REV CODE 250: Performed by: STUDENT IN AN ORGANIZED HEALTH CARE EDUCATION/TRAINING PROGRAM

## 2018-05-27 RX ORDER — ACETAMINOPHEN 500 MG
1000 TABLET ORAL ONCE
Status: COMPLETED | OUTPATIENT
Start: 2018-05-27 | End: 2018-05-27

## 2018-05-27 RX ORDER — LABETALOL HYDROCHLORIDE 5 MG/ML
20 INJECTION, SOLUTION INTRAVENOUS
Status: COMPLETED | OUTPATIENT
Start: 2018-05-27 | End: 2018-05-27

## 2018-05-27 RX ADMIN — LABETALOL HYDROCHLORIDE 20 MG: 5 INJECTION, SOLUTION INTRAVENOUS at 06:05

## 2018-05-27 RX ADMIN — ACETAMINOPHEN 1000 MG: 500 TABLET, FILM COATED ORAL at 06:05

## 2018-05-27 NOTE — ED PROVIDER NOTES
"Encounter Date: 5/27/2018       History     Chief Complaint   Patient presents with    Fall     Per EMS + trip adn fall on floor x 1 hour approx 1500 today. Denies hitting head or LOC.      89 y.o.  female with PMH of HTN, HLD, Anemia, osteoporosis, scoliosis who presents today after falling. Patient was standing in her kitchen when she turned to get something out of the refrigerator, lost her balance and fell. Patient states she fell "on her bottom". Denies hitting her head and or losing consciousness. States she has "developed some chest pain from trying to brace" herself and reach her phone. She states that she "strained" her chest trying to pull herself up. Denies any back pain, neck pain. States she also has pain in her right shoulder which is chronic. Of note patient fell last October and broke her femur and right shoulder requiring surgery. Patient was in her fallen position for roughly 1 hour until her daughter came in from the yard to help her.           Review of patient's allergies indicates:   Allergen Reactions    Augmentin [amoxicillin-pot clavulanate] Nausea And Vomiting    Sulfa (sulfonamide antibiotics)     Zestril  [lisinopril]      Other reaction(s): Swelling    Hydrochlorothiazide Itching     Past Medical History:   Diagnosis Date    Anemia     Arthritis     Fever blister     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 Cornejo    HYSTERECTOMY      OOPHORECTOMY      ORIF HIP FRACTURE Left 12/2013    TONSILLECTOMY       Family History   Problem Relation Age of Onset    Stroke Father 49    Hypertension Father     Osteoporosis Mother     " Cancer Maternal Grandmother     Amblyopia Neg Hx     Blindness Neg Hx     Cataracts Neg Hx     Macular degeneration Neg Hx     Retinal detachment Neg Hx     Strabismus Neg Hx     Thyroid disease Neg Hx     Melanoma Neg Hx      Social History   Substance Use Topics    Smoking status: Never Smoker    Smokeless tobacco: Never Used      Comment: The patient exercises every other day on an exercise bike.  On alternative days she does floor exercises with weights.    Alcohol use 1.2 - 2.4 oz/week     1 Glasses of wine, 1 - 3 Standard drinks or equivalent per week      Comment: Occasional use     Review of Systems   Constitutional: Negative for chills, fatigue and fever.   HENT: Negative for congestion.    Eyes: Negative for visual disturbance.   Respiratory: Negative for cough and shortness of breath.    Cardiovascular: Positive for chest pain. Negative for palpitations.   Gastrointestinal: Negative for abdominal distention, abdominal pain, constipation, diarrhea, nausea and vomiting.   Genitourinary: Negative for difficulty urinating, dysuria, hematuria, vaginal bleeding and vaginal discharge.   Musculoskeletal: Positive for arthralgias (Right shoulder, right hip) and back pain. Negative for neck pain.   Skin: Negative for rash.   Neurological: Negative for dizziness, seizures, light-headedness and headaches.   Hematological: Does not bruise/bleed easily.   Psychiatric/Behavioral: Negative for dysphoric mood. The patient is not nervous/anxious.        Physical Exam     Initial Vitals [05/27/18 1654]   BP Pulse Resp Temp SpO2   (!) 245/109 64 18 98 °F (36.7 °C) (!) 94 %      MAP       154.33         Physical Exam    Vitals reviewed.  Constitutional: She appears well-developed.   Thin, frail, elderly woman. Appears to be in discomfort. Resting in bed still, not wanting to move.    HENT:   Head: Normocephalic and atraumatic.   Mouth/Throat: Oropharynx is clear and moist.   Eyes: EOM are normal. Pupils are equal,  round, and reactive to light.   Neck: Normal range of motion. Neck supple.   No cervical bony tenderness noted.    Cardiovascular: Normal rate, regular rhythm, normal heart sounds and intact distal pulses. Exam reveals no gallop and no friction rub.    No murmur heard.  DP and PT pulses bilaterally +2   Pulmonary/Chest: Breath sounds normal. No respiratory distress. She exhibits tenderness.   Abdominal: Soft. Bowel sounds are normal. She exhibits no distension. There is no tenderness.   Musculoskeletal: She exhibits no edema.        Right shoulder: She exhibits decreased range of motion and tenderness.   Kyphosis and scoliosis noted. No bony tenderness noted on palpation of entire spine. Limited active and passive ROM of right shoulder 2/2 to pain.   All other joints were aggressively palpated and ranged without tenderness or decreased ROM except as otherwise mentioned.  There is no midline tenderness of the cervical spine.    There is no midline tenderness of the thoracic spine.    There is no midline tenderness of the lumbar spine.    There is no tenderness over the sacrum.   Neurological: She is alert and oriented to person, place, and time. She has normal strength. She displays normal reflexes. No cranial nerve deficit.   Skin: Skin is warm and dry.   Psychiatric: She has a normal mood and affect. Her behavior is normal. Judgment and thought content normal.         ED Course   Procedures  Labs Reviewed   CBC W/ AUTO DIFFERENTIAL - Abnormal; Notable for the following:        Result Value    WBC 13.66 (*)     Gran # (ANC) 11.1 (*)     Gran% 81.3 (*)     Lymph% 11.0 (*)     All other components within normal limits   COMPREHENSIVE METABOLIC PANEL - Abnormal; Notable for the following:     Glucose 122 (*)     Calcium 10.7 (*)     Total Protein 8.6 (*)     All other components within normal limits   URINALYSIS - Abnormal; Notable for the following:     Protein, UA 2+ (*)     Ketones, UA Trace (*)     Occult Blood UA  2+ (*)     All other components within normal limits   URINALYSIS MICROSCOPIC - Abnormal; Notable for the following:     RBC, UA 33 (*)     WBC, UA 7 (*)     WBC Clumps, UA Occasional (*)     All other components within normal limits   TROPONIN I   TROPONIN I          X-Rays:   Independently Interpreted Readings:   Other Readings:  XR SHOULDER COMPLETE 2 OR MORE VIEWS RIGHT    CLINICAL HISTORY:  Pain in right shoulder    TECHNIQUE:  Two views of the right shoulder were performed.    COMPARISON:  October 2017.There is chronic impacted right humeral neck fracture.  Similar findings were seen on prior radiograph from October 2017.  No definite acute fracture seen.  No evidence of dislocation.      XR PELVIS ROUTINE AP    CLINICAL HISTORY:  pelvis pain;    TECHNIQUE:  AP view of the pelvis was performed.    COMPARISON:  October 2017.    FINDINGS:  Postsurgical ORIF changes are visualized involving the bilateral proximal femurs.  Chronic changes suggestive for remote fractures are seen involving the right superior and inferior pubic ramus.  No acute fractures or dislocation seen.  No hardware complication seen.  Impression       No acute fracture or hardware complication seen.      XR CHEST PA AND LATERAL    CLINICAL HISTORY:  Chest pain, unspecified    TECHNIQUE:  PA and lateral views of the chest were performed.    COMPARISON:  October 2017.    FINDINGS:  There is severe dextroscoliosis of the thoracolumbar spine.  Heart is stable in size.  Chronic bilateral lung changes are seen with suspected bibasilar atelectasis.  No evidence of pneumothorax or large effusion.  Chronic impacted right humeral neck fracture is again noted.  No acute osseous abnormality identified.    Medical Decision Making:   ED Management:  EKG WNL  XRAY of pelvis, right shoulder and test WNL  BP on arrival 248/110 repeat /110. Patient refused narcotic pain medication. Requested tylenol. Tylenol given. BP elevated and equal in both arms.  Distal pulses intact bilaterally. 20mg IV labetalol given. BP now 169/76. Sat 100% on RA.   Pain improved after 1g of tylenol   Patient was able to ambulate with assistance with pain well controlled  Troponins WNL 0.018 3 hours later 0.022  CBC and CMP WNL  UA showed + blood and + proteinuria, unchanged from previous UA's in the past   Discussed results with patient and daughter  Encourage ambulation and tylenol PRN as needed   ED and fall precautions discussed  Discharged home with daughter.               Attending Attestation:   Physician Attestation Statement for Resident:  As the supervising MD   Physician Attestation Statement: I have personally seen and examined this patient.   I agree with the above history. -: Patient has chronic right shoulder pain and right hip pain.   As the supervising MD I agree with the above PE.   -: Anterior chest wall tenderness to palpation without ecchymosis or obvious lesion.  No midline spinal tenderness.  -: Emergent evaluation a 89-year-old female with complaint of chest pain which started while down after a fall, no head injury or LOC.  Physical exam did reveal some chest wall tenderness. Labs are benign, there is hematuria present but this is likely trauma from catheterization, patient denies any urinary symptoms. Multiple x-rays including chest x-ray, basic labs as well as serial troponin x2 sets at 3:00 a.m. apart are negative. I think chest pain is musculoskeletal in etiology.  Patient is discharged to the care of her daughter in good condition, they were advised close follow-up with PCP or return here for new or worsening symptoms.                       Clinical Impression:   The primary encounter diagnosis was Chest pain. Diagnoses of Shoulder pain, Right shoulder pain, Pain of right hip joint, and Elevated blood pressure reading were also pertinent to this visit.                           Amy Ga MD  Resident  05/27/18 2645       Yuly Izaguirre MD  05/31/18  [id number]

## 2018-05-27 NOTE — ED NOTES
Pt presents via EMS with c/o chest pain after a fall. Per pt, she fell at home earlier today and was unable to get herself up for an hour. Pt denies hitting her head or LOC. Pt reporting she has pain across her chest that is reproducible with palpation.

## 2018-05-28 ENCOUNTER — TELEPHONE (OUTPATIENT)
Dept: INTERNAL MEDICINE | Facility: CLINIC | Age: 83
End: 2018-05-28

## 2018-05-28 NOTE — TELEPHONE ENCOUNTER
----- Message from Willam Luz sent at 5/26/2018 11:35 AM CDT -----  Contact: Pt  Pt would like to be called back asap regarding high blood pressure. Pt would like to discuss further.    Pt can be reached at [phone].    Thanks

## 2018-05-28 NOTE — TELEPHONE ENCOUNTER
Spoke to patient she states she will be keeping the appointment with Dr. Graves for follow up but wants him to know she was in the ED and needs to follow up with him for that.

## 2018-05-28 NOTE — ED NOTES
Pt returned from x-ray. Pt lying down, respirations even/unlabored. NAD noted. Pt denies any current needs at this time. Side rails upx2, call bell within reach. Will continue to monitor.

## 2018-06-04 ENCOUNTER — OFFICE VISIT (OUTPATIENT)
Dept: INTERNAL MEDICINE | Facility: CLINIC | Age: 83
End: 2018-06-04
Payer: MEDICARE

## 2018-06-04 VITALS
HEIGHT: 62 IN | HEART RATE: 64 BPM | DIASTOLIC BLOOD PRESSURE: 105 MMHG | WEIGHT: 94.13 LBS | SYSTOLIC BLOOD PRESSURE: 233 MMHG | BODY MASS INDEX: 17.32 KG/M2

## 2018-06-04 DIAGNOSIS — I10 ESSENTIAL HYPERTENSION: Primary | ICD-10-CM

## 2018-06-04 DIAGNOSIS — R80.9 PROTEINURIA, UNSPECIFIED TYPE: ICD-10-CM

## 2018-06-04 LAB
BACTERIA #/AREA URNS AUTO: ABNORMAL /HPF
BILIRUB UR QL STRIP: NEGATIVE
CLARITY UR REFRACT.AUTO: CLEAR
COLOR UR AUTO: YELLOW
GLUCOSE UR QL STRIP: NEGATIVE
HGB UR QL STRIP: ABNORMAL
HYALINE CASTS UR QL AUTO: 3 /LPF
KETONES UR QL STRIP: NEGATIVE
LEUKOCYTE ESTERASE UR QL STRIP: ABNORMAL
MICROSCOPIC COMMENT: ABNORMAL
NITRITE UR QL STRIP: NEGATIVE
PH UR STRIP: 5 [PH] (ref 5–8)
PROT UR QL STRIP: ABNORMAL
RBC #/AREA URNS AUTO: 14 /HPF (ref 0–4)
SP GR UR STRIP: 1.01 (ref 1–1.03)
SQUAMOUS #/AREA URNS AUTO: 0 /HPF
URN SPEC COLLECT METH UR: ABNORMAL
UROBILINOGEN UR STRIP-ACNC: NEGATIVE EU/DL
WBC #/AREA URNS AUTO: 4 /HPF (ref 0–5)

## 2018-06-04 PROCEDURE — 81001 URINALYSIS AUTO W/SCOPE: CPT

## 2018-06-04 PROCEDURE — 99213 OFFICE O/P EST LOW 20 MIN: CPT | Mod: PBBFAC | Performed by: INTERNAL MEDICINE

## 2018-06-04 PROCEDURE — 99214 OFFICE O/P EST MOD 30 MIN: CPT | Mod: S$PBB,GC,, | Performed by: INTERNAL MEDICINE

## 2018-06-04 PROCEDURE — 99999 PR PBB SHADOW E&M-EST. PATIENT-LVL III: CPT | Mod: PBBFAC,GC,, | Performed by: INTERNAL MEDICINE

## 2018-06-04 RX ORDER — LOSARTAN POTASSIUM 100 MG/1
100 TABLET ORAL DAILY
Qty: 90 TABLET | Refills: 3 | Status: SHIPPED | OUTPATIENT
Start: 2018-06-04 | End: 2018-07-23 | Stop reason: SDUPTHER

## 2018-06-04 RX ORDER — HYDROCHLOROTHIAZIDE 12.5 MG/1
12.5 TABLET ORAL DAILY
Qty: 30 TABLET | Refills: 11 | Status: SHIPPED | OUTPATIENT
Start: 2018-06-04 | End: 2018-07-23

## 2018-06-04 NOTE — PROGRESS NOTES
Subjective:       Patient ID: An  Aleksander Luz is a 89 y.o. female.    Chief Complaint: Follow-up    HPI     Mrs. Luz is a 89 yo female who presents for follow up for uncontrolled hypertension. Was seen by Dr. Graves on 4/30 and at that time BP was initially 200/100 and on recheck was 168/90. She was started on Losartan 50 mg daily and was told to continue her Atenolol 25 mg BID. Of note, prior to this visit she has been bt Dr. Higuera in April who stopped her Amlodipine secondary to ankle swelling and started on HCTZ 12.5 mg daily which she stopped taking due to a rash which she believed was due to the medication. She went to see a dermatologist for the rash who took a biopsy and believed the rash to be bacterial and prescribed 7 days of Keflex.     She reports mild headache over the last week but denies any chest pain, SOB, or vision changes.       Review of Systems   Constitutional: Negative for chills, diaphoresis, fatigue and fever.   HENT: Negative for sinus pain, sinus pressure and sneezing.    Eyes: Negative for photophobia and visual disturbance.   Respiratory: Negative for cough, choking and shortness of breath.    Cardiovascular: Negative for chest pain, palpitations and leg swelling.   Gastrointestinal: Negative for diarrhea, nausea and vomiting.   Endocrine: Negative for polydipsia and polyuria.   Genitourinary: Negative for dysuria, hematuria, menstrual problem and urgency.   Musculoskeletal: Negative for arthralgias and myalgias.   Skin: Negative for pallor and rash.   Allergic/Immunologic: Negative for immunocompromised state.   Neurological: Negative for dizziness, syncope, light-headedness and headaches.   Hematological: Negative for adenopathy.   Psychiatric/Behavioral: Negative for agitation and behavioral problems.       Objective:      Physical Exam   Constitutional: She is oriented to person, place, and time. She appears well-developed. No distress.   HENT:   Head: Normocephalic  and atraumatic.   Eyes: Pupils are equal, round, and reactive to light.   Neck: Normal range of motion. No JVD present.   Cardiovascular: Normal rate and regular rhythm.    No murmur heard.  Pulmonary/Chest: Effort normal and breath sounds normal. No respiratory distress. She has no wheezes.   Abdominal: Soft. Bowel sounds are normal. She exhibits no distension. There is no tenderness.   Musculoskeletal: Normal range of motion. She exhibits no edema.   Neurological: She is alert and oriented to person, place, and time.   Skin: Skin is warm and dry.   Psychiatric: She has a normal mood and affect. Her behavior is normal.       Assessment:         1. Hypertension    2. Proteinuria.   Plan:          1. Patient with uncontrolled hypertension. BP today was 230/100. Denies chest pain, headache or vision changes.  Will increase Losartan to 100 mg and start HCTZ 12.5 daily. Continue atenolol 25 mg BID.   2. Recheck UA today.     Discussed with Dr. Lawrence.     Follow up with me on Friday.     Julisa Mejia MD PGY-3

## 2018-06-08 ENCOUNTER — OFFICE VISIT (OUTPATIENT)
Dept: INTERNAL MEDICINE | Facility: CLINIC | Age: 83
End: 2018-06-08
Payer: MEDICARE

## 2018-06-08 VITALS
DIASTOLIC BLOOD PRESSURE: 84 MMHG | BODY MASS INDEX: 17.12 KG/M2 | WEIGHT: 93.06 LBS | SYSTOLIC BLOOD PRESSURE: 188 MMHG | HEIGHT: 62 IN | HEART RATE: 63 BPM

## 2018-06-08 DIAGNOSIS — I10 ESSENTIAL HYPERTENSION: ICD-10-CM

## 2018-06-08 DIAGNOSIS — M54.50 CHRONIC LOW BACK PAIN WITHOUT SCIATICA, UNSPECIFIED BACK PAIN LATERALITY: Primary | ICD-10-CM

## 2018-06-08 DIAGNOSIS — G89.29 CHRONIC LOW BACK PAIN WITHOUT SCIATICA, UNSPECIFIED BACK PAIN LATERALITY: Primary | ICD-10-CM

## 2018-06-08 PROCEDURE — 99213 OFFICE O/P EST LOW 20 MIN: CPT | Mod: PBBFAC | Performed by: INTERNAL MEDICINE

## 2018-06-08 PROCEDURE — 99213 OFFICE O/P EST LOW 20 MIN: CPT | Mod: S$PBB,GC,, | Performed by: INTERNAL MEDICINE

## 2018-06-08 PROCEDURE — 99999 PR PBB SHADOW E&M-EST. PATIENT-LVL III: CPT | Mod: PBBFAC,GC,, | Performed by: INTERNAL MEDICINE

## 2018-06-08 RX ORDER — AMLODIPINE BESYLATE 5 MG/1
5 TABLET ORAL DAILY
Qty: 30 TABLET | Refills: 11 | Status: SHIPPED | OUTPATIENT
Start: 2018-06-08 | End: 2018-06-25 | Stop reason: SDUPTHER

## 2018-06-08 NOTE — PROGRESS NOTES
Subjective:       Patient ID: An  Aleksander Luz is a 89 y.o. female.    Chief Complaint: Hypertension    Hypertension   Pertinent negatives include no chest pain, headaches, palpitations or shortness of breath.        Mrs. Luz is a 89 yo female who presents for follow up for uncontrolled hypertension. Her BP is 185/90 today. Seen by me last week when her BP was elevated to around 220 systilic. We increased her Valsartan to 100 mg daily and started HCTZ 12.5 mg daily in addition to her atenolol 25 mg BID.     Of note she was seen by Dr. Graves on 4/30 and at that time BP was initially 200/100 and on recheck was 168/90. She was started on Losartan 50 mg daily and was told to continue her Atenolol 25 mg BID. Of note, prior to this visit she has been bt Dr. Higuera in April who stopped her Amlodipine secondary to ankle swelling and started on HCTZ 12.5 mg daily which she stopped taking due to a rash which she believed was due to the medication. She went to see a dermatologist for the rash who took a biopsy and believed the rash to be bacterial and prescribed 7 days of Keflex.     She reports mild headache over the last week but denies any chest pain, SOB, or vision changes.       Review of Systems   Constitutional: Negative for chills, diaphoresis, fatigue and fever.   HENT: Negative for sinus pain, sinus pressure and sneezing.    Eyes: Negative for photophobia and visual disturbance.   Respiratory: Negative for cough, choking and shortness of breath.    Cardiovascular: Negative for chest pain, palpitations and leg swelling.   Gastrointestinal: Negative for diarrhea, nausea and vomiting.   Endocrine: Negative for polydipsia and polyuria.   Genitourinary: Negative for dysuria, hematuria, menstrual problem and urgency.   Musculoskeletal: Negative for arthralgias and myalgias.   Skin: Negative for pallor and rash.   Allergic/Immunologic: Negative for immunocompromised state.   Neurological: Negative for  dizziness, syncope, light-headedness and headaches.   Hematological: Negative for adenopathy.   Psychiatric/Behavioral: Negative for agitation and behavioral problems.       Objective:      Physical Exam   Constitutional: She is oriented to person, place, and time. She appears well-developed. No distress.   HENT:   Head: Normocephalic and atraumatic.   Eyes: Pupils are equal, round, and reactive to light.   Neck: Normal range of motion. No JVD present.   Cardiovascular: Normal rate and regular rhythm.    No murmur heard.  Pulmonary/Chest: Effort normal and breath sounds normal. No respiratory distress. She has no wheezes.   Abdominal: Soft. Bowel sounds are normal. She exhibits no distension. There is no tenderness.   Musculoskeletal: Normal range of motion. She exhibits no edema.   Neurological: She is alert and oriented to person, place, and time.   Skin: Skin is warm and dry.   Psychiatric: She has a normal mood and affect. Her behavior is normal.       Assessment:         1. Essential Hypertension     2. Acute on Chronic Back pain   Plan:          1. Patient with uncontrolled hypertension. BP today was 200/100. Denies chest pain, headache or vision changes.  Will continue Losartan  100 mg, HCTZ 12.5 daily and atenolol 25 mg BID. Will add back Amlodipine 5 mg daily and monitor closely for ankle swelling. If better controlled during next visit condiser d/c HCTZ as she states it makes her urinate too often.   2. Will refer to [facility].     Discussed with Dr. Smith     Follow up with new PCP in 2 weeks.     Julisa Mejia MD PGY-3

## 2018-06-12 NOTE — PROGRESS NOTES
Preceptor note    Patient's history and physical discussed, please refer to resident physician's note for specific details.  I agree with resident's assessment and plan.

## 2018-06-25 ENCOUNTER — TELEPHONE (OUTPATIENT)
Dept: INTERNAL MEDICINE | Facility: CLINIC | Age: 83
End: 2018-06-25

## 2018-06-25 ENCOUNTER — CLINICAL SUPPORT (OUTPATIENT)
Dept: INTERNAL MEDICINE | Facility: CLINIC | Age: 83
End: 2018-06-25
Payer: MEDICARE

## 2018-06-25 ENCOUNTER — OFFICE VISIT (OUTPATIENT)
Dept: INTERNAL MEDICINE | Facility: CLINIC | Age: 83
End: 2018-06-25
Payer: MEDICARE

## 2018-06-25 VITALS
HEIGHT: 62 IN | WEIGHT: 94.38 LBS | SYSTOLIC BLOOD PRESSURE: 126 MMHG | DIASTOLIC BLOOD PRESSURE: 70 MMHG | OXYGEN SATURATION: 94 % | HEART RATE: 69 BPM | BODY MASS INDEX: 17.37 KG/M2

## 2018-06-25 DIAGNOSIS — D72.829 LEUKOCYTOSIS, UNSPECIFIED TYPE: ICD-10-CM

## 2018-06-25 DIAGNOSIS — Z00.00 VISIT FOR ANNUAL HEALTH EXAMINATION: Primary | ICD-10-CM

## 2018-06-25 DIAGNOSIS — K59.09 CHRONIC CONSTIPATION: ICD-10-CM

## 2018-06-25 DIAGNOSIS — R32 URINARY INCONTINENCE, UNSPECIFIED TYPE: ICD-10-CM

## 2018-06-25 DIAGNOSIS — R79.9 ABNORMAL FINDING OF BLOOD CHEMISTRY: ICD-10-CM

## 2018-06-25 DIAGNOSIS — Z13.1 SCREENING FOR DIABETES MELLITUS: ICD-10-CM

## 2018-06-25 DIAGNOSIS — Z87.81 HISTORY OF HIP FRACTURE: ICD-10-CM

## 2018-06-25 DIAGNOSIS — M41.9 KYPHOSCOLIOSIS: ICD-10-CM

## 2018-06-25 DIAGNOSIS — E78.2 MIXED HYPERLIPIDEMIA: ICD-10-CM

## 2018-06-25 DIAGNOSIS — G89.29 OTHER CHRONIC PAIN: ICD-10-CM

## 2018-06-25 DIAGNOSIS — Z91.81 HISTORY OF FALL: ICD-10-CM

## 2018-06-25 DIAGNOSIS — I10 ESSENTIAL HYPERTENSION: ICD-10-CM

## 2018-06-25 DIAGNOSIS — M81.0 OSTEOPOROSIS, UNSPECIFIED OSTEOPOROSIS TYPE, UNSPECIFIED PATHOLOGICAL FRACTURE PRESENCE: ICD-10-CM

## 2018-06-25 PROCEDURE — 99214 OFFICE O/P EST MOD 30 MIN: CPT | Mod: PBBFAC,25 | Performed by: INTERNAL MEDICINE

## 2018-06-25 PROCEDURE — 99214 OFFICE O/P EST MOD 30 MIN: CPT | Mod: S$PBB,,, | Performed by: INTERNAL MEDICINE

## 2018-06-25 PROCEDURE — G0009 ADMIN PNEUMOCOCCAL VACCINE: HCPCS | Mod: PBBFAC

## 2018-06-25 PROCEDURE — 99999 PR PBB SHADOW E&M-EST. PATIENT-LVL IV: CPT | Mod: PBBFAC,,, | Performed by: INTERNAL MEDICINE

## 2018-06-25 RX ORDER — AMLODIPINE BESYLATE 5 MG/1
5 TABLET ORAL DAILY
Qty: 90 TABLET | Refills: 1 | Status: SHIPPED | OUTPATIENT
Start: 2018-06-25 | End: 2018-09-17

## 2018-06-25 RX ORDER — POLYETHYLENE GLYCOL 3350 17 G/17G
POWDER, FOR SOLUTION ORAL
Qty: 30 EACH | Refills: 3 | Status: SHIPPED | OUTPATIENT
Start: 2018-06-25 | End: 2018-06-25 | Stop reason: SDUPTHER

## 2018-06-25 RX ORDER — MELOXICAM 7.5 MG/1
7.5 TABLET ORAL DAILY
Qty: 90 TABLET | Refills: 0 | Status: SHIPPED | OUTPATIENT
Start: 2018-06-25 | End: 2018-07-27 | Stop reason: SDUPTHER

## 2018-06-25 RX ORDER — MELOXICAM 7.5 MG/1
7.5 TABLET ORAL DAILY
Qty: 90 TABLET | Refills: 0 | Status: SHIPPED | OUTPATIENT
Start: 2018-06-25 | End: 2018-06-25 | Stop reason: SDUPTHER

## 2018-06-25 RX ORDER — POLYETHYLENE GLYCOL 3350 17 G/17G
POWDER, FOR SOLUTION ORAL
Qty: 30 EACH | Refills: 3 | Status: SHIPPED | OUTPATIENT
Start: 2018-06-25

## 2018-06-25 RX ORDER — AMLODIPINE BESYLATE 5 MG/1
5 TABLET ORAL DAILY
Qty: 90 TABLET | Refills: 1 | Status: SHIPPED | OUTPATIENT
Start: 2018-06-25 | End: 2018-06-25 | Stop reason: SDUPTHER

## 2018-06-25 NOTE — TELEPHONE ENCOUNTER
Spoke to pt, she stated that her kids will be traveling within that month. Will call back to schedule appt due to transportation. Pt placed on recall list for reminder to make an appt

## 2018-06-25 NOTE — TELEPHONE ENCOUNTER
----- Message from Salma Guerra sent at 6/25/2018 10:17 AM CDT -----  Pt is in need of a 1 month follow up

## 2018-06-25 NOTE — PROGRESS NOTES
INTERNAL MEDICINE INITIAL VISIT NOTE      CHIEF COMPLAINT     Chief Complaint   Patient presents with    Establish Care       HPI     An  Aleksander Luz is a 89 y.o.  female who presents with HTN, HLD, anemia, osteoporosis c kyphoscoliosis, glaucoma, chronic dry eyes, urinary incontinence, chronic constipation, here today to establish care.    Former pt of Dr. Thomas.    At baseline, lives at home c her daughter who works.  AAOx3, does most ADLs independently but appreciates assistance.  Wears poise pads for hx urinary incontinence, no issues c fecal incontinence.  Ambulates c a cane.  Has had 3 falls in the past, in 2013, 2017, and again last month.  Had r humeral neck fx back in 2017 and hip fx in 2013 s/p ORIF B.    Was seen recently in resident clinic for poorly controlled HTN.  Had ankle swelling c amlodipine.  ?rash c HCTZ although seen by Derm who took bx and presumed to be bacterial so HCTZ was cont'ed.  Amlodipine restarted at low dose at recent appt c plan to hopefully stop HCTZ in the future due to urinary s/e.    Does note some issues c chronic constipation.  Is on Oxybutynin for urinary incontinence and also has been taking prn hydrocodone due to pain in her shoulder and hips from recent fall.  Says otc tylenol and ibuprofen have not helped.  Has not seen ortho recently.  No falls since fall last month.    Also c/o dry mouth.      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 Cornejo    HYSTERECTOMY       OOPHORECTOMY      ORIF HIP FRACTURE Left 12/2013    TONSILLECTOMY         Home Medications:  Prior to Admission medications    Medication Sig Start Date End Date Taking? Authorizing Provider   acetaminophen (TYLENOL) 325 MG tablet Take 2 tablets (650 mg total) by mouth every 6 (six) hours as needed (mild pain). Do NOT exceed 3000 mg in a 24 hour time period and do not take with other medications containing acetaminophen 1/15/14   Britt Lara NP   alendronate (FOSAMAX) 70 MG tablet Take 1 tablet (70 mg total) by mouth every 7 days. 12/18/17 12/18/18  Alcon Thomas Jr., MD   amLODIPine (NORVASC) 5 MG tablet Take 1 tablet (5 mg total) by mouth once daily. 6/8/18 6/8/19  FRAN Esposito   atenolol (TENORMIN) 25 MG tablet Take 1 tablet (25 mg total) by mouth 2 (two) times daily. 2/19/18   Alcon Thomas Jr., MD   cholecalciferol, vitamin D3, (VITAMIN D3) 1,000 unit capsule Take 1,000 Units by mouth once daily.    Historical Provider, MD   FOLIC ACID/MULTIVIT-MIN/LUTEIN (CENTRUM SILVER ORAL) Take 1 tablet by mouth once daily.     Historical Provider, MD   hydroCHLOROthiazide (HYDRODIURIL) 12.5 MG Tab Take 1 tablet (12.5 mg total) by mouth once daily. 6/4/18 6/4/19  FRAN Esposito   losartan (COZAAR) 100 MG tablet Take 1 tablet (100 mg total) by mouth once daily. 6/4/18 6/4/19  FRAN Esposito   oxybutynin (DITROPAN-XL) 5 MG TR24 TAKE 1 TABLET DAILY 10/3/17   Alcon Thomas Jr., MD   pravastatin (PRAVACHOL) 20 MG tablet TAKE ONE-HALF (1/2) TABLET DAILY 4/26/18   Alcon Thomas Jr., MD   senna-docusate 8.6-50 mg (PERICOLACE) 8.6-50 mg per tablet Take 2 tablets by mouth 2 (two) times daily. 11/14/17   Verna Mc MD       Allergies:  Review of patient's allergies indicates:   Allergen Reactions    Augmentin [amoxicillin-pot clavulanate] Nausea And Vomiting    Sulfa (sulfonamide antibiotics)     Zestril  [lisinopril]      Other reaction(s): Swelling        Family History:  Family History   Problem Relation Age of Onset    Stroke Father 49    Hypertension Father     Osteoporosis Mother     Cancer Maternal Grandmother     Amblyopia Neg Hx     Blindness Neg Hx     Cataracts Neg Hx     Macular degeneration Neg Hx     Retinal detachment Neg Hx     Strabismus Neg Hx     Thyroid disease Neg Hx     Melanoma Neg Hx        Social History:  Social History   Substance Use Topics    Smoking status: Never Smoker    Smokeless tobacco: Never Used      Comment: The patient exercises every other day on an exercise bike.  On alternative days she does floor exercises with weights.    Alcohol use 1.2 - 2.4 oz/week     1 Glasses of wine, 1 - 3 Standard drinks or equivalent per week      Comment: Occasional use       Review of Systems:  Review of Systems   Constitutional: Negative for appetite change, chills, fatigue, fever and unexpected weight change.   HENT: Negative for congestion, hearing loss and rhinorrhea.    Eyes: Negative for pain and visual disturbance.   Respiratory: Negative for cough, chest tightness, shortness of breath and wheezing.    Cardiovascular: Negative for chest pain, palpitations and leg swelling.   Gastrointestinal: Positive for constipation. Negative for abdominal distention and abdominal pain.   Endocrine: Negative for polydipsia and polyuria.   Genitourinary: Negative for decreased urine volume, difficulty urinating, dysuria, hematuria and vaginal discharge.   Musculoskeletal: Positive for arthralgias and back pain.   Neurological: Negative for weakness, numbness and headaches.   Psychiatric/Behavioral: Negative for behavioral problems and confusion.       Health Maintenance:   Immunizations:   Influenza is up to date 12/2017  TDap is up to date 5/2017  Prevnar 13 5/2016, will give pneumovax today  Shingrix is not UTD.     rec once back in stock    Cancer Screening:  PAP: n/a based on age, also c hx hyst  Mammogram: n/a based on  "age  Colonoscopy: n/a based on age  DEXA:  is not up to date. Will order      PHYSICAL EXAM     /70 (BP Location: Left arm, Patient Position: Sitting, BP Method: Large (Manual))   Pulse 69   Ht 5' 2" (1.575 m)   Wt 42.8 kg (94 lb 5.7 oz)   LMP  (LMP Unknown)   SpO2 (!) 94%   BMI 17.26 kg/m²     GEN - A+OX4, NAD   HEENT - PERRL, EOMI, OP clear  Neck - No thyromegaly or cervical LAD. No thyroid masses felt.  CV - RRR, no m/r/g  Chest - CTAB, no wheezing, crackles, or rhonchi  Abd - S/NT/ND/+BS.   Ext - 2+BDP and radial pulses. Trace pitting edema B ankles.  LN - No LAD appreciated.  Skin - Normal color and texture, no rash, no skin lesions.  Dry mouth noted.      LABS     Lab Results   Component Value Date    WBC 13.66 (H) 05/27/2018    HGB 13.2 05/27/2018    HCT 40.3 05/27/2018    MCV 94 05/27/2018     05/27/2018     CMP  Sodium   Date Value Ref Range Status   05/27/2018 141 136 - 145 mmol/L Final     Potassium   Date Value Ref Range Status   05/27/2018 4.2 3.5 - 5.1 mmol/L Final     Comment:     Specimen slightly hemolyzed     Chloride   Date Value Ref Range Status   05/27/2018 103 95 - 110 mmol/L Final     CO2   Date Value Ref Range Status   05/27/2018 25 23 - 29 mmol/L Final     Glucose   Date Value Ref Range Status   05/27/2018 122 (H) 70 - 110 mg/dL Final     BUN, Bld   Date Value Ref Range Status   05/27/2018 17 8 - 23 mg/dL Final     Creatinine   Date Value Ref Range Status   05/27/2018 0.8 0.5 - 1.4 mg/dL Final     Calcium   Date Value Ref Range Status   05/27/2018 10.7 (H) 8.7 - 10.5 mg/dL Final     Total Protein   Date Value Ref Range Status   05/27/2018 8.6 (H) 6.0 - 8.4 g/dL Final     Albumin   Date Value Ref Range Status   05/27/2018 4.0 3.5 - 5.2 g/dL Final     Total Bilirubin   Date Value Ref Range Status   05/27/2018 0.7 0.1 - 1.0 mg/dL Final     Comment:     For infants and newborns, interpretation of results should be based  on gestational age, weight and in agreement with " clinical  observations.  Premature Infant recommended reference ranges:  Up to 24 hours.............<8.0 mg/dL  Up to 48 hours............<12.0 mg/dL  3-5 days..................<15.0 mg/dL  6-29 days.................<15.0 mg/dL       Alkaline Phosphatase   Date Value Ref Range Status   05/27/2018 60 55 - 135 U/L Final     AST   Date Value Ref Range Status   05/27/2018 35 10 - 40 U/L Final     ALT   Date Value Ref Range Status   05/27/2018 19 10 - 44 U/L Final     Anion Gap   Date Value Ref Range Status   05/27/2018 13 8 - 16 mmol/L Final     eGFR if    Date Value Ref Range Status   05/27/2018 >60 >60 mL/min/1.73 m^2 Final     eGFR if non    Date Value Ref Range Status   05/27/2018 >60 >60 mL/min/1.73 m^2 Final     Comment:     Calculation used to obtain the estimated glomerular filtration  rate (eGFR) is the CKD-EPI equation.        Lab Results   Component Value Date    LDLCALC 84.6 08/14/2017     Lab Results   Component Value Date    TSH 1.124 11/14/2017    FREET4 1.18 11/14/2017     No results found for: LABA1C, HGBA1C      ASSESSMENT/PLAN     An Hazel is a 89 y.o. female with  An was seen today for establish care.    Diagnoses and all orders for this visit:    Visit for annual health examination  History and physical exam completed.  Health maintenance reviewed as above.    Essential hypertension  At goal  Will cont current meds  -     Comprehensive metabolic panel; Future; Expected date: 06/25/2018  -     amLODIPine (NORVASC) 5 MG tablet; Take 1 tablet (5 mg total) by mouth once daily.    Mixed hyperlipidemia  Lab Results   Component Value Date    LDLCALC 84.6 08/14/2017     LDL at goal, last trig elevated, will repeat now c a1c, does not drink etoh  -     Lipid panel; Future; Expected date: 06/25/2018    Osteoporosis, unspecified osteoporosis type, unspecified pathological fracture presence  Prev drug holiday and meds restarted Dec 2017  Will cont  alendronate  Repeat dexa since due  Last vit d close to normal, cont daily 1000 D3.  Prev on Ca but stopped by prev PCP, will monitor  -     Vitamin D; Future; Expected date: 06/25/2018  -     DXA Bone Density Spine And Hip; Future; Expected date: 06/25/2018     Kyphoscoliosis  No acute issues, some chronic pain issues, see below    Urinary incontinence, unspecified type  As per HPI  Consider urology eval since pt c mult med s/e of oxybutynin including constipation and dry mouth, can reassess at f/u if pt amenable    Leukocytosis, unspecified type  -     CBC auto differential; Future; Expected date: 06/25/2018    Screening for diabetes mellitus  -     Hemoglobin A1c; Future; Expected date: 06/25/2018    Other chronic pain  History of hip fracture  -     Ambulatory Referral to Pain Clinic  -     Ambulatory Referral to Orthopedics  -     meloxicam (MOBIC) 7.5 MG tablet; Take 1 tablet (7.5 mg total) by mouth once daily.  Can consider tramadol prn in future  Given fall hx, would avoid narcotics or other sedating meds    Chronic constipation  rec colace bid c prn miralax  GI referral or linzess if sx persist  -     polyethylene glycol (GLYCOLAX) 17 gram PwPk; Take one capful with a full glass of water daily as needed for constipation.    History of fall  As per HPI  Will arrange for home PT  -     Ambulatory referral to Home Health    Abnormal finding of blood chemistry   -     Hemoglobin A1c; Future; Expected date: 06/25/2018    HM as above    RTC in 1 mo for f/u bp and pain issues, labs tomorrow    Jalyn Caballero MD  Department of Internal Medicine - [facility]  06/25/2018

## 2018-06-26 ENCOUNTER — LAB VISIT (OUTPATIENT)
Dept: LAB | Facility: HOSPITAL | Age: 83
End: 2018-06-26
Attending: INTERNAL MEDICINE
Payer: MEDICARE

## 2018-06-26 DIAGNOSIS — R79.9 ABNORMAL FINDING OF BLOOD CHEMISTRY: ICD-10-CM

## 2018-06-26 DIAGNOSIS — E78.2 MIXED HYPERLIPIDEMIA: ICD-10-CM

## 2018-06-26 DIAGNOSIS — D72.829 LEUKOCYTOSIS, UNSPECIFIED TYPE: ICD-10-CM

## 2018-06-26 DIAGNOSIS — M81.0 OSTEOPOROSIS, UNSPECIFIED OSTEOPOROSIS TYPE, UNSPECIFIED PATHOLOGICAL FRACTURE PRESENCE: ICD-10-CM

## 2018-06-26 DIAGNOSIS — I10 ESSENTIAL HYPERTENSION: ICD-10-CM

## 2018-06-26 DIAGNOSIS — Z13.1 SCREENING FOR DIABETES MELLITUS: ICD-10-CM

## 2018-06-26 LAB
25(OH)D3+25(OH)D2 SERPL-MCNC: 35 NG/ML
ALBUMIN SERPL BCP-MCNC: 3.5 G/DL
ALP SERPL-CCNC: 87 U/L
ALT SERPL W/O P-5'-P-CCNC: 12 U/L
ANION GAP SERPL CALC-SCNC: 8 MMOL/L
AST SERPL-CCNC: 21 U/L
BASOPHILS # BLD AUTO: 0.07 K/UL
BASOPHILS NFR BLD: 0.7 %
BILIRUB SERPL-MCNC: 0.6 MG/DL
BUN SERPL-MCNC: 19 MG/DL
CALCIUM SERPL-MCNC: 10.3 MG/DL
CHLORIDE SERPL-SCNC: 106 MMOL/L
CHOLEST SERPL-MCNC: 199 MG/DL
CHOLEST/HDLC SERPL: 5.1 {RATIO}
CO2 SERPL-SCNC: 29 MMOL/L
CREAT SERPL-MCNC: 0.7 MG/DL
DIFFERENTIAL METHOD: ABNORMAL
EOSINOPHIL # BLD AUTO: 0.3 K/UL
EOSINOPHIL NFR BLD: 3.2 %
ERYTHROCYTE [DISTWIDTH] IN BLOOD BY AUTOMATED COUNT: 14.7 %
EST. GFR  (AFRICAN AMERICAN): >60 ML/MIN/1.73 M^2
EST. GFR  (NON AFRICAN AMERICAN): >60 ML/MIN/1.73 M^2
ESTIMATED AVG GLUCOSE: 123 MG/DL
GLUCOSE SERPL-MCNC: 103 MG/DL
HBA1C MFR BLD HPLC: 5.9 %
HCT VFR BLD AUTO: 34.4 %
HDLC SERPL-MCNC: 39 MG/DL
HDLC SERPL: 19.6 %
HGB BLD-MCNC: 11.2 G/DL
LDLC SERPL CALC-MCNC: 123.8 MG/DL
LYMPHOCYTES # BLD AUTO: 1.6 K/UL
LYMPHOCYTES NFR BLD: 16 %
MCH RBC QN AUTO: 30.6 PG
MCHC RBC AUTO-ENTMCNC: 32.6 G/DL
MCV RBC AUTO: 94 FL
MONOCYTES # BLD AUTO: 1.1 K/UL
MONOCYTES NFR BLD: 11.4 %
NEUTROPHILS # BLD AUTO: 6.8 K/UL
NEUTROPHILS NFR BLD: 68.6 %
NONHDLC SERPL-MCNC: 160 MG/DL
PLATELET # BLD AUTO: 192 K/UL
PMV BLD AUTO: 11.3 FL
POTASSIUM SERPL-SCNC: 3.9 MMOL/L
PROT SERPL-MCNC: 7.8 G/DL
RBC # BLD AUTO: 3.66 M/UL
SODIUM SERPL-SCNC: 143 MMOL/L
TRIGL SERPL-MCNC: 181 MG/DL
WBC # BLD AUTO: 9.96 K/UL

## 2018-06-26 PROCEDURE — 80061 LIPID PANEL: CPT

## 2018-06-26 PROCEDURE — 83036 HEMOGLOBIN GLYCOSYLATED A1C: CPT

## 2018-06-26 PROCEDURE — 82306 VITAMIN D 25 HYDROXY: CPT

## 2018-06-26 PROCEDURE — 80053 COMPREHEN METABOLIC PANEL: CPT

## 2018-06-26 PROCEDURE — 85025 COMPLETE CBC W/AUTO DIFF WBC: CPT

## 2018-06-26 PROCEDURE — 36415 COLL VENOUS BLD VENIPUNCTURE: CPT

## 2018-06-27 ENCOUNTER — TELEPHONE (OUTPATIENT)
Dept: INTERNAL MEDICINE | Facility: CLINIC | Age: 83
End: 2018-06-27

## 2018-06-27 NOTE — TELEPHONE ENCOUNTER
Spoke with Shari she stated that she will fax over orders for pt that will need to be signed by provider and faxed back

## 2018-06-27 NOTE — TELEPHONE ENCOUNTER
----- Message from Isabel Martinez sent at 6/27/2018  2:45 PM CDT -----  Contact: [facility]   Type: Home Health (orders, updates, clarifications, etc.)    Home Health Agency/Nurse: [facility]     Phone number: [phone]    Reason for call:they received order for home health calling to give update on patient health     Comments: please advise, Thanks

## 2018-07-09 ENCOUNTER — TELEPHONE (OUTPATIENT)
Dept: ADMINISTRATIVE | Facility: CLINIC | Age: 83
End: 2018-07-09

## 2018-07-11 ENCOUNTER — TELEPHONE (OUTPATIENT)
Dept: INTERNAL MEDICINE | Facility: CLINIC | Age: 83
End: 2018-07-11

## 2018-07-11 NOTE — TELEPHONE ENCOUNTER
----- Message from Isabel Martinez sent at 7/11/2018 12:08 PM CDT -----  Contact: self [phone]  Patient requesting a call from the office to discuss her health . Please advise, Thanks

## 2018-07-16 ENCOUNTER — TELEPHONE (OUTPATIENT)
Dept: INTERNAL MEDICINE | Facility: CLINIC | Age: 83
End: 2018-07-16

## 2018-07-16 NOTE — TELEPHONE ENCOUNTER
----- Message from Teresa Oakes sent at 7/16/2018  1:31 PM CDT -----  Contact: Pt [phone]  Patient is calling in regards to her being recommended to doctor Daniel Madsen she said that he recommended for her to take prolia instead of fosamax. Patient would like to know if she should take the fosamax that the doctor has recommended to her?

## 2018-07-16 NOTE — TELEPHONE ENCOUNTER
Pt states that she will look in medications and try to get a recommendation from another MD. DEXA appt made pt provided with pricing transparency number for questions about cost, 1 month f/u appt made

## 2018-07-23 ENCOUNTER — OFFICE VISIT (OUTPATIENT)
Dept: INTERNAL MEDICINE | Facility: CLINIC | Age: 83
End: 2018-07-23
Payer: MEDICARE

## 2018-07-23 ENCOUNTER — HOSPITAL ENCOUNTER (OUTPATIENT)
Dept: RADIOLOGY | Facility: CLINIC | Age: 83
Discharge: HOME | End: 2018-07-23
Attending: INTERNAL MEDICINE
Payer: MEDICARE

## 2018-07-23 VITALS
BODY MASS INDEX: 17.44 KG/M2 | OXYGEN SATURATION: 98 % | HEIGHT: 61 IN | WEIGHT: 92.38 LBS | DIASTOLIC BLOOD PRESSURE: 74 MMHG | SYSTOLIC BLOOD PRESSURE: 136 MMHG | HEART RATE: 61 BPM

## 2018-07-23 DIAGNOSIS — I10 ESSENTIAL HYPERTENSION: Primary | ICD-10-CM

## 2018-07-23 DIAGNOSIS — I70.0 AORTIC ATHEROSCLEROSIS: ICD-10-CM

## 2018-07-23 DIAGNOSIS — M81.0 OSTEOPOROSIS, UNSPECIFIED OSTEOPOROSIS TYPE, UNSPECIFIED PATHOLOGICAL FRACTURE PRESENCE: ICD-10-CM

## 2018-07-23 DIAGNOSIS — E21.3 HYPERPARATHYROIDISM: ICD-10-CM

## 2018-07-23 DIAGNOSIS — G89.29 OTHER CHRONIC PAIN: ICD-10-CM

## 2018-07-23 PROCEDURE — 77080 DXA BONE DENSITY AXIAL: CPT | Mod: 26,,, | Performed by: INTERNAL MEDICINE

## 2018-07-23 PROCEDURE — 99999 PR PBB SHADOW E&M-EST. PATIENT-LVL III: CPT | Mod: PBBFAC,,, | Performed by: INTERNAL MEDICINE

## 2018-07-23 PROCEDURE — 99214 OFFICE O/P EST MOD 30 MIN: CPT | Mod: S$PBB,,, | Performed by: INTERNAL MEDICINE

## 2018-07-23 PROCEDURE — 77080 DXA BONE DENSITY AXIAL: CPT | Mod: TC

## 2018-07-23 PROCEDURE — 99213 OFFICE O/P EST LOW 20 MIN: CPT | Mod: PBBFAC,25 | Performed by: INTERNAL MEDICINE

## 2018-07-23 RX ORDER — HYDRALAZINE HYDROCHLORIDE 10 MG/1
10 TABLET, FILM COATED ORAL 3 TIMES DAILY
Qty: 270 TABLET | Refills: 2 | Status: SHIPPED | OUTPATIENT
Start: 2018-07-23 | End: 2018-08-02 | Stop reason: SDUPTHER

## 2018-07-23 RX ORDER — LOSARTAN POTASSIUM 100 MG/1
100 TABLET ORAL DAILY
Qty: 90 TABLET | Refills: 3 | Status: SHIPPED | OUTPATIENT
Start: 2018-07-23 | End: 2018-12-13 | Stop reason: SDUPTHER

## 2018-07-23 NOTE — PROGRESS NOTES
INTERNAL MEDICINE ESTABLISHED PATIENT VISIT NOTE    Subjective:     Chief Complaint: Follow-up (1 month f/u)  BP and chronic hip pain     Patient ID: An Hazel is a 89 y.o. female with HTN, HLD, anemia, osteoporosis c kyphoscoliosis, glaucoma, chronic dry eyes, urinary incontinence, chronic constipation, aortic atherosclerosis noted incidentally on prior imaging, last seen by me a month ago to [email or web address] care, here today for f/u bp and hip issues.    To review:  At baseline, lives at home c her daughter who works.  AAOx3, does most ADLs independently but appreciates assistance.  Wears poise pads for hx urinary incontinence, no issues c fecal incontinence.  Ambulates c a cane.  Has had 3 falls in the past, in 2013, 2017, and again 5/18.  Had r humeral neck fx back in 2017 and hip fx in 2013 s/p ORIF B.    Has been on Losartan 100, amlodipine 5 mg daily and HCTZ 12.5 mg daily for bp.  Had LE edema c higher doses of amlodipine but tolerating 5 mg dose and requesting to get off HCTZ due to urinary issues.    Says meloxicam has helped c pain issues.    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 Authorizing Provider   acetaminophen (TYLENOL) 325 MG tablet Take 2 tablets (650 mg total) by mouth every 6 (six) hours as needed (mild pain). Do NOT exceed 3000 mg in a 24 hour time period and do not take with other medications containing acetaminophen 1/15/14  Yes Britt Lara NP   alendronate (FOSAMAX) 70 MG tablet Take 1 tablet (70 mg total) by mouth every 7 days. 12/18/17 12/18/18 Yes Alcon Thomas Jr., MD   amLODIPine (NORVASC) 5 MG tablet Take 1  tablet (5 mg total) by mouth once daily. 6/25/18 6/25/19 Yes Jalyn Caballero MD   atenolol (TENORMIN) 25 MG tablet Take 1 tablet (25 mg total) by mouth 2 (two) times daily. 2/19/18  Yes Alcon Thomas Jr., MD   cholecalciferol, vitamin D3, (VITAMIN D3) 1,000 unit capsule Take 1,000 Units by mouth once daily.   Yes Historical Provider, MD   FOLIC ACID/MULTIVIT-MIN/LUTEIN (CENTRUM SILVER ORAL) Take 1 tablet by mouth once daily.    Yes Historical Provider, MD   hydroCHLOROthiazide (HYDRODIURIL) 12.5 MG Tab Take 1 tablet (12.5 mg total) by mouth once daily. 6/4/18 6/4/19 Yes FRAN Esposito   losartan (COZAAR) 100 MG tablet Take 1 tablet (100 mg total) by mouth once daily. 6/4/18 6/4/19 Yes FRAN Esposito   meloxicam (MOBIC) 7.5 MG tablet Take 1 tablet (7.5 mg total) by mouth once daily. 6/25/18  Yes Jalyn Caballero MD   oxybutynin (DITROPAN-XL) 5 MG TR24 TAKE 1 TABLET DAILY 10/3/17  Yes Alcon Thomas Jr., MD   polyethylene glycol (GLYCOLAX) 17 gram PwPk Take one capful with a full glass of water daily as needed for constipation. 6/25/18  Yes Jalyn Caballero MD   pravastatin (PRAVACHOL) 20 MG tablet TAKE ONE-HALF (1/2) TABLET DAILY 4/26/18  Yes Alcon Thomas Jr., MD       Allergies:  Review of patient's allergies indicates:   Allergen Reactions    Augmentin [amoxicillin-pot clavulanate] Nausea And Vomiting    Sulfa (sulfonamide antibiotics)     Zestril  [lisinopril]      Other reaction(s): Swelling       Social History:  Social History   Substance Use Topics    Smoking status: Never Smoker    Smokeless tobacco: Never Used      Comment: The patient exercises every other day on an exercise bike.  On alternative days she does floor exercises with weights.    Alcohol use 1.2 - 2.4 oz/week     1 Glasses of wine, 1 - 3 Standard drinks or equivalent per week      Comment: Occasional use        Review of Systems   Constitutional: Negative for chills, fatigue and fever.   Respiratory: Negative for  "cough, chest tightness and shortness of breath.    Cardiovascular: Negative for chest pain.   Gastrointestinal: Negative for abdominal pain and blood in stool.   Genitourinary: Negative for dysuria and frequency.   Musculoskeletal: Positive for arthralgias.         Health Maintenance:   Immunizations:   Influenza is up to date 12/2017  TDap is up to date 5/2017  Prevnar 13 5/2016, pvax 6/18  Shingrix is not UTD.     rec once back in stock     Cancer Screening:  PAP: n/a based on age, also c hx hyst  Mammogram: n/a based on age  Colonoscopy: n/a based on age  DEXA:  completed today, awaiting official read    Objective:   /74 (BP Location: Left arm, Patient Position: Sitting, BP Method: Medium (Manual))   Pulse 61   Ht 5' 1" (1.549 m)   Wt 41.9 kg (92 lb 6 oz)   LMP  (LMP Unknown)   SpO2 98%   BMI 17.45 kg/m²        General: AAO x3, no apparent distress  CV: RRR, no m/r/g  Pulm: Lungs CTAB, no crackles, no wheezes  Abd: s/NT/ND +BS  Extremities: no c/c/e    Labs:     Lab Results   Component Value Date    WBC 9.96 06/26/2018    HGB 11.2 (L) 06/26/2018    HCT 34.4 (L) 06/26/2018    MCV 94 06/26/2018     06/26/2018     CMP  Sodium   Date Value Ref Range Status   06/26/2018 143 136 - 145 mmol/L Final     Potassium   Date Value Ref Range Status   06/26/2018 3.9 3.5 - 5.1 mmol/L Final     Chloride   Date Value Ref Range Status   06/26/2018 106 95 - 110 mmol/L Final     CO2   Date Value Ref Range Status   06/26/2018 29 23 - 29 mmol/L Final     Glucose   Date Value Ref Range Status   06/26/2018 103 70 - 110 mg/dL Final     BUN, Bld   Date Value Ref Range Status   06/26/2018 19 8 - 23 mg/dL Final     Creatinine   Date Value Ref Range Status   06/26/2018 0.7 0.5 - 1.4 mg/dL Final     Calcium   Date Value Ref Range Status   06/26/2018 10.3 8.7 - 10.5 mg/dL Final     Total Protein   Date Value Ref Range Status   06/26/2018 7.8 6.0 - 8.4 g/dL Final     Albumin   Date Value Ref Range Status   06/26/2018 3.5 3.5 - " 5.2 g/dL Final     Total Bilirubin   Date Value Ref Range Status   06/26/2018 0.6 0.1 - 1.0 mg/dL Final     Comment:     For infants and newborns, interpretation of results should be based  on gestational age, weight and in agreement with clinical  observations.  Premature Infant recommended reference ranges:  Up to 24 hours.............<8.0 mg/dL  Up to 48 hours............<12.0 mg/dL  3-5 days..................<15.0 mg/dL  6-29 days.................<15.0 mg/dL       Alkaline Phosphatase   Date Value Ref Range Status   06/26/2018 87 55 - 135 U/L Final     AST   Date Value Ref Range Status   06/26/2018 21 10 - 40 U/L Final     ALT   Date Value Ref Range Status   06/26/2018 12 10 - 44 U/L Final     Anion Gap   Date Value Ref Range Status   06/26/2018 8 8 - 16 mmol/L Final     eGFR if    Date Value Ref Range Status   06/26/2018 >60 >60 mL/min/1.73 m^2 Final     eGFR if non    Date Value Ref Range Status   06/26/2018 >60 >60 mL/min/1.73 m^2 Final     Comment:     Calculation used to obtain the estimated glomerular filtration  rate (eGFR) is the CKD-EPI equation.        Lab Results   Component Value Date    .0 (H) 11/14/2017    CALCIUM 10.3 06/26/2018    PHOS 5.3 (H) 10/28/2017         Assessment/Plan     An was seen today for follow-up.    Diagnoses and all orders for this visit:    Essential hypertension  bp at goal today but requesting to get off HCTZ 2/2 intolerable urinary s/e.  Ok to stop HCTZ.  Cont losartan 100 and amlodipine 5 mg daily, unable to tolerate 10 mg dose due to peripheral edema.  Avoiding BB since baseline HR lower limit of normal  Will add hydralazine  -     hydrALAZINE (APRESOLINE) 10 MG tablet; Take 1 tablet (10 mg total) by mouth 3 (three) times daily.  -     losartan (COZAAR) 100 MG tablet; Take 1 tablet (100 mg total) by mouth once daily.    Aortic atherosclerosis  Noted on chart review, no sx, cont bp and lipid control    Hyperparathyroidism  Noted  on chart review, Ca wnl but may need f/u c endo pending dexa results.    Osteoporosis, unspecified osteoporosis type, unspecified pathological fracture presence  As above, dexa results pending.    Other chronic pain  Stable, mostly in hip, improved c prn meloxicam and tylenol, ok to cont.      HM as above  RTC in 1 mo for f/u BP since meds adjusted.    Jalyn Caballero MD  Department of Internal Medicine - [facility]  07/23/2018

## 2018-07-27 DIAGNOSIS — G89.29 OTHER CHRONIC PAIN: ICD-10-CM

## 2018-07-27 RX ORDER — MELOXICAM 7.5 MG/1
7.5 TABLET ORAL DAILY
Qty: 7 TABLET | Refills: 0 | Status: SHIPPED | OUTPATIENT
Start: 2018-07-27 | End: 2018-09-17

## 2018-07-27 RX ORDER — MELOXICAM 7.5 MG/1
7.5 TABLET ORAL DAILY
Qty: 7 TABLET | Refills: 0 | Status: SHIPPED | OUTPATIENT
Start: 2018-07-27 | End: 2018-07-27 | Stop reason: SDUPTHER

## 2018-07-27 NOTE — TELEPHONE ENCOUNTER
----- Message from Stevered Katerina sent at 7/27/2018  9:53 AM CDT -----  Contact: self/[phone]  Pt is calling to speak with someone in the office in regards to getting meloxicam (MOBIC) 7.5 MG tablet sent to [address]. Pt states that she requested a refill on the medication to [facility] and they told her they sent it out on 7/10. She has never received it. Pt states that she only has one pill left and after she won't have any more. Pt would like this done today of possible. Please advise.      Thanks

## 2018-08-02 ENCOUNTER — TELEPHONE (OUTPATIENT)
Dept: INTERNAL MEDICINE | Facility: CLINIC | Age: 83
End: 2018-08-02

## 2018-08-02 DIAGNOSIS — I10 ESSENTIAL HYPERTENSION: ICD-10-CM

## 2018-08-02 RX ORDER — HYDRALAZINE HYDROCHLORIDE 10 MG/1
10 TABLET, FILM COATED ORAL 3 TIMES DAILY
Qty: 270 TABLET | Refills: 2 | Status: SHIPPED | OUTPATIENT
Start: 2018-08-02 | End: 2018-09-17 | Stop reason: SDUPTHER

## 2018-08-02 NOTE — TELEPHONE ENCOUNTER
----- Message from Stevered Katerina sent at 8/2/2018  3:31 PM CDT -----  Contact: self/[phone]  Pt states that she has never received the medications that she was supposed to get [facility]. States that she spoke with pharmacy and they told her that it was shipped to [place] but never made it to her home. Pt states that she now needs medication sent to [facility] [address] [place] [address] phone number is [phone]. Medications meloxicam (MOBIC) 7.5 MG tablet,hydrALAZINE (APRESOLINE) 10 MG tablet should be sent over. Please advise.      Thanks

## 2018-08-02 NOTE — TELEPHONE ENCOUNTER
Spoke with pt and she wants hydralazine sent to [facility] because [facility] have not mailed her rx yet.

## 2018-08-06 RX ORDER — PRAVASTATIN SODIUM 20 MG/1
10 TABLET ORAL DAILY
Qty: 90 TABLET | Refills: 0 | Status: SHIPPED | OUTPATIENT
Start: 2018-08-06 | End: 2018-10-22 | Stop reason: SDUPTHER

## 2018-08-20 RX ORDER — ATENOLOL 25 MG/1
25 TABLET ORAL 2 TIMES DAILY
Qty: 90 TABLET | Refills: 3 | Status: SHIPPED | OUTPATIENT
Start: 2018-08-20 | End: 2018-11-28 | Stop reason: SDUPTHER

## 2018-08-20 NOTE — TELEPHONE ENCOUNTER
"----- Message from Betzy Aleman sent at 8/20/2018 11:56 AM CDT -----  Contact: Self/[phone]  RX request - refill or new RX.  Is this a refill or new RX:  Refill  RX name and strength: atenolol (TENORMIN) 25 MG tablet  Directions: Take 1 tablet (25 mg total) by mouth 2 (two) times daily. - Oral  Is this a 30 day or 90 day RX:  90  Local pharmacy or mail order pharmacy:  Mail order  Pharmacy name and phone # (DON'T enter "on file" or "in chart"): [facility] - [phone] (Phone)  [phone] (Fax)  Comments:        "

## 2018-09-17 ENCOUNTER — OFFICE VISIT (OUTPATIENT)
Dept: INTERNAL MEDICINE | Facility: CLINIC | Age: 83
End: 2018-09-17
Payer: MEDICARE

## 2018-09-17 ENCOUNTER — IMMUNIZATION (OUTPATIENT)
Dept: INTERNAL MEDICINE | Facility: CLINIC | Age: 83
End: 2018-09-17
Payer: MEDICARE

## 2018-09-17 VITALS
WEIGHT: 94.38 LBS | BODY MASS INDEX: 17.82 KG/M2 | OXYGEN SATURATION: 99 % | HEART RATE: 51 BPM | SYSTOLIC BLOOD PRESSURE: 120 MMHG | DIASTOLIC BLOOD PRESSURE: 76 MMHG | HEIGHT: 61 IN

## 2018-09-17 DIAGNOSIS — I10 ESSENTIAL HYPERTENSION: Primary | ICD-10-CM

## 2018-09-17 DIAGNOSIS — M79.672 LEFT FOOT PAIN: ICD-10-CM

## 2018-09-17 PROCEDURE — 99214 OFFICE O/P EST MOD 30 MIN: CPT | Mod: S$PBB,,, | Performed by: INTERNAL MEDICINE

## 2018-09-17 PROCEDURE — 99999 PR PBB SHADOW E&M-EST. PATIENT-LVL III: CPT | Mod: PBBFAC,,, | Performed by: INTERNAL MEDICINE

## 2018-09-17 PROCEDURE — 90662 IIV NO PRSV INCREASED AG IM: CPT | Mod: PBBFAC

## 2018-09-17 PROCEDURE — 99213 OFFICE O/P EST LOW 20 MIN: CPT | Mod: PBBFAC,25 | Performed by: INTERNAL MEDICINE

## 2018-09-17 RX ORDER — HYDRALAZINE HYDROCHLORIDE 25 MG/1
25 TABLET, FILM COATED ORAL 3 TIMES DAILY
Qty: 270 TABLET | Refills: 2 | Status: SHIPPED | OUTPATIENT
Start: 2018-09-17 | End: 2018-10-22 | Stop reason: SDUPTHER

## 2018-09-17 NOTE — PROGRESS NOTES
INTERNAL MEDICINE ESTABLISHED PATIENT VISIT NOTE    Subjective:     Chief Complaint: Follow-up  blood pressure     Patient ID: An  Aleksander Luz is a 89 y.o. female with HTN, HLD, anemia, osteoporosis c kyphoscoliosis, glaucoma, chronic dry eyes, urinary incontinence, chronic constipation, aortic atherosclerosis noted incidentally on prior imaging, last seen by me a little over a month ago, here today for f/u of blood pressures.    At last appt, c/o LE edema so Amlodipine was decreased and we added hydralazine which she has been taking.    Reports still c LE edema, worse at the end of the day.  Brings bp log which she states is worse in the evening in the 140s to 150s.    Also recently stubbed her L 5th toe and still c slight pain on palpation but no issues c ROM.    Also gets occ RLQ abd discomfort that is relieved c exercise.  No pain currently.    To review:  At baseline, lives at home c her daughter who works.  AAOx3, does most ADLs independently but appreciates assistance.  Wears poise pads for hx urinary incontinence, no issues c fecal incontinence.  Ambulates c a cane.  Has had 3 falls in the past, in 2013, 2017, and again 5/18.  Had r humeral neck fx back in 2017 and hip fx in 2013 s/p ORIF B.        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 Authorizing Provider   acetaminophen (TYLENOL) 325 MG tablet Take 2 tablets (650 mg total) by mouth every 6 (six) hours as needed (mild pain). Do NOT exceed 3000 mg in a 24 hour time period and do not take with other medications containing acetaminophen 1/15/14  Yes Britt Lara NP   alendronate  (FOSAMAX) 70 MG tablet Take 1 tablet (70 mg total) by mouth every 7 days. 12/18/17 12/18/18 Yes Alcon Thomas Jr., MD   amLODIPine (NORVASC) 5 MG tablet Take 1 tablet (5 mg total) by mouth once daily. 6/25/18 6/25/19 Yes Jalyn Caballero MD   atenolol (TENORMIN) 25 MG tablet Take 1 tablet (25 mg total) by mouth 2 (two) times daily. 8/20/18  Yes Jalyn Caballero MD   cholecalciferol, vitamin D3, (VITAMIN D3) 1,000 unit capsule Take 1,000 Units by mouth once daily.   Yes Historical Provider, MD   FOLIC ACID/MULTIVIT-MIN/LUTEIN (CENTRUM SILVER ORAL) Take 1 tablet by mouth once daily.    Yes Historical Provider, MD   hydrALAZINE (APRESOLINE) 10 MG tablet Take 1 tablet (10 mg total) by mouth 3 (three) times daily. 8/2/18 8/2/19 Yes Effie Ann NP   losartan (COZAAR) 100 MG tablet Take 1 tablet (100 mg total) by mouth once daily. 7/23/18 7/23/19 Yes Jalyn Caballero MD   oxybutynin (DITROPAN-XL) 5 MG TR24 TAKE 1 TABLET DAILY 10/3/17  Yes Alcon Thomas Jr., MD   polyethylene glycol (GLYCOLAX) 17 gram PwPk Take one capful with a full glass of water daily as needed for constipation. 6/25/18  Yes Jalyn Caballero MD   pravastatin (PRAVACHOL) 20 MG tablet Take 0.5 tablets (10 mg total) by mouth once daily. 8/6/18  Yes Jalyn Caballero MD   meloxicam (MOBIC) 7.5 MG tablet Take 1 tablet (7.5 mg total) by mouth once daily. 7/27/18 9/17/18  Effie Ann NP       Allergies:  Review of patient's allergies indicates:   Allergen Reactions    Augmentin [amoxicillin-pot clavulanate] Nausea And Vomiting    Sulfa (sulfonamide antibiotics)     Zestril  [lisinopril]      Other reaction(s): Swelling       Social History:  Social History     Tobacco Use    Smoking status: Never Smoker    Smokeless tobacco: Never Used    Tobacco comment: The patient exercises every other day on an exercise bike.  On alternative days she does floor exercises with weights.   Substance Use Topics    Alcohol use: Yes     Alcohol/week: 1.2 - 2.4 oz      "Types: 1 Glasses of wine, 1 - 3 Standard drinks or equivalent per week     Comment: Occasional use    Drug use: No        Review of Systems   Constitutional: Negative for chills, fatigue and fever.   Respiratory: Negative for cough, chest tightness and shortness of breath.    Cardiovascular: Positive for leg swelling. Negative for chest pain.   Gastrointestinal: Negative for abdominal pain and blood in stool.   Genitourinary: Negative for dysuria and frequency.   Musculoskeletal: Positive for arthralgias.         Health Maintenance:     Immunizations:   Influenza is up to date 12/2017  TDap is up to date 5/2017  Prevnar 13 5/2016, pvax 6/18  Shingrix is not UTD.     rec once back in stock     Cancer Screening:  PAP: n/a based on age, also c hx hyst  Mammogram: n/a based on age  Colonoscopy: n/a based on age  DEXA:  7/2018, cont alendronate      Objective:   /76 (BP Location: Left arm, Patient Position: Sitting, BP Method: Medium (Manual))   Pulse (!) 51   Ht 5' 1" (1.549 m)   Wt 42.8 kg (94 lb 5.7 oz)   LMP  (LMP Unknown)   SpO2 99%   BMI 17.83 kg/m²        General: AAO x3, no apparent distress  CV: RRR, no m/r/g  Pulm: Lungs CTAB, no crackles, no wheezes  Abd: s/NT/ND +BS  Extremities: no c/c/e    Labs:         Assessment/Plan     An was seen today for follow-up.    Diagnoses and all orders for this visit:    Essential hypertension  At goal today but will try stopping amlodipine and increasing hydralazine from 10 mg to 25 mg TID to see if this will help c LE edema.  Suspect component of dependent edema/venous insufficiency.  Cont losartan and atenolol which she has been on for a while.  HCTZ stopped previously due to urinary s/e.  -     hydrALAZINE (APRESOLINE) 25 MG tablet; Take 1 tablet (25 mg total) by mouth 3 (three) times daily.    Left foot pain  As per HPI  Recent stubbing her foot.  No issues c ROM.  Offered xrays today which pt declined.  If pain persists at f/u can consider xrays at " that time.  Will refer to podiatry per pt request for routine foot care.      HM as above  RTC in 1 mo for bp check since meds adjusted again.    Jalyn Caballero MD  Department of Internal Medicine - [facility]  09/17/2018

## 2018-10-22 ENCOUNTER — OFFICE VISIT (OUTPATIENT)
Dept: INTERNAL MEDICINE | Facility: CLINIC | Age: 83
End: 2018-10-22
Payer: MEDICARE

## 2018-10-22 VITALS
WEIGHT: 91.25 LBS | HEIGHT: 61 IN | HEART RATE: 68 BPM | DIASTOLIC BLOOD PRESSURE: 64 MMHG | SYSTOLIC BLOOD PRESSURE: 142 MMHG | OXYGEN SATURATION: 98 % | BODY MASS INDEX: 17.23 KG/M2

## 2018-10-22 DIAGNOSIS — I10 ESSENTIAL HYPERTENSION: ICD-10-CM

## 2018-10-22 DIAGNOSIS — M79.672 PAIN IN BOTH FEET: ICD-10-CM

## 2018-10-22 DIAGNOSIS — E78.2 MIXED HYPERLIPIDEMIA: ICD-10-CM

## 2018-10-22 DIAGNOSIS — R32 URINARY INCONTINENCE, UNSPECIFIED TYPE: ICD-10-CM

## 2018-10-22 DIAGNOSIS — M79.671 PAIN IN BOTH FEET: ICD-10-CM

## 2018-10-22 DIAGNOSIS — I10 BENIGN ESSENTIAL HYPERTENSION: Primary | ICD-10-CM

## 2018-10-22 PROCEDURE — 99214 OFFICE O/P EST MOD 30 MIN: CPT | Mod: PBBFAC | Performed by: INTERNAL MEDICINE

## 2018-10-22 PROCEDURE — 99999 PR PBB SHADOW E&M-EST. PATIENT-LVL IV: CPT | Mod: PBBFAC,,, | Performed by: INTERNAL MEDICINE

## 2018-10-22 PROCEDURE — 99213 OFFICE O/P EST LOW 20 MIN: CPT | Mod: S$PBB,,, | Performed by: INTERNAL MEDICINE

## 2018-10-22 RX ORDER — HYDRALAZINE HYDROCHLORIDE 50 MG/1
50 TABLET, FILM COATED ORAL 3 TIMES DAILY
Qty: 270 TABLET | Refills: 2 | Status: SHIPPED | OUTPATIENT
Start: 2018-10-22 | End: 2018-12-17

## 2018-10-22 RX ORDER — PRAVASTATIN SODIUM 20 MG/1
10 TABLET ORAL DAILY
Qty: 90 TABLET | Refills: 1 | Status: SHIPPED | OUTPATIENT
Start: 2018-10-22 | End: 2019-01-21

## 2018-10-22 RX ORDER — OXYBUTYNIN CHLORIDE 5 MG/1
5 TABLET, EXTENDED RELEASE ORAL DAILY
Qty: 90 TABLET | Refills: 3 | Status: SHIPPED | OUTPATIENT
Start: 2018-10-22 | End: 2019-10-01 | Stop reason: SDUPTHER

## 2018-10-22 NOTE — PROGRESS NOTES
INTERNAL MEDICINE ESTABLISHED PATIENT VISIT NOTE    Subjective:     Chief Complaint: Follow-up       Patient ID: An Hazel is a 89 y.o. female with HTN, HLD, anemia, osteoporosis c kyphoscoliosis, glaucoma, chronic dry eyes, urinary incontinence, chronic constipation, aortic atherosclerosis noted incidentally on prior imaging, last seen by me a month ago, here today for f/u BP.    At last appt, hydralazine increased from 10 to 25 tid.  Has been taking meds as rx'ed.  Had LE edema c amlodipine and urinary sx c HCTZ.    C/o chronic dry mouth.  Denies dizziness or weakness.  Bring BP log today c BP often in 150s to 160s sys.    To review:  At baseline, lives at home c her daughter who works.  AAOx3, does most ADLs independently but appreciates assistance.  Wears poise pads for hx urinary incontinence, no issues c fecal incontinence.  Ambulates c a cane.  Has had 3 falls in the past, in 2013, 2017, and again 5/18.  Had r humeral neck fx back in 2017 and hip fx in 2013 s/p ORIF B.    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 Authorizing Provider   acetaminophen (TYLENOL) 325 MG tablet Take 2 tablets (650 mg total) by mouth every 6 (six) hours as needed (mild pain). Do NOT exceed 3000 mg in a 24 hour time period and do not take with other medications containing acetaminophen 1/15/14  Yes Britt Lara NP   alendronate (FOSAMAX) 70 MG tablet Take 1 tablet (70 mg total) by mouth every 7 days. 12/18/17 12/18/18 Yes Alcon Thomas Jr., MD   atenolol (TENORMIN) 25 MG tablet Take 1 tablet (25 mg total) by mouth 2 (two) times daily. 8/20/18   Yes Jalyn Caballero MD   cholecalciferol, vitamin D3, (VITAMIN D3) 1,000 unit capsule Take 1,000 Units by mouth once daily.   Yes Historical Provider, MD   FOLIC ACID/MULTIVIT-MIN/LUTEIN (CENTRUM SILVER ORAL) Take 1 tablet by mouth once daily.    Yes Historical Provider, MD   hydrALAZINE (APRESOLINE) 25 MG tablet Take 1 tablet (25 mg total) by mouth 3 (three) times daily. 9/17/18 9/17/19 Yes Jalyn Caballero MD   losartan (COZAAR) 100 MG tablet Take 1 tablet (100 mg total) by mouth once daily. 7/23/18 7/23/19 Yes Jalyn Caballero MD   oxybutynin (DITROPAN-XL) 5 MG TR24 TAKE 1 TABLET DAILY 10/3/17  Yes Alcon Thomas Jr., MD   polyethylene glycol (GLYCOLAX) 17 gram PwPk Take one capful with a full glass of water daily as needed for constipation. 6/25/18  Yes Jalyn Caballero MD   pravastatin (PRAVACHOL) 20 MG tablet Take 0.5 tablets (10 mg total) by mouth once daily. 8/6/18  Yes Jalyn Caballero MD       Allergies:  Review of patient's allergies indicates:   Allergen Reactions    Augmentin [amoxicillin-pot clavulanate] Nausea And Vomiting    Sulfa (sulfonamide antibiotics)     Zestril  [lisinopril]      Other reaction(s): Swelling       Social History:  Social History     Tobacco Use    Smoking status: Never Smoker    Smokeless tobacco: Never Used    Tobacco comment: The patient exercises every other day on an exercise bike.  On alternative days she does floor exercises with weights.   Substance Use Topics    Alcohol use: Yes     Alcohol/week: 1.2 - 2.4 oz     Types: 1 Glasses of wine, 1 - 3 Standard drinks or equivalent per week     Comment: Occasional use    Drug use: No        Review of Systems   Constitutional: Negative for chills, fatigue and fever.   Respiratory: Negative for cough, chest tightness and shortness of breath.    Cardiovascular: Negative for chest pain.   Gastrointestinal: Negative for abdominal pain and blood in stool.   Genitourinary: Negative for dysuria and frequency.         Health Maintenance:  "    Immunizations:   Influenza 9/2018  TDap is up to date 5/2017  Prevnar 13 5/2016, pvax 6/18  Shingrix is not UTD.     rec once back in stock     Cancer Screening:  PAP: n/a based on age, also c hx hyst  Mammogram: n/a based on age  Colonoscopy: n/a based on age  DEXA:  7/2018, cont alendronate      Objective:   BP (!) 142/64   Pulse 68   Ht 5' 1" (1.549 m)   Wt 41.4 kg (91 lb 4.3 oz)   LMP  (LMP Unknown)   SpO2 98%   BMI 17.25 kg/m²        General: AAO x3, no apparent distress  CV: RRR, no m/r/g  Pulm: Lungs CTAB, no crackles, no wheezes  Abd: s/NT/ND +BS  Extremities: no c/c/e    Labs:         Assessment/Plan     An was seen today for follow-up.    Diagnoses and all orders for this visit:    Benign essential hypertension  Elevated now, cont losartan and atenolol and will increase hydralazine from 25 to 50 mg tid  Consider increase to 4x/daily if needed vs going back to amlodipine if pt ok c tolerating LE edema.  -     hydrALAZINE (APRESOLINE) 50 MG tablet; Take 1 tablet (50 mg total) by mouth 3 (three) times daily.    Mixed hyperlipidemia  Lab Results   Component Value Date    LDLCALC 123.8 06/26/2018     At goal on last check, cont meds  -     pravastatin (PRAVACHOL) 20 MG tablet; Take 0.5 tablets (10 mg total) by mouth once daily.    Urinary incontinence, unspecified type  Stable, has dry mouth likely 2/2 meds, encouraged hydration and mints if needed  -     oxybutynin (DITROPAN-XL) 5 MG TR24; Take 1 tablet (5 mg total) by mouth once daily.    Pain in both feet  req podiatry for routine foot care per pt request, pain better now.  -     Ambulatory referral to Podiatry        HM as above  RTC in 1 mo for BP check    Jalyn Caballero MD  Department of Internal Medicine - [facility]  10/22/2018  "

## 2018-11-01 ENCOUNTER — OFFICE VISIT (OUTPATIENT)
Dept: PODIATRY | Facility: CLINIC | Age: 83
End: 2018-11-01
Payer: MEDICARE

## 2018-11-01 VITALS
HEART RATE: 72 BPM | HEIGHT: 61 IN | DIASTOLIC BLOOD PRESSURE: 78 MMHG | SYSTOLIC BLOOD PRESSURE: 213 MMHG | BODY MASS INDEX: 17.18 KG/M2 | WEIGHT: 91 LBS

## 2018-11-01 DIAGNOSIS — L90.9 FAT PAD ATROPHY OF FOOT: ICD-10-CM

## 2018-11-01 DIAGNOSIS — L60.2 ONYCHOGRYPHOSIS: Primary | ICD-10-CM

## 2018-11-01 DIAGNOSIS — B35.3 TINEA PEDIS OF BOTH FEET: ICD-10-CM

## 2018-11-01 PROCEDURE — 11721 DEBRIDE NAIL 6 OR MORE: CPT | Mod: PBBFAC | Performed by: PODIATRIST

## 2018-11-01 PROCEDURE — 11721 DEBRIDE NAIL 6 OR MORE: CPT | Mod: S$PBB,,, | Performed by: PODIATRIST

## 2018-11-01 PROCEDURE — 99213 OFFICE O/P EST LOW 20 MIN: CPT | Mod: 25,S$PBB,, | Performed by: PODIATRIST

## 2018-11-01 PROCEDURE — 99999 PR PBB SHADOW E&M-EST. PATIENT-LVL III: CPT | Mod: PBBFAC,,, | Performed by: PODIATRIST

## 2018-11-01 PROCEDURE — 99213 OFFICE O/P EST LOW 20 MIN: CPT | Mod: PBBFAC,25 | Performed by: PODIATRIST

## 2018-11-01 NOTE — PROGRESS NOTES
Subjective:      Patient ID: An Hazel is a 89 y.o. female.    Chief Complaint: Callouses (lt foot) and Nail Care    Thick painful toenails preventing sock and shoe wear and imparing ambulation.  Gradual onset, worsening over past several weeks, aggravated by increased weight bearing, shoe gear, pressure.  Periodic debridement helps symptoms. Denies trauma, surgery all toes.    Itching flaking skin both feet between toes.  Gradual onset, worsening over past several weeks, aggravated by increased weight bearing, shoe gear, pressure.  No previous medical treatment.  OTC pain med not helping.     Review of Systems   Constitution: Negative for chills, diaphoresis, fever, malaise/fatigue and night sweats.   Cardiovascular: Negative for claudication, cyanosis, leg swelling and syncope.   Skin: Positive for itching, nail changes and rash. Negative for color change, dry skin, suspicious lesions and unusual hair distribution.   Musculoskeletal: Negative for falls, joint pain, joint swelling, muscle cramps, muscle weakness and stiffness.   Gastrointestinal: Negative for constipation, diarrhea, nausea and vomiting.   Neurological: Negative for brief paralysis, disturbances in coordination, focal weakness, numbness, paresthesias, sensory change and tremors.           Objective:      Physical Exam   Constitutional: She is oriented to person, place, and time. She appears well-developed and well-nourished. She is cooperative. No distress.   Cardiovascular:   Pulses:       Popliteal pulses are 2+ on the right side, and 2+ on the left side.        Dorsalis pedis pulses are 2+ on the right side, and 2+ on the left side.        Posterior tibial pulses are 1+ on the right side, and 1+ on the left side.   Capillary refill 3 seconds all toes/distal feet, all toes/both feet warm to touch.      Negative lymphadenopathy bilateral popliteal fossa and tarsal tunnel.      Negavie lower extremity edema bilateral.      Musculoskeletal:        Right ankle: She exhibits normal range of motion, no swelling, no ecchymosis, no deformity, no laceration and normal pulse. Achilles tendon normal. Achilles tendon exhibits no pain, no defect and normal Garay's test results.   Normal angle, base, station of gait. All ten toes without clubbing, cyanosis, or signs of ischemia.  No pain to palpation bilateral lower extremities.  Range of motion, stability, muscle strength, and muscle tone normal bilateral feet and legs.     Lymphadenopathy: No inguinal adenopathy noted on the right or left side.   Negative lymphadenopathy bilateral popliteal fossa and tarsal tunnel.    Negative lymphangitic streaking bilateral feet/ankles/legs.   Neurological: She is alert and oriented to person, place, and time. She has normal strength. She displays no atrophy and no tremor. No sensory deficit. She exhibits normal muscle tone. Gait normal.   Reflex Scores:       Patellar reflexes are 2+ on the right side and 2+ on the left side.       Achilles reflexes are 2+ on the right side and 2+ on the left side.  Negative tinel sign to percussion sural, superficial peroneal, deep peroneal, saphenous, and posterior tibial nerves right and left ankles and feet.     Skin: Skin is warm, dry and intact. Capillary refill takes 2 to 3 seconds. No abrasion, no bruising, no burn, no ecchymosis, no laceration, no lesion and no rash noted. She is not diaphoretic. No cyanosis or erythema. No pallor. Nails show no clubbing.   Dry scale with superficial flakes over an erythematous base  without ulceration, drainage, pus, tracking, fluctuance, malodor, or cardinal signs infection.    Otherwise;  Skin is normal age and health appropriate color, turgor, texture, and temperature bilateral lower extremities without ulceration, hyperpigmentation, discoloration, masses nodules or cords palpated.  No ecchymosis, erythema, edema, or cardinal signs of infection bilateral lower  extremities.    Toenails 1st, 2nd, 3rd, 4th, 5th  bilateral are hypertrophic thickened 2-3 mm, dystrophic, discolored tanish brown with tan, gray crumbly subungual debris.  Severe pain and tenderness  to distal nail plate pressure, without periungual skin abnormality of each.     Psychiatric: She has a normal mood and affect.             Assessment:       Encounter Diagnoses   Name Primary?    Onychogryphosis Yes    Fat pad atrophy of foot     Tinea pedis of both feet          Plan:       An was seen today for callouses and nail care.    Diagnoses and all orders for this visit:    Onychogryphosis    Fat pad atrophy of foot    Tinea pedis of both feet      I counseled the patient on her conditions, their implications and medical management.    Rx loprox gel.    In my medical opinion, this patient's condition makes the need for nail debridement today necessary medically to avoid serious injury risking limb.    With the patient's permission, I debrided all ten toenails with a clean nipper and curette, removing all offending nail and debris.  Patient tolerated the procedure well and related significant relief.    Discussed conservative treatment with shoes of adequate dimensions, material, and style to alleviate symptoms and delay or prevent surgical intervention.            Follow-up in about 1 week (around 11/8/2018), or if symptoms worsen or fail to improve.

## 2018-11-01 NOTE — LETTER
November 1, 2018      Jalyn Caballero MD  [address]  [place] [address]           [facility]  [address]  [facility] [address]  Phone: [phone]          Patient: An Hazel   MR Number: [id number]   YOB: 1927   Date of Visit: 11/1/2018       Dear Dr. Delvalle:    Thank you for referring An Luz to me for evaluation. Attached you will find relevant portions of my assessment and plan of care.    If you have questions, please do not hesitate to call me. I look forward to following An Luz along with you.    Sincerely,    Alfredo Stevens, AGATHA    Enclosure  CC:  No Recipients    If you would like to receive this communication electronically, please contact [email or web address] or [phone] to request more information on "[employer] Link access.    For providers and/or their staff who would like to refer a patient to [facility], please contact us through our one-stop-shop provider referral line, [facility] , at [phone].    If you feel you have received this communication in error or would no longer like to receive these types of communications, please e-mail [email or web address]

## 2018-11-15 ENCOUNTER — OFFICE VISIT (OUTPATIENT)
Dept: OTOLARYNGOLOGY | Facility: CLINIC | Age: 83
End: 2018-11-15
Payer: MEDICARE

## 2018-11-15 VITALS
BODY MASS INDEX: 17.01 KG/M2 | TEMPERATURE: 97 F | SYSTOLIC BLOOD PRESSURE: 141 MMHG | HEART RATE: 65 BPM | WEIGHT: 90 LBS | DIASTOLIC BLOOD PRESSURE: 59 MMHG

## 2018-11-15 DIAGNOSIS — H61.23 IMPACTED CERUMEN, BILATERAL: ICD-10-CM

## 2018-11-15 DIAGNOSIS — H61.893 DRY EAR CANAL, BILATERAL: Primary | ICD-10-CM

## 2018-11-15 DIAGNOSIS — Z97.4 WEARS HEARING AID IN BOTH EARS: ICD-10-CM

## 2018-11-15 PROCEDURE — 99999 PR PBB SHADOW E&M-EST. PATIENT-LVL III: CPT | Mod: PBBFAC,,, | Performed by: OTOLARYNGOLOGY

## 2018-11-15 PROCEDURE — 99213 OFFICE O/P EST LOW 20 MIN: CPT | Mod: PBBFAC,25 | Performed by: OTOLARYNGOLOGY

## 2018-11-15 PROCEDURE — 69210 REMOVE IMPACTED EAR WAX UNI: CPT | Mod: S$PBB,,, | Performed by: OTOLARYNGOLOGY

## 2018-11-15 PROCEDURE — 99499 UNLISTED E&M SERVICE: CPT | Mod: S$PBB,,, | Performed by: OTOLARYNGOLOGY

## 2018-11-15 PROCEDURE — 69210 REMOVE IMPACTED EAR WAX UNI: CPT | Mod: 50,PBBFAC | Performed by: OTOLARYNGOLOGY

## 2018-11-15 NOTE — PATIENT INSTRUCTIONS
Some cerumen removed from AD eac ; larger C.I. extracted from AS eac  RTC yearly for cleaning /prn

## 2018-11-28 NOTE — TELEPHONE ENCOUNTER
----- Message from Javan Jha sent at 11/28/2018  4:31 PM CST -----  Contact: Patient [phone]  RX request - refill or new RX.  Is this a refill or new RX:  Refill  RX name and strength: atenolol (TENORMIN) 25 MG tablet     Is this a 30 day or 90 day RX:  Short order supply    Pharmacy name and phone # ): [address] [phone] (Phone) [phone] (Fax    Comments:  Patient requesting a Short order supply to be sent to local pharmacy above, requesting a call back to inform both order requests has been sent.    Also    RX request - refill or new RX.  Is this a refill or new RX:  Refill  RX name and strength: atenolol (TENORMIN) 25 MG tablet    Is this a 30 day or 90 day RX:  Full order 90 Day supply    Pharmacy name and phone #: [facility] - [address] [phone] (Phone) [phone] (Fax)    Comments:  Patient requesting a full order to be sent to Mail order service.    Please call an advise  Thank you

## 2018-11-28 NOTE — TELEPHONE ENCOUNTER
Will send request for [facility] electronically and call in a short supply to [facility] once approved and notify patient.

## 2018-11-29 RX ORDER — ATENOLOL 25 MG/1
25 TABLET ORAL 2 TIMES DAILY
Qty: 60 TABLET | Refills: 0 | Status: SHIPPED | OUTPATIENT
Start: 2018-11-29 | End: 2019-01-21 | Stop reason: SDUPTHER

## 2018-11-29 RX ORDER — ATENOLOL 25 MG/1
25 TABLET ORAL 2 TIMES DAILY
Qty: 90 TABLET | Refills: 3 | Status: SHIPPED | OUTPATIENT
Start: 2018-11-29 | End: 2018-11-29 | Stop reason: SDUPTHER

## 2018-11-29 NOTE — TELEPHONE ENCOUNTER
"----- Message from Carlee Rodriguez sent at 11/29/2018  1:35 PM CST -----  Contact: call [phone]  RX request - refill or new RX.  Is this a refill or new RX:  Refill   RX name and strength: atenolol (TENORMIN) 25 MG tablet  Directions:   Is this a 30 day or 90 day RX: 30 DAY AND A 90 DAY  Local pharmacy or mail order pharmacy:  LOCAL AND MAIL ORDER   Pharmacy name and phone # (DON'T enter "on file" or "in chart"): LOCAL ::[address] [phone] (Phone    MAIL ORDER:: [email or web address] - [address] [phone] (Phone)   Comments: PT IS OUT OF THIS MEDICATION AND HAS BEEN TRYING FOR SEVERAL DAYS TO GET DONE  PATIENT IS REQUESTING A PHONE CALL TO LET HER KNOW WHEN THIS IS DONE....  ASAP PLEASE.       "

## 2018-12-13 ENCOUNTER — TELEPHONE (OUTPATIENT)
Dept: INTERNAL MEDICINE | Facility: CLINIC | Age: 83
End: 2018-12-13

## 2018-12-13 DIAGNOSIS — I10 ESSENTIAL HYPERTENSION: ICD-10-CM

## 2018-12-13 RX ORDER — LOSARTAN POTASSIUM 100 MG/1
100 TABLET ORAL DAILY
Qty: 90 TABLET | Refills: 3 | Status: SHIPPED | OUTPATIENT
Start: 2018-12-13 | End: 2019-06-21 | Stop reason: SDUPTHER

## 2018-12-13 NOTE — TELEPHONE ENCOUNTER
----- Message from Cyn Sánchez sent at 12/13/2018  3:23 PM CST -----  Contact: [phone]  Patient is requesting a call from Mae, its personal.    Please advise, thank you

## 2018-12-13 NOTE — TELEPHONE ENCOUNTER
----- Message from Cyn Sánchez sent at 12/13/2018  3:20 PM CST -----  Contact: [phone]  Type: Rx    Name of medication(s): losartan (COZAAR) 100 MG tablet    Is this a refill? New rx?   refill    Who prescribed medication?  Ada    Pharmacy Name, Phone, & Location: [facility] - [address]    Comments:  Please advise, thank you

## 2018-12-17 ENCOUNTER — OFFICE VISIT (OUTPATIENT)
Dept: OPHTHALMOLOGY | Facility: CLINIC | Age: 83
End: 2018-12-17
Payer: MEDICARE

## 2018-12-17 ENCOUNTER — CLINICAL SUPPORT (OUTPATIENT)
Dept: OPHTHALMOLOGY | Facility: CLINIC | Age: 83
End: 2018-12-17
Payer: MEDICARE

## 2018-12-17 ENCOUNTER — TELEPHONE (OUTPATIENT)
Dept: INTERNAL MEDICINE | Facility: CLINIC | Age: 83
End: 2018-12-17

## 2018-12-17 ENCOUNTER — OFFICE VISIT (OUTPATIENT)
Dept: INTERNAL MEDICINE | Facility: CLINIC | Age: 83
End: 2018-12-17
Payer: MEDICARE

## 2018-12-17 VITALS
HEIGHT: 61 IN | WEIGHT: 93.25 LBS | HEART RATE: 74 BPM | SYSTOLIC BLOOD PRESSURE: 132 MMHG | OXYGEN SATURATION: 96 % | BODY MASS INDEX: 17.61 KG/M2 | DIASTOLIC BLOOD PRESSURE: 60 MMHG

## 2018-12-17 DIAGNOSIS — I10 BENIGN ESSENTIAL HYPERTENSION: Primary | ICD-10-CM

## 2018-12-17 DIAGNOSIS — H53.483 GENERALIZED CONTRACTION OF VISUAL FIELD OF BOTH EYES: ICD-10-CM

## 2018-12-17 DIAGNOSIS — E78.2 MIXED HYPERLIPIDEMIA: ICD-10-CM

## 2018-12-17 DIAGNOSIS — H04.123 DRY EYE SYNDROME, BILATERAL: ICD-10-CM

## 2018-12-17 DIAGNOSIS — H35.361 FAMILIAL DRUSEN, BILATERAL: ICD-10-CM

## 2018-12-17 DIAGNOSIS — H40.013 OPEN ANGLE WITH BORDERLINE FINDINGS AND LOW GLAUCOMA RISK IN BOTH EYES: ICD-10-CM

## 2018-12-17 DIAGNOSIS — G24.5 BLEPHAROSPASM: ICD-10-CM

## 2018-12-17 DIAGNOSIS — H02.403 PTOSIS, BILATERAL: ICD-10-CM

## 2018-12-17 DIAGNOSIS — H35.362 FAMILIAL DRUSEN, BILATERAL: ICD-10-CM

## 2018-12-17 DIAGNOSIS — H40.013 OPEN ANGLE WITH BORDERLINE FINDINGS AND LOW GLAUCOMA RISK IN BOTH EYES: Primary | ICD-10-CM

## 2018-12-17 DIAGNOSIS — Z96.1 PSEUDOPHAKIA OF BOTH EYES: ICD-10-CM

## 2018-12-17 DIAGNOSIS — M81.0 OSTEOPOROSIS, UNSPECIFIED OSTEOPOROSIS TYPE, UNSPECIFIED PATHOLOGICAL FRACTURE PRESENCE: ICD-10-CM

## 2018-12-17 PROCEDURE — 92133 CPTRZD OPH DX IMG PST SGM ON: CPT | Mod: PBBFAC

## 2018-12-17 PROCEDURE — 99212 OFFICE O/P EST SF 10 MIN: CPT | Mod: PBBFAC,25 | Performed by: OPHTHALMOLOGY

## 2018-12-17 PROCEDURE — 99999 PR PBB SHADOW E&M-EST. PATIENT-LVL III: CPT | Mod: PBBFAC,,, | Performed by: INTERNAL MEDICINE

## 2018-12-17 PROCEDURE — 99999 PR PBB SHADOW E&M-EST. PATIENT-LVL II: CPT | Mod: PBBFAC,,, | Performed by: OPHTHALMOLOGY

## 2018-12-17 PROCEDURE — 92020 GONIOSCOPY: CPT | Mod: PBBFAC | Performed by: OPHTHALMOLOGY

## 2018-12-17 PROCEDURE — 92020 GONIOSCOPY: CPT | Mod: S$PBB,,, | Performed by: OPHTHALMOLOGY

## 2018-12-17 PROCEDURE — 99213 OFFICE O/P EST LOW 20 MIN: CPT | Mod: PBBFAC,27 | Performed by: INTERNAL MEDICINE

## 2018-12-17 PROCEDURE — 92133 CPTRZD OPH DX IMG PST SGM ON: CPT | Mod: 26,S$PBB,, | Performed by: OPHTHALMOLOGY

## 2018-12-17 PROCEDURE — 99214 OFFICE O/P EST MOD 30 MIN: CPT | Mod: S$PBB,,, | Performed by: INTERNAL MEDICINE

## 2018-12-17 PROCEDURE — 92012 INTRM OPH EXAM EST PATIENT: CPT | Mod: S$PBB,,, | Performed by: OPHTHALMOLOGY

## 2018-12-17 RX ORDER — ALENDRONATE SODIUM 70 MG/1
70 TABLET ORAL
Qty: 12 TABLET | Refills: 3 | Status: SHIPPED | OUTPATIENT
Start: 2018-12-17 | End: 2020-02-03

## 2018-12-17 RX ORDER — AMLODIPINE BESYLATE 5 MG/1
5 TABLET ORAL DAILY
Qty: 30 TABLET | Refills: 0 | Status: SHIPPED | OUTPATIENT
Start: 2018-12-17 | End: 2019-01-21 | Stop reason: SDUPTHER

## 2018-12-17 RX ORDER — HYDROCHLOROTHIAZIDE 12.5 MG/1
12.5 CAPSULE ORAL DAILY
Qty: 30 CAPSULE | Refills: 0 | Status: SHIPPED | OUTPATIENT
Start: 2018-12-17 | End: 2018-12-17

## 2018-12-17 RX ORDER — HYDROCHLOROTHIAZIDE 12.5 MG/1
12.5 CAPSULE ORAL DAILY
Qty: 30 CAPSULE | Refills: 0 | Status: SHIPPED | OUTPATIENT
Start: 2018-12-17 | End: 2019-01-21 | Stop reason: SDUPTHER

## 2018-12-17 NOTE — TELEPHONE ENCOUNTER
----- Message from Javan Jha sent at 12/17/2018  1:27 PM CST -----  Contact: Patient [phone]  Patient stating is requesting a call back from office, regarding mix up of Rx, is needing to make some changes also to after summary.    Please call an advise  Thank you

## 2018-12-17 NOTE — PROGRESS NOTES
INTERNAL MEDICINE ESTABLISHED PATIENT VISIT NOTE    Subjective:     Chief Complaint: Follow-up  HTN     Patient ID: An Hazel is a 89 y.o. female with HTN, HLD, anemia, osteoporosis c kyphoscoliosis, glaucoma, chronic dry eyes, urinary incontinence, chronic constipation, aortic atherosclerosis noted incidentally on prior imaging, last seen by me in Oct, here today for f/u.    To review:  At baseline, lives at home c her daughter who works.  AAOx3, does most ADLs independently but appreciates assistance.  Wears poise pads for hx urinary incontinence, no issues c fecal incontinence.  Ambulates c a cane.  Has had 3 falls in the past, in 2013, 2017, and again 5/18.  Had r humeral neck fx back in 2017 and hip fx in 2013 s/p ORIF B.    To review, previously on Amlodipine which was previously thought to be contributing to LE edema (although pt thinks sx related to previous surgery many years ago) and previously on HCTZ which was stopped due to urinary sx (although pt denies this and is unsure if she is still taking this med).    At last couple of appts, hydralazine was added and then increased in Oct.  Says her home checks show that her pressures are better in the morning but sometimes in the 140s to 150s in the afternoons and evenings.      Now states that she does not like taking meds TID and wants to stop hydralazine and go back to amlodipine and HCTZ.  Still taking losartan and atenolol.    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 Authorizing Provider   acetaminophen (TYLENOL) 325 MG tablet Take 2 tablets (650 mg total) by mouth every 6 (six) hours  as needed (mild pain). Do NOT exceed 3000 mg in a 24 hour time period and do not take with other medications containing acetaminophen 1/15/14  Yes Britt Lara NP   alendronate (FOSAMAX) 70 MG tablet Take 1 tablet (70 mg total) by mouth every 7 days. 12/18/17 12/18/18 Yes Alcon Thomas Jr., MD   atenolol (TENORMIN) 25 MG tablet Take 1 tablet (25 mg total) by mouth 2 (two) times daily. 11/29/18  Yes Jalyn Caballero MD   cholecalciferol, vitamin D3, (VITAMIN D3) 1,000 unit capsule Take 1,000 Units by mouth once daily.   Yes Historical Provider, MD   FOLIC ACID/MULTIVIT-MIN/LUTEIN (CENTRUM SILVER ORAL) Take 1 tablet by mouth once daily.    Yes Historical Provider, MD   hydrALAZINE (APRESOLINE) 50 MG tablet Take 1 tablet (50 mg total) by mouth 3 (three) times daily. 10/22/18 10/22/19 Yes Jalyn Caballero MD   losartan (COZAAR) 100 MG tablet Take 1 tablet (100 mg total) by mouth once daily. 12/13/18 12/13/19 Yes Alfred Pacheco MD   oxybutynin (DITROPAN-XL) 5 MG TR24 Take 1 tablet (5 mg total) by mouth once daily. 10/22/18  Yes Jalyn Caballero MD   polyethylene glycol (GLYCOLAX) 17 gram PwPk Take one capful with a full glass of water daily as needed for constipation. 6/25/18  Yes Jalyn Caballero MD   pravastatin (PRAVACHOL) 20 MG tablet Take 0.5 tablets (10 mg total) by mouth once daily. 10/22/18  Yes Jalyn Caballero MD       Allergies:  Review of patient's allergies indicates:   Allergen Reactions    Augmentin [amoxicillin-pot clavulanate] Nausea And Vomiting    Sulfa (sulfonamide antibiotics)     Zestril  [lisinopril]      Other reaction(s): Swelling       Social History:  Social History     Tobacco Use    Smoking status: Never Smoker    Smokeless tobacco: Never Used    Tobacco comment: The patient exercises every other day on an exercise bike.  On alternative days she does floor exercises with weights.   Substance Use Topics    Alcohol use: Yes     Alcohol/week: 1.2 - 2.4 oz     Types: 1 Glasses of wine, 1 - 3  "Standard drinks or equivalent per week     Comment: Occasional use    Drug use: No        Review of Systems   Constitutional: Negative for chills, fatigue and fever.   Respiratory: Negative for cough, chest tightness and shortness of breath.    Cardiovascular: Positive for leg swelling (trace at baseline on R). Negative for chest pain.   Gastrointestinal: Negative for abdominal pain and blood in stool.   Genitourinary: Negative for dysuria and frequency.         Health Maintenance:   Immunizations:   Influenza 9/2018  TDap is up to date 5/2017  Prevnar 13 5/2016, pvax 6/18  Shingrix is not UTD.     rec once back in stock     Cancer Screening:  PAP: n/a based on age, also c hx hyst  Mammogram: n/a based on age  Colonoscopy: n/a based on age  DEXA:  7/2018, cont alendronate    Objective:   /60 (BP Location: Left arm, Patient Position: Sitting, BP Method: Medium (Manual))   Pulse 74   Ht 5' 1" (1.549 m)   Wt 42.3 kg (93 lb 4.1 oz)   LMP  (LMP Unknown)   SpO2 96%   BMI 17.62 kg/m²        General: AAO x3, no apparent distress  CV: RRR, no m/r/g  Pulm: Lungs CTAB, no crackles, no wheezes  Abd: s/NT/ND +BS  Extremities: no c/c/e    Labs:         Assessment/Plan     An was seen today for follow-up.    Diagnoses and all orders for this visit:    Benign essential hypertension  As per HPI  Since pt no longer desires to take hydralazine due to difficulty c TID dosing, will change back to amlodipine 5 mg and HCTZ 12.5 daily  Discussed s/e of potential worsening LE edema on amlodipine and polyuria and worsening dry mouth (pt has dry mouth at baseline), pt understands risks vs benefits and still wants to proceed c change.  Will also cont her atenolol and losartan which is unchanged.  Advised to bring all pill bottles to her f/u as she was not clear if she was still taking HCTZ.  -     amLODIPine (NORVASC) 5 MG tablet; Take 1 tablet (5 mg total) by mouth once daily.  -     hydroCHLOROthiazide (MICROZIDE) 12.5 mg " capsule; Take 1 capsule (12.5 mg total) by mouth once daily.    Osteoporosis, unspecified osteoporosis type, unspecified pathological fracture presence  Prev drug holiday but meds restarted Dec 2017  DEXA utd  Cont meds, refill sent.  -     alendronate (FOSAMAX) 70 MG tablet; Take 1 tablet (70 mg total) by mouth every 7 days.    Mixed HLD  At goal  Can cont meds for now but based on age could consider stopping in the near future.    HM as above  RTC in 1 mo for f/u BP.    Jalyn Caballero MD  Department of Internal Medicine - [facility]  12/17/2018

## 2018-12-17 NOTE — PROGRESS NOTES
Assessment /Plan     For exam results, see Encounter Report.    Open angle with borderline findings and low glaucoma risk in both eyes    Blepharospasm - Both Eyes    Familial drusen, bilateral    Dry eye syndrome, bilateral    Ptosis, bilateral    Pseudophakia of both eyes        1. Glaucoma Suspect   First HVF   9/6/2011   First photos   2010   Treatment / Drops started   - none           Family history    Familial drusen OU        Glaucoma meds    none        H/O adverse rxn to glaucoma drops    none        LASERS    none        GLAUCOMA SURGERIES    none        OTHER EYE SURGERIES   PC IOL OD (12/07), OS (4/2010),                                     S/P neuromyectomy, levator repair, ptosis, repair RUL margin defect (4/10/11)        CDR    0.7/0.7        Tbase    12-18/12-18        Tmax    18/18        Ttarget    ??           HVF    6 test 2011 to  2018 - gen dep/? IAD - fluctuates   od // full (POOR RELIABILITY) os        Gonio    +4 ou        CCT   508/508        OCT   5  test 2008 to 2018 - RNFL - nl od // nl os         HRT    4 test 2011 to 2018  - MR -  HSD od // HSD os /// CDR xxx od // xxx os        Disc photos    2010. 2016     - Ttoday    11/11  - Test done today   HRT / gonio       2. -  Blepharospasm  - S/P neuromyectomy, levator repair, ptosis, repair RUL margin defect (4/10/11)- Dr Begum  - Residual RUL notch  - Pt used to get Botox injections (last Botox injection OU was in 2011)  - since retiring she does not care to get more botox - she says her friends know she blinks a lot  - notch in th RUL post surgery with assoc lag    3. Dominant familial drusen OU   Monitor    4.   LUPE / lag - 2/2 above surgery - Lagopthalmous   AT's prn // ointment qhs    5. Ptosis ou    OS >> OD   2/2 #2    6. PCIOL - ou   OD (12/07), - Chantal   OS (4/2010) - Dr Cornejo    7. Refractive error    Very happy with her glasses from [facility]   Has computer glasses and regular bifocals    8. PVD od  - not having  any symptoms    Stable x 3 weeks    Warned of signs of RD     9/ C/O poor distance  vision  // happy with her reading vision    Minimal PCO ou - NVS    Do not recommend yag cap - would be difficult to focus on it    Last saw  Mari  4/2018 - can go back to see hime in May for glasses check     Plan:  RTC in 9  months - HVF / DFE / photos     I have seen and personally examined the patient.  I agree with the findings, assessment and plan of the resident and/or fellow.     Dian Cornejo MD

## 2018-12-18 NOTE — TELEPHONE ENCOUNTER
Spoke with pt and she states she has not taking hctz 12.5 mg since 8/3/18. Pt was taking hydralazine 10mg but will d/c and start back taking hctz.

## 2018-12-20 NOTE — PROGRESS NOTES
"Physical Therapy   Treatment    [facility] Aleksander Luz   MRN: [id number]      11/08/17 1518   PT Time Calculation   PT Start Time 1518   PT Stop Time 1603   PT Total Time (min) 45 min   Treatment   Treatment Type Treatment   PT/PTA PT   General   PT Received On 11/08/17   Family/Caregiver Present No   Patient Found (position) Seated in wheelchair   Pt found with (posey belt donned, sling to R UE)   Precautions   General Precautions fall   Orthopedic Yes   RUE Weight Bearing NWB   RLE Weight Bearing WBAT   Required Braces or Orthoses Yes   RUE Brace/Splint sling   Visual/Auditory Hearing impaired;Vision impaired   Subjective   Patient states "I am ready for you"   Pain/Comfort   Pain Rating 1 5/10   Location - Side 1 Right   Location - Orientation 1 generalized   Location 1 hip   Pain Addressed 1 Reposition;Distraction   Transfers   Transfer yes   Sit to Stand   Sit <> Stand Assistance Moderate Assistance;Maximum Assistance   Sit <> Stand Assistive Device Other (see comments)  (parallel bars)   Trials/Comments pt performed x 4 trials in parallel bars with assistance from PT required to elevate hips out of wheelchair and to decrease posterior lean during transfer   Stand to Sit   Assistance Moderate Assistance;Maximum Assistance   Assistive Device Other (see comments)  (parallel bars)   Trials/Comments Pt performed x 4 trials, pt required assistance to control descent into sitting in wheelchair    Gait   Gait Yes   Weight Bearing Status WBAT to R LE   Gait 1   Surface 1 Level tile   Gait Assistive Device Parallel bars   Other Apparatus 1 Wheelchair follow   Assistance 1 Moderate assistance;Total assistance   Gait Distance pt ambulated length of parallel bars with moderate assistance required to increase pt's R LE weight bearing during stance phase. Posterior lean observed   Gait Pattern swing-through gait   Gait Deviation(s) decreased lissette;increased time in double stance;decreased velocity of limb motion;decreased " Left another message for Willie Charlton to call us back.  Unfortunately, we cannot leave the result message on his unidentified voicemail.   step length;decreased stride length;decreased toe-to-floor clearance;decreased weight-shifting ability;toe drag;forward lean   Impairments Contributing to Gait Deviations impaired balance;decreased strength;impaired postural control;pain;decreased flexibility;decreased ROM   Balance   Balance Yes   Static Standing Balance   Static Standing-Balance Support Right upper extremity supported;Left upper extremity supported  (in parallel bars)   Static Standing-Level of Assistance Contact guard;Minimum assistance   Static Standing-Comment/# of Minutes Pt tolerated 8 minutes of static standing in parallel bars, gentle assistance from PT required to assist pt with increasing weight bearing to R LE   Seated   Seated-Exercises Lower extremity;Specific exercises   Seated-Exercise Type Ankle pumps;Glut sets;Hip flexion;Long arc quads   Seated-Exercise Comments Pt performed 3 x 15 reps to B LE, pt required active assist from PT for R LE    Activity Tolerance   Activity Tolerance Patient tolerated treatment well   After Treatment   Patient Position After Treatment Seated in wheelchair   Patient after treatment left call button in reach   Assessment   Prognosis Fair   Problem List Decreased strength;Decreased endurance;Impaired balance;Decreased mobility;Pain;Decreased skin integrity;Orthopedic restrictions;Impaired vision   Session Assessment progressing toward goals   Assessment Pt required slightly decreased assistane to perform sit to stand transfers in parallel bars, pt is beginning to accept more weight to R LE during standing but continues to be fearful of falling. Pt will continue to benefit from further skilled PT intervention in order to address these above impairments and to improve pt's functional independence with mobility.    Level of Motivation/Participation good   Barriers to Discharge Inaccessible home environment;Decreased caregiver support   Barriers to Discharge Comments 5 CORTEZ home, pt has 5 steps to enter home    Discharge Recommendations   Equipment Needed After Discharge slide board   Discharge Facility/Level Of Care Needs home health PT   Plan   Planned Therapy Intervention Continue with current plan   Therapy Frequency 2 times/day;daily;Monday-Friday;Saturday or Sunday   Physical Therapy Follow-up   PT Follow-up? Yes   PT - Next Visit Date 11/09/17   Treatment/Billable Minutes   Gait training 10   Therapeutic Activity 20   Therapeutic Exercise 15   Total Time 45     Ronda Templeton, PT  11/8/2017

## 2018-12-26 ENCOUNTER — TELEPHONE (OUTPATIENT)
Dept: INTERNAL MEDICINE | Facility: CLINIC | Age: 83
End: 2018-12-26

## 2018-12-26 NOTE — TELEPHONE ENCOUNTER
Spoke to pt. Pt c/o persistant cough since yesterday. Pt thinks she may have bronchitis. Advised pt that she should be seen. She have agreed to go to [facility] on [address].

## 2018-12-26 NOTE — TELEPHONE ENCOUNTER
"----- Message from [place]en [place]rett sent at 12/26/2018  2:12 PM CST -----  Contact: ANITA RUGGIERO [[id number]]            Name of Who is Calling: ANITA RUGGIERO [[id number]]      What is the request in detail:  Patient called requesting medication for a persistent cough she's presently experiencing. Says, "she would the medication called into her local pharmacy."  I did offer an appointment to be seen but patient refused.   Please give a call back at your earliest convenience.     Thanks!      Can the clinic reply by [facility]: No      What Number to Call Back: ANITA RUGGIERO / # [phone]                                    "

## 2019-01-21 ENCOUNTER — OFFICE VISIT (OUTPATIENT)
Dept: INTERNAL MEDICINE | Facility: CLINIC | Age: 84
End: 2019-01-21
Payer: MEDICARE

## 2019-01-21 VITALS
SYSTOLIC BLOOD PRESSURE: 110 MMHG | WEIGHT: 88.63 LBS | BODY MASS INDEX: 16.73 KG/M2 | HEIGHT: 61 IN | OXYGEN SATURATION: 97 % | HEART RATE: 58 BPM | DIASTOLIC BLOOD PRESSURE: 64 MMHG

## 2019-01-21 DIAGNOSIS — I70.0 AORTIC ATHEROSCLEROSIS: ICD-10-CM

## 2019-01-21 DIAGNOSIS — R32 URINARY INCONTINENCE, UNSPECIFIED TYPE: ICD-10-CM

## 2019-01-21 DIAGNOSIS — M81.0 OSTEOPOROSIS, UNSPECIFIED OSTEOPOROSIS TYPE, UNSPECIFIED PATHOLOGICAL FRACTURE PRESENCE: ICD-10-CM

## 2019-01-21 DIAGNOSIS — I10 BENIGN ESSENTIAL HYPERTENSION: Primary | ICD-10-CM

## 2019-01-21 DIAGNOSIS — E78.2 MIXED HYPERLIPIDEMIA: ICD-10-CM

## 2019-01-21 DIAGNOSIS — D64.9 ANEMIA, UNSPECIFIED TYPE: ICD-10-CM

## 2019-01-21 DIAGNOSIS — E21.3 HYPERPARATHYROIDISM: ICD-10-CM

## 2019-01-21 DIAGNOSIS — R73.03 PREDIABETES: ICD-10-CM

## 2019-01-21 PROCEDURE — 99999 PR PBB SHADOW E&M-EST. PATIENT-LVL III: CPT | Mod: PBBFAC,,, | Performed by: INTERNAL MEDICINE

## 2019-01-21 PROCEDURE — 99214 PR OFFICE/OUTPT VISIT, EST, LEVL IV, 30-39 MIN: ICD-10-PCS | Mod: S$PBB,,, | Performed by: INTERNAL MEDICINE

## 2019-01-21 PROCEDURE — 99999 PR PBB SHADOW E&M-EST. PATIENT-LVL III: ICD-10-PCS | Mod: PBBFAC,,, | Performed by: INTERNAL MEDICINE

## 2019-01-21 PROCEDURE — 99214 OFFICE O/P EST MOD 30 MIN: CPT | Mod: S$PBB,,, | Performed by: INTERNAL MEDICINE

## 2019-01-21 PROCEDURE — 99213 OFFICE O/P EST LOW 20 MIN: CPT | Mod: PBBFAC | Performed by: INTERNAL MEDICINE

## 2019-01-21 RX ORDER — AMLODIPINE BESYLATE 5 MG/1
5 TABLET ORAL DAILY
Qty: 90 TABLET | Refills: 2 | Status: SHIPPED | OUTPATIENT
Start: 2019-01-21 | End: 2019-10-14 | Stop reason: SDUPTHER

## 2019-01-21 RX ORDER — HYDROCHLOROTHIAZIDE 12.5 MG/1
12.5 CAPSULE ORAL DAILY
Qty: 90 CAPSULE | Refills: 2 | Status: SHIPPED | OUTPATIENT
Start: 2019-01-21 | End: 2019-05-20

## 2019-01-21 RX ORDER — ATENOLOL 25 MG/1
25 TABLET ORAL 2 TIMES DAILY
Qty: 180 TABLET | Refills: 2 | Status: SHIPPED | OUTPATIENT
Start: 2019-01-21 | End: 2019-10-14 | Stop reason: SDUPTHER

## 2019-01-21 NOTE — PROGRESS NOTES
INTERNAL MEDICINE ESTABLISHED PATIENT VISIT NOTE    Subjective:     Chief Complaint: Follow-up  HTN     Patient ID: An Hazel is a 89 y.o. female with HTN, HLD, preDM, anemia, osteoporosis c kyphoscoliosis, glaucoma, chronic dry eyes, urinary incontinence, chronic constipation, aortic atherosclerosis noted incidentally on prior imaging, last seen by me a month ago, here today for BP f/u.    To review:  At baseline, lives at home c her daughter who works.  AAOx3, does most ADLs independently but appreciates assistance.  Wears poise pads for hx urinary incontinence, no issues c fecal incontinence.  Ambulates c a cane.  Has had 3 falls in the past, in 2013, 2017, and again 5/18.  Had r humeral neck fx back in 2017 and hip fx in 2013 s/p ORIF B.    BP meds have been adjusted over the past few appts due to s/e including LE edema and urinary sx; however, pt did not like TID dosing c Hydralazine so was switched back to low dose Amlodipine and HCTZ which she has been taking and reports sx are stable/tolerable.    Feeling well overall.    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 Authorizing Provider   alendronate (FOSAMAX) 70 MG tablet Take 1 tablet (70 mg total) by mouth every 7 days. 12/17/18 12/17/19  Jalyn Caballero MD   amLODIPine (NORVASC) 5 MG tablet Take 1 tablet (5 mg total) by mouth once daily. 12/17/18 12/17/19  Jalyn Caballero MD   atenolol (TENORMIN) 25 MG tablet Take 1 tablet (25 mg total) by mouth 2 (two) times daily. 11/29/18   Jalyn Caballero MD   cholecalciferol, vitamin D3, (VITAMIN D3) 1,000 unit capsule Take 1,000 Units by mouth once daily.     Historical Provider, MD   FOLIC ACID/MULTIVIT-MIN/LUTEIN (CENTRUM SILVER ORAL) Take 1 tablet by mouth once daily.     Historical Provider, MD   hydroCHLOROthiazide (MICROZIDE) 12.5 mg capsule Take 1 capsule (12.5 mg total) by mouth once daily. 12/17/18 12/17/19  Jalyn Caballero MD   losartan (COZAAR) 100 MG tablet Take 1 tablet (100 mg total) by mouth once daily. 12/13/18 12/13/19  Alfred Pacheco MD   oxybutynin (DITROPAN-XL) 5 MG TR24 Take 1 tablet (5 mg total) by mouth once daily. 10/22/18   Jalyn Caballero MD   polyethylene glycol (GLYCOLAX) 17 gram PwPk Take one capful with a full glass of water daily as needed for constipation. 6/25/18   Jalyn Caballero MD   pravastatin (PRAVACHOL) 20 MG tablet Take 0.5 tablets (10 mg total) by mouth once daily. 10/22/18   Jalyn Caballero MD       Allergies:  Review of patient's allergies indicates:   Allergen Reactions    Augmentin [amoxicillin-pot clavulanate] Nausea And Vomiting    Sulfa (sulfonamide antibiotics)     Zestril [lisinopril] Swelling     Facial Swelling       Social History:  Social History     Tobacco Use    Smoking status: Never Smoker    Smokeless tobacco: Never Used    Tobacco comment: The patient exercises every other day on an exercise bike.  On alternative days she does floor exercises with weights.   Substance Use Topics    Alcohol use: Yes     Alcohol/week: 1.2 - 2.4 oz     Types: 1 Glasses of wine, 1 - 3 Standard drinks or equivalent per week     Comment: Occasional use    Drug use: No        Review of Systems   Constitutional: Negative for chills, fatigue and fever.   Respiratory: Negative for cough, chest tightness and shortness of breath.    Cardiovascular: Negative for chest pain.   Gastrointestinal: Negative for abdominal pain and blood in stool.   Genitourinary: Negative for dysuria and frequency.         Health Maintenance:     Immunizations:   Influenza 9/2018  TDap is up to date 5/2017  Prevnar 13 5/2016, pvax 6/18  Shingrix is not UTD.     rec once  "back in stock     Cancer Screening:  PAP: n/a based on age, also c hx hyst  Mammogram: n/a based on age  Colonoscopy: n/a based on age  DEXA:  7/2018, cont alendronate      Objective:   /64 (BP Location: Right arm, Patient Position: Sitting, BP Method: Medium (Manual))   Pulse (!) 58   Ht 5' 1" (1.549 m)   Wt 40.2 kg (88 lb 10 oz)   LMP  (LMP Unknown)   SpO2 97%   BMI 16.75 kg/m²        General: AAO x3, no apparent distress  CV: RRR, no m/r/g  Pulm: Lungs CTAB, no crackles, no wheezes  Abd: s/NT/ND +BS  Extremities: trace edema on RLE, chronic.    Labs:     Lab Results   Component Value Date    WBC 9.96 06/26/2018    HGB 11.2 (L) 06/26/2018    HCT 34.4 (L) 06/26/2018    MCV 94 06/26/2018     06/26/2018     Lab Results   Component Value Date    IRON 68 09/06/2017    TIBC 414 09/06/2017       Sodium   Date Value Ref Range Status   06/26/2018 143 136 - 145 mmol/L Final     Potassium   Date Value Ref Range Status   06/26/2018 3.9 3.5 - 5.1 mmol/L Final     Chloride   Date Value Ref Range Status   06/26/2018 106 95 - 110 mmol/L Final     CO2   Date Value Ref Range Status   06/26/2018 29 23 - 29 mmol/L Final     Glucose   Date Value Ref Range Status   06/26/2018 103 70 - 110 mg/dL Final     BUN, Bld   Date Value Ref Range Status   06/26/2018 19 8 - 23 mg/dL Final     Creatinine   Date Value Ref Range Status   06/26/2018 0.7 0.5 - 1.4 mg/dL Final     Calcium   Date Value Ref Range Status   06/26/2018 10.3 8.7 - 10.5 mg/dL Final     Total Protein   Date Value Ref Range Status   06/26/2018 7.8 6.0 - 8.4 g/dL Final     Albumin   Date Value Ref Range Status   06/26/2018 3.5 3.5 - 5.2 g/dL Final     Total Bilirubin   Date Value Ref Range Status   06/26/2018 0.6 0.1 - 1.0 mg/dL Final     Comment:     For infants and newborns, interpretation of results should be based  on gestational age, weight and in agreement with clinical  observations.  Premature Infant recommended reference ranges:  Up to 24 " hours.............<8.0 mg/dL  Up to 48 hours............<12.0 mg/dL  3-5 days..................<15.0 mg/dL  6-29 days.................<15.0 mg/dL       Alkaline Phosphatase   Date Value Ref Range Status   06/26/2018 87 55 - 135 U/L Final     AST   Date Value Ref Range Status   06/26/2018 21 10 - 40 U/L Final     ALT   Date Value Ref Range Status   06/26/2018 12 10 - 44 U/L Final     Anion Gap   Date Value Ref Range Status   06/26/2018 8 8 - 16 mmol/L Final     eGFR if    Date Value Ref Range Status   06/26/2018 >60 >60 mL/min/1.73 m^2 Final     eGFR if non    Date Value Ref Range Status   06/26/2018 >60 >60 mL/min/1.73 m^2 Final     Comment:     Calculation used to obtain the estimated glomerular filtration  rate (eGFR) is the CKD-EPI equation.            Assessment/Plan     An was seen today for follow-up.    Diagnoses and all orders for this visit:    Benign essential hypertension  At goal on amlodipine, atenolol, HCTZ and losartan.  Cont current regimen.  S/e tolerable.  -     amLODIPine (NORVASC) 5 MG tablet; Take 1 tablet (5 mg total) by mouth once daily.  -     atenolol (TENORMIN) 25 MG tablet; Take 1 tablet (25 mg total) by mouth 2 (two) times daily.  -     hydroCHLOROthiazide (MICROZIDE) 12.5 mg capsule; Take 1 capsule (12.5 mg total) by mouth once daily.    Aortic atherosclerosis  HLD  Noted on prior imaging, no issues  Cont bp control  Discussed risks and benefits of statin at her age, ok to stop since numbers have been decent for her age.    Hyperparathyroidism  Lab Results   Component Value Date    .0 (H) 11/14/2017    CALCIUM 10.3 06/26/2018    PHOS 5.3 (H) 10/28/2017     Mild, Ca has been normal, no issues  Based on age, will manage conservatively    Urinary incontinence, unspecified type  Sx tolerable, wears poise pads for stress incontinence and on oxybutynin for OAB    Osteoporosis, unspecified osteoporosis type, unspecified pathological fracture  presence  Prev on drug holiday but has been back on alendronate, will plan for repeat dexa 7/2020    Anemia  Mild, close to goal on last check, will monitor, conservative mgmt based on age    preDM  Mild, do not need aggressive tx based on age, will repeat labs at f/u     as above  RTC in 4 mos, labs at f/u    Jalyn Caballero MD  Department of Internal Medicine - [facility]  01/21/2019

## 2019-05-01 ENCOUNTER — OFFICE VISIT (OUTPATIENT)
Dept: OPTOMETRY | Facility: CLINIC | Age: 84
End: 2019-05-01
Payer: MEDICARE

## 2019-05-01 DIAGNOSIS — Z98.41 S/P BILATERAL CATARACT EXTRACTION: ICD-10-CM

## 2019-05-01 DIAGNOSIS — H52.203 ASTIGMATISM OF BOTH EYES, UNSPECIFIED TYPE: ICD-10-CM

## 2019-05-01 DIAGNOSIS — H40.003 GLAUCOMA SUSPECT OF BOTH EYES: Primary | ICD-10-CM

## 2019-05-01 DIAGNOSIS — Z96.1 PSEUDOPHAKIA OF BOTH EYES: ICD-10-CM

## 2019-05-01 DIAGNOSIS — Z98.42 S/P BILATERAL CATARACT EXTRACTION: ICD-10-CM

## 2019-05-01 DIAGNOSIS — G24.5 BLEPHAROSPASM: ICD-10-CM

## 2019-05-01 PROCEDURE — 99213 OFFICE O/P EST LOW 20 MIN: CPT | Mod: PBBFAC | Performed by: OPTOMETRIST

## 2019-05-01 PROCEDURE — 99999 PR PBB SHADOW E&M-EST. PATIENT-LVL III: CPT | Mod: PBBFAC,,, | Performed by: OPTOMETRIST

## 2019-05-01 PROCEDURE — 92015 PR REFRACTION: ICD-10-PCS | Mod: ,,, | Performed by: OPTOMETRIST

## 2019-05-01 PROCEDURE — 92014 PR EYE EXAM, EST PATIENT,COMPREHESV: ICD-10-PCS | Mod: S$PBB,,, | Performed by: OPTOMETRIST

## 2019-05-01 PROCEDURE — 99999 PR PBB SHADOW E&M-EST. PATIENT-LVL III: ICD-10-PCS | Mod: PBBFAC,,, | Performed by: OPTOMETRIST

## 2019-05-01 PROCEDURE — 92015 DETERMINE REFRACTIVE STATE: CPT | Mod: ,,, | Performed by: OPTOMETRIST

## 2019-05-01 PROCEDURE — 92014 COMPRE OPH EXAM EST PT 1/>: CPT | Mod: S$PBB,,, | Performed by: OPTOMETRIST

## 2019-05-01 NOTE — PATIENT INSTRUCTIONS
S/P bilateral cataract surgery, with bilateral posterior chamber intraocular lens.  Residual astigmatic refractive error in each eye, with best-corrected VA of 20/40-1 in the right eye and 20/40-2 in the left eye.  New spectacle lens Rx issued for use as desired.  Recommend full-time wear.    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  Did have regular Botox injections in the past (Dr. Begum), but has not been getting injections for the past two years.  Monitored by Dr. Cornejo.     Followed by Dr. Cornejo as glaucoma suspect bilaterally.  Not using drops for IOP control/reduction in either eye.  Continue to follow up with Dr. Cornejo as she recommends.     Recheck refraction as deemed necessary by Dr. Cornejo or if Ms. Luz notes bothersome decrease in VA with glasses.

## 2019-05-01 NOTE — PROGRESS NOTES
HPI     Recheck Refraction      Additional comments: Wants updated spectacle lens Rx.  Notes difficulty reading closed captioned print on TV screen.  Not unhappy with near VA with glasses.               Comments     89 yrs old  wf                        Approximate date of last eye examination:  12/17/2018- Dr. Cornejo   Name of last eye doctor seen:  Dr Guerra - 04/10/2018  Wears glasses? yes     If yes, wears full-time or part-time?  Full time    Present glasses are bifocal / S V Distance / S V Reading:  SV   Approximate age of present glasses:  2 yrs old   Got new glasses following last exam, or subsequently?:  Yes    Any problem with VA with glasses?  Yes, pblms seeing words on tv, would   like a new rx for eye glasses.   Wears CLs?:  no  Headaches? no  Eye pain/discomfort?  no                                                                                       Flashes?  no  Floaters?  No  Diplopia/Double vision?  no  Patient's Ocular History:         Any eye surgeries?  S/p cataract surgery OU, s/p ptosis sx         Any eye injury?  no         Any treatment for eye disease?  No longer getting Botox for   blepharospasms, glaucoma suspect - followed by Dr. Cornejo   Family history of eye disease?  Mom-blepharitis  Significant patient medical history:         1. Diabetes?  no       If yes, IDDM or NIDDM? n/a   2. HBP?  yes              3. Other (describe):  High cholesterol,    ! OTC eyedrops currently using:  Genteal gel-AM and prn OU-   hydrocortisone adarsh on lids, PM-ou   ! Prescription eye meds currently using:  No - no longer using drops for   control of IOP.  Monitor off drops by Dr. Cornejo.    ! Any history of allergy/adverse reaction to any eye meds used   previously?  no    ! Any history of allergy/adverse reaction to eyedrops used during prior   eye exam(s)? no    ! Any history of allergy/adverse reaction to Novacaine or similar meds?   no   ! Any history of allergy/adverse reaction to  "Epinephrine or similar meds?   no    ! Patient okay with use of anesthetic eyedrops to check eye pressure? yes            ! Patient okay with use of eyedrops to dilate pupils today? Patient was   recently dilated with Dr. Cornejo on 12/17/2018.    !  Allergies/Medications/Medical History/Family History reviewed today?    yes    PD =   60/56  Desired reading distance =  12"  Approx computer distance =  17"                                                                        Last edited by Michele Guerra, OD on 5/1/2019  3:16 PM. (History)            Assessment /Plan     For exam results, see Encounter Report.    1. Glaucoma suspect of both eyes     2. Blepharospasm     3. S/P bilateral cataract extraction     4. Pseudophakia of both eyes     5. Astigmatism of both eyes, unspecified type                  S/P bilateral cataract surgery, with bilateral posterior chamber intraocular lens.  Residual astigmatic refractive error in each eye, with best-corrected VA of 20/40-1 in the right eye and 20/40-2 in the left eye.  New spectacle lens Rx issued for use as desired.  Recommend full-time wear.    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  Did have regular Botox injections in the past (Dr. Begum), but has not been getting injections for the past two years.  Monitored by Dr. Cornejo.     Followed by Dr. Cornejo as glaucoma suspect bilaterally.  Not using drops for IOP control/reduction in either eye.  Continue to follow up with Dr. Cornejo as she recommends.     Recheck refraction as deemed necessary by Dr. Cornejo or if Ms. Luz notes bothersome decrease in VA with glasses.         "

## 2019-05-20 ENCOUNTER — OFFICE VISIT (OUTPATIENT)
Dept: INTERNAL MEDICINE | Facility: CLINIC | Age: 84
End: 2019-05-20
Payer: MEDICARE

## 2019-05-20 ENCOUNTER — LAB VISIT (OUTPATIENT)
Dept: LAB | Facility: HOSPITAL | Age: 84
End: 2019-05-20
Attending: INTERNAL MEDICINE
Payer: MEDICARE

## 2019-05-20 VITALS
SYSTOLIC BLOOD PRESSURE: 140 MMHG | HEART RATE: 65 BPM | OXYGEN SATURATION: 99 % | BODY MASS INDEX: 16.11 KG/M2 | HEIGHT: 61 IN | DIASTOLIC BLOOD PRESSURE: 84 MMHG | WEIGHT: 85.31 LBS

## 2019-05-20 DIAGNOSIS — E78.2 MIXED HYPERLIPIDEMIA: ICD-10-CM

## 2019-05-20 DIAGNOSIS — M81.0 OSTEOPOROSIS, UNSPECIFIED OSTEOPOROSIS TYPE, UNSPECIFIED PATHOLOGICAL FRACTURE PRESENCE: ICD-10-CM

## 2019-05-20 DIAGNOSIS — R73.03 PREDIABETES: ICD-10-CM

## 2019-05-20 DIAGNOSIS — I10 ESSENTIAL HYPERTENSION: Primary | ICD-10-CM

## 2019-05-20 DIAGNOSIS — D64.9 ANEMIA, UNSPECIFIED TYPE: ICD-10-CM

## 2019-05-20 DIAGNOSIS — I70.0 AORTIC ATHEROSCLEROSIS: ICD-10-CM

## 2019-05-20 DIAGNOSIS — R32 URINARY INCONTINENCE, UNSPECIFIED TYPE: ICD-10-CM

## 2019-05-20 DIAGNOSIS — I10 BENIGN ESSENTIAL HYPERTENSION: ICD-10-CM

## 2019-05-20 DIAGNOSIS — E21.3 HYPERPARATHYROIDISM: ICD-10-CM

## 2019-05-20 DIAGNOSIS — I10 ESSENTIAL HYPERTENSION: ICD-10-CM

## 2019-05-20 DIAGNOSIS — K40.90 UNILATERAL INGUINAL HERNIA WITHOUT OBSTRUCTION OR GANGRENE, RECURRENCE NOT SPECIFIED: ICD-10-CM

## 2019-05-20 LAB
ALBUMIN SERPL BCP-MCNC: 3.4 G/DL (ref 3.5–5.2)
ALP SERPL-CCNC: 71 U/L (ref 55–135)
ALT SERPL W/O P-5'-P-CCNC: 9 U/L (ref 10–44)
ANION GAP SERPL CALC-SCNC: 5 MMOL/L (ref 8–16)
AST SERPL-CCNC: 19 U/L (ref 10–40)
BACTERIA #/AREA URNS AUTO: ABNORMAL /HPF
BASOPHILS # BLD AUTO: 0.05 K/UL (ref 0–0.2)
BASOPHILS NFR BLD: 0.7 % (ref 0–1.9)
BILIRUB SERPL-MCNC: 0.4 MG/DL (ref 0.1–1)
BILIRUB UR QL STRIP: NEGATIVE
BUN SERPL-MCNC: 19 MG/DL (ref 10–30)
CA-I BLDV-SCNC: 1.32 MMOL/L (ref 1.06–1.42)
CALCIUM SERPL-MCNC: 10.3 MG/DL (ref 8.7–10.5)
CHLORIDE SERPL-SCNC: 101 MMOL/L (ref 95–110)
CLARITY UR REFRACT.AUTO: CLEAR
CO2 SERPL-SCNC: 30 MMOL/L (ref 23–29)
COLOR UR AUTO: ABNORMAL
CREAT SERPL-MCNC: 0.8 MG/DL (ref 0.5–1.4)
DIFFERENTIAL METHOD: ABNORMAL
EOSINOPHIL # BLD AUTO: 0.4 K/UL (ref 0–0.5)
EOSINOPHIL NFR BLD: 5 % (ref 0–8)
ERYTHROCYTE [DISTWIDTH] IN BLOOD BY AUTOMATED COUNT: 13.8 % (ref 11.5–14.5)
EST. GFR  (AFRICAN AMERICAN): >60 ML/MIN/1.73 M^2
EST. GFR  (NON AFRICAN AMERICAN): >60 ML/MIN/1.73 M^2
GLUCOSE SERPL-MCNC: 91 MG/DL (ref 70–110)
GLUCOSE UR QL STRIP: NEGATIVE
HCT VFR BLD AUTO: 36.8 % (ref 37–48.5)
HGB BLD-MCNC: 11.4 G/DL (ref 12–16)
HGB UR QL STRIP: ABNORMAL
HYALINE CASTS UR QL AUTO: 0 /LPF
KETONES UR QL STRIP: NEGATIVE
LEUKOCYTE ESTERASE UR QL STRIP: NEGATIVE
LYMPHOCYTES # BLD AUTO: 1.5 K/UL (ref 1–4.8)
LYMPHOCYTES NFR BLD: 21.1 % (ref 18–48)
MCH RBC QN AUTO: 29.4 PG (ref 27–31)
MCHC RBC AUTO-ENTMCNC: 31 G/DL (ref 32–36)
MCV RBC AUTO: 95 FL (ref 82–98)
MICROSCOPIC COMMENT: ABNORMAL
MONOCYTES # BLD AUTO: 1 K/UL (ref 0.3–1)
MONOCYTES NFR BLD: 14.7 % (ref 4–15)
NEUTROPHILS # BLD AUTO: 4.1 K/UL (ref 1.8–7.7)
NEUTROPHILS NFR BLD: 58.4 % (ref 38–73)
NITRITE UR QL STRIP: NEGATIVE
PH UR STRIP: 7 [PH] (ref 5–8)
PLATELET # BLD AUTO: 262 K/UL (ref 150–350)
PMV BLD AUTO: 11.5 FL (ref 9.2–12.9)
POTASSIUM SERPL-SCNC: 3.8 MMOL/L (ref 3.5–5.1)
PROT SERPL-MCNC: 8.6 G/DL (ref 6–8.4)
PROT UR QL STRIP: ABNORMAL
PTH-INTACT SERPL-MCNC: 55 PG/ML (ref 9–77)
RBC # BLD AUTO: 3.88 M/UL (ref 4–5.4)
RBC #/AREA URNS AUTO: 11 /HPF (ref 0–4)
SODIUM SERPL-SCNC: 136 MMOL/L (ref 136–145)
SP GR UR STRIP: 1.01 (ref 1–1.03)
URN SPEC COLLECT METH UR: ABNORMAL
WBC # BLD AUTO: 7.07 K/UL (ref 3.9–12.7)
WBC #/AREA URNS AUTO: 1 /HPF (ref 0–5)

## 2019-05-20 PROCEDURE — 36415 COLL VENOUS BLD VENIPUNCTURE: CPT

## 2019-05-20 PROCEDURE — 83036 HEMOGLOBIN GLYCOSYLATED A1C: CPT

## 2019-05-20 PROCEDURE — 82330 ASSAY OF CALCIUM: CPT

## 2019-05-20 PROCEDURE — 99214 OFFICE O/P EST MOD 30 MIN: CPT | Mod: S$PBB,,, | Performed by: INTERNAL MEDICINE

## 2019-05-20 PROCEDURE — 83970 ASSAY OF PARATHORMONE: CPT

## 2019-05-20 PROCEDURE — 99214 PR OFFICE/OUTPT VISIT, EST, LEVL IV, 30-39 MIN: ICD-10-PCS | Mod: S$PBB,,, | Performed by: INTERNAL MEDICINE

## 2019-05-20 PROCEDURE — 99999 PR PBB SHADOW E&M-EST. PATIENT-LVL III: ICD-10-PCS | Mod: PBBFAC,,, | Performed by: INTERNAL MEDICINE

## 2019-05-20 PROCEDURE — 85025 COMPLETE CBC W/AUTO DIFF WBC: CPT

## 2019-05-20 PROCEDURE — 81001 URINALYSIS AUTO W/SCOPE: CPT

## 2019-05-20 PROCEDURE — 99999 PR PBB SHADOW E&M-EST. PATIENT-LVL III: CPT | Mod: PBBFAC,,, | Performed by: INTERNAL MEDICINE

## 2019-05-20 PROCEDURE — 99213 OFFICE O/P EST LOW 20 MIN: CPT | Mod: PBBFAC | Performed by: INTERNAL MEDICINE

## 2019-05-20 PROCEDURE — 80053 COMPREHEN METABOLIC PANEL: CPT

## 2019-05-20 RX ORDER — HYDROCHLOROTHIAZIDE 12.5 MG/1
12.5 CAPSULE ORAL DAILY
Qty: 90 CAPSULE | Refills: 2
Start: 2019-05-20 | End: 2019-08-14 | Stop reason: SDUPTHER

## 2019-05-20 NOTE — PROGRESS NOTES
"INTERNAL MEDICINE ESTABLISHED PATIENT VISIT NOTE    Subjective:     Chief Complaint: Follow-up  HTN     Patient ID: An Hazel is a 89 y.o. female with HTN, HLD, preDM, anemia, osteoporosis c kyphoscoliosis, glaucoma, chronic dry eyes, urinary incontinence, chronic constipation, aortic atherosclerosis noted incidentally on prior imaging, last seen by me in Jan, here today for f/u HTN.    To review:  At baseline, lives at home c her daughter who works.  AAOx3, does most ADLs independently but appreciates assistance.  Wears poise pads for hx urinary incontinence, no issues c fecal incontinence.  Ambulates c a cane.  Has had 3 falls in the past, in 2013, 2017, and again 5/18.  Had r humeral neck fx back in 2017 and hip fx in 2013 s/p ORIF B.     BP meds have been adjusted over the past few appts due to s/e including LE edema and urinary sx; however, pt did not like TID dosing c Hydralazine so was switched back to low dose Amlodipine and HCTZ which she has been taking as of her last appt c me.    However, since Jan, pt reports she had an episode of persistent hiccups so decided to stop HCTZ since she thought it could be the culprit.  Says she stopped meds and eventually the hiccups stopped.    Reports BP has been elevated at home.  Denies cp or sob.  Denies vision changes.    Today also c/o what she thinks is a hernia.  Has been present for "a while" but says it is not painful.    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 Authorizing Provider   alendronate (FOSAMAX) 70 MG tablet Take 1 tablet (70 mg total) by mouth every 7 days. 12/17/18 12/17/19 Yes " Jalyn Caballero MD   amLODIPine (NORVASC) 5 MG tablet Take 1 tablet (5 mg total) by mouth once daily. 1/21/19 1/21/20 Yes Jalyn Caballero MD   atenolol (TENORMIN) 25 MG tablet Take 1 tablet (25 mg total) by mouth 2 (two) times daily. 1/21/19  Yes Jalyn Caballero MD   cholecalciferol, vitamin D3, (VITAMIN D3) 1,000 unit capsule Take 1,000 Units by mouth once daily.   Yes Historical Provider, MD   FOLIC ACID/MULTIVIT-MIN/LUTEIN (CENTRUM SILVER ORAL) Take 1 tablet by mouth once daily.    Yes Historical Provider, MD   losartan (COZAAR) 100 MG tablet Take 1 tablet (100 mg total) by mouth once daily. 12/13/18 12/13/19 Yes Alfred Pacheco MD   oxybutynin (DITROPAN-XL) 5 MG TR24 Take 1 tablet (5 mg total) by mouth once daily. 10/22/18  Yes Jalyn Caballero MD   polyethylene glycol (GLYCOLAX) 17 gram PwPk Take one capful with a full glass of water daily as needed for constipation. 6/25/18  Yes Jalyn Caballero MD   hydroCHLOROthiazide (MICROZIDE) 12.5 mg capsule Take 1 capsule (12.5 mg total) by mouth once daily. 1/21/19 5/20/19  Jalyn Caballero MD       Allergies:  Review of patient's allergies indicates:   Allergen Reactions    Augmentin [amoxicillin-pot clavulanate] Nausea And Vomiting    Sulfa (sulfonamide antibiotics)     Zestril [lisinopril] Swelling     Facial Swelling       Social History:  Social History     Tobacco Use    Smoking status: Never Smoker    Smokeless tobacco: Never Used    Tobacco comment: The patient exercises every other day on an exercise bike.  On alternative days she does floor exercises with weights.   Substance Use Topics    Alcohol use: Yes     Alcohol/week: 1.2 - 2.4 oz     Types: 1 Glasses of wine, 1 - 3 Standard drinks or equivalent per week     Comment: Occasional use    Drug use: No        Review of Systems   Constitutional: Negative for chills, fatigue and fever.   Respiratory: Negative for cough, chest tightness and shortness of breath.    Cardiovascular: Negative for chest pain.   Gastrointestinal: Negative  "for abdominal pain and blood in stool.   Genitourinary: Negative for dysuria and frequency.         Health Maintenance:     Immunizations:   Influenza 9/2018  TDap is up to date 5/2017  Prevnar 13 5/2016, pvax 6/18  Shingrix is not UTD.     rec once back in stock     Cancer Screening:  PAP: n/a based on age, also c hx hyst  Mammogram: n/a based on age  Colonoscopy: n/a based on age  DEXA:  7/2018, cont alendronate     Objective:   BP (!) 140/84 (BP Location: Right arm, Patient Position: Sitting, BP Method: Medium (Manual))   Pulse 65   Ht 5' 1" (1.549 m)   Wt 38.7 kg (85 lb 5.1 oz)   LMP  (LMP Unknown)   SpO2 99%   BMI 16.12 kg/m²        General: AAO x3, no apparent distress  CV: RRR, no m/r/g  Pulm: Lungs CTAB, no crackles, no wheezes  Abd: s/NT/ND +BS  Groin: large bulge on L inguinal area.  Normal in color.  No ttp.  Extremities: no c/c/e    Labs:         Assessment/Plan     An was seen today for follow-up.    Diagnoses and all orders for this visit:    Essential hypertension  Elevated but self discontinued her HCTZ, advised to resume as I do not think this is the cause of her hiccups which have since resolved.  Cont amlodipine, losartan, and atenolol as well.  Had LE edema on higher doses of amlodipine.  -     Comprehensive metabolic panel; Future    Urinary incontinence, unspecified type  Stable on meds, no issues    Osteoporosis, unspecified osteoporosis type, unspecified pathological fracture presence  Had stopped taking alendronate, advised to resume.  Completed drug holiday in the past and should be taking meds now.    Mixed hyperlipidemia  Lab Results   Component Value Date    LDLCALC 123.8 06/26/2018   has been diet controlled, no issues, no longer need lipid checks based on age.    Prediabetes  Lab Results   Component Value Date    HGBA1C 5.7 (H) 05/20/2019     -     Hemoglobin A1c; Future    Hyperparathyroidism  Advised against Ca supplement, will repeat labs now  Likely will manage " conservatively based on age/numbers.  -     PTH, intact; Future  -     Calcium, ionized; Future    Aortic atherosclerosis  Noted on prior imaging, no issues    Benign essential hypertension  As above  -     hydroCHLOROthiazide (MICROZIDE) 12.5 mg capsule; Take 1 capsule (12.5 mg total) by mouth once daily.  -     Urinalysis    Anemia, unspecified type  Mild, fluctuating previously, will repeat  Lab Results   Component Value Date    IRON 68 09/06/2017    TIBC 414 09/06/2017     -     CBC auto differential; Future    Unilateral inguinal hernia without obstruction or gangrene, recurrence not specified  Noted on exam, again likely c conservative mgmt based on age.  Advised to let us know if it becomes painful.  BM wnl.    Other orders  -     Urinalysis Microscopic        HM as above  RTC in    Jalyn Caballero MD  Department of Internal Medicine - [facility]  05/21/2019

## 2019-05-21 LAB
ESTIMATED AVG GLUCOSE: 117 MG/DL (ref 68–131)
HBA1C MFR BLD HPLC: 5.7 % (ref 4–5.6)

## 2019-06-12 ENCOUNTER — OFFICE VISIT (OUTPATIENT)
Dept: PODIATRY | Facility: CLINIC | Age: 84
End: 2019-06-12
Payer: MEDICARE

## 2019-06-12 VITALS
HEART RATE: 71 BPM | HEIGHT: 61 IN | DIASTOLIC BLOOD PRESSURE: 67 MMHG | WEIGHT: 85 LBS | BODY MASS INDEX: 16.05 KG/M2 | SYSTOLIC BLOOD PRESSURE: 144 MMHG

## 2019-06-12 DIAGNOSIS — L90.9 FAT PAD ATROPHY OF FOOT: ICD-10-CM

## 2019-06-12 DIAGNOSIS — L92.9 GRANULOMA OF GREAT TOE: ICD-10-CM

## 2019-06-12 DIAGNOSIS — L60.2 ONYCHOGRYPHOSIS: ICD-10-CM

## 2019-06-12 DIAGNOSIS — M79.672 PAIN IN BOTH FEET: Primary | ICD-10-CM

## 2019-06-12 DIAGNOSIS — M79.671 PAIN IN BOTH FEET: Primary | ICD-10-CM

## 2019-06-12 PROCEDURE — 99499 NO LOS: ICD-10-PCS | Mod: S$PBB,,, | Performed by: PODIATRIST

## 2019-06-12 PROCEDURE — 99499 UNLISTED E&M SERVICE: CPT | Mod: S$PBB,,, | Performed by: PODIATRIST

## 2019-06-12 PROCEDURE — 17250 PR CHEM CAUTERY GRANULATN TISSUE: ICD-10-PCS | Mod: S$PBB,,, | Performed by: PODIATRIST

## 2019-06-12 PROCEDURE — 99213 OFFICE O/P EST LOW 20 MIN: CPT | Mod: PBBFAC,PN,25 | Performed by: PODIATRIST

## 2019-06-12 PROCEDURE — 99999 PR PBB SHADOW E&M-EST. PATIENT-LVL III: ICD-10-PCS | Mod: PBBFAC,,, | Performed by: PODIATRIST

## 2019-06-12 PROCEDURE — 11721 DEBRIDE NAIL 6 OR MORE: CPT | Mod: Q9,PBBFAC,PN | Performed by: PODIATRIST

## 2019-06-12 PROCEDURE — 17250 CHEM CAUT OF GRANLTJ TISSUE: CPT | Mod: S$PBB,,, | Performed by: PODIATRIST

## 2019-06-12 PROCEDURE — 17250 CHEM CAUT OF GRANLTJ TISSUE: CPT | Mod: 59,PBBFAC,PN | Performed by: PODIATRIST

## 2019-06-12 PROCEDURE — 99999 PR PBB SHADOW E&M-EST. PATIENT-LVL III: CPT | Mod: PBBFAC,,, | Performed by: PODIATRIST

## 2019-06-12 PROCEDURE — 11721 ROUTINE FOOT CARE: ICD-10-PCS | Mod: 59,S$PBB,, | Performed by: PODIATRIST

## 2019-06-12 NOTE — PROGRESS NOTES
Subjective:      Patient ID: An Hazel is a 89 y.o. female.    Chief Complaint: Foot Pain (Dr Mook MD seen on 5-20-19) and Nail Problem (Pain in feet)    Thick painful toenails preventing sock and shoe wear and imparing ambulation.  Gradual onset, worsening over past several years, aggravated by increased weight bearing, shoe gear, pressure.  Periodic debridement helps symptoms. Denies trauma, surgery all toes.      Patient Active Problem List   Diagnosis    Mixed hyperlipidemia    Osteoporosis    Kyphoscoliosis    Pseudoaphakia - Both Eyes    Blepharospasm - Both Eyes    Ptosis - Both Eyes    Familial drusen - Both Eyes    Lagophthalmos, unspecified - Both Eyes    Dry eye syndrome - Both Eyes    Anemia    Debility    Urinary dysfunction    Open angle with borderline findings and low glaucoma risk in both eyes    Fall    Pelvic hematoma in female    Acute posthemorrhagic anemia    Idiopathic scoliosis of lumbar spine    Idiopathic scoliosis of thoracic spine    Benign essential hypertension    Urinary incontinence    Essential hypertension    Closed fracture of right shoulder    Impaired mobility and ADLs    History of hip fracture    Other chronic pain    Chronic constipation    History of fall    Aortic atherosclerosis    Hyperparathyroidism    Prediabetes         Current Outpatient Medications on File Prior to Visit   Medication Sig Dispense Refill    alendronate (FOSAMAX) 70 MG tablet Take 1 tablet (70 mg total) by mouth every 7 days. 12 tablet 3    amLODIPine (NORVASC) 5 MG tablet Take 1 tablet (5 mg total) by mouth once daily. 90 tablet 2    atenolol (TENORMIN) 25 MG tablet Take 1 tablet (25 mg total) by mouth 2 (two) times daily. 180 tablet 2    cholecalciferol, vitamin D3, (VITAMIN D3) 1,000 unit capsule Take 1,000 Units by mouth once daily.      FOLIC ACID/MULTIVIT-MIN/LUTEIN (CENTRUM SILVER ORAL) Take 1 tablet by mouth once daily.        hydroCHLOROthiazide (MICROZIDE) 12.5 mg capsule Take 1 capsule (12.5 mg total) by mouth once daily. 90 capsule 2    losartan (COZAAR) 100 MG tablet Take 1 tablet (100 mg total) by mouth once daily. 90 tablet 3    oxybutynin (DITROPAN-XL) 5 MG TR24 Take 1 tablet (5 mg total) by mouth once daily. 90 tablet 3    polyethylene glycol (GLYCOLAX) 17 gram PwPk Take one capful with a full glass of water daily as needed for constipation. 30 each 3     No current facility-administered medications on file prior to visit.            Review of patient's allergies indicates:   Allergen Reactions    Augmentin [amoxicillin-pot clavulanate] Nausea And Vomiting    Sulfa (sulfonamide antibiotics)     Zestril [lisinopril] Swelling     Facial Swelling         Social History     Socioeconomic History    Marital status:      Spouse name: Not on file    Number of children: 4    Years of education: Not on file    Highest education level: Not on file   Occupational History    Occupation:  of [employer]     Comment: Retired after 37 years   Social Needs    Financial resource strain: Not on file    Food insecurity:     Worry: Not on file     Inability: Not on file    Transportation needs:     Medical: Not on file     Non-medical: Not on file   Tobacco Use    Smoking status: Never Smoker    Smokeless tobacco: Never Used    Tobacco comment: The patient exercises every other day on an exercise bike.  On alternative days she does floor exercises with weights.   Substance and Sexual Activity    Alcohol use: Yes     Alcohol/week: 1.2 - 2.4 oz     Types: 1 Glasses of wine, 1 - 3 Standard drinks or equivalent per week     Comment: Occasional use    Drug use: No    Sexual activity: Not on file   Lifestyle    Physical activity:     Days per week: Not on file     Minutes per session: Not on file    Stress: Not on file   Relationships    Social connections:     Talks on phone: Not on file      "Gets together: Not on file     Attends [employer] service: Not on file     Active member of club or organization: Not on file     Attends meetings of clubs or organizations: Not on file     Relationship status: Not on file   Other Topics Concern    Are you pregnant or think you may be? Not Asked    Breast-feeding Not Asked   Social History Narrative    Not on file           Review of Systems   Constitution: Negative for chills, diaphoresis, fever, malaise/fatigue and night sweats.   Cardiovascular: Negative for claudication, cyanosis, leg swelling and syncope.   Skin: Positive for itching, nail changes and rash. Negative for color change, dry skin, suspicious lesions and unusual hair distribution.   Musculoskeletal: Negative for falls, joint pain, joint swelling, muscle cramps, muscle weakness and stiffness.   Gastrointestinal: Negative for constipation, diarrhea, nausea and vomiting.   Neurological: Negative for brief paralysis, disturbances in coordination, focal weakness, numbness, paresthesias, sensory change and tremors.           Objective:        Vitals:    06/12/19 1544   BP: (!) 144/67   Pulse: 71   Weight: 38.6 kg (85 lb)   Height: 5' 1" (1.549 m)         Physical Exam   Constitutional: She is oriented to person, place, and time. She appears well-developed and well-nourished. She is cooperative. No distress.   Cardiovascular:   Pulses:       Popliteal pulses are 2+ on the right side, and 2+ on the left side.        Dorsalis pedis pulses are 2+ on the right side, and 2+ on the left side.        Posterior tibial pulses are 1+ on the right side, and 1+ on the left side.   Capillary refill 3 seconds all toes/distal feet, all toes/both feet warm to touch.      Negative lymphadenopathy bilateral popliteal fossa and tarsal tunnel.      Negavie lower extremity edema bilateral.     Musculoskeletal:        Right ankle: She exhibits normal range of motion, no swelling, no ecchymosis, no deformity, no laceration and " normal pulse. Achilles tendon normal. Achilles tendon exhibits no pain, no defect and normal Garay's test results.   Normal angle, base, station of gait. All ten toes without clubbing, cyanosis, or signs of ischemia.  No pain to palpation bilateral lower extremities.  Range of motion, stability, muscle strength, and muscle tone normal bilateral feet and legs.     Lymphadenopathy: No inguinal adenopathy noted on the right or left side.   Negative lymphadenopathy bilateral popliteal fossa and tarsal tunnel.    Negative lymphangitic streaking bilateral feet/ankles/legs.   Neurological: She is alert and oriented to person, place, and time. She has normal strength. She displays no atrophy and no tremor. No sensory deficit. She exhibits normal muscle tone. Gait normal.   Reflex Scores:       Patellar reflexes are 2+ on the right side and 2+ on the left side.       Achilles reflexes are 2+ on the right side and 2+ on the left side.  Negative tinel sign to percussion sural, superficial peroneal, deep peroneal, saphenous, and posterior tibial nerves right and left ankles and feet.     Skin: Skin is warm, dry and intact. Capillary refill takes 2 to 3 seconds. No abrasion, no bruising, no burn, no ecchymosis, no laceration, no lesion and no rash noted. She is not diaphoretic. No cyanosis or erythema. No pallor. Nails show no clubbing.   Dry scale with superficial flakes over an erythematous base  without ulceration, drainage, pus, tracking, fluctuance, malodor, or cardinal signs infection.    Otherwise;  Skin is normal age and health appropriate color, turgor, texture, and temperature bilateral lower extremities without ulceration, hyperpigmentation, discoloration, masses nodules or cords palpated.  No ecchymosis, erythema, edema, or cardinal signs of infection bilateral lower extremities.    Toenails 1st, 2nd, 3rd, 4th, 5th  bilateral are hypertrophic thickened 2-3 mm, dystrophic, discolored tanish brown with tan, gray  crumbly subungual debris.  Severe pain and tenderness  to distal nail plate pressure, without periungual skin abnormality of each.     Psychiatric: She has a normal mood and affect.             Assessment:       Encounter Diagnoses   Name Primary?    Onychogryphosis     Pain in both feet Yes    Fat pad atrophy of foot     Granuloma of great toe          Plan:       An was seen today for foot pain and nail problem.    Diagnoses and all orders for this visit:    Pain in both feet    Onychogryphosis    Fat pad atrophy of foot    Granuloma of great toe    Other orders  -     Routine Foot Care      I counseled the patient on her conditions, their implications and medical management.    Discussed conservative treatment with shoes of adequate dimensions, material, and style to alleviate symptoms and delay or prevent surgical intervention.    Shoe and activity modification as needed for relief.     Right hallux granuloma without acute infection.  With patient's permission,  Silver nitrate was used to cauterize the granuloma.  Patient tolerated well without immediate complication.         Routine Foot Care  Date/Time: 6/12/2019 4:20 PM  Performed by: Bia Caballero DPM  Authorized by: Bia Caballero DPM     Consent Done?:  Yes (Verbal)    Nail Care Type:  Debride  Location(s): All  (Left 1st Toe, Left 3rd Toe, Left 2nd Toe, Left 4th Toe, Left 5th Toe, Right 1st Toe, Right 2nd Toe, Right 3rd Toe, Right 4th Toe and Right 5th Toe)  Patient tolerance:  Patient tolerated the procedure well with no immediate complications     With patient's permission, the toenails mentioned above were aggressively reduced and debrided using a nail nipper, removing all offending nail and debris. Utilizing a #15 scalpel, I trimmed the corns and calluses at the above mentioned location.      The patient will continue to monitor the areas daily, inspect the feet, wear protective shoe gear when ambulatory, and moisturizer to maintain skin  integrity.        follow up 3 months for foot care or sooner if concerned.

## 2019-06-21 DIAGNOSIS — I10 ESSENTIAL HYPERTENSION: ICD-10-CM

## 2019-06-21 RX ORDER — LOSARTAN POTASSIUM 100 MG/1
100 TABLET ORAL DAILY
Qty: 14 TABLET | Refills: 0 | Status: SHIPPED | OUTPATIENT
Start: 2019-06-21 | End: 2019-08-20 | Stop reason: SDUPTHER

## 2019-06-21 NOTE — TELEPHONE ENCOUNTER
----- Message from Lori Carmona sent at 6/21/2019  8:38 AM CDT -----  Contact: Patient [phone]  Prescription Request:     Name of medication: losartan (COZAAR) 100 MG tablet    Reason for request: Refill    Pharmacy: [address]    The shipment from [facility] got lost and the patient is requesting a 14 day supply to be sent to the local pharmacy while they get this straight with the [employer] company. Also asked for you to call her when this has been sent.    Thank You

## 2019-07-16 ENCOUNTER — TELEPHONE (OUTPATIENT)
Dept: INTERNAL MEDICINE | Facility: CLINIC | Age: 84
End: 2019-07-16

## 2019-07-16 NOTE — TELEPHONE ENCOUNTER
----- Message from Kaylan Alcala sent at 7/16/2019  1:46 PM CDT -----  Contact: self/[phone]  Caller is requesting a sooner appointment. Caller declined first available appointment listed below. Caller will not accept being placed on the wait list and is requesting a message be sent to the provider.    When is the next available appointment:  10/19  Did you offer to schedule the next available appt and put the patient on the wait list?:   Yes, no  What visit type: ep   Symptoms:  f/u  Patient preference of timeframe to be scheduled:    What is the reason the patient is requesting a sooner appointment? (insurance terminating, changing jobs):    Would the patient rather a call back or a response via [facility]?:    Comments:  Patient stated that she receive a recall call stating that she need to setup an appointment. Unable to find an early appointment with PCP. Patient would like to get a courtesy call from Mae with PCP.

## 2019-08-14 DIAGNOSIS — I10 BENIGN ESSENTIAL HYPERTENSION: ICD-10-CM

## 2019-08-14 RX ORDER — HYDROCHLOROTHIAZIDE 12.5 MG/1
12.5 CAPSULE ORAL DAILY
Qty: 90 CAPSULE | Refills: 2
Start: 2019-08-14 | End: 2019-08-23 | Stop reason: SDUPTHER

## 2019-08-14 NOTE — TELEPHONE ENCOUNTER
----- Message from Aria Morales sent at 8/14/2019 12:15 PM CDT -----  Contact: self/[phone]  Pt called in regards to getting a Rx refill for     hydrochlorothiazide (MICROZIDE) 12.5 mg capsule 90 capsule 2 5/20/2019 5/19/2020 No  Take 1 capsule (12.5 mg total) by mouth once daily.    [facility] [phone] OPT 2  Please advise

## 2019-08-20 DIAGNOSIS — I10 ESSENTIAL HYPERTENSION: ICD-10-CM

## 2019-08-21 RX ORDER — LOSARTAN POTASSIUM 100 MG/1
100 TABLET ORAL DAILY
Qty: 90 TABLET | Refills: 3 | Status: SHIPPED | OUTPATIENT
Start: 2019-08-21 | End: 2020-08-17

## 2019-08-23 DIAGNOSIS — I10 BENIGN ESSENTIAL HYPERTENSION: ICD-10-CM

## 2019-08-23 RX ORDER — HYDROCHLOROTHIAZIDE 12.5 MG/1
12.5 CAPSULE ORAL DAILY
Qty: 14 CAPSULE | Refills: 0 | Status: SHIPPED | OUTPATIENT
Start: 2019-08-23 | End: 2019-09-04 | Stop reason: SDUPTHER

## 2019-08-23 NOTE — TELEPHONE ENCOUNTER
----- Message from Vani Covarrubias sent at 8/23/2019 11:49 AM CDT -----  Contact: Self [phone]  Patient is calling for an RX refill or new RX.  Is this a refill or new RX:  refill  RX name and strength: hydroCHLOROthiazide (MICROZIDE) 12.5 mg capsule  Directions (copy/paste from chart):    Is this a 30 day or 90 day RX:  Two weeks supply  Local pharmacy or mail order pharmacy:  local  Pharmacy name and phone # (copy/paste from chart):  [facility] [address] [phone] (Phone)  [phone] (Fax)   Comments:

## 2019-08-29 ENCOUNTER — PES CALL (OUTPATIENT)
Dept: ADMINISTRATIVE | Facility: CLINIC | Age: 84
End: 2019-08-29

## 2019-09-04 ENCOUNTER — OFFICE VISIT (OUTPATIENT)
Dept: INTERNAL MEDICINE | Facility: CLINIC | Age: 84
End: 2019-09-04
Payer: MEDICARE

## 2019-09-04 VITALS
SYSTOLIC BLOOD PRESSURE: 120 MMHG | BODY MASS INDEX: 15.61 KG/M2 | HEART RATE: 60 BPM | OXYGEN SATURATION: 99 % | DIASTOLIC BLOOD PRESSURE: 62 MMHG | WEIGHT: 82.69 LBS | HEIGHT: 61 IN

## 2019-09-04 DIAGNOSIS — R32 URINARY INCONTINENCE, UNSPECIFIED TYPE: ICD-10-CM

## 2019-09-04 DIAGNOSIS — D64.9 ANEMIA, UNSPECIFIED TYPE: ICD-10-CM

## 2019-09-04 DIAGNOSIS — I10 ESSENTIAL HYPERTENSION: Primary | ICD-10-CM

## 2019-09-04 DIAGNOSIS — I70.0 AORTIC ATHEROSCLEROSIS: ICD-10-CM

## 2019-09-04 DIAGNOSIS — M41.9 KYPHOSCOLIOSIS: ICD-10-CM

## 2019-09-04 DIAGNOSIS — R73.03 PREDIABETES: ICD-10-CM

## 2019-09-04 DIAGNOSIS — I10 BENIGN ESSENTIAL HYPERTENSION: ICD-10-CM

## 2019-09-04 DIAGNOSIS — E78.2 MIXED HYPERLIPIDEMIA: ICD-10-CM

## 2019-09-04 DIAGNOSIS — E21.3 HYPERPARATHYROIDISM: ICD-10-CM

## 2019-09-04 DIAGNOSIS — M81.0 OSTEOPOROSIS, UNSPECIFIED OSTEOPOROSIS TYPE, UNSPECIFIED PATHOLOGICAL FRACTURE PRESENCE: ICD-10-CM

## 2019-09-04 PROCEDURE — 99999 PR PBB SHADOW E&M-EST. PATIENT-LVL III: ICD-10-PCS | Mod: PBBFAC,,, | Performed by: INTERNAL MEDICINE

## 2019-09-04 PROCEDURE — 99999 PR PBB SHADOW E&M-EST. PATIENT-LVL III: CPT | Mod: PBBFAC,,, | Performed by: INTERNAL MEDICINE

## 2019-09-04 PROCEDURE — 99213 OFFICE O/P EST LOW 20 MIN: CPT | Mod: PBBFAC | Performed by: INTERNAL MEDICINE

## 2019-09-04 PROCEDURE — 99214 PR OFFICE/OUTPT VISIT, EST, LEVL IV, 30-39 MIN: ICD-10-PCS | Mod: S$PBB,,, | Performed by: INTERNAL MEDICINE

## 2019-09-04 PROCEDURE — 99214 OFFICE O/P EST MOD 30 MIN: CPT | Mod: S$PBB,,, | Performed by: INTERNAL MEDICINE

## 2019-09-04 RX ORDER — HYDROCHLOROTHIAZIDE 12.5 MG/1
12.5 CAPSULE ORAL DAILY
Qty: 90 CAPSULE | Refills: 3 | Status: SHIPPED | OUTPATIENT
Start: 2019-09-04 | End: 2019-09-09 | Stop reason: SDUPTHER

## 2019-09-04 NOTE — PROGRESS NOTES
INTERNAL MEDICINE ESTABLISHED PATIENT VISIT NOTE    Subjective:     Chief Complaint: Follow-up  HTN     Patient ID: An  Aleksander Luz is a 89 y.o. female with HTN, HLD, preDM, anemia, osteoporosis c kyphoscoliosis, glaucoma, chronic dry eyes, urinary incontinence, chronic constipation, aortic atherosclerosis noted incidentally on prior imaging, , last seen by me May, here today for f/u HTN.    To review:  At baseline, lives at home c her daughter who works.  AAOx3, does most ADLs independently but appreciates assistance.  Wears poise pads for hx urinary incontinence, no issues c fecal incontinence.  Ambulates c a cane.  Has had 3 falls in the past, in 2013, 2017, and again 5/18.  Had r humeral neck fx back in 2017 and hip fx in 2013 s/p ORIF B.    Feeling well overall.  Has continued to lose weight but declines additional w/u at this time.  Feeling well overall.  Denies cp or sob.  Denies abd pain.  Occasional constipation.  Takes otc dulcolax which helps.  Does not like laxatives as they cause diarrhea.  Reports appetite is good.    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 Authorizing Provider   alendronate (FOSAMAX) 70 MG tablet Take 1 tablet (70 mg total) by mouth every 7 days. 12/17/18 12/17/19 Yes Jalyn Caballero MD   amLODIPine (NORVASC) 5 MG tablet Take 1 tablet (5 mg total) by mouth once daily. 1/21/19 1/21/20 Yes Jalyn Caballero MD   atenolol (TENORMIN) 25 MG tablet Take 1 tablet (25 mg total) by mouth 2 (two) times daily. 1/21/19  Yes Jalyn Caballero MD   cholecalciferol, vitamin D3, (VITAMIN D3) 1,000 unit capsule Take 1,000 Units by mouth once daily.   Yes  Historical Provider, MD   FOLIC ACID/MULTIVIT-MIN/LUTEIN (CENTRUM SILVER ORAL) Take 1 tablet by mouth once daily.    Yes Historical Provider, MD   hydroCHLOROthiazide (MICROZIDE) 12.5 mg capsule Take 1 capsule (12.5 mg total) by mouth once daily. 8/23/19 8/22/20 Yes Calista Cohn MD   losartan (COZAAR) 100 MG tablet Take 1 tablet (100 mg total) by mouth once daily. 8/21/19 8/20/20 Yes Jalyn Caballero MD   oxybutynin (DITROPAN-XL) 5 MG TR24 Take 1 tablet (5 mg total) by mouth once daily. 10/22/18  Yes Jalyn Caballero MD   polyethylene glycol (GLYCOLAX) 17 gram PwPk Take one capful with a full glass of water daily as needed for constipation. 6/25/18  Yes Jalyn Caballero MD       Allergies:  Review of patient's allergies indicates:   Allergen Reactions    Augmentin [amoxicillin-pot clavulanate] Nausea And Vomiting    Sulfa (sulfonamide antibiotics)     Zestril [lisinopril] Swelling     Facial Swelling       Social History:  Social History     Tobacco Use    Smoking status: Never Smoker    Smokeless tobacco: Never Used    Tobacco comment: The patient exercises every other day on an exercise bike.  On alternative days she does floor exercises with weights.   Substance Use Topics    Alcohol use: Yes     Alcohol/week: 1.2 - 2.4 oz     Types: 1 Glasses of wine, 1 - 3 Standard drinks or equivalent per week     Comment: Occasional use    Drug use: No        Review of Systems   Constitutional: Positive for unexpected weight change. Negative for chills, fatigue and fever.   Respiratory: Negative for cough, chest tightness and shortness of breath.    Cardiovascular: Negative for chest pain.   Gastrointestinal: Negative for abdominal pain and blood in stool.   Genitourinary: Negative for dysuria and frequency.         Health Maintenance:      Immunizations:   Influenza 9/2018  TDap is up to date 5/2017  Prevnar 13 5/2016, pvax 6/18  Shingrix is not UTD.     rec once back in stock     Cancer Screening:  PAP: n/a based on age, also  "c hx hyst  Mammogram: n/a based on age  Colonoscopy: n/a based on age  DEXA:  7/2018, cont alendronate    Objective:   BP (!) 154/62 (BP Location: Left arm, Patient Position: Sitting, BP Method: Medium (Manual))   Pulse 60   Ht 5' 1" (1.549 m)   Wt 37.5 kg (82 lb 10.8 oz)   LMP  (LMP Unknown)   SpO2 99%   BMI 15.62 kg/m²        General: AAO x3, no apparent distress  HEENT: PERRL, OP clear  CV: RRR, no m/r/g  Pulm: Lungs CTAB, no crackles, no wheezes  Abd: s/NT/ND +BS  Extremities: no c/c/e    Labs:     Lab Results   Component Value Date    WBC 7.07 05/20/2019    HGB 11.4 (L) 05/20/2019    HCT 36.8 (L) 05/20/2019    MCV 95 05/20/2019     05/20/2019     Sodium   Date Value Ref Range Status   05/20/2019 136 136 - 145 mmol/L Final     Potassium   Date Value Ref Range Status   05/20/2019 3.8 3.5 - 5.1 mmol/L Final     Chloride   Date Value Ref Range Status   05/20/2019 101 95 - 110 mmol/L Final     CO2   Date Value Ref Range Status   05/20/2019 30 (H) 23 - 29 mmol/L Final     Glucose   Date Value Ref Range Status   05/20/2019 91 70 - 110 mg/dL Final     BUN, Bld   Date Value Ref Range Status   05/20/2019 19 10 - 30 mg/dL Final     Creatinine   Date Value Ref Range Status   05/20/2019 0.8 0.5 - 1.4 mg/dL Final     Calcium   Date Value Ref Range Status   05/20/2019 10.3 8.7 - 10.5 mg/dL Final     Total Protein   Date Value Ref Range Status   05/20/2019 8.6 (H) 6.0 - 8.4 g/dL Final     Albumin   Date Value Ref Range Status   05/20/2019 3.4 (L) 3.5 - 5.2 g/dL Final     Total Bilirubin   Date Value Ref Range Status   05/20/2019 0.4 0.1 - 1.0 mg/dL Final     Comment:     For infants and newborns, interpretation of results should be based  on gestational age, weight and in agreement with clinical  observations.  Premature Infant recommended reference ranges:  Up to 24 hours.............<8.0 mg/dL  Up to 48 hours............<12.0 mg/dL  3-5 days..................<15.0 mg/dL  6-29 days.................<15.0 mg/dL   "     Alkaline Phosphatase   Date Value Ref Range Status   05/20/2019 71 55 - 135 U/L Final     AST   Date Value Ref Range Status   05/20/2019 19 10 - 40 U/L Final     ALT   Date Value Ref Range Status   05/20/2019 9 (L) 10 - 44 U/L Final     Anion Gap   Date Value Ref Range Status   05/20/2019 5 (L) 8 - 16 mmol/L Final     eGFR if    Date Value Ref Range Status   05/20/2019 >60 >60 mL/min/1.73 m^2 Final     eGFR if non    Date Value Ref Range Status   05/20/2019 >60 >60 mL/min/1.73 m^2 Final     Comment:     Calculation used to obtain the estimated glomerular filtration  rate (eGFR) is the CKD-EPI equation.        Lab Results   Component Value Date    HGBA1C 5.7 (H) 05/20/2019     Lab Results   Component Value Date    LDLCALC 123.8 06/26/2018     Lab Results   Component Value Date    TSH 1.124 11/14/2017     Lab Results   Component Value Date    IRON 68 09/06/2017    TIBC 414 09/06/2017         Assessment/Plan     An was seen today for follow-up.    Diagnoses and all orders for this visit:    Essential hypertension  at goal.  Cont current medications.    Mixed hyperlipidemia  Lab Results   Component Value Date    LDLCALC 123.8 06/26/2018     Diet controlled.    Osteoporosis, unspecified osteoporosis type, unspecified pathological fracture presence  Kyphoscoliosis  On alendronate.  dexa utd.  Cont meds.    Anemia, unspecified type  Stable  Declined repeat csc based on age.    Urinary incontinence, unspecified type  Stable, cont meds    Aortic atherosclerosis  Cont bp and lipid control.    Hyperparathyroidism  Last check normalized.    Prediabetes  a1c stable on recent labs.    Benign essential hypertension  -     hydroCHLOROthiazide (MICROZIDE) 12.5 mg capsule; Take 1 capsule (12.5 mg total) by mouth once daily.        HM as above  RTC in 4 mos, sooner if needed.    Jalyn Caballero MD  [facility] Medicine [facility] [address]  09/04/2019

## 2019-09-09 DIAGNOSIS — I10 BENIGN ESSENTIAL HYPERTENSION: ICD-10-CM

## 2019-09-09 RX ORDER — HYDROCHLOROTHIAZIDE 12.5 MG/1
12.5 CAPSULE ORAL DAILY
Qty: 10 CAPSULE | Refills: 0 | Status: SHIPPED | OUTPATIENT
Start: 2019-09-09 | End: 2019-12-30 | Stop reason: SDUPTHER

## 2019-09-09 NOTE — TELEPHONE ENCOUNTER
----- Message from Clyde Adame sent at 9/9/2019  9:42 AM CDT -----  Contact: Pt [phone]  Is this a refill or new RX:  Refill    RX name and strength: hydroCHLOROthiazide (MICROZIDE) 12.5 mg capsule    Quantity 10 days  :    Pharmacy name and phone # [facility] [place] - [address] [facility] [phone] (Phone) [phone] (Fax)    Comments: [facility]

## 2019-09-12 ENCOUNTER — OFFICE VISIT (OUTPATIENT)
Dept: PODIATRY | Facility: CLINIC | Age: 84
End: 2019-09-12
Payer: MEDICARE

## 2019-09-12 VITALS — BODY MASS INDEX: 15.48 KG/M2 | HEIGHT: 61 IN | WEIGHT: 82 LBS

## 2019-09-12 DIAGNOSIS — M79.671 PAIN IN BOTH FEET: ICD-10-CM

## 2019-09-12 DIAGNOSIS — M79.672 PAIN IN BOTH FEET: ICD-10-CM

## 2019-09-12 DIAGNOSIS — L60.2 ONYCHOGRYPHOSIS: Primary | ICD-10-CM

## 2019-09-12 PROCEDURE — 99213 OFFICE O/P EST LOW 20 MIN: CPT | Mod: PBBFAC,PN | Performed by: PODIATRIST

## 2019-09-12 PROCEDURE — 99213 OFFICE O/P EST LOW 20 MIN: CPT | Mod: S$PBB,,, | Performed by: PODIATRIST

## 2019-09-12 PROCEDURE — 99999 PR PBB SHADOW E&M-EST. PATIENT-LVL III: CPT | Mod: PBBFAC,,, | Performed by: PODIATRIST

## 2019-09-12 PROCEDURE — 99213 PR OFFICE/OUTPT VISIT, EST, LEVL III, 20-29 MIN: ICD-10-PCS | Mod: S$PBB,,, | Performed by: PODIATRIST

## 2019-09-12 PROCEDURE — 99999 PR PBB SHADOW E&M-EST. PATIENT-LVL III: ICD-10-PCS | Mod: PBBFAC,,, | Performed by: PODIATRIST

## 2019-09-12 NOTE — PROGRESS NOTES
Subjective:      Patient ID: An Hazel is a 89 y.o. female.    Chief Complaint: Follow-up and Nail Problem (Black spot on Right great toe nail)    Thick painful toenails preventing sock and shoe wear and imparing ambulation.  Gradual onset, worsening over past several years, aggravated by increased weight bearing, shoe gear, pressure.  Periodic debridement helps symptoms. Denies trauma, surgery all toes.    PCP:  Jalyn Caballero MD          Patient Active Problem List   Diagnosis    Mixed hyperlipidemia    Osteoporosis    Kyphoscoliosis    Pseudoaphakia - Both Eyes    Blepharospasm - Both Eyes    Ptosis - Both Eyes    Familial drusen - Both Eyes    Lagophthalmos, unspecified - Both Eyes    Dry eye syndrome - Both Eyes    Anemia    Debility    Urinary dysfunction    Open angle with borderline findings and low glaucoma risk in both eyes    Fall    Acute posthemorrhagic anemia    Idiopathic scoliosis of lumbar spine    Idiopathic scoliosis of thoracic spine    Benign essential hypertension    Urinary incontinence    Essential hypertension    Closed fracture of right shoulder    Impaired mobility and ADLs    History of hip fracture    Other chronic pain    Chronic constipation    History of fall    Aortic atherosclerosis    Hyperparathyroidism    Prediabetes         Current Outpatient Medications on File Prior to Visit   Medication Sig Dispense Refill    alendronate (FOSAMAX) 70 MG tablet Take 1 tablet (70 mg total) by mouth every 7 days. 12 tablet 3    amLODIPine (NORVASC) 5 MG tablet Take 1 tablet (5 mg total) by mouth once daily. 90 tablet 2    atenolol (TENORMIN) 25 MG tablet Take 1 tablet (25 mg total) by mouth 2 (two) times daily. 180 tablet 2    cholecalciferol, vitamin D3, (VITAMIN D3) 1,000 unit capsule Take 1,000 Units by mouth once daily.      FOLIC ACID/MULTIVIT-MIN/LUTEIN (CENTRUM SILVER ORAL) Take 1 tablet by mouth once daily.       hydroCHLOROthiazide  (MICROZIDE) 12.5 mg capsule Take 1 capsule (12.5 mg total) by mouth once daily. 10 capsule 0    losartan (COZAAR) 100 MG tablet Take 1 tablet (100 mg total) by mouth once daily. 90 tablet 3    oxybutynin (DITROPAN-XL) 5 MG TR24 Take 1 tablet (5 mg total) by mouth once daily. 90 tablet 3    polyethylene glycol (GLYCOLAX) 17 gram PwPk Take one capful with a full glass of water daily as needed for constipation. 30 each 3     No current facility-administered medications on file prior to visit.            Review of patient's allergies indicates:   Allergen Reactions    Augmentin [amoxicillin-pot clavulanate] Nausea And Vomiting    Sulfa (sulfonamide antibiotics)     Zestril [lisinopril] Swelling     Facial Swelling         Social History     Socioeconomic History    Marital status:      Spouse name: Not on file    Number of children: 4    Years of education: Not on file    Highest education level: Not on file   Occupational History    Occupation:  of [employer]     Comment: Retired after 37 years   Social Needs    Financial resource strain: Not on file    Food insecurity:     Worry: Not on file     Inability: Not on file    Transportation needs:     Medical: Not on file     Non-medical: Not on file   Tobacco Use    Smoking status: Never Smoker    Smokeless tobacco: Never Used    Tobacco comment: The patient exercises every other day on an exercise bike.  On alternative days she does floor exercises with weights.   Substance and Sexual Activity    Alcohol use: Yes     Alcohol/week: 1.2 - 2.4 oz     Types: 1 Glasses of wine, 1 - 3 Standard drinks or equivalent per week     Comment: Occasional use    Drug use: No    Sexual activity: Not on file   Lifestyle    Physical activity:     Days per week: Not on file     Minutes per session: Not on file    Stress: Not on file   Relationships    Social connections:     Talks on phone: Not on file     Gets together: Not  "on file     Attends Hindu service: Not on file     Active member of club or organization: Not on file     Attends meetings of clubs or organizations: Not on file     Relationship status: Not on file   Other Topics Concern    Are you pregnant or think you may be? Not Asked    Breast-feeding Not Asked   Social History Narrative    Not on file           Review of Systems   Constitution: Negative for chills, diaphoresis, fever, malaise/fatigue and night sweats.   Cardiovascular: Negative for claudication, cyanosis, leg swelling and syncope.   Skin: Positive for itching, nail changes and rash. Negative for color change, dry skin, suspicious lesions and unusual hair distribution.   Musculoskeletal: Negative for falls, joint pain, joint swelling, muscle cramps, muscle weakness and stiffness.   Gastrointestinal: Negative for constipation, diarrhea, nausea and vomiting.   Neurological: Negative for brief paralysis, disturbances in coordination, focal weakness, numbness, paresthesias, sensory change and tremors.           Objective:        Vitals:    09/12/19 1410   Weight: 37.2 kg (82 lb)   Height: 5' 1" (1.549 m)         Physical Exam   Constitutional: She is oriented to person, place, and time. She appears well-developed and well-nourished. She is cooperative. No distress.   Cardiovascular:   Pulses:       Popliteal pulses are 2+ on the right side, and 2+ on the left side.        Dorsalis pedis pulses are 2+ on the right side, and 2+ on the left side.        Posterior tibial pulses are 1+ on the right side, and 1+ on the left side.   Capillary refill 3 seconds all toes/distal feet, all toes/both feet warm to touch.      Negative lymphadenopathy bilateral popliteal fossa and tarsal tunnel.      Negavie lower extremity edema bilateral.     Musculoskeletal:        Right ankle: She exhibits normal range of motion, no swelling, no ecchymosis, no deformity, no laceration and normal pulse. Achilles tendon normal. Achilles " tendon exhibits no pain, no defect and normal Garay's test results.   Normal angle, base, station of gait. All ten toes without clubbing, cyanosis, or signs of ischemia.  No pain to palpation bilateral lower extremities.  Range of motion, stability, muscle strength, and muscle tone normal bilateral feet and legs.     Lymphadenopathy: No inguinal adenopathy noted on the right or left side.   Negative lymphadenopathy bilateral popliteal fossa and tarsal tunnel.    Negative lymphangitic streaking bilateral feet/ankles/legs.   Neurological: She is alert and oriented to person, place, and time. She has normal strength. She displays no atrophy and no tremor. No sensory deficit. She exhibits normal muscle tone. Gait normal.   Reflex Scores:       Patellar reflexes are 2+ on the right side and 2+ on the left side.       Achilles reflexes are 2+ on the right side and 2+ on the left side.  Negative tinel sign to percussion sural, superficial peroneal, deep peroneal, saphenous, and posterior tibial nerves right and left ankles and feet.     Skin: Skin is warm, dry and intact. Capillary refill takes 2 to 3 seconds. No abrasion, no bruising, no burn, no ecchymosis, no laceration, no lesion and no rash noted. She is not diaphoretic. No cyanosis or erythema. No pallor. Nails show no clubbing.   Dry scale with superficial flakes over an erythematous base  without ulceration, drainage, pus, tracking, fluctuance, malodor, or cardinal signs infection.    Otherwise;  Skin is normal age and health appropriate color, turgor, texture, and temperature bilateral lower extremities without ulceration, hyperpigmentation, discoloration, masses nodules or cords palpated.  No ecchymosis, erythema, edema, or cardinal signs of infection bilateral lower extremities.    Toenails 1st, 2nd, 3rd, 4th, 5th  bilateral are hypertrophic thickened 2-3 mm, dystrophic, discolored tanish brown with tan, gray crumbly subungual debris.  Severe pain and  tenderness  to distal nail plate pressure, without periungual skin abnormality of each.     Psychiatric: She has a normal mood and affect.             Assessment:       Encounter Diagnoses   Name Primary?    Onychogryphosis Yes    Pain in both feet          Plan:       An was seen today for follow-up and nail problem.    Diagnoses and all orders for this visit:    Onychogryphosis    Pain in both feet      I counseled the patient on her conditions, their implications and medical management.    Discussed conservative treatment with shoes of adequate dimensions, material, and style to alleviate symptoms and delay or prevent surgical intervention.    Shoe and activity modification as needed for relief.     Right hallux granuloma healed.  No granuloma today.        follow up 4 months for Proc B $42.

## 2019-09-26 ENCOUNTER — TELEPHONE (OUTPATIENT)
Dept: INTERNAL MEDICINE | Facility: CLINIC | Age: 84
End: 2019-09-26

## 2019-09-26 NOTE — TELEPHONE ENCOUNTER
----- Message from Mark Berger sent at 9/26/2019 12:14 PM CDT -----  Contact: pt  Name of Who is Calling: pt    What is the request in detail: calling in regards to a flu shot. Please contact to further discuss and advise      Can the clinic reply by [facility]: no    [facility] [phone] pt is home until 3pm

## 2019-09-27 ENCOUNTER — PATIENT OUTREACH (OUTPATIENT)
Dept: ADMINISTRATIVE | Facility: OTHER | Age: 84
End: 2019-09-27

## 2019-09-30 ENCOUNTER — CLINICAL SUPPORT (OUTPATIENT)
Dept: OPHTHALMOLOGY | Facility: CLINIC | Age: 84
End: 2019-09-30
Payer: MEDICARE

## 2019-09-30 ENCOUNTER — IMMUNIZATION (OUTPATIENT)
Dept: INTERNAL MEDICINE | Facility: CLINIC | Age: 84
End: 2019-09-30
Payer: MEDICARE

## 2019-09-30 ENCOUNTER — OFFICE VISIT (OUTPATIENT)
Dept: OPHTHALMOLOGY | Facility: CLINIC | Age: 84
End: 2019-09-30
Payer: MEDICARE

## 2019-09-30 DIAGNOSIS — G24.5 BLEPHAROSPASM: ICD-10-CM

## 2019-09-30 DIAGNOSIS — H35.361 FAMILIAL DRUSEN, BILATERAL: ICD-10-CM

## 2019-09-30 DIAGNOSIS — H04.123 DRY EYE SYNDROME, BILATERAL: ICD-10-CM

## 2019-09-30 DIAGNOSIS — H40.013 OPEN ANGLE WITH BORDERLINE FINDINGS AND LOW GLAUCOMA RISK IN BOTH EYES: Primary | ICD-10-CM

## 2019-09-30 DIAGNOSIS — Z96.1 PSEUDOPHAKIA OF BOTH EYES: ICD-10-CM

## 2019-09-30 DIAGNOSIS — H35.362 FAMILIAL DRUSEN, BILATERAL: ICD-10-CM

## 2019-09-30 DIAGNOSIS — H02.403 PTOSIS, BILATERAL: ICD-10-CM

## 2019-09-30 DIAGNOSIS — H40.013 OPEN ANGLE WITH BORDERLINE FINDINGS AND LOW GLAUCOMA RISK IN BOTH EYES: ICD-10-CM

## 2019-09-30 PROCEDURE — 92014 PR EYE EXAM, EST PATIENT,COMPREHESV: ICD-10-PCS | Mod: S$PBB,,, | Performed by: OPHTHALMOLOGY

## 2019-09-30 PROCEDURE — 90662 IIV NO PRSV INCREASED AG IM: CPT | Mod: PBBFAC

## 2019-09-30 PROCEDURE — 92083 EXTENDED VISUAL FIELD XM: CPT | Mod: 26,S$PBB,, | Performed by: OPHTHALMOLOGY

## 2019-09-30 PROCEDURE — 92250 COLOR FUNDUS PHOTOGRAPHY - OU - BOTH EYES: ICD-10-PCS | Mod: 26,S$PBB,, | Performed by: OPHTHALMOLOGY

## 2019-09-30 PROCEDURE — 99999 PR PBB SHADOW E&M-EST. PATIENT-LVL II: CPT | Mod: PBBFAC,,, | Performed by: OPHTHALMOLOGY

## 2019-09-30 PROCEDURE — 92083 HUMPHREY VISUAL FIELD - OU - BOTH EYES: ICD-10-PCS | Mod: 26,S$PBB,, | Performed by: OPHTHALMOLOGY

## 2019-09-30 PROCEDURE — 92014 COMPRE OPH EXAM EST PT 1/>: CPT | Mod: S$PBB,,, | Performed by: OPHTHALMOLOGY

## 2019-09-30 PROCEDURE — 99212 OFFICE O/P EST SF 10 MIN: CPT | Mod: PBBFAC,25 | Performed by: OPHTHALMOLOGY

## 2019-09-30 PROCEDURE — 92250 FUNDUS PHOTOGRAPHY W/I&R: CPT | Mod: PBBFAC | Performed by: OPHTHALMOLOGY

## 2019-09-30 PROCEDURE — 92083 EXTENDED VISUAL FIELD XM: CPT | Mod: PBBFAC

## 2019-09-30 PROCEDURE — 99999 PR PBB SHADOW E&M-EST. PATIENT-LVL II: ICD-10-PCS | Mod: PBBFAC,,, | Performed by: OPHTHALMOLOGY

## 2019-09-30 NOTE — PROGRESS NOTES
"HPI     DLS: 12/17/18    Pt here for HVF review;      Meds;   AT"s PRN OU   Refresh PM QHS OU     1) Glaucoma suspect - borderline  - low risk   2) PCIOL OU   3) Hx Blepharospasms   4) Ptosis   5) Familial Drusen OU   6) LUPE   7) Lagophthalomos   8) PVD OD   9) PCO OU     Last edited by Erlinda Sneed on 9/30/2019  4:11 PM. (History)            Assessment /Plan     For exam results, see Encounter Report.    Open angle with borderline findings and low glaucoma risk in both eyes  -     Color Fundus Photography - OU - Both Eyes    Blepharospasm - Both Eyes    Familial drusen, bilateral    Dry eye syndrome, bilateral    Ptosis, bilateral    Pseudophakia of both eyes        1. Glaucoma Suspect   First HVF   9/6/2011   First photos   2010   Treatment / Drops started   - none           Family history    Familial drusen OU        Glaucoma meds    none        H/O adverse rxn to glaucoma drops    none        LASERS    none        GLAUCOMA SURGERIES    none        OTHER EYE SURGERIES   PC IOL OD (12/07), OS (4/2010),                                     S/P neuromyectomy, levator repair, ptosis, repair RUL margin defect (4/10/11)        CDR    0.7/0.7        Tbase    12-18/12-18        Tmax    18/18        Ttarget    ??           HVF    7 test 2011 to  2019 - gen dep/? IAD - fluctuates   od // full (POOR RELIABILITY) os   NO MORE VF TESTING - TOO UNRELIABLE         Gonio    +4 ou        CCT   508/508        OCT   5  test 2008 to 2018 - RNFL - nl od // nl os         HRT    4 test 2011 to 2018  - MR -  HSD od // HSD os /// CDR xxx od // xxx os        Disc photos    2010,  2016, 2019      - Ttoday    12/12  - Test done today   IOP and HVF       2. -  Blepharospasm  - S/P neuromyectomy, levator repair, ptosis, repair RUL margin defect (4/10/11)- Dr Begum  - Residual RUL notch  - Pt used to get Botox injections (last Botox injection OU was in 2011)  - since retiring she does not care to get more botox - she says her friends know she " blinks a lot  - notch in th RUL post surgery with assoc lag    3. Dominant familial drusen OU   Monitor    4.   LUPE / lag - 2/2 above surgery - Lagopthalmous   AT's prn // ointment qhs    5. Ptosis ou    OS >> OD   2/2 #2    6. PCIOL - ou   OD (12/07), - Chantal   OS (4/2010) - Dr Cornejo    7. Refractive error    Very happy with her glasses from [facility]   Has computer glasses and regular bifocals    8. PVD od  - not having any symptoms    Stable x 3 weeks    Warned of signs of RD     9/ C/O poor distance  vision  // happy with her reading vision    Minimal PCO ou - NVS    Do not recommend yag cap - would be difficult to focus on it    Last saw  [facility]  4/2018 - can go back to see hime in May for glasses check     Plan:  RTC in 9  months - DFE // OCT     NO MORE HVF'S  Testing - TOO UNRELIABLE // PT HAS TROUBLE GETTING INTO MACHINE     I have seen and personally examined the patient.  I agree with the findings, assessment and plan of the resident and/or fellow.     Dian Cornejo MD

## 2019-10-01 DIAGNOSIS — R32 URINARY INCONTINENCE, UNSPECIFIED TYPE: ICD-10-CM

## 2019-10-01 RX ORDER — OXYBUTYNIN CHLORIDE 5 MG/1
TABLET, EXTENDED RELEASE ORAL
Qty: 90 TABLET | Refills: 4 | Status: SHIPPED | OUTPATIENT
Start: 2019-10-01 | End: 2020-12-24

## 2019-10-06 ENCOUNTER — NURSE TRIAGE (OUTPATIENT)
Dept: ADMINISTRATIVE | Facility: CLINIC | Age: 84
End: 2019-10-06

## 2019-10-06 NOTE — TELEPHONE ENCOUNTER
Patient  concern of no BM x 3 days  Doculax suppository taken without results.    What ok to take something else.    Reason for Disposition   Unable to have a bowel movement (BM) without laxative or enema    Additional Information   Negative: [1] Abdomen pain is main symptom AND [2] adult male   Negative: [1] Abdomen pain is main symptom AND [2] adult female   Negative: Rectal pain or itching is main symptom   Negative: Rectal bleeding or blood in stool is main symptom   Negative: Constipation in a cancer patient who is currently (or recently) receiving chemotherapy or radiation therapy, or cancer patient who has metastatic or end-stage cancer and is receiving palliative care   Negative: Patient sounds very sick or weak to the triager   Negative: [1] Vomiting AND [2] contains bile (green color)   Negative: [1] Vomiting AND [2] abdomen looks much more swollen than usual   Negative: [1] Constant abdominal pain AND [2] present > 2 hours   Negative: [1] Rectal pain or fullness from fecal impaction (rectum full of stool) AND [2] NOT better after SITZ bath, suppository or enema   Negative: [1] Intermittent mild abdominal pain AND [2] fever   Negative: Abdomen is more swollen than usual   Negative: Last bowel movement (BM) > 4 days ago   Negative: Leaking stool   Negative: Unable to have a bowel movement (BM) without manually removing stool (using finger to pull out stool or perform disimpaction)    Protocols used: CONSTIPATION-A-AH

## 2019-10-14 DIAGNOSIS — I10 BENIGN ESSENTIAL HYPERTENSION: ICD-10-CM

## 2019-10-15 ENCOUNTER — TELEPHONE (OUTPATIENT)
Dept: INTERNAL MEDICINE | Facility: CLINIC | Age: 84
End: 2019-10-15

## 2019-10-15 DIAGNOSIS — K59.09 CHRONIC CONSTIPATION: Primary | ICD-10-CM

## 2019-10-15 RX ORDER — AMLODIPINE BESYLATE 5 MG/1
TABLET ORAL
Qty: 90 TABLET | Refills: 4 | Status: SHIPPED | OUTPATIENT
Start: 2019-10-15 | End: 2021-01-07

## 2019-10-15 RX ORDER — ATENOLOL 25 MG/1
TABLET ORAL
Qty: 180 TABLET | Refills: 4 | Status: SHIPPED | OUTPATIENT
Start: 2019-10-15 | End: 2021-01-07

## 2019-10-15 NOTE — TELEPHONE ENCOUNTER
----- Message from Hoa Jones sent at 10/15/2019  4:32 PM CDT -----  Contact: self   Patient states she has talked to Dr Caballero about her hernia. Patient states she believes the hernia is starting to affect her bowel movements. Patient states she may need to see a gastro doctor.Please call and advise

## 2019-11-04 ENCOUNTER — TELEPHONE (OUTPATIENT)
Dept: OTOLARYNGOLOGY | Facility: CLINIC | Age: 84
End: 2019-11-04

## 2019-11-04 NOTE — TELEPHONE ENCOUNTER
----- Message from Cha Larsen sent at 11/4/2019  3:01 PM CST -----  Contact: pt   Pt has ear wax    Had hearing aid checked     Having hearing issues  Pt said she knew your mother.    Soonest appt in Dec     Could she possible get a work in ?    Please call ph [phone]  Thanks

## 2019-11-11 ENCOUNTER — OFFICE VISIT (OUTPATIENT)
Dept: OTOLARYNGOLOGY | Facility: CLINIC | Age: 84
End: 2019-11-11
Payer: MEDICARE

## 2019-11-11 DIAGNOSIS — Z97.4 WEARS HEARING AID IN BOTH EARS: Primary | ICD-10-CM

## 2019-11-11 DIAGNOSIS — H61.23 BILATERAL IMPACTED CERUMEN: ICD-10-CM

## 2019-11-11 PROCEDURE — 99213 OFFICE O/P EST LOW 20 MIN: CPT | Mod: PBBFAC,25 | Performed by: OTOLARYNGOLOGY

## 2019-11-11 PROCEDURE — 99999 PR PBB SHADOW E&M-EST. PATIENT-LVL III: CPT | Mod: PBBFAC,,, | Performed by: OTOLARYNGOLOGY

## 2019-11-11 PROCEDURE — 99499 NO LOS: ICD-10-PCS | Mod: S$PBB,,, | Performed by: OTOLARYNGOLOGY

## 2019-11-11 PROCEDURE — 69210 REMOVE IMPACTED EAR WAX UNI: CPT | Mod: 50,PBBFAC | Performed by: OTOLARYNGOLOGY

## 2019-11-11 PROCEDURE — 99499 UNLISTED E&M SERVICE: CPT | Mod: S$PBB,,, | Performed by: OTOLARYNGOLOGY

## 2019-11-11 PROCEDURE — 69210 PR REMOVAL IMPACTED CERUMEN REQUIRING INSTRUMENTATION, UNILATERAL: ICD-10-PCS | Mod: S$PBB,,, | Performed by: OTOLARYNGOLOGY

## 2019-11-11 PROCEDURE — 69210 REMOVE IMPACTED EAR WAX UNI: CPT | Mod: S$PBB,,, | Performed by: OTOLARYNGOLOGY

## 2019-11-11 PROCEDURE — 99999 PR PBB SHADOW E&M-EST. PATIENT-LVL III: ICD-10-PCS | Mod: PBBFAC,,, | Performed by: OTOLARYNGOLOGY

## 2019-11-11 NOTE — PROGRESS NOTES
CC:Ear cleaning; wears hearing aids  HPI:Ms. Luz is a 89-year-old  female who knew my mother when she attended [employer] as a [employer].  She wears bilateral hearing aids.  She is removed them for the scheduled ear cleaning procedures today.  She has deferred my offered an audiogram today.  Her ears were last cleaned by me in November 2018 almost a year ago to the day.  She reports accidentally falling this AM while retrieving her paper today without use of her cane which she forgot.  She reports no significant pain or swelling anywhere.    She is accompanied by her son today who awaits her in the lobby.      Past Medical History:   Diagnosis Date    Anemia     Arthritis     Fever blister     Glaucoma suspect with open angle     Hypercalcemia     Hyperlipidemia     Hypertension     Osteoporosis     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    Pubic ramus fracture     Scoliosis     Allergies:  Augmentin, sulfa, Zestril    PE:  Height 5 ft 1 in weight 82 lb  General:  Alert and oriented lady in no acute distress  Both ears were examined under the microscope in the micro procedure room  Procedure:  Cerumen impactions are carefully extracted from each ear canal with use of a blunt curette.  Both eardrums are intact and clear and both middle ear spaces appear well aerated.      DIAGNOSIS:     ICD-10-CM ICD-9-CM    1. Wears hearing aid in both ears Z97.4 [id number]    2. Bilateral impacted cerumen H61.23 380.4      PLAN: C.I.s extracted from both eacs with curette  RTC prn ear cleaning

## 2019-11-26 ENCOUNTER — HOSPITAL ENCOUNTER (OUTPATIENT)
Dept: RADIOLOGY | Facility: HOSPITAL | Age: 84
Discharge: HOME | End: 2019-11-26
Attending: INTERNAL MEDICINE
Payer: MEDICARE

## 2019-11-26 ENCOUNTER — OFFICE VISIT (OUTPATIENT)
Dept: GASTROENTEROLOGY | Facility: CLINIC | Age: 84
End: 2019-11-26
Payer: MEDICARE

## 2019-11-26 VITALS
WEIGHT: 79.13 LBS | SYSTOLIC BLOOD PRESSURE: 132 MMHG | DIASTOLIC BLOOD PRESSURE: 65 MMHG | BODY MASS INDEX: 14.94 KG/M2 | HEIGHT: 61 IN

## 2019-11-26 DIAGNOSIS — K59.09 CHRONIC CONSTIPATION: Primary | ICD-10-CM

## 2019-11-26 DIAGNOSIS — R13.19 ESOPHAGEAL DYSPHAGIA: ICD-10-CM

## 2019-11-26 DIAGNOSIS — K59.09 CHRONIC CONSTIPATION: ICD-10-CM

## 2019-11-26 PROCEDURE — 99214 OFFICE O/P EST MOD 30 MIN: CPT | Mod: PBBFAC,25,PO | Performed by: INTERNAL MEDICINE

## 2019-11-26 PROCEDURE — 99204 PR OFFICE/OUTPT VISIT, NEW, LEVL IV, 45-59 MIN: ICD-10-PCS | Mod: S$PBB,,, | Performed by: INTERNAL MEDICINE

## 2019-11-26 PROCEDURE — 74018 RADEX ABDOMEN 1 VIEW: CPT | Mod: 26,,, | Performed by: RADIOLOGY

## 2019-11-26 PROCEDURE — 74018 XR ABDOMEN AP 1 VIEW: ICD-10-PCS | Mod: 26,,, | Performed by: RADIOLOGY

## 2019-11-26 PROCEDURE — 99999 PR PBB SHADOW E&M-EST. PATIENT-LVL IV: CPT | Mod: PBBFAC,,, | Performed by: INTERNAL MEDICINE

## 2019-11-26 PROCEDURE — 99999 PR PBB SHADOW E&M-EST. PATIENT-LVL IV: ICD-10-PCS | Mod: PBBFAC,,, | Performed by: INTERNAL MEDICINE

## 2019-11-26 PROCEDURE — 99204 OFFICE O/P NEW MOD 45 MIN: CPT | Mod: S$PBB,,, | Performed by: INTERNAL MEDICINE

## 2019-11-26 PROCEDURE — 74018 RADEX ABDOMEN 1 VIEW: CPT | Mod: TC,FY

## 2019-11-26 NOTE — PATIENT INSTRUCTIONS
1. Start benefiber - 1 tablespoon twice a day  2. Start senna (or senakot) daily  3. Continue stool softener daily too    If you go more than 2 days without bowel movement, take dose of miralax.

## 2019-11-26 NOTE — PROGRESS NOTES
GASTROENTEROLOGY CLINIC NOTE    Reason for visit: The primary encounter diagnosis was Chronic constipation. A diagnosis of Esophageal dysphagia was also pertinent to this visit.  Referring provider/PCP: Jalyn Caballero MD      HPI:  An Hazel is a 89 y.o. female here today for evaluation of constipation. Referred by PCP.  accompanied by her daughter.     6 months of worsening constipation. Stools are hard to pass at times. Has BM about every 3 days, starts to feel the urge the 2nd or third day.   Takes 3 stool softeners every night. Drinks water every day. Eating high fiber diet. No aggrivating factors. No radiation. Her hernia has been present at least 5 years or so, possibly longer.  Has had some constipation issues for multiple years and prior to this, but here recently that has worsened.  She is concerned about her left inguinal hernia causing possible problems with constipation. Previously used to drink water every AM and that made her regular - 5 10 years ago. Only takes miralax for purge laxative.    She also mentions dysphagia, with foods sometimes sticking near top of sternum. This has improved recently but before was symptomatic. Sometimes would bring up thick phlegm. Mostly with solid foods but occ liquids as well. Never had EGD. Stopped taking fosamax because she thought that could cause it but PCP wanted her to resume.      Prior Endoscopy:  EGD: none    Colon:  2011 pauly - diverticular disease, otherwise normal ; recd repeat PRN     (Portions of this note were dictated using voice recognition software and may contain dictation related errors in spelling/grammar/syntax not found on text review)    Review of Systems   Constitutional: Positive for weight loss. Negative for chills and fever.   HENT: Positive for hearing loss. Negative for sore throat.    Eyes: Negative for blurred vision and double vision.   Respiratory: Negative for cough and hemoptysis.    Cardiovascular: Negative  for chest pain and palpitations.   Gastrointestinal: Positive for constipation. Negative for abdominal pain and blood in stool.   Genitourinary: Positive for dysuria and urgency.   Neurological: Negative for dizziness and headaches.   Psychiatric/Behavioral: Negative for hallucinations. The patient is not nervous/anxious.        Past Medical History: has a past medical history of Anemia, Arthritis, Fever blister, Glaucoma suspect with open angle, Hypercalcemia, Hyperlipidemia, Hypertension, Osteoporosis, Pseudoaphakia - Both Eyes, Pubic ramus fracture, and Scoliosis.    Past Surgical History: has a past surgical history that includes Tonsillectomy; Appendectomy; Hysterectomy; Oophorectomy; blepharospasm surgery; ORIF hip fracture (Left, 12/2013); Cataract extraction w/  intraocular lens implant (Right, 12/2007); and Cataract extraction w/  intraocular lens implant (Left, 04/2010).    Family History:family history includes Cancer in her maternal grandmother; Hypertension in her father; Osteoporosis in her mother; Stroke (age of onset: 49) in her father.    Allergies:   Review of patient's allergies indicates:   Allergen Reactions    Augmentin [amoxicillin-pot clavulanate] Nausea And Vomiting    Sulfa (sulfonamide antibiotics)     Zestril [lisinopril] Swelling     Facial Swelling       Social History: reports that she has never smoked. She has never used smokeless tobacco. She reports that she drinks about 2.0 - 4.0 standard drinks of alcohol per week. She reports that she does not use drugs.    Home medications:   Current Outpatient Medications on File Prior to Visit   Medication Sig Dispense Refill    alendronate (FOSAMAX) 70 MG tablet Take 1 tablet (70 mg total) by mouth every 7 days. 12 tablet 3    amLODIPine (NORVASC) 5 MG tablet TAKE 1 TABLET DAILY 90 tablet 4    atenolol (TENORMIN) 25 MG tablet TAKE 1 TABLET TWICE A  tablet 4    cholecalciferol, vitamin D3, (VITAMIN D3) 1,000 unit capsule Take  "1,000 Units by mouth once daily.      flu vacc [id number],65yr up,PF (FLUZONE HIGH-DOSE 2019-20, PF,) 180 mcg/0.5 mL Syrg INJECT INTO THE MUSCLE. 0.5 mL 0    FOLIC ACID/MULTIVIT-MIN/LUTEIN (CENTRUM SILVER ORAL) Take 1 tablet by mouth once daily.       hydroCHLOROthiazide (MICROZIDE) 12.5 mg capsule Take 1 capsule (12.5 mg total) by mouth once daily. 10 capsule 0    losartan (COZAAR) 100 MG tablet Take 1 tablet (100 mg total) by mouth once daily. 90 tablet 3    oxybutynin (DITROPAN-XL) 5 MG TR24 TAKE 1 TABLET DAILY 90 tablet 4    polyethylene glycol (GLYCOLAX) 17 gram PwPk Take one capful with a full glass of water daily as needed for constipation. 30 each 3     No current facility-administered medications on file prior to visit.        Vital signs:  /65   Ht 5' 1" (1.549 m)   Wt 35.9 kg (79 lb 2.3 oz)   LMP  (LMP Unknown)   BMI 14.95 kg/m²     Physical Exam   Constitutional: She is oriented to person, place, and time. No distress.   HENT:   Head: Normocephalic and atraumatic.   Eyes: Conjunctivae are normal. No scleral icterus.   Neck: Neck supple. No thyromegaly present.   Cardiovascular: Normal rate, normal heart sounds and intact distal pulses.   Pulmonary/Chest: Effort normal and breath sounds normal. No stridor. No respiratory distress.   Abdominal: Soft. She exhibits no distension and no mass. There is no tenderness. There is no rebound and no guarding.   Left inguinal hernia with loop of bowel, easily reducible and soft and no pain on palpation.  abd is otherwise mildly distended ; no rigidity no tenderness.   Musculoskeletal: She exhibits no tenderness or deformity.   Neurological: She is alert and oriented to person, place, and time. Gait normal.   Skin: Skin is warm and dry. No rash noted. She is not diaphoretic.   Psychiatric: She has a normal mood and affect. Her behavior is normal.   Vitals reviewed.      Routine labs:  Lab Results   Component Value Date    WBC 7.07 05/20/2019    HGB 11.4 " (L) 05/20/2019    HCT 36.8 (L) 05/20/2019    MCV 95 05/20/2019     05/20/2019     Lab Results   Component Value Date    INR 1.1 02/16/2016     Lab Results   Component Value Date    IRON 68 09/06/2017    TIBC 414 09/06/2017    FESATURATED 16 (L) 09/06/2017     Lab Results   Component Value Date     05/20/2019    K 3.8 05/20/2019     05/20/2019    CO2 30 (H) 05/20/2019    BUN 19 05/20/2019    CREATININE 0.8 05/20/2019     Lab Results   Component Value Date    ALBUMIN 3.4 (L) 05/20/2019    ALT 9 (L) 05/20/2019    AST 19 05/20/2019    ALKPHOS 71 05/20/2019    BILITOT 0.4 05/20/2019     No results found for: GLUCOSE    Imaging:  Reviewed  Ct from 2016 with colonic inguinal hernia.  Reviewed PCP notes from this year.    Assessment:    1. Chronic constipation    2. Esophageal dysphagia        Plan:    Orders Placed This Encounter    X-Ray Abdomen AP 1 View     Given the chronicity and easily reduce ability, I do not suspect that overall her main issue is her colonic inguinal hernia. I did explain that I cannot fully rule out that that is not at least contributing to the problem but I do not think that is the main problem.  I do not recommend surgical fixing unless this were to become painful    We discussed TIPS for a good bowel regimen and instructions were given.  Increase fiber, increase water, use senna and continue stool softener  Will check KUB, as may need complete cleanse if significant backup / impaction is noted.    Regarding her dysphagia, we discussed potentially workup , including barium esophagram then EGD  - she states this is not as bothersome right now and will defer any workup, but knows to contact us if she wishes to undergo workup as stated above.      RTC as needed   - she knows to contact us if these above measures are not helpful, and we can consider more aggressive regimen such as linzess or other laxatives.      Joaquin Collins MD  [facility] [place]

## 2019-11-26 NOTE — LETTER
November 26, 2019      Jalyn Caballero MD  [address]  [facility] [address]           [facility]  [address], [facility] [address]  [facility] [address]  Phone: [phone]          Patient: An Hazel   MR Number: [id number]   YOB: 1927   Date of Visit: 11/26/2019       Dear Dr. Delvalle:    Thank you for referring An Luz to me for evaluation. Attached you will find relevant portions of my assessment and plan of care.    If you have questions, please do not hesitate to call me. I look forward to following An Luz along with you.    Sincerely,    Joaquin Collins MD    Enclosure  CC:  No Recipients    If you would like to receive this communication electronically, please contact [email or web address] or [phone] to request more information on [employer] access.    For providers and/or their staff who would like to refer a patient to [facility], please contact us through our one-stop-shop provider referral line, [facility], at [phone].    If you feel you have received this communication in error or would no longer like to receive these types of communications, please e-mail [email or web address]

## 2019-11-27 ENCOUNTER — TELEPHONE (OUTPATIENT)
Dept: GASTROENTEROLOGY | Facility: CLINIC | Age: 84
End: 2019-11-27

## 2019-11-27 NOTE — TELEPHONE ENCOUNTER
----- Message from Joaquin Collins MD sent at 11/26/2019 12:38 PM CST -----  Please inform,  X ray shows large stool burden and significant constipation.  Recommend our cleanout protocol with either miralax 4.1 or 2 bottles mag citrate,  Then after that is complete, start our regimen we discussed in clinic today

## 2019-12-03 ENCOUNTER — OFFICE VISIT (OUTPATIENT)
Dept: INTERNAL MEDICINE | Facility: CLINIC | Age: 84
End: 2019-12-03
Payer: MEDICARE

## 2019-12-03 VITALS
BODY MASS INDEX: 14.61 KG/M2 | SYSTOLIC BLOOD PRESSURE: 122 MMHG | DIASTOLIC BLOOD PRESSURE: 64 MMHG | HEART RATE: 64 BPM | HEIGHT: 61 IN | WEIGHT: 77.38 LBS

## 2019-12-03 DIAGNOSIS — Z00.00 ENCOUNTER FOR PREVENTIVE HEALTH EXAMINATION: Primary | ICD-10-CM

## 2019-12-03 DIAGNOSIS — R32 URINARY INCONTINENCE, UNSPECIFIED TYPE: ICD-10-CM

## 2019-12-03 DIAGNOSIS — D64.9 ANEMIA, UNSPECIFIED TYPE: ICD-10-CM

## 2019-12-03 DIAGNOSIS — Z74.09 IMPAIRED MOBILITY AND ADLS: ICD-10-CM

## 2019-12-03 DIAGNOSIS — K59.09 CHRONIC CONSTIPATION: ICD-10-CM

## 2019-12-03 DIAGNOSIS — Z78.9 IMPAIRED MOBILITY AND ADLS: ICD-10-CM

## 2019-12-03 DIAGNOSIS — I10 BENIGN ESSENTIAL HYPERTENSION: ICD-10-CM

## 2019-12-03 DIAGNOSIS — I70.0 AORTIC ATHEROSCLEROSIS: ICD-10-CM

## 2019-12-03 DIAGNOSIS — H40.013 OPEN ANGLE WITH BORDERLINE FINDINGS AND LOW GLAUCOMA RISK IN BOTH EYES: ICD-10-CM

## 2019-12-03 DIAGNOSIS — E44.0 MODERATE PROTEIN-CALORIE MALNUTRITION: ICD-10-CM

## 2019-12-03 DIAGNOSIS — M81.0 OSTEOPOROSIS, UNSPECIFIED OSTEOPOROSIS TYPE, UNSPECIFIED PATHOLOGICAL FRACTURE PRESENCE: ICD-10-CM

## 2019-12-03 DIAGNOSIS — E21.3 HYPERPARATHYROIDISM: ICD-10-CM

## 2019-12-03 DIAGNOSIS — R73.03 PREDIABETES: ICD-10-CM

## 2019-12-03 DIAGNOSIS — E78.2 MIXED HYPERLIPIDEMIA: ICD-10-CM

## 2019-12-03 DIAGNOSIS — M41.9 KYPHOSCOLIOSIS: ICD-10-CM

## 2019-12-03 DIAGNOSIS — R13.19 ESOPHAGEAL DYSPHAGIA: ICD-10-CM

## 2019-12-03 DIAGNOSIS — Z87.81 HISTORY OF HIP FRACTURE: ICD-10-CM

## 2019-12-03 PROCEDURE — G0439 PPPS, SUBSEQ VISIT: HCPCS | Mod: S$GLB,,, | Performed by: NURSE PRACTITIONER

## 2019-12-03 PROCEDURE — G0439 PR MEDICARE ANNUAL WELLNESS SUBSEQUENT VISIT: ICD-10-PCS | Mod: S$GLB,,, | Performed by: NURSE PRACTITIONER

## 2019-12-03 PROCEDURE — 99999 PR PBB SHADOW E&M-EST. PATIENT-LVL IV: CPT | Mod: PBBFAC,,, | Performed by: NURSE PRACTITIONER

## 2019-12-03 PROCEDURE — 99214 OFFICE O/P EST MOD 30 MIN: CPT | Mod: PBBFAC | Performed by: NURSE PRACTITIONER

## 2019-12-03 PROCEDURE — 99999 PR PBB SHADOW E&M-EST. PATIENT-LVL IV: ICD-10-PCS | Mod: PBBFAC,,, | Performed by: NURSE PRACTITIONER

## 2019-12-03 RX ORDER — SENNOSIDES 8.6 MG/1
1 TABLET ORAL DAILY
COMMUNITY

## 2019-12-03 NOTE — PROGRESS NOTES
"An Luz presented for a  Medicare AWV and comprehensive Health Risk Assessment today. The following components were reviewed and updated:    · Medical history  · Family History  · Social history  · Allergies and Current Medications  · Health Risk Assessment  · Health Maintenance  · Care Team     ** See Completed Assessments for Annual Wellness Visit within the encounter summary.**       The following assessments were completed:  · Living Situation  · CAGE  · Depression Screening  · Timed Get Up and Go  · Whisper Test  · Cognitive Function Screening  · Nutrition Screening  · ADL Screening  · PAQ Screening        Vitals:    12/03/19 1038   BP: 122/64   BP Location: Right arm   Patient Position: Sitting   Pulse: 64   Weight: 35.1 kg (77 lb 6.1 oz)   Height: 5' 1" (1.549 m)     Body mass index is 14.62 kg/m².  Physical Exam   Constitutional: She is oriented to person, place, and time. She appears well-developed and well-nourished. No distress.   HENT:   Head: Normocephalic and atraumatic.   Eyes: No scleral icterus.   Neck: Normal range of motion. Neck supple.   Cardiovascular: Normal rate, regular rhythm, normal heart sounds and intact distal pulses.   Pulmonary/Chest: Effort normal and breath sounds normal. No respiratory distress.   Abdominal: She exhibits no distension.   Musculoskeletal: Normal range of motion. She exhibits no edema.   Neurological: She is alert and oriented to person, place, and time.   Skin: Skin is warm and dry. She is not diaphoretic.   Small scabbed wound to lateral right ankle, mild erythema noted around edge of scar   Psychiatric: She has a normal mood and affect. Her behavior is normal.       Diagnoses and health risks identified today and associated recommendations/orders:    1. Encounter for preventive health examination  Assessments completed  Preventive health recommendations reviewed    2. Hyperparathyroidism  Stable.   Last pth within range  Followed by PCP.     3. Aortic " atherosclerosis  Stable.   Seen on CT from 02/16/16  Followed by PCP.     4. Moderate protein-calorie malnutrition  Per patient and daughter, has been losing weight unintentionally.  Daughter says patient will eat about three meals per day even when she is not hungry.  Daughter cooks for patient and is trying to increase calories.  PCP aware.   Followed by PCP.     5. Benign essential hypertension  Stable.   Controlled with current medical therapy  Followed by PCP.     6. Mixed hyperlipidemia  Stable.   Followed by PCP.     7. Prediabetes  Stable.   Controlled with diet and lifestyle mod  Followed by PCP.     8. Anemia, unspecified type  Stable.   Controlled with current medical therapy  Followed by PCP.     9. Chronic constipation  Stable.   Controlled with current medical therapy  Followed by GI     10. Esophageal dysphagia  Stable.   Followed by PCP and GI.     11. Urinary incontinence, unspecified type  Stable.   Controlled with current medical therapy  Followed by PCP.     12. Osteoporosis, unspecified osteoporosis type, unspecified pathological fracture presence  Stable.   Controlled with current medical therapy  Followed by PCP.     13. History of hip fracture  Stable.   Followed by orthopedics and PCP.     14. Kyphoscoliosis  Stable.   Followed by PCP.     15. Impaired mobility and ADLs  Stable.   Followed by PCP.     16. Open angle with borderline findings and low glaucoma risk in both eyes  Stable.   Followed by optometry/ophthalmology.    Small wound noted to patient's right ankle, lateral side, scabbed over with mild area of redness around edges of scab, no warmth or drainage.   Patient says she may have done this about three weeks ago.  No fever, no chills.  Appointment made for next week for wound check.  Patient told to call office sooner if redness, swelling, fever, chills or other symptoms start.     Provided An with a 5-10 year written screening schedule and personal prevention plan.  Recommendations were developed using the USPSTF age appropriate recommendations. Education, counseling, and referrals were provided as needed. After Visit Summary printed and given to patient which includes a list of additional screenings\tests needed.    Follow up in 1 month (on 1/6/2020) for a routine visit with your primary care provider or sooner if problems arise. .    Brenda Ely, NP

## 2019-12-03 NOTE — PATIENT INSTRUCTIONS
Counseling and Referral of Other Preventative  (Italic type indicates deductible and co-insurance are waived)    Patient Name: An Luz  Today's Date: 12/3/2019    Health Maintenance       Date Due Completion Date    Shingles Vaccine (1 of 2) 10/08/1977 ---    Lipid Panel 06/26/2019 6/26/2018    TETANUS VACCINE 05/02/2027 5/2/2017        No orders of the defined types were placed in this encounter.    The following information is provided to all patients.  This information is to help you find resources for any of the problems found today that may be affecting your health:                Living healthy guide: www.[facility].[place].[facility]      Understanding Diabetes: www.diabetes.org      Eating healthy: www.cdc.gov/healthyweight      CDC home safety checklist: www.cdc.gov/steadi/patient.html      Agency on Aging: www.goea.[place].[facility]      Alcoholics anonymous (AA): www.aa.org      Physical Activity: www.[place].nih.gov/pw9tovt      Tobacco use: www.quitwithusla.org

## 2019-12-03 NOTE — PROGRESS NOTES
I offered to discuss end of life issues, including information on how to make advance directives that the patient could use to name someone who would make medical decisions on their behalf if they became too ill to make themselves.    ___Patient declined  _X_Patient has advance directives in order

## 2019-12-09 ENCOUNTER — OFFICE VISIT (OUTPATIENT)
Dept: INTERNAL MEDICINE | Facility: CLINIC | Age: 84
End: 2019-12-09
Payer: MEDICARE

## 2019-12-09 VITALS
OXYGEN SATURATION: 98 % | HEART RATE: 69 BPM | BODY MASS INDEX: 15.77 KG/M2 | HEIGHT: 59 IN | DIASTOLIC BLOOD PRESSURE: 60 MMHG | WEIGHT: 78.25 LBS | SYSTOLIC BLOOD PRESSURE: 122 MMHG

## 2019-12-09 DIAGNOSIS — S90.511D: ICD-10-CM

## 2019-12-09 DIAGNOSIS — M19.90 ARTHRITIS: Primary | ICD-10-CM

## 2019-12-09 PROCEDURE — 99999 PR PBB SHADOW E&M-EST. PATIENT-LVL III: CPT | Mod: PBBFAC,,, | Performed by: PHYSICIAN ASSISTANT

## 2019-12-09 PROCEDURE — 1159F MED LIST DOCD IN RCRD: CPT | Mod: ,,, | Performed by: PHYSICIAN ASSISTANT

## 2019-12-09 PROCEDURE — 99999 PR PBB SHADOW E&M-EST. PATIENT-LVL III: ICD-10-PCS | Mod: PBBFAC,,, | Performed by: PHYSICIAN ASSISTANT

## 2019-12-09 PROCEDURE — 99213 OFFICE O/P EST LOW 20 MIN: CPT | Mod: S$PBB,,, | Performed by: PHYSICIAN ASSISTANT

## 2019-12-09 PROCEDURE — 99213 OFFICE O/P EST LOW 20 MIN: CPT | Mod: PBBFAC | Performed by: PHYSICIAN ASSISTANT

## 2019-12-09 PROCEDURE — 99213 PR OFFICE/OUTPT VISIT, EST, LEVL III, 20-29 MIN: ICD-10-PCS | Mod: S$PBB,,, | Performed by: PHYSICIAN ASSISTANT

## 2019-12-09 PROCEDURE — 1126F PR PAIN SEVERITY QUANTIFIED, NO PAIN PRESENT: ICD-10-PCS | Mod: ,,, | Performed by: PHYSICIAN ASSISTANT

## 2019-12-09 PROCEDURE — 1159F PR MEDICATION LIST DOCUMENTED IN MEDICAL RECORD: ICD-10-PCS | Mod: ,,, | Performed by: PHYSICIAN ASSISTANT

## 2019-12-09 PROCEDURE — 1126F AMNT PAIN NOTED NONE PRSNT: CPT | Mod: ,,, | Performed by: PHYSICIAN ASSISTANT

## 2019-12-09 RX ORDER — MUPIROCIN 20 MG/G
OINTMENT TOPICAL 2 TIMES DAILY
Qty: 30 G | Refills: 0 | Status: SHIPPED | OUTPATIENT
Start: 2019-12-09 | End: 2020-02-17 | Stop reason: SDUPTHER

## 2019-12-09 NOTE — PROGRESS NOTES
"Subjective:       Patient ID: An Hazel is a 89 y.o. female.    Chief Complaint: Ankle Pain    HPI     Established pt of Jalyn Caballero MD (new to me)    Here to follow with right ankle abrasion, noted at recent [facility] visit on 12.3 . Pt states she thinks it occurred while walking getting the mail, she slipped but braced herself with her walker. No fall. Notes mild pain to ankle, when laying on hr right side/leg.       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  On alternative days she does floor exercises with weights.   Substance Use Topics    Alcohol use: Yes     Alcohol/week: 2.0 - 4.0 standard drinks     Types: 1 Glasses of wine, 1 - 3 Standard drinks or equivalent per week     Comment: Occasional use    Drug use: No     Review of patient's allergies indicates:   Allergen Reactions    Augmentin [amoxicillin-pot clavulanate] Nausea And Vomiting    Sulfa (sulfonamide antibiotics)     Zestril [lisinopril] Swelling     Facial Swelling         Review of Systems   Constitutional: Negative for chills, fever and unexpected weight change.   Respiratory: Negative for cough and shortness of breath.    Cardiovascular: Negative for chest pain and leg swelling.   Gastrointestinal: Negative for nausea and vomiting.   Musculoskeletal: Positive for arthralgias.   Skin:        As per HPI   Neurological: Negative for weakness and headaches.       Objective: /60 (BP Location: Right arm, Patient Position: Sitting, BP Method: Medium (Manual))   Pulse 69   Ht 4' 11" (1.499 m)   Wt " 35.5 kg (78 lb 4.2 oz)   LMP  (LMP Unknown)   SpO2 98%   BMI 15.81 kg/m²         Physical Exam   Constitutional: She appears well-developed and well-nourished. No distress.   HENT:   Head: Normocephalic and atraumatic.   Mouth/Throat: Oropharynx is clear and moist.   Cardiovascular: Normal rate and regular rhythm. Exam reveals no friction rub.   No murmur heard.  Pulmonary/Chest: Effort normal and breath sounds normal. She has no wheezes. She has no rales.   Musculoskeletal:   ambulating with cane   Neurological: She is alert.   Skin: Skin is warm and dry. No rash noted. Abrasion: approx 1-2cm with scabbing, no warmth, no drainage, no significant erythema (Right lateral ankle)   Vitals reviewed.              Assessment:       1. Arthritis    2. Abrasion of right ankle without infection, subsequent encounter        Plan:         An was seen today for ankle pain.    Diagnoses and all orders for this visit:    Arthritis  Tylenol prn    Abrasion of right ankle without infection, subsequent encounter  -     mupirocin (BACTROBAN) 2 % ointment; Apply topically 2 (two) times daily.  - Improved expected to continue to heal and resolve  -RTC precautions and s/s of infection discussed      REBECCA Jean BaptisteC

## 2019-12-30 ENCOUNTER — TELEPHONE (OUTPATIENT)
Dept: GASTROENTEROLOGY | Facility: CLINIC | Age: 84
End: 2019-12-30

## 2019-12-30 ENCOUNTER — TELEPHONE (OUTPATIENT)
Dept: INTERNAL MEDICINE | Facility: CLINIC | Age: 84
End: 2019-12-30

## 2019-12-30 DIAGNOSIS — I10 BENIGN ESSENTIAL HYPERTENSION: ICD-10-CM

## 2019-12-30 RX ORDER — HYDROCHLOROTHIAZIDE 12.5 MG/1
12.5 CAPSULE ORAL DAILY
Qty: 10 CAPSULE | Refills: 0 | Status: SHIPPED | OUTPATIENT
Start: 2019-12-30 | End: 2020-01-06 | Stop reason: SDUPTHER

## 2019-12-30 RX ORDER — HYDROCHLOROTHIAZIDE 12.5 MG/1
12.5 CAPSULE ORAL DAILY
Qty: 90 CAPSULE | Refills: 0 | Status: SHIPPED | OUTPATIENT
Start: 2019-12-30 | End: 2020-01-06 | Stop reason: SDUPTHER

## 2019-12-30 NOTE — TELEPHONE ENCOUNTER
----- Message from Isabelle Aquino sent at 12/30/2019 11:50 AM CST -----  Contact: self  Patient is requesting a call back concerning having blood in her stool and wanted to talk about the procedure. Please call

## 2019-12-30 NOTE — TELEPHONE ENCOUNTER
Spoke with pt, she has had BM yesterday and noticed blood in the stool. She's taking new medication she has started taking Bene fiber and Senna after seeing  GI doctor, dr Collins. Pt said, that she has had BM this morning, with no blood. Pt states that she feels fine, denies, weakness, dizziness. Pt will call her GI doctor right now.

## 2019-12-30 NOTE — TELEPHONE ENCOUNTER
----- Message from Teddy Mata sent at 12/30/2019 11:15 AM CST -----  Contact: self    Patient is calling for an RX refill or new RX.  Is this a refill or new RX:  Refill Patient would like to get medical advice.  Symptoms (please be specific):  Blood in stooll  How long has patient had these symptoms:  1 day   Pharmacy name and phone # (copy/paste from chart):    Any drug allergies (copy/paste from chart):      Would the patient rather a call back or a response via [facility]?:  Call back  Comments:    Patient is calling for an RX refill or new RX.  Is this a refill or new RX:  RX name and strength: hydroCHLOROthiazide (MICROZIDE) 12.5 mg capsule  Directions (copy/paste from chart):    Is this a 30 day or 90 day RX:    Local pharmacy or mail order pharmacy:  [facility] - [address] [phone] (Phone)  [phone] (Fax)  Pharmacy name and phone # (copy/paste from chart):     Comments:  Patient would like to get 10 pills until she receives her full script from [facility]

## 2019-12-30 NOTE — TELEPHONE ENCOUNTER
----- Message from Teddy Mata sent at 12/30/2019 11:11 AM CST -----  Contact: self   Patient is calling for an RX refill or new RX.  Is this a refill or new RX:  refill  RX name and strength: hydroCHLOROthiazide (MICROZIDE) 12.5 mg capsule  Directions (copy/paste from chart):    Is this a 30 day or 90 day RX:    Local pharmacy or mail order pharmacy:  [facility] - [address] [phone] (Phone)  [phone] (Fax  Pharmacy name and phone # (copy/paste from chart):     Comments:

## 2020-01-02 NOTE — TELEPHONE ENCOUNTER
----- Message from Og Rondon sent at 1/2/2020  1:17 PM CST -----  Contact: Pt   Nurse Gale:   Pt missed a call and would like the nurse to return their call.        Pt can be reached at [phone].

## 2020-01-02 NOTE — TELEPHONE ENCOUNTER
Spoke with pts daughter and patient has c/o constipation and a bloody stool. She is taking Senna-s and also a fiber supplement, but still has trouble with constipation. She saw blood in her stool a week ago. She said that it was a bright red blood and she may have had a tear from straining or helping her bowels move. She has not seen blood since but will go to the ED if this happens again. Informed pt that MD would like to start her off with Linzess of the other regimen was not successful. Informed daughter that we would send this to the [facility] pharmacy so that they can get this authorized through the insurance company.

## 2020-01-06 ENCOUNTER — OFFICE VISIT (OUTPATIENT)
Dept: INTERNAL MEDICINE | Facility: CLINIC | Age: 85
End: 2020-01-06
Payer: MEDICARE

## 2020-01-06 ENCOUNTER — LAB VISIT (OUTPATIENT)
Dept: LAB | Facility: HOSPITAL | Age: 85
End: 2020-01-06
Attending: INTERNAL MEDICINE
Payer: MEDICARE

## 2020-01-06 VITALS
WEIGHT: 78.69 LBS | OXYGEN SATURATION: 99 % | HEART RATE: 66 BPM | SYSTOLIC BLOOD PRESSURE: 138 MMHG | DIASTOLIC BLOOD PRESSURE: 78 MMHG | HEIGHT: 59 IN | BODY MASS INDEX: 15.86 KG/M2

## 2020-01-06 DIAGNOSIS — I10 BENIGN ESSENTIAL HYPERTENSION: Primary | ICD-10-CM

## 2020-01-06 DIAGNOSIS — K92.1 BLOOD IN STOOL: ICD-10-CM

## 2020-01-06 DIAGNOSIS — R73.03 PREDIABETES: ICD-10-CM

## 2020-01-06 DIAGNOSIS — I70.0 AORTIC ATHEROSCLEROSIS: ICD-10-CM

## 2020-01-06 DIAGNOSIS — M81.0 OSTEOPOROSIS, UNSPECIFIED OSTEOPOROSIS TYPE, UNSPECIFIED PATHOLOGICAL FRACTURE PRESENCE: ICD-10-CM

## 2020-01-06 DIAGNOSIS — E78.2 MIXED HYPERLIPIDEMIA: ICD-10-CM

## 2020-01-06 DIAGNOSIS — I10 BENIGN ESSENTIAL HYPERTENSION: ICD-10-CM

## 2020-01-06 DIAGNOSIS — R63.4 WEIGHT LOSS: ICD-10-CM

## 2020-01-06 DIAGNOSIS — E21.3 HYPERPARATHYROIDISM: ICD-10-CM

## 2020-01-06 LAB
ALBUMIN SERPL BCP-MCNC: 3.3 G/DL (ref 3.5–5.2)
ALP SERPL-CCNC: 68 U/L (ref 55–135)
ALT SERPL W/O P-5'-P-CCNC: 10 U/L (ref 10–44)
ANION GAP SERPL CALC-SCNC: 8 MMOL/L (ref 8–16)
AST SERPL-CCNC: 23 U/L (ref 10–40)
BASOPHILS # BLD AUTO: 0.09 K/UL (ref 0–0.2)
BASOPHILS NFR BLD: 1.2 % (ref 0–1.9)
BILIRUB SERPL-MCNC: 0.5 MG/DL (ref 0.1–1)
BUN SERPL-MCNC: 15 MG/DL (ref 10–30)
CALCIUM SERPL-MCNC: 10.3 MG/DL (ref 8.7–10.5)
CHLORIDE SERPL-SCNC: 103 MMOL/L (ref 95–110)
CO2 SERPL-SCNC: 29 MMOL/L (ref 23–29)
CREAT SERPL-MCNC: 0.7 MG/DL (ref 0.5–1.4)
DIFFERENTIAL METHOD: ABNORMAL
EOSINOPHIL # BLD AUTO: 0.2 K/UL (ref 0–0.5)
EOSINOPHIL NFR BLD: 1.9 % (ref 0–8)
ERYTHROCYTE [DISTWIDTH] IN BLOOD BY AUTOMATED COUNT: 13.8 % (ref 11.5–14.5)
EST. GFR  (AFRICAN AMERICAN): >60 ML/MIN/1.73 M^2
EST. GFR  (NON AFRICAN AMERICAN): >60 ML/MIN/1.73 M^2
ESTIMATED AVG GLUCOSE: 126 MG/DL (ref 68–131)
GLUCOSE SERPL-MCNC: 83 MG/DL (ref 70–110)
HBA1C MFR BLD HPLC: 6 % (ref 4–5.6)
HCT VFR BLD AUTO: 35.9 % (ref 37–48.5)
HGB BLD-MCNC: 11.3 G/DL (ref 12–16)
LYMPHOCYTES # BLD AUTO: 1.4 K/UL (ref 1–4.8)
LYMPHOCYTES NFR BLD: 18.1 % (ref 18–48)
MCH RBC QN AUTO: 30.8 PG (ref 27–31)
MCHC RBC AUTO-ENTMCNC: 31.5 G/DL (ref 32–36)
MCV RBC AUTO: 98 FL (ref 82–98)
MONOCYTES # BLD AUTO: 0.8 K/UL (ref 0.3–1)
MONOCYTES NFR BLD: 10 % (ref 4–15)
NEUTROPHILS # BLD AUTO: 5.3 K/UL (ref 1.8–7.7)
NEUTROPHILS NFR BLD: 68.5 % (ref 38–73)
NRBC BLD-RTO: 0 /100 WBC
PLATELET # BLD AUTO: 265 K/UL (ref 150–350)
PMV BLD AUTO: 11.8 FL (ref 9.2–12.9)
POTASSIUM SERPL-SCNC: 4 MMOL/L (ref 3.5–5.1)
PROT SERPL-MCNC: 8.3 G/DL (ref 6–8.4)
RBC # BLD AUTO: 3.67 M/UL (ref 4–5.4)
SODIUM SERPL-SCNC: 140 MMOL/L (ref 136–145)
TSH SERPL DL<=0.005 MIU/L-ACNC: 2.72 UIU/ML (ref 0.4–4)
WBC # BLD AUTO: 7.79 K/UL (ref 3.9–12.7)

## 2020-01-06 PROCEDURE — 99999 PR PBB SHADOW E&M-EST. PATIENT-LVL IV: CPT | Mod: PBBFAC,,, | Performed by: INTERNAL MEDICINE

## 2020-01-06 PROCEDURE — 99214 PR OFFICE/OUTPT VISIT, EST, LEVL IV, 30-39 MIN: ICD-10-PCS | Mod: S$PBB,,, | Performed by: INTERNAL MEDICINE

## 2020-01-06 PROCEDURE — 99214 OFFICE O/P EST MOD 30 MIN: CPT | Mod: S$PBB,,, | Performed by: INTERNAL MEDICINE

## 2020-01-06 PROCEDURE — 1159F PR MEDICATION LIST DOCUMENTED IN MEDICAL RECORD: ICD-10-PCS | Mod: ,,, | Performed by: INTERNAL MEDICINE

## 2020-01-06 PROCEDURE — 84443 ASSAY THYROID STIM HORMONE: CPT

## 2020-01-06 PROCEDURE — 85025 COMPLETE CBC W/AUTO DIFF WBC: CPT

## 2020-01-06 PROCEDURE — 80053 COMPREHEN METABOLIC PANEL: CPT

## 2020-01-06 PROCEDURE — 83036 HEMOGLOBIN GLYCOSYLATED A1C: CPT

## 2020-01-06 PROCEDURE — 1126F AMNT PAIN NOTED NONE PRSNT: CPT | Mod: ,,, | Performed by: INTERNAL MEDICINE

## 2020-01-06 PROCEDURE — 99214 OFFICE O/P EST MOD 30 MIN: CPT | Mod: PBBFAC | Performed by: INTERNAL MEDICINE

## 2020-01-06 PROCEDURE — 1126F PR PAIN SEVERITY QUANTIFIED, NO PAIN PRESENT: ICD-10-PCS | Mod: ,,, | Performed by: INTERNAL MEDICINE

## 2020-01-06 PROCEDURE — 1159F MED LIST DOCD IN RCRD: CPT | Mod: ,,, | Performed by: INTERNAL MEDICINE

## 2020-01-06 PROCEDURE — 99999 PR PBB SHADOW E&M-EST. PATIENT-LVL IV: ICD-10-PCS | Mod: PBBFAC,,, | Performed by: INTERNAL MEDICINE

## 2020-01-06 PROCEDURE — 36415 COLL VENOUS BLD VENIPUNCTURE: CPT

## 2020-01-06 RX ORDER — HYDROCHLOROTHIAZIDE 12.5 MG/1
12.5 CAPSULE ORAL DAILY
Qty: 90 CAPSULE | Refills: 3 | Status: SHIPPED | OUTPATIENT
Start: 2020-01-06 | End: 2020-07-27 | Stop reason: SDUPTHER

## 2020-01-06 RX ORDER — HYDROCHLOROTHIAZIDE 12.5 MG/1
12.5 CAPSULE ORAL DAILY
Qty: 10 CAPSULE | Refills: 0 | Status: SHIPPED | OUTPATIENT
Start: 2020-01-06 | End: 2020-01-06

## 2020-01-06 NOTE — PROGRESS NOTES
"INTERNAL MEDICINE ESTABLISHED PATIENT VISIT NOTE    Subjective:     Chief Complaint: Follow-up  HTN     Patient ID: An Hazel is a 89 y.o. female with HTN, HLD, preDM, anemia, osteoporosis c kyphoscoliosis, glaucoma, chronic dry eyes, urinary incontinence, chronic constipation, aortic atherosclerosis noted incidentally on prior imaging, last seen by me in Sept, here today for f/u.    To review:  At baseline, lives at home c her daughter who works.  AAOx3, does most ADLs independently but appreciates assistance.  Wears poise pads for hx urinary incontinence, no issues c fecal incontinence.  Ambulates c a cane.  Has had 3 falls in the past, in 2013, 2017, and again 5/18.  Had r humeral neck fx back in 2017 and hip fx in 2013 s/p ORIF B.    Today states she recently had issues getting her HCTZ from [facility].  Got a temporary supply from [facility] but has not received meds from [facility] that was sent in last week.    States since last appt, had issues c constipation and saw Dr. Abbasi (GI) who told her she had "back up of stool" and started her on fiber which helped.  Having BM more regularly now.  At that time, had episode where she felt stool was coming and had to manually disimpact herself and had subsequent blood in her stool which has since resolved.    Has also lost weight over the past year but feels she is eating and that her appetite is good.    Also c/o gen dry skin.  Also had a preulcerative callus on her R lateral ankle that was getting better but then bumped it and states it is very tender again.    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 Authorizing Provider   alendronate (FOSAMAX) 70 MG tablet Take 1 tablet (70 mg total) by mouth every 7 days. 12/17/18 12/17/19  Jalyn Caballero MD   amLODIPine (NORVASC) 5 MG tablet TAKE 1 TABLET DAILY 10/15/19   Jalyn Caballero MD   atenolol (TENORMIN) 25 MG tablet TAKE 1 TABLET TWICE A DAY 10/15/19   Jalyn Caballero MD   cholecalciferol, vitamin D3, (VITAMIN D3) 1,000 unit capsule Take 1,000 Units by mouth once daily.    Historical Provider, MD   FOLIC ACID/MULTIVIT-MIN/LUTEIN (CENTRUM SILVER ORAL) Take 1 tablet by mouth once daily.     Historical Provider, MD   hydroCHLOROthiazide (MICROZIDE) 12.5 mg capsule Take 1 capsule (12.5 mg total) by mouth once daily. 12/30/19 12/29/20  Beverly Vickers MD   hydroCHLOROthiazide (MICROZIDE) 12.5 mg capsule Take 1 capsule (12.5 mg total) by mouth once daily. 12/30/19 12/29/20  Beverly Vickers MD   losartan (COZAAR) 100 MG tablet Take 1 tablet (100 mg total) by mouth once daily. 8/21/19 8/20/20  Jalyn Caballero MD   mupirocin (BACTROBAN) 2 % ointment Apply topically 2 (two) times daily. 12/9/19   Meme Rosario PA-C   oxybutynin (DITROPAN-XL) 5 MG TR24 TAKE 1 TABLET DAILY 10/1/19   Jalyn Caballero MD   plecanatide (TRULANCE) 3 mg Tab Take 3 mg by mouth once daily. 1/6/20 5/8/20  Joaquin Collins MD   polyethylene glycol (GLYCOLAX) 17 gram PwPk Take one capful with a full glass of water daily as needed for constipation. 6/25/18   Jalyn Caballero MD   senna (SENOKOT) 8.6 mg tablet Take 1 tablet by mouth once daily.    Historical Provider, MD   wheat dextrin (BENEFIBER HEALTHY SHAPE) 5 gram/7.4 gram Powd Take by mouth.    Historical Provider, MD       Allergies:  Review of patient's allergies indicates:   Allergen Reactions    Augmentin [amoxicillin-pot clavulanate] Nausea And Vomiting    Sulfa (sulfonamide antibiotics)     Zestril [lisinopril] Swelling     Facial Swelling       Social History:  Social History     Tobacco Use    Smoking status: Never Smoker    Smokeless  "tobacco: Never Used    Tobacco comment: The patient exercises every other day on an exercise bike.  On alternative days she does floor exercises with weights.   Substance Use Topics    Alcohol use: Yes     Alcohol/week: 2.0 - 4.0 standard drinks     Types: 1 Glasses of wine, 1 - 3 Standard drinks or equivalent per week     Comment: Occasional use    Drug use: No        Review of Systems   Constitutional: Negative for chills, fatigue and fever.   Respiratory: Negative for cough, chest tightness and shortness of breath.    Cardiovascular: Negative for chest pain.   Gastrointestinal: Negative for abdominal pain and blood in stool.   Genitourinary: Negative for dysuria and frequency.         Health Maintenance:     Immunizations:   Influenza 9/2019  TDap is up to date 5/2017  Prevnar 13 5/2016, pvax 6/18  Shingrix is not UTD.    rec today but pt declined due to concerns regarding potential s/e, Risks and benefits were discussed with patient.       Cancer Screening:  PAP: n/a based on age, also c hx hyst  Mammogram: n/a based on age  Colonoscopy: n/a based on age  DEXA:  7/2018, cont alendronate    Objective:   /78   Pulse 66   Ht 4' 11" (1.499 m)   Wt 35.7 kg (78 lb 11.3 oz)   LMP  (LMP Unknown)   SpO2 99%   BMI 15.90 kg/m²        General: AAO x3, no apparent distress  HEENT: PERRL, OP clear  CV: RRR, no m/r/g  Pulm: Lungs CTAB, no crackles, no wheezes  Abd: s/NT/ND +BS  Extremities: no c/c/e  R ankle c small callus and minimally surrounding erythema.    Labs:     Lab Results   Component Value Date    WBC 7.07 05/20/2019    HGB 11.4 (L) 05/20/2019    HCT 36.8 (L) 05/20/2019    MCV 95 05/20/2019     05/20/2019     Sodium   Date Value Ref Range Status   05/20/2019 136 136 - 145 mmol/L Final     Potassium   Date Value Ref Range Status   05/20/2019 3.8 3.5 - 5.1 mmol/L Final     Chloride   Date Value Ref Range Status   05/20/2019 101 95 - 110 mmol/L Final     CO2   Date Value Ref Range Status "   05/20/2019 30 (H) 23 - 29 mmol/L Final     Glucose   Date Value Ref Range Status   05/20/2019 91 70 - 110 mg/dL Final     BUN, Bld   Date Value Ref Range Status   05/20/2019 19 10 - 30 mg/dL Final     Creatinine   Date Value Ref Range Status   05/20/2019 0.8 0.5 - 1.4 mg/dL Final     Calcium   Date Value Ref Range Status   05/20/2019 10.3 8.7 - 10.5 mg/dL Final     Total Protein   Date Value Ref Range Status   05/20/2019 8.6 (H) 6.0 - 8.4 g/dL Final     Albumin   Date Value Ref Range Status   05/20/2019 3.4 (L) 3.5 - 5.2 g/dL Final     Total Bilirubin   Date Value Ref Range Status   05/20/2019 0.4 0.1 - 1.0 mg/dL Final     Comment:     For infants and newborns, interpretation of results should be based  on gestational age, weight and in agreement with clinical  observations.  Premature Infant recommended reference ranges:  Up to 24 hours.............<8.0 mg/dL  Up to 48 hours............<12.0 mg/dL  3-5 days..................<15.0 mg/dL  6-29 days.................<15.0 mg/dL       Alkaline Phosphatase   Date Value Ref Range Status   05/20/2019 71 55 - 135 U/L Final     AST   Date Value Ref Range Status   05/20/2019 19 10 - 40 U/L Final     ALT   Date Value Ref Range Status   05/20/2019 9 (L) 10 - 44 U/L Final     Anion Gap   Date Value Ref Range Status   05/20/2019 5 (L) 8 - 16 mmol/L Final     eGFR if    Date Value Ref Range Status   05/20/2019 >60 >60 mL/min/1.73 m^2 Final     eGFR if non    Date Value Ref Range Status   05/20/2019 >60 >60 mL/min/1.73 m^2 Final     Comment:     Calculation used to obtain the estimated glomerular filtration  rate (eGFR) is the CKD-EPI equation.        Lab Results   Component Value Date    HGBA1C 5.7 (H) 05/20/2019     Lab Results   Component Value Date    LDLCALC 123.8 06/26/2018     Lab Results   Component Value Date    TSH 1.124 11/14/2017         Assessment/Plan     An was seen today for follow-up.    Diagnoses and all orders for this  visit:    Benign essential hypertension  -     Repeat at goal and bp at recent appts at goal.  States home BP has been good.  Will cont amlodipine, HCTZ, and losartan at current dose.  -     hydroCHLOROthiazide (MICROZIDE) 12.5 mg capsule; Take 1 capsule (12.5 mg total) by mouth once daily.  -     Comprehensive metabolic panel; Future    Mixed hyperlipidemia  Lab Results   Component Value Date    LDLCALC 123.8 06/26/2018     No longer monitoring based on age, not req meds at her age.    Osteoporosis, unspecified osteoporosis type, unspecified pathological fracture presence  On alendronate, dexa utd, can repeat this summer    Prediabetes  Lab Results   Component Value Date    HGBA1C 5.7 (H) 05/20/2019     Conservative mgmt  Will repeat based on wt loss  -     Hemoglobin A1c; Future    Hyperparathyroidism  Last check normalized  Will monitor ca    Aortic atherosclerosis  No acute issues  Cont bp control    Blood in stool  Likely due to trauma from self disimpaction  Will check cbc to make sure stable, has also seen GI  -     CBC auto differential; Future    Weight loss  As per HPI  Advised to add ensure daily and increase protein intake, appetite ok, check basic labs  -     TSH; Future    Ankle callus  rec cont topical mupirocin but increase to TID  Can cover c gauze at night  If not better c conservative mgmt, will consider doxy bid for 5 days.    HM as above  RTC in 5 mos, sooner if needed.    Jalyn Caballero MD  Department of Internal Medicine - [facility]  01/06/2020

## 2020-01-06 NOTE — TELEPHONE ENCOUNTER
Trial of trulance sent to [facility].  Patient should let us know how this trial goes...  She should have a follow up with us within the month.  Please arrange non-urgent follow up.

## 2020-01-16 ENCOUNTER — OFFICE VISIT (OUTPATIENT)
Dept: PODIATRY | Facility: CLINIC | Age: 85
End: 2020-01-16
Payer: MEDICARE

## 2020-01-16 VITALS — HEIGHT: 59 IN | WEIGHT: 78 LBS | BODY MASS INDEX: 15.72 KG/M2

## 2020-01-16 DIAGNOSIS — B35.1 DERMATOPHYTOSIS OF NAIL: Primary | ICD-10-CM

## 2020-01-16 PROCEDURE — 17999 UNLISTD PX SKN MUC MEMB SUBQ: CPT | Mod: CSM,,, | Performed by: PODIATRIST

## 2020-01-16 PROCEDURE — 17999 PR NON-COVERED FOOT CARE: ICD-10-PCS | Mod: CSM,,, | Performed by: PODIATRIST

## 2020-01-16 PROCEDURE — 99499 NO LOS: ICD-10-PCS | Mod: ,,, | Performed by: PODIATRIST

## 2020-01-16 PROCEDURE — 99999 PR PBB SHADOW E&M-EST. PATIENT-LVL II: ICD-10-PCS | Mod: PBBFAC,,, | Performed by: PODIATRIST

## 2020-01-16 PROCEDURE — 99999 PR PBB SHADOW E&M-EST. PATIENT-LVL II: CPT | Mod: PBBFAC,,, | Performed by: PODIATRIST

## 2020-01-16 PROCEDURE — 99499 UNLISTED E&M SERVICE: CPT | Mod: ,,, | Performed by: PODIATRIST

## 2020-01-16 PROCEDURE — 99212 OFFICE O/P EST SF 10 MIN: CPT | Mod: PBBFAC,PN | Performed by: PODIATRIST

## 2020-01-16 NOTE — PROGRESS NOTES
Chief Complaint: Nail Care      HPI:  An Hazel is a 89 y.o. female  With Thick painful toenails preventing sock and shoe wear and imparing ambulation.  Gradual onset, worsening over past several years, aggravated by increased weight bearing, shoe gear, pressure.  Periodic debridement helps symptoms. Denies trauma, surgery all toes.    PCP:  Jalyn Caballero MD          Patient Active Problem List   Diagnosis    Mixed hyperlipidemia    Osteoporosis    Kyphoscoliosis    Pseudoaphakia - Both Eyes    Blepharospasm - Both Eyes    Ptosis - Both Eyes    Familial drusen - Both Eyes    Lagophthalmos, unspecified - Both Eyes    Dry eye syndrome - Both Eyes    Anemia    Debility    Urinary dysfunction    Open angle with borderline findings and low glaucoma risk in both eyes    Fall    Acute posthemorrhagic anemia    Idiopathic scoliosis of lumbar spine    Idiopathic scoliosis of thoracic spine    Benign essential hypertension    Urinary incontinence    Closed fracture of right shoulder    Impaired mobility and ADLs    History of hip fracture    Other chronic pain    Chronic constipation    History of fall    Aortic atherosclerosis    Hyperparathyroidism    Prediabetes    Esophageal dysphagia    Moderate protein-calorie malnutrition         Current Outpatient Medications on File Prior to Visit   Medication Sig Dispense Refill    amLODIPine (NORVASC) 5 MG tablet TAKE 1 TABLET DAILY 90 tablet 4    atenolol (TENORMIN) 25 MG tablet TAKE 1 TABLET TWICE A  tablet 4    cholecalciferol, vitamin D3, (VITAMIN D3) 1,000 unit capsule Take 1,000 Units by mouth once daily.      FOLIC ACID/MULTIVIT-MIN/LUTEIN (CENTRUM SILVER ORAL) Take 1 tablet by mouth once daily.       hydroCHLOROthiazide (MICROZIDE) 12.5 mg capsule Take 1 capsule (12.5 mg total) by mouth once daily. 90 capsule 3    losartan (COZAAR) 100 MG tablet Take 1 tablet (100 mg total) by mouth once daily. 90 tablet 3     mupirocin (BACTROBAN) 2 % ointment Apply topically 2 (two) times daily. 30 g 0    oxybutynin (DITROPAN-XL) 5 MG TR24 TAKE 1 TABLET DAILY 90 tablet 4    plecanatide (TRULANCE) 3 mg Tab Take 1 tablet by mouth once daily. 30 tablet 3    polyethylene glycol (GLYCOLAX) 17 gram PwPk Take one capful with a full glass of water daily as needed for constipation. 30 each 3    senna (SENOKOT) 8.6 mg tablet Take 1 tablet by mouth once daily.      wheat dextrin (BENEFIBER HEALTHY SHAPE) 5 gram/7.4 gram Powd Take by mouth.      alendronate (FOSAMAX) 70 MG tablet Take 1 tablet (70 mg total) by mouth every 7 days. 12 tablet 3     No current facility-administered medications on file prior to visit.            Review of patient's allergies indicates:   Allergen Reactions    Augmentin [amoxicillin-pot clavulanate] Nausea And Vomiting    Sulfa (sulfonamide antibiotics)     Zestril [lisinopril] Swelling     Facial Swelling         Social History     Socioeconomic History    Marital status:      Spouse name: Not on file    Number of children: 4    Years of education: Not on file    Highest education level: Not on file   Occupational History    Occupation:  of [employer]     Comment: Retired after 37 years   Social Needs    Financial resource strain: Not on file    Food insecurity:     Worry: Not on file     Inability: Not on file    Transportation needs:     Medical: Not on file     Non-medical: Not on file   Tobacco Use    Smoking status: Never Smoker    Smokeless tobacco: Never Used    Tobacco comment: The patient exercises every other day on an exercise bike.  On alternative days she does floor exercises with weights.   Substance and Sexual Activity    Alcohol use: Yes     Alcohol/week: 2.0 - 4.0 standard drinks     Types: 1 Glasses of wine, 1 - 3 Standard drinks or equivalent per week     Comment: Occasional use    Drug use: No    Sexual activity: Not on file   Lifestyle  "   Physical activity:     Days per week: Not on file     Minutes per session: Not on file    Stress: Not on file   Relationships    Social connections:     Talks on phone: Not on file     Gets together: Not on file     Attends Religion service: Not on file     Active member of club or organization: Not on file     Attends meetings of clubs or organizations: Not on file     Relationship status: Not on file   Other Topics Concern    Are you pregnant or think you may be? Not Asked    Breast-feeding Not Asked   Social History Narrative    Not on file           Review of Systems   Constitution: Negative for chills, diaphoresis, fever, malaise/fatigue and night sweats.   Cardiovascular: Negative for claudication, cyanosis, leg swelling and syncope.   Skin: Positive for itching, nail changes and rash. Negative for color change, dry skin, suspicious lesions and unusual hair distribution.   Musculoskeletal: Negative for falls, joint pain, joint swelling, muscle cramps, muscle weakness and stiffness.   Gastrointestinal: Negative for constipation, diarrhea, nausea and vomiting.   Neurological: Negative for brief paralysis, disturbances in coordination, focal weakness, numbness, paresthesias, sensory change and tremors.           Objective:        Vitals:    01/16/20 1203   Weight: 35.4 kg (78 lb)   Height: 4' 11" (1.499 m)         Physical Exam   Constitutional: She is oriented to person, place, and time. She appears well-developed and well-nourished. She is cooperative. No distress.   Cardiovascular:   Pulses:       Popliteal pulses are 2+ on the right side, and 2+ on the left side.        Dorsalis pedis pulses are 2+ on the right side, and 2+ on the left side.        Posterior tibial pulses are 1+ on the right side, and 1+ on the left side.   Capillary refill 3 seconds all toes/distal feet, all toes/both feet warm to touch.      Negative lymphadenopathy bilateral popliteal fossa and tarsal tunnel.      Negavie lower " extremity edema bilateral.     Musculoskeletal:        Right ankle: She exhibits normal range of motion, no swelling, no ecchymosis, no deformity, no laceration and normal pulse. Achilles tendon normal. Achilles tendon exhibits no pain, no defect and normal Garay's test results.   Normal angle, base, station of gait. All ten toes without clubbing, cyanosis, or signs of ischemia.  No pain to palpation bilateral lower extremities.  Range of motion, stability, muscle strength, and muscle tone normal bilateral feet and legs.     Lymphadenopathy: No inguinal adenopathy noted on the right or left side.   Negative lymphadenopathy bilateral popliteal fossa and tarsal tunnel.    Negative lymphangitic streaking bilateral feet/ankles/legs.   Neurological: She is alert and oriented to person, place, and time. She has normal strength. She displays no atrophy and no tremor. No sensory deficit. She exhibits normal muscle tone. Gait normal.   Reflex Scores:       Patellar reflexes are 2+ on the right side and 2+ on the left side.       Achilles reflexes are 2+ on the right side and 2+ on the left side.  Negative tinel sign to percussion sural, superficial peroneal, deep peroneal, saphenous, and posterior tibial nerves right and left ankles and feet.     Skin: Skin is warm, dry and intact. Capillary refill takes 2 to 3 seconds. No abrasion, no bruising, no burn, no ecchymosis, no laceration, no lesion and no rash noted. She is not diaphoretic. No cyanosis or erythema. No pallor. Nails show no clubbing.   Dry scale with superficial flakes over an erythematous base  without ulceration, drainage, pus, tracking, fluctuance, malodor, or cardinal signs infection.    Otherwise;  Skin is normal age and health appropriate color, turgor, texture, and temperature bilateral lower extremities without ulceration, hyperpigmentation, discoloration, masses nodules or cords palpated.  No ecchymosis, erythema, edema, or cardinal signs of infection  bilateral lower extremities.    Toenails 1st, 2nd, 3rd, 4th, 5th  bilateral are hypertrophic thickened 2-3 mm, dystrophic, discolored tanish brown with tan, gray crumbly subungual debris.  Severe pain and tenderness  to distal nail plate pressure, without periungual skin abnormality of each.     Psychiatric: She has a normal mood and affect.             Assessment:       Encounter Diagnosis   Name Primary?    Dermatophytosis of nail Yes         Plan:       An was seen today for nail care.    Diagnoses and all orders for this visit:    Dermatophytosis of nail      I counseled the patient on her conditions, their implications and medical management.    Shoe and activity modification as needed for relief.     Follow up 4 months for Proc B $42.

## 2020-02-02 DIAGNOSIS — M81.0 OSTEOPOROSIS, UNSPECIFIED OSTEOPOROSIS TYPE, UNSPECIFIED PATHOLOGICAL FRACTURE PRESENCE: ICD-10-CM

## 2020-02-03 RX ORDER — ALENDRONATE SODIUM 70 MG/1
TABLET ORAL
Qty: 12 TABLET | Refills: 4 | Status: SHIPPED | OUTPATIENT
Start: 2020-02-03 | End: 2021-03-29

## 2020-02-17 DIAGNOSIS — S90.511D: ICD-10-CM

## 2020-02-17 RX ORDER — MUPIROCIN 20 MG/G
OINTMENT TOPICAL 2 TIMES DAILY
Qty: 30 G | Refills: 0 | Status: SHIPPED | OUTPATIENT
Start: 2020-02-17 | End: 2020-08-13

## 2020-02-17 NOTE — TELEPHONE ENCOUNTER
----- Message from Javan Jha sent at 2/17/2020 11:47 AM CST -----  Contact: Patient [phone]  RX request - refill or new RX.  Is this a refill or new RX: Refill   RX name and strength: mupirocin (BACTROBAN) 2 % ointment    Pharmacy name and phone # [facility] [place], [place] - [address] [facility] [phone] (Phone) [phone] (Fax      Comments:  Patient requesting for two Rx's of the same kind to be sent to pharmacy above today, stating leaves one Rx up stairs and one down stairs used three per day.    Please call an advise  Thank you

## 2020-05-12 ENCOUNTER — OFFICE VISIT (OUTPATIENT)
Dept: PODIATRY | Facility: CLINIC | Age: 85
End: 2020-05-12
Payer: MEDICARE

## 2020-05-12 VITALS
BODY MASS INDEX: 15.72 KG/M2 | HEIGHT: 59 IN | SYSTOLIC BLOOD PRESSURE: 192 MMHG | DIASTOLIC BLOOD PRESSURE: 78 MMHG | WEIGHT: 78 LBS | HEART RATE: 65 BPM

## 2020-05-12 DIAGNOSIS — L85.1 PLANTAR POROKERATOSIS, ACQUIRED: Primary | ICD-10-CM

## 2020-05-12 DIAGNOSIS — B35.1 DERMATOPHYTOSIS OF NAIL: ICD-10-CM

## 2020-05-12 PROCEDURE — 99499 UNLISTED E&M SERVICE: CPT | Mod: ,,, | Performed by: PODIATRIST

## 2020-05-12 PROCEDURE — 17999 UNLISTD PX SKN MUC MEMB SUBQ: CPT | Mod: CSM,,, | Performed by: PODIATRIST

## 2020-05-12 PROCEDURE — 99999 PR PBB SHADOW E&M-EST. PATIENT-LVL III: CPT | Mod: PBBFAC,,, | Performed by: PODIATRIST

## 2020-05-12 PROCEDURE — 99999 PR PBB SHADOW E&M-EST. PATIENT-LVL III: ICD-10-PCS | Mod: PBBFAC,,, | Performed by: PODIATRIST

## 2020-05-12 PROCEDURE — 99213 OFFICE O/P EST LOW 20 MIN: CPT | Mod: PBBFAC,PN | Performed by: PODIATRIST

## 2020-05-12 PROCEDURE — 99499 NO LOS: ICD-10-PCS | Mod: ,,, | Performed by: PODIATRIST

## 2020-05-12 PROCEDURE — 17999 PR NON-COVERED FOOT CARE: ICD-10-PCS | Mod: CSM,,, | Performed by: PODIATRIST

## 2020-05-12 NOTE — PROGRESS NOTES
Chief Complaint: Nail Care and Callouses      HPI:  An Hazel is a 89 y.o. female  With Thick painful toenails preventing sock and shoe wear and imparing ambulation.  Gradual onset, worsening over past several years, aggravated by increased weight bearing, shoe gear, pressure.  Periodic debridement helps symptoms. Denies trauma, surgery all toes.    PCP:  Jalyn Caballero MD          Patient Active Problem List   Diagnosis    Mixed hyperlipidemia    Osteoporosis    Kyphoscoliosis    Pseudoaphakia - Both Eyes    Blepharospasm - Both Eyes    Ptosis - Both Eyes    Familial drusen - Both Eyes    Lagophthalmos, unspecified - Both Eyes    Dry eye syndrome - Both Eyes    Anemia    Debility    Urinary dysfunction    Open angle with borderline findings and low glaucoma risk in both eyes    Fall    Acute posthemorrhagic anemia    Idiopathic scoliosis of lumbar spine    Idiopathic scoliosis of thoracic spine    Benign essential hypertension    Urinary incontinence    Closed fracture of right shoulder    Impaired mobility and ADLs    History of hip fracture    Other chronic pain    Chronic constipation    History of fall    Aortic atherosclerosis    Hyperparathyroidism    Prediabetes    Esophageal dysphagia    Moderate protein-calorie malnutrition         Current Outpatient Medications on File Prior to Visit   Medication Sig Dispense Refill    alendronate (FOSAMAX) 70 MG tablet TAKE 1 TABLET EVERY 7 DAYS 12 tablet 4    amLODIPine (NORVASC) 5 MG tablet TAKE 1 TABLET DAILY 90 tablet 4    atenolol (TENORMIN) 25 MG tablet TAKE 1 TABLET TWICE A  tablet 4    cholecalciferol, vitamin D3, (VITAMIN D3) 1,000 unit capsule Take 1,000 Units by mouth once daily.      FOLIC ACID/MULTIVIT-MIN/LUTEIN (CENTRUM SILVER ORAL) Take 1 tablet by mouth once daily.       hydroCHLOROthiazide (MICROZIDE) 12.5 mg capsule Take 1 capsule (12.5 mg total) by mouth once daily. 90 capsule 3    losartan  (COZAAR) 100 MG tablet Take 1 tablet (100 mg total) by mouth once daily. 90 tablet 3    mupirocin (BACTROBAN) 2 % ointment Apply topically 2 (two) times daily. 30 g 0    oxybutynin (DITROPAN-XL) 5 MG TR24 TAKE 1 TABLET DAILY 90 tablet 4    polyethylene glycol (GLYCOLAX) 17 gram PwPk Take one capful with a full glass of water daily as needed for constipation. 30 each 3    senna (SENOKOT) 8.6 mg tablet Take 1 tablet by mouth once daily.      wheat dextrin (BENEFIBER HEALTHY SHAPE) 5 gram/7.4 gram Powd Take by mouth.       No current facility-administered medications on file prior to visit.            Review of patient's allergies indicates:   Allergen Reactions    Augmentin [amoxicillin-pot clavulanate] Nausea And Vomiting    Sulfa (sulfonamide antibiotics)     Zestril [lisinopril] Swelling     Facial Swelling         Social History     Socioeconomic History    Marital status:      Spouse name: Not on file    Number of children: 4    Years of education: Not on file    Highest education level: Not on file   Occupational History    Occupation:  of [employer]     Comment: Retired after 37 years   Social Needs    Financial resource strain: Not on file    Food insecurity:     Worry: Not on file     Inability: Not on file    Transportation needs:     Medical: Not on file     Non-medical: Not on file   Tobacco Use    Smoking status: Never Smoker    Smokeless tobacco: Never Used    Tobacco comment: The patient exercises every other day on an exercise bike.  On alternative days she does floor exercises with weights.   Substance and Sexual Activity    Alcohol use: Yes     Alcohol/week: 2.0 - 4.0 standard drinks     Types: 1 Glasses of wine, 1 - 3 Standard drinks or equivalent per week     Comment: Occasional use    Drug use: No    Sexual activity: Not on file   Lifestyle    Physical activity:     Days per week: Not on file     Minutes per session: Not on file     "Stress: Not on file   Relationships    Social connections:     Talks on phone: Not on file     Gets together: Not on file     Attends Confucianist service: Not on file     Active member of club or organization: Not on file     Attends meetings of clubs or organizations: Not on file     Relationship status: Not on file   Other Topics Concern    Are you pregnant or think you may be? Not Asked    Breast-feeding Not Asked   Social History Narrative    Not on file           Review of Systems   Constitution: Negative for chills, diaphoresis, fever, malaise/fatigue and night sweats.   Cardiovascular: Negative for claudication, cyanosis, leg swelling and syncope.   Skin: Positive for itching, nail changes and rash. Negative for color change, dry skin, suspicious lesions and unusual hair distribution.   Musculoskeletal: Negative for falls, joint pain, joint swelling, muscle cramps, muscle weakness and stiffness.   Gastrointestinal: Negative for constipation, diarrhea, nausea and vomiting.   Neurological: Negative for brief paralysis, disturbances in coordination, focal weakness, numbness, paresthesias, sensory change and tremors.           Objective:        Vitals:    05/12/20 1652   BP: (!) 192/78   Pulse: 65   Weight: 35.4 kg (78 lb)   Height: 4' 11" (1.499 m)         Physical Exam   Constitutional: She is oriented to person, place, and time. She appears well-developed and well-nourished. She is cooperative. No distress.   Cardiovascular:   Pulses:       Popliteal pulses are 2+ on the right side, and 2+ on the left side.        Dorsalis pedis pulses are 2+ on the right side, and 2+ on the left side.        Posterior tibial pulses are 1+ on the right side, and 1+ on the left side.   Capillary refill 3 seconds all toes/distal feet, all toes/both feet warm to touch.      Negative lymphadenopathy bilateral popliteal fossa and tarsal tunnel.      Negavie lower extremity edema bilateral.     Musculoskeletal:        Right ankle: " She exhibits normal range of motion, no swelling, no ecchymosis, no deformity, no laceration and normal pulse. Achilles tendon normal. Achilles tendon exhibits no pain, no defect and normal Garay's test results.   Normal angle, base, station of gait. All ten toes without clubbing, cyanosis, or signs of ischemia.  No pain to palpation bilateral lower extremities.  Range of motion, stability, muscle strength, and muscle tone normal bilateral feet and legs.     Lymphadenopathy: No inguinal adenopathy noted on the right or left side.   Negative lymphadenopathy bilateral popliteal fossa and tarsal tunnel.    Negative lymphangitic streaking bilateral feet/ankles/legs.   Neurological: She is alert and oriented to person, place, and time. She has normal strength. She displays no atrophy and no tremor. No sensory deficit. She exhibits normal muscle tone. Gait normal.   Reflex Scores:       Patellar reflexes are 2+ on the right side and 2+ on the left side.       Achilles reflexes are 2+ on the right side and 2+ on the left side.  Negative tinel sign to percussion sural, superficial peroneal, deep peroneal, saphenous, and posterior tibial nerves right and left ankles and feet.     Skin: Skin is warm, dry and intact. Capillary refill takes 2 to 3 seconds. No abrasion, no bruising, no burn, no ecchymosis, no laceration, no lesion and no rash noted. She is not diaphoretic. No cyanosis or erythema. No pallor. Nails show no clubbing.   Dry scale with superficial flakes over an erythematous base  without ulceration, drainage, pus, tracking, fluctuance, malodor, or cardinal signs infection.    Otherwise;  Skin is normal age and health appropriate color, turgor, texture, and temperature bilateral lower extremities without ulceration, hyperpigmentation, discoloration, masses nodules or cords palpated.  No ecchymosis, erythema, edema, or cardinal signs of infection bilateral lower extremities.    Toenails 1st, 2nd, 3rd, 4th, 5th   "bilateral are hypertrophic thickened 2-3 mm, dystrophic, discolored tanish brown with tan, gray crumbly subungual debris.  Severe pain and tenderness  to distal nail plate pressure, without periungual skin abnormality of each.     Psychiatric: She has a normal mood and affect.             Assessment:       Encounter Diagnoses   Name Primary?    Plantar porokeratosis, acquired Yes    Dermatophytosis of nail          Plan:       An was seen today for nail care and callouses.    Diagnoses and all orders for this visit:    Plantar porokeratosis, acquired    Dermatophytosis of nail          Vitals:    05/12/20 1652   BP: (!) 192/78   Pulse: 65   Weight: 35.4 kg (78 lb)   Height: 4' 11" (1.499 m)       Plantar porokeratosis, acquired    Dermatophytosis of nail        Patient presents to the clinic for non-covered routine foot care.  Patient patient is self pay.    Pedal pulses are palpable. No known risk factors requiring routine foot care.  Nails and calluses  were reduced and trimmed bilaterally. Patient tolerated well.     follow up in 4 months per patient request.        "

## 2020-05-27 ENCOUNTER — TELEPHONE (OUTPATIENT)
Dept: INTERNAL MEDICINE | Facility: CLINIC | Age: 85
End: 2020-05-27

## 2020-05-27 NOTE — TELEPHONE ENCOUNTER
----- Message from Rebekah Gomez sent at 5/27/2020  4:15 PM CDT -----  Contact: [phone]  Patient would like to speak to the nurse for an explanation on why she is schedule with KAILA robins. Patient states she did not anything about it until she received a letter on the mail. Please advise.

## 2020-06-03 ENCOUNTER — OFFICE VISIT (OUTPATIENT)
Dept: INTERNAL MEDICINE | Facility: CLINIC | Age: 85
End: 2020-06-03
Payer: MEDICARE

## 2020-06-03 VITALS
WEIGHT: 69.69 LBS | DIASTOLIC BLOOD PRESSURE: 70 MMHG | OXYGEN SATURATION: 99 % | HEIGHT: 59 IN | BODY MASS INDEX: 14.05 KG/M2 | SYSTOLIC BLOOD PRESSURE: 138 MMHG | HEART RATE: 67 BPM

## 2020-06-03 DIAGNOSIS — I70.0 AORTIC ATHEROSCLEROSIS: ICD-10-CM

## 2020-06-03 DIAGNOSIS — I10 BENIGN ESSENTIAL HYPERTENSION: Primary | ICD-10-CM

## 2020-06-03 DIAGNOSIS — R73.03 PREDIABETES: ICD-10-CM

## 2020-06-03 DIAGNOSIS — M81.0 OSTEOPOROSIS, UNSPECIFIED OSTEOPOROSIS TYPE, UNSPECIFIED PATHOLOGICAL FRACTURE PRESENCE: ICD-10-CM

## 2020-06-03 DIAGNOSIS — D64.9 ANEMIA, UNSPECIFIED TYPE: ICD-10-CM

## 2020-06-03 DIAGNOSIS — R63.4 WEIGHT LOSS: ICD-10-CM

## 2020-06-03 DIAGNOSIS — E78.2 MIXED HYPERLIPIDEMIA: ICD-10-CM

## 2020-06-03 PROCEDURE — 99213 OFFICE O/P EST LOW 20 MIN: CPT | Mod: PBBFAC | Performed by: INTERNAL MEDICINE

## 2020-06-03 PROCEDURE — 99214 PR OFFICE/OUTPT VISIT, EST, LEVL IV, 30-39 MIN: ICD-10-PCS | Mod: S$PBB,,, | Performed by: INTERNAL MEDICINE

## 2020-06-03 PROCEDURE — 99214 OFFICE O/P EST MOD 30 MIN: CPT | Mod: S$PBB,,, | Performed by: INTERNAL MEDICINE

## 2020-06-03 PROCEDURE — 99999 PR PBB SHADOW E&M-EST. PATIENT-LVL III: ICD-10-PCS | Mod: PBBFAC,,, | Performed by: INTERNAL MEDICINE

## 2020-06-03 PROCEDURE — 99999 PR PBB SHADOW E&M-EST. PATIENT-LVL III: CPT | Mod: PBBFAC,,, | Performed by: INTERNAL MEDICINE

## 2020-06-03 NOTE — PROGRESS NOTES
INTERNAL MEDICINE ESTABLISHED PATIENT VISIT NOTE    Subjective:     Chief Complaint: Follow-up  HTN     Patient ID: An  Aleksander Luz is a 89 y.o. female with HTN, HLD, preDM, anemia, osteoporosis c kyphoscoliosis, glaucoma, chronic dry eyes, urinary incontinence, chronic constipation, aortic atherosclerosis noted incidentally on prior imaging, last seen by me in Jan, here today for f/u.    To review:  At baseline, lives at home c her daughter who works.  AAOx3, does most ADLs independently but appreciates assistance.  Wears poise pads for hx urinary incontinence, no issues c fecal incontinence.  Ambulates c a cane.  Has had 3 falls in the past, in 2013, 2017, and again 5/18.  Had r humeral neck fx back in 2017 and hip fx in 2013 s/p ORIF B.     Has lost wt since last appt which pt attributes to cutting out sweets based on last a1c.  States she has never had a big appetite and has been this way all her life.    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 Authorizing Provider   alendronate (FOSAMAX) 70 MG tablet TAKE 1 TABLET EVERY 7 DAYS 2/3/20   Jalyn Caballero MD   amLODIPine (NORVASC) 5 MG tablet TAKE 1 TABLET DAILY 10/15/19   Jalyn Caballero MD   atenolol (TENORMIN) 25 MG tablet TAKE 1 TABLET TWICE A DAY 10/15/19   Jalyn Caballero MD   cholecalciferol, vitamin D3, (VITAMIN D3) 1,000 unit capsule Take 1,000 Units by mouth once daily.    Historical Provider, MD   FOLIC ACID/MULTIVIT-MIN/LUTEIN (CENTRUM SILVER ORAL) Take 1 tablet by mouth once daily.     Historical Provider, MD   hydroCHLOROthiazide (MICROZIDE) 12.5 mg capsule Take 1 capsule (12.5 mg  total) by mouth once daily. 1/6/20 1/5/21  Jalyn Caballero MD   losartan (COZAAR) 100 MG tablet Take 1 tablet (100 mg total) by mouth once daily. 8/21/19 8/20/20  Jalyn Caballero MD   mupirocin (BACTROBAN) 2 % ointment Apply topically 2 (two) times daily. 2/17/20   Jalyn Caballero MD   oxybutynin (DITROPAN-XL) 5 MG TR24 TAKE 1 TABLET DAILY 10/1/19   Jalyn Caballero MD   polyethylene glycol (GLYCOLAX) 17 gram PwPk Take one capful with a full glass of water daily as needed for constipation. 6/25/18   Jalyn Caballero MD   senna (SENOKOT) 8.6 mg tablet Take 1 tablet by mouth once daily.    Historical Provider, MD   wheat dextrin (BENEFIBER HEALTHY SHAPE) 5 gram/7.4 gram Powd Take by mouth.    Historical Provider, MD       Allergies:  Review of patient's allergies indicates:   Allergen Reactions    Augmentin [amoxicillin-pot clavulanate] Nausea And Vomiting    Sulfa (sulfonamide antibiotics)     Zestril [lisinopril] Swelling     Facial Swelling       Social History:  Social History     Tobacco Use    Smoking status: Never Smoker    Smokeless tobacco: Never Used    Tobacco comment: The patient exercises every other day on an exercise bike.  On alternative days she does floor exercises with weights.   Substance Use Topics    Alcohol use: Yes     Alcohol/week: 2.0 - 4.0 standard drinks     Types: 1 Glasses of wine, 1 - 3 Standard drinks or equivalent per week     Comment: Occasional use    Drug use: No        Review of Systems   Constitutional: Negative for chills, fatigue and fever.   Respiratory: Negative for cough, chest tightness and shortness of breath.    Cardiovascular: Negative for chest pain.   Gastrointestinal: Negative for abdominal pain and blood in stool.   Genitourinary: Negative for dysuria and frequency.         Health Maintenance:     Immunizations:   Influenza 9/2019  TDap is up to date 5/2017  Prevnar 13 5/2016, pvax 6/18  Shingrix is not UTD.    rec today but pt declined due to concerns regarding potential s/e, Risks and  "benefits were discussed with patient.        Cancer Screening:  PAP: n/a based on age, also c hx hyst  Mammogram: n/a based on age  Colonoscopy: n/a based on age  DEXA:  7/2018, cont alendronate    Objective:   /70 (BP Location: Left arm, Patient Position: Sitting, BP Method: Medium (Manual))   Pulse 67   Ht 4' 11" (1.499 m)   Wt 31.6 kg (69 lb 10.7 oz)   LMP  (LMP Unknown)   SpO2 99%   BMI 14.07 kg/m²        General: AAO x3, no apparent distress, very thin c kyphosis  HEENT: PERRL, OP clear  CV: RRR, no m/r/g  Pulm: Lungs CTAB, no crackles, no wheezes  Abd: s/NT/ND +BS  Extremities: no c/c/e    Labs:     Lab Results   Component Value Date    WBC 7.79 01/06/2020    HGB 11.3 (L) 01/06/2020    HCT 35.9 (L) 01/06/2020    MCV 98 01/06/2020     01/06/2020     Lab Results   Component Value Date    IRON 68 09/06/2017    TIBC 414 09/06/2017       Sodium   Date Value Ref Range Status   01/06/2020 140 136 - 145 mmol/L Final     Potassium   Date Value Ref Range Status   01/06/2020 4.0 3.5 - 5.1 mmol/L Final     Chloride   Date Value Ref Range Status   01/06/2020 103 95 - 110 mmol/L Final     CO2   Date Value Ref Range Status   01/06/2020 29 23 - 29 mmol/L Final     Glucose   Date Value Ref Range Status   01/06/2020 83 70 - 110 mg/dL Final     BUN, Bld   Date Value Ref Range Status   01/06/2020 15 10 - 30 mg/dL Final     Creatinine   Date Value Ref Range Status   01/06/2020 0.7 0.5 - 1.4 mg/dL Final     Calcium   Date Value Ref Range Status   01/06/2020 10.3 8.7 - 10.5 mg/dL Final     Total Protein   Date Value Ref Range Status   01/06/2020 8.3 6.0 - 8.4 g/dL Final     Albumin   Date Value Ref Range Status   01/06/2020 3.3 (L) 3.5 - 5.2 g/dL Final     Total Bilirubin   Date Value Ref Range Status   01/06/2020 0.5 0.1 - 1.0 mg/dL Final     Comment:     For infants and newborns, interpretation of results should be based  on gestational age, weight and in agreement with clinical  observations.  Premature Infant " recommended reference ranges:  Up to 24 hours.............<8.0 mg/dL  Up to 48 hours............<12.0 mg/dL  3-5 days..................<15.0 mg/dL  6-29 days.................<15.0 mg/dL       Alkaline Phosphatase   Date Value Ref Range Status   01/06/2020 68 55 - 135 U/L Final     AST   Date Value Ref Range Status   01/06/2020 23 10 - 40 U/L Final     ALT   Date Value Ref Range Status   01/06/2020 10 10 - 44 U/L Final     Anion Gap   Date Value Ref Range Status   01/06/2020 8 8 - 16 mmol/L Final     eGFR if    Date Value Ref Range Status   01/06/2020 >60.0 >60 mL/min/1.73 m^2 Final     eGFR if non    Date Value Ref Range Status   01/06/2020 >60.0 >60 mL/min/1.73 m^2 Final     Comment:     Calculation used to obtain the estimated glomerular filtration  rate (eGFR) is the CKD-EPI equation.        Lab Results   Component Value Date    HGBA1C 6.0 (H) 01/06/2020     Lab Results   Component Value Date    LDLCALC 123.8 06/26/2018     Lab Results   Component Value Date    TSH 2.724 01/06/2020         Assessment/Plan     An was seen today for follow-up.    Diagnoses and all orders for this visit:    Benign essential hypertension  at goal.  Cont current medications.-     Comprehensive metabolic panel; Future    Mixed hyperlipidemia  Lab Results   Component Value Date    LDLCALC 123.8 06/26/2018     Well controlled on last check s meds, no longer checking based on age.    Prediabetes  Weight loss  Sugars worse on last check at which time was advised to cut back on sugars.  Has lost significant weight since Jan so advised to try to increase protein intake and occasional sweets okay, repeat labs now.   -     Hemoglobin A1C; Future    Osteoporosis, unspecified osteoporosis type, unspecified pathological fracture presence  On alendronate, advised to cont meds    Aortic atherosclerosis  No acute issues, cont bp control    Anemia, unspecified type  Lab Results   Component Value Date    IRON 68  09/06/2017    [facility] 414 09/06/2017     Stable on last check, will monitor.  -     CBC auto differential; Future    HM as above  RTC in 2 mos to f/u on weight, discussed Glucerna shakes to supplement and increase dietary protein    Jalyn Caballero MD  Department of Internal Medicine - [facility]  06/03/2020

## 2020-07-06 ENCOUNTER — OFFICE VISIT (OUTPATIENT)
Dept: HOME HEALTH SERVICES | Facility: CLINIC | Age: 85
End: 2020-07-06
Payer: MEDICARE

## 2020-07-06 VITALS
HEART RATE: 60 BPM | WEIGHT: 70 LBS | DIASTOLIC BLOOD PRESSURE: 74 MMHG | HEIGHT: 59 IN | BODY MASS INDEX: 14.11 KG/M2 | SYSTOLIC BLOOD PRESSURE: 141 MMHG

## 2020-07-06 DIAGNOSIS — R73.03 PREDIABETES: ICD-10-CM

## 2020-07-06 DIAGNOSIS — R63.6 UNDERWEIGHT: ICD-10-CM

## 2020-07-06 DIAGNOSIS — H40.013 OPEN ANGLE WITH BORDERLINE FINDINGS AND LOW GLAUCOMA RISK IN BOTH EYES: ICD-10-CM

## 2020-07-06 DIAGNOSIS — M81.0 OSTEOPOROSIS, UNSPECIFIED OSTEOPOROSIS TYPE, UNSPECIFIED PATHOLOGICAL FRACTURE PRESENCE: ICD-10-CM

## 2020-07-06 DIAGNOSIS — E44.0 MODERATE PROTEIN-CALORIE MALNUTRITION: ICD-10-CM

## 2020-07-06 DIAGNOSIS — R32 URINARY INCONTINENCE, UNSPECIFIED TYPE: ICD-10-CM

## 2020-07-06 DIAGNOSIS — Z74.09 OTHER REDUCED MOBILITY: ICD-10-CM

## 2020-07-06 DIAGNOSIS — I70.0 AORTIC ATHEROSCLEROSIS: ICD-10-CM

## 2020-07-06 DIAGNOSIS — Z99.89 DEPENDENCE ON OTHER ENABLING MACHINES AND DEVICES: ICD-10-CM

## 2020-07-06 DIAGNOSIS — E78.2 MIXED HYPERLIPIDEMIA: ICD-10-CM

## 2020-07-06 DIAGNOSIS — D64.9 ANEMIA, UNSPECIFIED TYPE: ICD-10-CM

## 2020-07-06 DIAGNOSIS — N18.30 CKD (CHRONIC KIDNEY DISEASE) STAGE 3, GFR 30-59 ML/MIN: ICD-10-CM

## 2020-07-06 DIAGNOSIS — I10 BENIGN ESSENTIAL HYPERTENSION: ICD-10-CM

## 2020-07-06 DIAGNOSIS — Z00.00 ENCOUNTER FOR PREVENTIVE HEALTH EXAMINATION: Primary | ICD-10-CM

## 2020-07-06 DIAGNOSIS — R39.198 URINARY DYSFUNCTION: ICD-10-CM

## 2020-07-06 PROBLEM — E21.3 HYPERPARATHYROIDISM: Status: RESOLVED | Noted: 2018-07-23 | Resolved: 2020-07-06

## 2020-07-06 PROCEDURE — 99348 PR HOME VISIT,ESTAB PATIENT,LEVEL II: ICD-10-PCS | Mod: S$GLB,ICN,, | Performed by: NURSE PRACTITIONER

## 2020-07-06 PROCEDURE — 99348 HOME/RES VST EST LOW MDM 30: CPT | Mod: S$GLB,ICN,, | Performed by: NURSE PRACTITIONER

## 2020-07-06 NOTE — PATIENT INSTRUCTIONS
Counseling and Referral of Other Preventative  (Italic type indicates deductible and co-insurance are waived)    Patient Name: An Luz  Today's Date: 7/6/2020    Health Maintenance       Date Due Completion Date    Lipid Panel 06/26/2019 6/26/2018    Shingles Vaccine (1 of 2) 07/27/2020 (Originally 10/8/1977) ---    Influenza Vaccine (1) 09/01/2020 9/30/2019    TETANUS VACCINE 05/02/2027 5/2/2017        No orders of the defined types were placed in this encounter.    The following information is provided to all patients.  This information is to help you find resources for any of the problems found today that may be affecting your health:                Living healthy guide: www.[facility].[place].gov      Understanding Diabetes: www.diabetes.org      Eating healthy: www.cdc.gov/healthyweight      [facility] home safety checklist: www.cdc.gov/steadi/patient.html      Agency on Aging: www.goea.[place].[facility]      Alcoholics anonymous (AA): www.aa.org      Physical Activity: www.[place].nih.gov/vc5rqnf      Tobacco use: www.quitwithusla.org

## 2020-07-06 NOTE — PROGRESS NOTES
"  An Luz presented for a  Medicare AWV and comprehensive Health Risk Assessment today. The following components were reviewed and updated:    · Medical history  · Family History  · Social history  · Allergies and Current Medications  · Health Risk Assessment  · Health Maintenance  · Care Team     ** See Completed Assessments for Annual Wellness Visit within the encounter summary.**       The following assessments were completed:  · Living Situation  · CAGE  · Depression Screening  · Timed Get Up and Go  · Whisper Test  · Cognitive Function Screening  ·   ·   ·   · Nutrition Screening  · ADL Screening  · PAQ Screening    Vitals:    07/06/20 1412   BP: (!) 141/74   Pulse: 60   Weight: 31.8 kg (70 lb)   Height: 4' 11" (1.499 m)     Body mass index is 14.14 kg/m².  Physical Exam  Constitutional:       Appearance: Normal appearance.   HENT:      Head: Normocephalic and atraumatic.      Nose: Nose normal.   Eyes:      Extraocular Movements: Extraocular movements intact.   Neck:      Musculoskeletal: Normal range of motion.   Cardiovascular:      Rate and Rhythm: Normal rate and regular rhythm.      Heart sounds: Normal heart sounds.   Pulmonary:      Effort: No respiratory distress.      Breath sounds: Normal breath sounds.   Abdominal:      General: Bowel sounds are normal.      Palpations: Abdomen is soft.   Musculoskeletal: Normal range of motion.         General: No swelling.      Comments: Unsteady gait  Cane present   Skin:     General: Skin is warm and dry.   Neurological:      General: No focal deficit present.      Mental Status: She is alert and oriented to person, place, and time.   Psychiatric:         Mood and Affect: Mood normal.         Behavior: Behavior normal.           Diagnoses and health risks identified today and associated recommendations/orders:    1. Encounter for preventive health examination  Assessment completed. Preventive measures reviewed with patient and patients daughter.    2. " Dependence on other enabling machines and devices  Stable, followed by PCP.    3. Other reduced mobility  Stable, followed by PCP.    4. Underweight  Stable, followed by PCP.    5. Open angle with borderline findings and low glaucoma risk in both eyes  Stable, followed by Optometry.    6. Benign essential hypertension  Stable, followed by PCP.    7. Aortic atherosclerosis  Stable, followed by PCP.    8. Mixed hyperlipidemia  Stable, followed by PCP.  - Lipid Panel; Future    9. Urinary dysfunction  Stable on  Medications. Followed by PCP.    10. Urinary incontinence, unspecified type  Stable in medications. Followed by PCP.    11. Anemia, unspecified type  Stable, followed by PCP.    12. Moderate protein-calorie malnutrition  Stable, followed by PCP.    13. Prediabetes  Stable, followed by PCP.    14. Osteoporosis, unspecified osteoporosis type, unspecified pathological fracture presence  Stable, followed by PCP.    15. CKD (chronic kidney disease) stage 3, GFR 30-59 ml/min  Stable, followed by PCP.    Provided An with a 5-10 year written screening schedule and personal prevention plan. Recommendations were developed using the USPSTF age appropriate recommendations. Education, counseling, and referrals were provided as needed. After Visit Summary printed and given to patient which includes a list of additional screenings\tests needed.    No follow-ups on file.    Alberta Gerardo NP  I offered to discuss end of life issues, including information on how to make advance directives that the patient could use to name someone who would make medical decisions on their behalf if they became too ill to make themselves.    ___Patient declined  _X_Patient is interested, I provided paper work and offered to discuss.

## 2020-07-17 DIAGNOSIS — Z71.89 COMPLEX CARE COORDINATION: ICD-10-CM

## 2020-07-27 DIAGNOSIS — I10 BENIGN ESSENTIAL HYPERTENSION: ICD-10-CM

## 2020-07-27 RX ORDER — HYDROCHLOROTHIAZIDE 12.5 MG/1
12.5 CAPSULE ORAL DAILY
Qty: 90 CAPSULE | Refills: 3 | Status: SHIPPED | OUTPATIENT
Start: 2020-07-27 | End: 2021-06-08 | Stop reason: SDUPTHER

## 2020-07-27 NOTE — TELEPHONE ENCOUNTER
----- Message from Chey Amin sent at 7/27/2020 10:59 AM CDT -----  Contact: self [phone]  Type: Rx    Name of medication(s): hydroCHLOROthiazide (MICROZIDE) 12.5 mg capsule    Is this a refill? New rx? refill    Who prescribed medication? Dr Caballero     Pharmacy Name, Phone, & Location: [facility] - [place], [address]    Comments: pt is asking for a new [facility] for above medication

## 2020-07-30 ENCOUNTER — LAB VISIT (OUTPATIENT)
Dept: LAB | Facility: HOSPITAL | Age: 85
End: 2020-07-30
Payer: MEDICARE

## 2020-07-30 DIAGNOSIS — E78.2 MIXED HYPERLIPIDEMIA: ICD-10-CM

## 2020-07-30 LAB
CHOLEST SERPL-MCNC: 195 MG/DL (ref 120–199)
CHOLEST/HDLC SERPL: 4.1 {RATIO} (ref 2–5)
HDLC SERPL-MCNC: 47 MG/DL (ref 40–75)
HDLC SERPL: 24.1 % (ref 20–50)
LDLC SERPL CALC-MCNC: 128.8 MG/DL (ref 63–159)
NONHDLC SERPL-MCNC: 148 MG/DL
TRIGL SERPL-MCNC: 96 MG/DL (ref 30–150)

## 2020-07-30 PROCEDURE — 36415 COLL VENOUS BLD VENIPUNCTURE: CPT

## 2020-07-30 PROCEDURE — 80061 LIPID PANEL: CPT

## 2020-08-13 ENCOUNTER — OFFICE VISIT (OUTPATIENT)
Dept: INTERNAL MEDICINE | Facility: CLINIC | Age: 85
End: 2020-08-13
Payer: MEDICARE

## 2020-08-13 ENCOUNTER — TELEPHONE (OUTPATIENT)
Dept: INTERNAL MEDICINE | Facility: CLINIC | Age: 85
End: 2020-08-13

## 2020-08-13 ENCOUNTER — LAB VISIT (OUTPATIENT)
Dept: LAB | Facility: HOSPITAL | Age: 85
End: 2020-08-13
Attending: INTERNAL MEDICINE
Payer: MEDICARE

## 2020-08-13 ENCOUNTER — TELEPHONE (OUTPATIENT)
Dept: GASTROENTEROLOGY | Facility: CLINIC | Age: 85
End: 2020-08-13

## 2020-08-13 VITALS
DIASTOLIC BLOOD PRESSURE: 62 MMHG | SYSTOLIC BLOOD PRESSURE: 130 MMHG | HEIGHT: 59 IN | BODY MASS INDEX: 14.23 KG/M2 | WEIGHT: 70.56 LBS | HEART RATE: 70 BPM | OXYGEN SATURATION: 98 %

## 2020-08-13 DIAGNOSIS — E46 PROTEIN-CALORIE MALNUTRITION, UNSPECIFIED SEVERITY: ICD-10-CM

## 2020-08-13 DIAGNOSIS — R13.10 DYSPHAGIA, UNSPECIFIED TYPE: ICD-10-CM

## 2020-08-13 DIAGNOSIS — D64.9 ANEMIA, UNSPECIFIED TYPE: ICD-10-CM

## 2020-08-13 DIAGNOSIS — I10 BENIGN ESSENTIAL HYPERTENSION: ICD-10-CM

## 2020-08-13 DIAGNOSIS — N18.30 CKD (CHRONIC KIDNEY DISEASE) STAGE 3, GFR 30-59 ML/MIN: Primary | ICD-10-CM

## 2020-08-13 DIAGNOSIS — R63.4 WEIGHT LOSS: ICD-10-CM

## 2020-08-13 DIAGNOSIS — D89.2 HYPERGAMMAGLOBULINEMIA: ICD-10-CM

## 2020-08-13 DIAGNOSIS — R63.4 WEIGHT LOSS: Primary | ICD-10-CM

## 2020-08-13 LAB
ALBUMIN SERPL BCP-MCNC: 3.3 G/DL (ref 3.5–5.2)
ALP SERPL-CCNC: 62 U/L (ref 55–135)
ALT SERPL W/O P-5'-P-CCNC: 7 U/L (ref 10–44)
ANION GAP SERPL CALC-SCNC: 5 MMOL/L (ref 8–16)
AST SERPL-CCNC: 22 U/L (ref 10–40)
BASOPHILS # BLD AUTO: 0.06 K/UL (ref 0–0.2)
BASOPHILS NFR BLD: 0.8 % (ref 0–1.9)
BILIRUB SERPL-MCNC: 0.4 MG/DL (ref 0.1–1)
BUN SERPL-MCNC: 24 MG/DL (ref 10–30)
CALCIUM SERPL-MCNC: 11.1 MG/DL (ref 8.7–10.5)
CHLORIDE SERPL-SCNC: 99 MMOL/L (ref 95–110)
CO2 SERPL-SCNC: 32 MMOL/L (ref 23–29)
CREAT SERPL-MCNC: 0.8 MG/DL (ref 0.5–1.4)
DIFFERENTIAL METHOD: ABNORMAL
EOSINOPHIL # BLD AUTO: 0.1 K/UL (ref 0–0.5)
EOSINOPHIL NFR BLD: 1.7 % (ref 0–8)
ERYTHROCYTE [DISTWIDTH] IN BLOOD BY AUTOMATED COUNT: 13.7 % (ref 11.5–14.5)
EST. GFR  (AFRICAN AMERICAN): >60 ML/MIN/1.73 M^2
EST. GFR  (NON AFRICAN AMERICAN): >60 ML/MIN/1.73 M^2
FERRITIN SERPL-MCNC: 64 NG/ML (ref 20–300)
FOLATE SERPL-MCNC: 17.2 NG/ML (ref 4–24)
GLUCOSE SERPL-MCNC: 84 MG/DL (ref 70–110)
HCT VFR BLD AUTO: 33 % (ref 37–48.5)
HGB BLD-MCNC: 10.4 G/DL (ref 12–16)
IMM GRANULOCYTES # BLD AUTO: 0.01 K/UL (ref 0–0.04)
IMM GRANULOCYTES NFR BLD AUTO: 0.1 % (ref 0–0.5)
IRON SERPL-MCNC: 49 UG/DL (ref 30–160)
LYMPHOCYTES # BLD AUTO: 1.5 K/UL (ref 1–4.8)
LYMPHOCYTES NFR BLD: 19.9 % (ref 18–48)
MCH RBC QN AUTO: 31 PG (ref 27–31)
MCHC RBC AUTO-ENTMCNC: 31.5 G/DL (ref 32–36)
MCV RBC AUTO: 98 FL (ref 82–98)
MONOCYTES # BLD AUTO: 0.9 K/UL (ref 0.3–1)
MONOCYTES NFR BLD: 12.2 % (ref 4–15)
NEUTROPHILS # BLD AUTO: 5 K/UL (ref 1.8–7.7)
NEUTROPHILS NFR BLD: 65.3 % (ref 38–73)
NRBC BLD-RTO: 0 /100 WBC
PLATELET # BLD AUTO: 248 K/UL (ref 150–350)
PMV BLD AUTO: 11.9 FL (ref 9.2–12.9)
POTASSIUM SERPL-SCNC: 4.5 MMOL/L (ref 3.5–5.1)
PROT SERPL-MCNC: 8.2 G/DL (ref 6–8.4)
RBC # BLD AUTO: 3.36 M/UL (ref 4–5.4)
SATURATED IRON: 14 % (ref 20–50)
SODIUM SERPL-SCNC: 136 MMOL/L (ref 136–145)
TOTAL IRON BINDING CAPACITY: 348 UG/DL (ref 250–450)
TRANSFERRIN SERPL-MCNC: 235 MG/DL (ref 200–375)
VIT B12 SERPL-MCNC: 899 PG/ML (ref 210–950)
WBC # BLD AUTO: 7.64 K/UL (ref 3.9–12.7)

## 2020-08-13 PROCEDURE — 85025 COMPLETE CBC W/AUTO DIFF WBC: CPT

## 2020-08-13 PROCEDURE — 86334 IMMUNOFIX E-PHORESIS SERUM: CPT

## 2020-08-13 PROCEDURE — 82746 ASSAY OF FOLIC ACID SERUM: CPT

## 2020-08-13 PROCEDURE — 80053 COMPREHEN METABOLIC PANEL: CPT

## 2020-08-13 PROCEDURE — 99999 PR PBB SHADOW E&M-EST. PATIENT-LVL V: ICD-10-PCS | Mod: PBBFAC,,, | Performed by: INTERNAL MEDICINE

## 2020-08-13 PROCEDURE — 83540 ASSAY OF IRON: CPT

## 2020-08-13 PROCEDURE — 82607 VITAMIN B-12: CPT

## 2020-08-13 PROCEDURE — 99214 OFFICE O/P EST MOD 30 MIN: CPT | Mod: S$PBB,,, | Performed by: INTERNAL MEDICINE

## 2020-08-13 PROCEDURE — 86334 PATHOLOGIST INTERPRETATION IFE: ICD-10-PCS | Mod: 26,,, | Performed by: PATHOLOGY

## 2020-08-13 PROCEDURE — 82728 ASSAY OF FERRITIN: CPT

## 2020-08-13 PROCEDURE — 36415 COLL VENOUS BLD VENIPUNCTURE: CPT

## 2020-08-13 PROCEDURE — 84165 PROTEIN E-PHORESIS SERUM: CPT

## 2020-08-13 PROCEDURE — 84165 PROTEIN E-PHORESIS SERUM: CPT | Mod: 26,,, | Performed by: PATHOLOGY

## 2020-08-13 PROCEDURE — 99215 OFFICE O/P EST HI 40 MIN: CPT | Mod: PBBFAC | Performed by: INTERNAL MEDICINE

## 2020-08-13 PROCEDURE — 99999 PR PBB SHADOW E&M-EST. PATIENT-LVL V: CPT | Mod: PBBFAC,,, | Performed by: INTERNAL MEDICINE

## 2020-08-13 PROCEDURE — 99214 PR OFFICE/OUTPT VISIT, EST, LEVL IV, 30-39 MIN: ICD-10-PCS | Mod: S$PBB,,, | Performed by: INTERNAL MEDICINE

## 2020-08-13 PROCEDURE — 86334 IMMUNOFIX E-PHORESIS SERUM: CPT | Mod: 26,,, | Performed by: PATHOLOGY

## 2020-08-13 PROCEDURE — 84165 PATHOLOGIST INTERPRETATION SPE: ICD-10-PCS | Mod: 26,,, | Performed by: PATHOLOGY

## 2020-08-13 NOTE — TELEPHONE ENCOUNTER
Called pt, spoke with pt and daughter Jing. Jing states its not easy getting her mother here so I told her that Dr Ada TA will give her a call to schedule an appt.

## 2020-08-13 NOTE — TELEPHONE ENCOUNTER
----- Message from Bettina Aguilera sent at 8/13/2020 12:01 PM CDT -----  Regarding: schedule  Please contact patient for a follow-up, she has been seen by Dr. Collins before.  Thanks

## 2020-08-13 NOTE — PROGRESS NOTES
INTERNAL MEDICINE ESTABLISHED PATIENT VISIT NOTE    Subjective:     Chief Complaint: Follow-up  weight, HTN     Patient ID: An Hazel is a 89 y.o. female with HTN, HLD, preDM, anemia, osteoporosis c kyphoscoliosis, glaucoma, chronic dry eyes, urinary incontinence, chronic constipation, aortic atherosclerosis noted incidentally on prior imaging, last seen by me in June, here today for f/u.    To review:  At baseline, lives at home c her daughter who works.  AAOx3, does most ADLs independently but appreciates assistance.  Wears poise pads for hx urinary incontinence, no issues c fecal incontinence.  Ambulates c a cane.  Has had 3 falls in the past, in 2013, 2017, and again 5/18.  Had r humeral neck fx back in 2017 and hip fx in 2013 s/p ORIF B.    Was previously noted to have preDM so pt was counseled on diet but subsequently lost weight after cutting back on sugars and carbs.  At last visit was down from 78 to 70 lbs over the course of 6 mos so was advised she can go back to eating a regular diet, increase protein intake.  Has been trying to eat more since last visit but has not gained weight.    Overall has been asymptomatic other than some issues c dysphagia.  Was seen by GI for this back in Nov but since sx had not been overly bothersome, had opted not to proceed with additional w/u at that time (discussed Ba esophagram and then EGD).  However, pt now states she would like to go for f/u.    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 Authorizing Provider   alendronate (FOSAMAX) 70 MG tablet TAKE 1 TABLET EVERY 7 DAYS 2/3/20   Jalyn  MD Ada   amLODIPine (NORVASC) 5 MG tablet TAKE 1 TABLET DAILY 10/15/19   Jalyn Caballero MD   atenolol (TENORMIN) 25 MG tablet TAKE 1 TABLET TWICE A DAY 10/15/19   Jalyn Caballero MD   cholecalciferol, vitamin D3, (VITAMIN D3) 1,000 unit capsule Take 1,000 Units by mouth once daily.    Historical Provider, MD   FOLIC ACID/MULTIVIT-MIN/LUTEIN (CENTRUM SILVER ORAL) Take 1 tablet by mouth once daily.     Historical Provider, MD   hydroCHLOROthiazide (MICROZIDE) 12.5 mg capsule Take 1 capsule (12.5 mg total) by mouth once daily. 7/27/20 7/27/21  Jalyn Caballero MD   losartan (COZAAR) 100 MG tablet Take 1 tablet (100 mg total) by mouth once daily. 8/21/19 8/20/20  Jalyn Caballero MD   mupirocin (BACTROBAN) 2 % ointment Apply topically 2 (two) times daily.  Patient not taking: Reported on 7/6/2020 2/17/20   Jalyn Caballreo MD   oxybutynin (DITROPAN-XL) 5 MG TR24 TAKE 1 TABLET DAILY 10/1/19   Jalyn Caballero MD   polyethylene glycol (GLYCOLAX) 17 gram PwPk Take one capful with a full glass of water daily as needed for constipation. 6/25/18   Jalyn Caballero MD   senna (SENOKOT) 8.6 mg tablet Take 1 tablet by mouth once daily.    Historical Provider, MD   wheat dextrin (BENEFIBER HEALTHY SHAPE) 5 gram/7.4 gram Powd Take by mouth.    Historical Provider, MD       Allergies:  Review of patient's allergies indicates:   Allergen Reactions    Augmentin [amoxicillin-pot clavulanate] Nausea And Vomiting    Sulfa (sulfonamide antibiotics)     Zestril [lisinopril] Swelling     Facial Swelling       Social History:  Social History     Tobacco Use    Smoking status: Never Smoker    Smokeless tobacco: Never Used    Tobacco comment: The patient exercises every other day on an exercise bike.  On alternative days she does floor exercises with weights.   Substance Use Topics    Alcohol use: Yes     Alcohol/week: 2.0 - 4.0 standard drinks     Types: 1 Glasses of wine, 1 - 3 Standard drinks or equivalent per week     Comment: Occasional use    Drug use: No       "  Review of Systems   Constitutional: Positive for unexpected weight change. Negative for chills, fatigue and fever.   Respiratory: Negative for cough, chest tightness and shortness of breath.    Cardiovascular: Negative for chest pain.   Gastrointestinal: Negative for abdominal pain and blood in stool.   Genitourinary: Negative for dysuria and frequency.         Health Maintenance:     Immunizations:   Influenza 9/2019, rec next month  TDap is up to date 5/2017  Prevnar 13 5/2016, pvax 6/18  Shingrix is not UTD.    rec today but pt declined due to concerns regarding potential s/e, Risks and benefits were discussed with patient.        Cancer Screening:  PAP: n/a based on age, also c hx hyst  Mammogram: n/a based on age  Colonoscopy: n/a based on age  DEXA:  7/2018, cont alendronate, will schedule repeat at f/u appt.    Objective:   /62   Pulse 70   Ht 4' 11" (1.499 m)   Wt 32 kg (70 lb 8.8 oz)   LMP  (LMP Unknown)   SpO2 98%   BMI 14.25 kg/m²        General: AAO x3, very thin, kyphosis noted.  HEENT: PERRL, OP clear  CV: RRR, no m/r/g  Pulm: Lungs CTAB, no crackles, no wheezes  Abd: s/NT/ND +BS  Extremities: no c/c/e    Labs:     Lab Results   Component Value Date    WBC 7.54 06/03/2020    HGB 10.4 (L) 06/03/2020    HCT 34.2 (L) 06/03/2020     (H) 06/03/2020     06/03/2020     Lab Results   Component Value Date    IRON 68 09/06/2017    TIBC 414 09/06/2017       Sodium   Date Value Ref Range Status   06/03/2020 139 136 - 145 mmol/L Final     Potassium   Date Value Ref Range Status   06/03/2020 3.9 3.5 - 5.1 mmol/L Final     Chloride   Date Value Ref Range Status   06/03/2020 102 95 - 110 mmol/L Final     CO2   Date Value Ref Range Status   06/03/2020 27 23 - 29 mmol/L Final     Glucose   Date Value Ref Range Status   06/03/2020 112 (H) 70 - 110 mg/dL Final     BUN, Bld   Date Value Ref Range Status   06/03/2020 25 10 - 30 mg/dL Final     Creatinine   Date Value Ref Range Status   06/03/2020 " 0.9 0.5 - 1.4 mg/dL Final     Calcium   Date Value Ref Range Status   06/03/2020 10.2 8.7 - 10.5 mg/dL Final     Total Protein   Date Value Ref Range Status   06/03/2020 8.5 (H) 6.0 - 8.4 g/dL Final     Albumin   Date Value Ref Range Status   06/03/2020 3.2 (L) 3.5 - 5.2 g/dL Final     Total Bilirubin   Date Value Ref Range Status   06/03/2020 0.3 0.1 - 1.0 mg/dL Final     Comment:     For infants and newborns, interpretation of results should be based  on gestational age, weight and in agreement with clinical  observations.  Premature Infant recommended reference ranges:  Up to 24 hours.............<8.0 mg/dL  Up to 48 hours............<12.0 mg/dL  3-5 days..................<15.0 mg/dL  6-29 days.................<15.0 mg/dL       Alkaline Phosphatase   Date Value Ref Range Status   06/03/2020 77 55 - 135 U/L Final     AST   Date Value Ref Range Status   06/03/2020 24 10 - 40 U/L Final     ALT   Date Value Ref Range Status   06/03/2020 10 10 - 44 U/L Final     Anion Gap   Date Value Ref Range Status   06/03/2020 10 8 - 16 mmol/L Final     eGFR if    Date Value Ref Range Status   06/03/2020 >60.0 >60 mL/min/1.73 m^2 Final     eGFR if non    Date Value Ref Range Status   06/03/2020 55.7 (A) >60 mL/min/1.73 m^2 Final     Comment:     Calculation used to obtain the estimated glomerular filtration  rate (eGFR) is the CKD-EPI equation.        Lab Results   Component Value Date    HGBA1C 5.8 (H) 06/03/2020     Lab Results   Component Value Date    LDLCALC 128.8 07/30/2020     Lab Results   Component Value Date    TSH 2.724 01/06/2020         Assessment/Plan     An was seen today for follow-up.    Diagnoses and all orders for this visit:    Weight loss  Unsure if related to dysphagia  Labs significant for mild anemia, marginally worse compard to previous, also c elevated TP  Will w/u c additional labs and have GI reassess.    Does not want aggressive w/u based on her age as we discussed  that if she were to have something like cancer, that should would not pursue any type of aggressive mgmt at her age  Risks and benefits were discussed with patient.  Consider appetite stimulant if needed in near future.  -     Comprehensive metabolic panel; Future  -     Protein electrophoresis, serum; Future  -     Protein electrophoresis, timed urine; Future  -     Ambulatory referral/consult to Gastroenterology; Future    Benign essential hypertension  Repeat manual at goal, cont meds    Anemia, unspecified type  As per HPI  Will w/u  -     CBC auto differential; Future  -     Folate; Future  -     Vitamin B12; Future  -     Iron and TIBC; Future  -     Ferritin; Future    Hypergammaglobulinemia  -     Protein electrophoresis, serum; Future  -     Protein electrophoresis, timed urine; Future    Protein-calorie malnutrition, unspecified severity   -     Folate; Future  -     Vitamin B12; Future    Dysphagia, unspecified type  -     Ambulatory referral/consult to Gastroenterology; Future    HM as above  RTC in 3 mos for f/u of weight, sooner if needed.    Jalyn Caballero MD  Department of Internal Medicine - [facility]  08/13/2020

## 2020-08-13 NOTE — TELEPHONE ENCOUNTER
----- Message from Divine Martinez sent at 8/13/2020  3:40 PM CDT -----  Regarding: [facility]  Contact: [phone]  There was a UTP24 ordered on this patient, but the urine was collected in a regular urine container. The specimen was not collected in a 24 hour urine container with preservative. The order was canceled and will need to be recollected, but the UPE was done.

## 2020-08-14 ENCOUNTER — TELEPHONE (OUTPATIENT)
Dept: INTERNAL MEDICINE | Facility: CLINIC | Age: 85
End: 2020-08-14

## 2020-08-14 ENCOUNTER — TELEPHONE (OUTPATIENT)
Dept: GASTROENTEROLOGY | Facility: CLINIC | Age: 85
End: 2020-08-14

## 2020-08-14 LAB
ALBUMIN SERPL ELPH-MCNC: 3.66 G/DL (ref 3.35–5.55)
ALPHA1 GLOB SERPL ELPH-MCNC: 0.32 G/DL (ref 0.17–0.41)
ALPHA2 GLOB SERPL ELPH-MCNC: 0.9 G/DL (ref 0.43–0.99)
B-GLOBULIN SERPL ELPH-MCNC: 0.78 G/DL (ref 0.5–1.1)
GAMMA GLOB SERPL ELPH-MCNC: 2.14 G/DL (ref 0.67–1.58)
PATHOLOGIST INTERPRETATION SPE: NORMAL
PROT SERPL-MCNC: 7.8 G/DL (ref 6–8.4)

## 2020-08-14 NOTE — TELEPHONE ENCOUNTER
Spoke to pt---pt would like to find out if Dr. Caballero wanted her to complete a 24 hour urine. Pt stated that it was not mentioned during her appt. She is willing to complete this if you feel this is necessary. She would have her daughter  container. Please advise.

## 2020-08-14 NOTE — TELEPHONE ENCOUNTER
----- Message from Coco Martinez sent at 8/14/2020 11:13 AM CDT -----  Regarding: Appt  Pt is calling because she attempted to schedule an appt online and was unsuccessful.   attempted to make the appt from the Referral in [facility] and was unsuccessful as well.  Pt is requesting a returned call to be scheduled for unexplained weight loss and trouble swallowing.    She can be reached at [phone].  If she does not answer, please leave a voicemail message.    Thank you.

## 2020-08-14 NOTE — TELEPHONE ENCOUNTER
----- Message from Vania Faustin sent at 8/14/2020  1:19 PM CDT -----  Contact: corazon(daughter) [phone]  Pt daughter is calling to f/u on the container needed for her mothers urine sample and the correct instructions on what they need to do. Please call and advise

## 2020-08-14 NOTE — TELEPHONE ENCOUNTER
----- Message from An Thomas sent at 8/14/2020 10:56 AM CDT -----  Contact: An @[phone]  Patient Requesting Order    Order Needed:  Urine test     Communication Preference: An @[phone]    Additional Information:   Pt would like to come into to have her urine done. She is requesting a call back with a time so that she can make arrangements to brought to the clinic.

## 2020-08-17 ENCOUNTER — TELEPHONE (OUTPATIENT)
Dept: INTERNAL MEDICINE | Facility: CLINIC | Age: 85
End: 2020-08-17

## 2020-08-17 ENCOUNTER — LAB VISIT (OUTPATIENT)
Dept: LAB | Facility: HOSPITAL | Age: 85
End: 2020-08-17
Attending: NURSE PRACTITIONER
Payer: MEDICARE

## 2020-08-17 DIAGNOSIS — D64.9 ANEMIA, UNSPECIFIED TYPE: Primary | ICD-10-CM

## 2020-08-17 DIAGNOSIS — N18.30 CKD (CHRONIC KIDNEY DISEASE) STAGE 3, GFR 30-59 ML/MIN: ICD-10-CM

## 2020-08-17 LAB
INTERPRETATION SERPL IFE-IMP: NORMAL
PROT 24H UR-MRATE: 70 MG/SPEC (ref 0–100)
PROT UR-MCNC: 28 MG/DL (ref 0–15)
URINE COLLECTION DURATION: 24 HR
URINE VOLUME: 250 ML

## 2020-08-17 PROCEDURE — 84166 PROTEIN E-PHORESIS/URINE/CSF: CPT | Mod: 26,,, | Performed by: PATHOLOGY

## 2020-08-17 PROCEDURE — 84166 PROTEIN E-PHORESIS/URINE/CSF: CPT

## 2020-08-17 PROCEDURE — 84166 PATHOLOGIST INTERPRETATION UPE: ICD-10-PCS | Mod: 26,,, | Performed by: PATHOLOGY

## 2020-08-17 PROCEDURE — 84156 ASSAY OF PROTEIN URINE: CPT

## 2020-08-17 NOTE — TELEPHONE ENCOUNTER
Pt notified of results.  Now c hypercalcemia.  Advised to stop her MVI, is not taking any additional supplements otherwise.  [facility] and [facility].  Awaiting UPEP results.

## 2020-08-18 LAB — PATHOLOGIST INTERPRETATION IFE: NORMAL

## 2020-08-18 NOTE — TELEPHONE ENCOUNTER
Call pt, answered but couldn't hear me or phone wasn't working on my end. Called back and no answer.

## 2020-08-19 ENCOUNTER — TELEPHONE (OUTPATIENT)
Dept: HEMATOLOGY/ONCOLOGY | Facility: CLINIC | Age: 85
End: 2020-08-19

## 2020-08-20 LAB
PATHOLOGIST INTERPRETATION UPE: NORMAL
PROT PATTERN UR ELPH-IMP: NORMAL

## 2020-08-25 ENCOUNTER — OFFICE VISIT (OUTPATIENT)
Dept: GASTROENTEROLOGY | Facility: CLINIC | Age: 85
End: 2020-08-25
Payer: MEDICARE

## 2020-08-25 ENCOUNTER — OFFICE VISIT (OUTPATIENT)
Dept: HEMATOLOGY/ONCOLOGY | Facility: CLINIC | Age: 85
End: 2020-08-25
Payer: MEDICARE

## 2020-08-25 ENCOUNTER — TELEPHONE (OUTPATIENT)
Dept: GASTROENTEROLOGY | Facility: CLINIC | Age: 85
End: 2020-08-25

## 2020-08-25 VITALS
SYSTOLIC BLOOD PRESSURE: 173 MMHG | DIASTOLIC BLOOD PRESSURE: 74 MMHG | TEMPERATURE: 98 F | BODY MASS INDEX: 14.02 KG/M2 | HEART RATE: 65 BPM | HEIGHT: 60 IN | WEIGHT: 71.44 LBS | RESPIRATION RATE: 20 BRPM

## 2020-08-25 DIAGNOSIS — E83.52 HYPERCALCEMIA: ICD-10-CM

## 2020-08-25 DIAGNOSIS — R63.4 WEIGHT LOSS: ICD-10-CM

## 2020-08-25 DIAGNOSIS — R13.19 OTHER DYSPHAGIA: Primary | ICD-10-CM

## 2020-08-25 DIAGNOSIS — D64.9 ANEMIA, UNSPECIFIED TYPE: ICD-10-CM

## 2020-08-25 DIAGNOSIS — D47.2 MONOCLONAL GAMMOPATHY OF UNKNOWN SIGNIFICANCE (MGUS): Primary | ICD-10-CM

## 2020-08-25 DIAGNOSIS — R13.10 DYSPHAGIA, UNSPECIFIED TYPE: ICD-10-CM

## 2020-08-25 PROCEDURE — 99204 PR OFFICE/OUTPT VISIT, NEW, LEVL IV, 45-59 MIN: ICD-10-PCS | Mod: S$PBB,,, | Performed by: INTERNAL MEDICINE

## 2020-08-25 PROCEDURE — 99999 PR PBB SHADOW E&M-EST. PATIENT-LVL IV: ICD-10-PCS | Mod: PBBFAC,,, | Performed by: INTERNAL MEDICINE

## 2020-08-25 PROCEDURE — 99443 PR PHYSICIAN TELEPHONE EVALUATION 21-30 MIN: ICD-10-PCS | Mod: 95,,, | Performed by: INTERNAL MEDICINE

## 2020-08-25 PROCEDURE — 99443 PR PHYSICIAN TELEPHONE EVALUATION 21-30 MIN: CPT | Mod: 95,,, | Performed by: INTERNAL MEDICINE

## 2020-08-25 PROCEDURE — 99999 PR PBB SHADOW E&M-EST. PATIENT-LVL IV: CPT | Mod: PBBFAC,,, | Performed by: INTERNAL MEDICINE

## 2020-08-25 PROCEDURE — 99214 OFFICE O/P EST MOD 30 MIN: CPT | Mod: PBBFAC | Performed by: INTERNAL MEDICINE

## 2020-08-25 PROCEDURE — 99204 OFFICE O/P NEW MOD 45 MIN: CPT | Mod: S$PBB,,, | Performed by: INTERNAL MEDICINE

## 2020-08-25 RX ORDER — ACETAMINOPHEN 325 MG/1
325 TABLET ORAL EVERY 6 HOURS PRN
COMMUNITY

## 2020-08-25 NOTE — TELEPHONE ENCOUNTER
----- Message from Joaquin Collins MD sent at 8/25/2020 12:30 PM CDT -----  Please schedule mod barium and speech eval, orders placed.

## 2020-08-25 NOTE — PROGRESS NOTES
History:     Reason For Consultation:   Anemia    Referring Provider:   Jalyn Caballero MD  [address] [place] [address]    Records Obtained: Records of the patients history including those obtained from the referring provider were reviewed and summarized in detail.    HPI:   An Hazel presents for consultation of anemia. Patient had labs done recently which showed hemoglobin 10.4, white blood cells 7.64 and platelets 248, MCV 98, ANC 5.0. Iron studies showed iron 49, iron sat 14, TIBC 348 and ferritin 64, folate 17.2, B12 899.  Serum protein electrophoresis revealed a 1.58 grams/deciliter IgG kappa monoclonal protein and urine studies also revealed a monoclonal protein.  She was found as well to have hypercalcemia with a calcium of 11.1.  Her renal function is normal. She's on HCTZ and has been told that she's had high calcium before.  She reports weight loss - nothing in the last 3 months, but had lost 5 # between January and April.   Saw GI this morning with complaints of dysphagia and constipation. She has been referred to speech therapy.   + history of osteoporosis - on alendronate.       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    Pubic ramus fracture     Scoliosis      Patient Active Problem List   Diagnosis    Mixed hyperlipidemia    Osteoporosis    Kyphoscoliosis    Pseudoaphakia - Both Eyes    Blepharospasm - Both Eyes    Ptosis - Both Eyes    Familial drusen - Both Eyes    Lagophthalmos, unspecified - Both Eyes    Dry eye syndrome - Both Eyes    Anemia    Debility    Urinary dysfunction    Open angle with borderline findings and low glaucoma risk in both eyes    Fall    Acute posthemorrhagic anemia    Idiopathic scoliosis of lumbar spine     Idiopathic scoliosis of thoracic spine    Benign essential hypertension    Urinary incontinence    Closed fracture of right shoulder    Impaired mobility and ADLs    History of hip fracture    Other chronic pain    Chronic constipation    History of fall    Aortic atherosclerosis    Prediabetes    Esophageal dysphagia    Moderate protein-calorie malnutrition    Weight loss    CKD (chronic kidney disease) stage 3, GFR 30-59 ml/min     Social History   Social History     Socioeconomic History    Marital status:      Spouse name: Not on file    Number of children: 4    Years of education: Not on file    Highest education level: Not on file   Occupational History    Occupation:  of [employer]     Comment: Retired after 37 years   Social Needs    Financial resource strain: Not on file    Food insecurity     Worry: Not on file     Inability: Not on file    Transportation needs     Medical: Not on file     Non-medical: Not on file   Tobacco Use    Smoking status: Never Smoker    Smokeless tobacco: Never Used    Tobacco comment: The patient exercises every other day on an exercise bike.  On alternative days she does floor exercises with weights.   Substance and Sexual Activity    Alcohol use: Yes     Alcohol/week: 2.0 - 4.0 standard drinks     Types: 1 Glasses of wine, 1 - 3 Standard drinks or equivalent per week     Comment: Occasional use    Drug use: No    Sexual activity: Not Currently   Lifestyle    Physical activity     Days per week: Not on file     Minutes per session: Not on file    Stress: Not on file   Relationships    Social connections     Talks on phone: Not on file     Gets together: Not on file     Attends Confucianism service: Not on file     Active member of club or organization: Not on file     Attends meetings of clubs or organizations: Not on file     Relationship status: Not on file   Other Topics Concern    Are you pregnant or  think you may be? Not Asked    Breast-feeding Not Asked   Social History Narrative    Not on file     Family History  Family History   Problem Relation Age of Onset    Stroke Father 49    Hypertension Father     Osteoporosis Mother     Cancer Maternal Grandmother     Amblyopia Neg Hx     Blindness Neg Hx     Cataracts Neg Hx     Macular degeneration Neg Hx     Retinal detachment Neg Hx     Strabismus Neg Hx     Thyroid disease Neg Hx     Melanoma Neg Hx      Medications    Current Outpatient Medications:     alendronate (FOSAMAX) 70 MG tablet, TAKE 1 TABLET EVERY 7 DAYS, Disp: 12 tablet, Rfl: 4    amLODIPine (NORVASC) 5 MG tablet, TAKE 1 TABLET DAILY, Disp: 90 tablet, Rfl: 4    atenolol (TENORMIN) 25 MG tablet, TAKE 1 TABLET TWICE A DAY, Disp: 180 tablet, Rfl: 4    cholecalciferol, vitamin D3, (VITAMIN D3) 1,000 unit capsule, Take 1,000 Units by mouth once daily., Disp: , Rfl:     FOLIC ACID/MULTIVIT-MIN/LUTEIN (CENTRUM SILVER ORAL), Take 1 tablet by mouth once daily. , Disp: , Rfl:     hydroCHLOROthiazide (MICROZIDE) 12.5 mg capsule, Take 1 capsule (12.5 mg total) by mouth once daily., Disp: 90 capsule, Rfl: 3    losartan (COZAAR) 100 MG tablet, TAKE 1 TABLET DAILY, Disp: 90 tablet, Rfl: 3    oxybutynin (DITROPAN-XL) 5 MG TR24, TAKE 1 TABLET DAILY, Disp: 90 tablet, Rfl: 4    polyethylene glycol (GLYCOLAX) 17 gram PwPk, Take one capful with a full glass of water daily as needed for constipation., Disp: 30 each, Rfl: 3    senna (SENOKOT) 8.6 mg tablet, Take 1 tablet by mouth once daily., Disp: , Rfl:     wheat dextrin (BENEFIBER HEALTHY SHAPE) 5 gram/7.4 gram Powd, Take by mouth., Disp: , Rfl:   Allergies  Review of patient's allergies indicates:   Allergen Reactions    Augmentin [amoxicillin-pot clavulanate] Nausea And Vomiting    Sulfa (sulfonamide antibiotics)     Zestril [lisinopril] Swelling     Facial Swelling     Review of Systems  Review of Systems   Constitutional: Positive for  unexpected weight change. Negative for activity change, appetite change, chills, fatigue and fever.   HENT: Negative for congestion, hearing loss, nosebleeds, sinus pressure and trouble swallowing.    Eyes: Negative for pain, discharge and itching.   Respiratory: Negative for cough, chest tightness and shortness of breath.    Cardiovascular: Negative for chest pain, palpitations and leg swelling.   Gastrointestinal: Negative for abdominal distention, abdominal pain, blood in stool, diarrhea, nausea, rectal pain and vomiting.        Dysphagia   Endocrine: Negative for heat intolerance and polydipsia.   Genitourinary: Negative for difficulty urinating, dysuria, flank pain, frequency, hematuria and urgency.   Musculoskeletal: Positive for arthralgias. Negative for back pain and neck pain.   Skin: Negative for color change, pallor and rash.   Neurological: Positive for headaches (occasional). Negative for dizziness and numbness.   Hematological: Negative for adenopathy. Does not bruise/bleed easily.   Psychiatric/Behavioral: Negative for confusion and decreased concentration. The patient is not nervous/anxious.         Objective     Objective:     There were no vitals filed for this visit.  There is no height or weight on file to calculate BSA.  There is no height or weight on file to calculate BMI.    Physical Exam  Constitutional:       General: She is awake. She is not in acute distress.     Appearance: She is well-developed and underweight.   HENT:      Head: Normocephalic and atraumatic.      Mouth/Throat:      Mouth: No oral lesions.   Eyes:      General: No scleral icterus.        Right eye: No discharge.         Left eye: No discharge.      Conjunctiva/sclera: Conjunctivae normal.   Neck:      Musculoskeletal: Normal range of motion and neck supple.      Thyroid: No thyroid mass or thyromegaly.   Cardiovascular:      Rate and Rhythm: Normal rate and regular rhythm.      Heart sounds: Normal heart sounds, S1  normal and S2 normal.   Pulmonary:      Effort: Pulmonary effort is normal. No respiratory distress.      Breath sounds: Normal breath sounds. No wheezing, rhonchi or rales.   Abdominal:      General: Bowel sounds are normal.      Palpations: Abdomen is soft.      Tenderness: There is no abdominal tenderness.   Musculoskeletal:      Comments: Walks with walker   Lymphadenopathy:      Cervical: No cervical adenopathy.      Upper Body:      Right upper body: No supraclavicular adenopathy.      Left upper body: No supraclavicular adenopathy.   Skin:     General: Skin is warm and dry.      Coloration: Skin is not pale.   Neurological:      Mental Status: She is alert and oriented to person, place, and time.   Psychiatric:         Behavior: Behavior normal. Behavior is cooperative.         Thought Content: Thought content normal.           Labs and Imaging  Results for orders placed or performed in visit on 08/17/20   Protein electrophoresis, timed urine   Result Value Ref Range    Protein Electrophoresis, Ur SEE COMMENT    Protein, urine, timed   Result Value Ref Range    Urine Volume 250 mL    Urine Collection Duration 24 Hr    Protein, Urine 28 (H) 0 - 15 mg/dL    Urine Protein, Timed 70 0 - 100 mg/Spec   Pathologist Interpretation UPE   Result Value Ref Range    Pathologist Interpretation UPE REVIEWED      Lab Results   Component Value Date    WBC 7.64 08/13/2020    RBC 3.36 (L) 08/13/2020    HGB 10.4 (L) 08/13/2020    HCT 33.0 (L) 08/13/2020    MCV 98 08/13/2020    MCH 31.0 08/13/2020    MCHC 31.5 (L) 08/13/2020    RDW 13.7 08/13/2020     08/13/2020    MPV 11.9 08/13/2020    GRAN 5.0 08/13/2020    GRAN 65.3 08/13/2020    LYMPH 1.5 08/13/2020    LYMPH 19.9 08/13/2020    MONO 0.9 08/13/2020    MONO 12.2 08/13/2020    EOS 0.1 08/13/2020    BASO 0.06 08/13/2020    EOSINOPHIL 1.7 08/13/2020    BASOPHIL 0.8 08/13/2020       Assessment   Assessment:     1. 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 Hypercalcemia   * Could be secondary to #1, but also could be related to HCTZ (looks like she's been on hctz for at least 9 mo)     Plan:     An has at least an MGUS but typically with elevated calcium and anemia, I would do a skeletal survey and bone marrow biopsy, however given her age and the fast that she's asymptomatic at this point (and her weight loss seems to have stopped over the last 3 mo), we have opted against additional evaluation and she would not want therapy even if myeloma were confirmed. She prefers the least aggressive approach possible.    I recommend repeating cbc, cmp in about 2-3 months as she may need symptomatic treatment for her hypercalcemia. I doubt that the HCTZ is causing the elevated calcium, but its potentially worthwhile stopping too if able. Since we aren't going to be treating or doing further eval, we'll plan to see her prn.   I sent a message to her PCP. Her daughter accompanied her today and agreed with plan.     I asked the patient to call for any questions, concerns, or new symptoms.

## 2020-08-25 NOTE — LETTER
August 25, 2020      Jalyn Caballero MD  [address]  [facility] [address]           [facility]ology [facility]  [address]  [facility] [address]  Phone: [phone]          Patient: An Hazel   MR Number: [id number]   YOB: 1927   Date of Visit: 8/25/2020       Dear Dr. Delvalle:    Thank you for referring An Luz to me for evaluation. Attached you will find relevant portions of my assessment and plan of care.    If you have questions, please do not hesitate to call me. I look forward to following An Luz along with you.    Sincerely,    Cyndy Goins MD    Enclosure  CC:  No Recipients    If you would like to receive this communication electronically, please contact [email or web address] or [phone] to request more information on [employer] access.    For providers and/or their staff who would like to refer a patient to [facility], please contact us through our one-stop-shop provider referral line, [facility], at [phone].    If you feel you have received this communication in error or would no longer like to receive these types of communications, please e-mail [email or web address]

## 2020-08-25 NOTE — TELEPHONE ENCOUNTER
----- Message from Nanci Ocampo sent at 8/25/2020  3:26 PM CDT -----  Pt missed a call and would like the nurse to return their call.    Pt can be reached at  [phone]      Thanks  KB

## 2020-08-25 NOTE — PROGRESS NOTES
Established Patient - Audio Only Telehealth Visit     The patient location is: home, [place]  The chief complaint leading to consultation is: dysphagia / constipatoin  Visit type: Virtual visit with audio only (telephone)  Total time spent with patient: 22 min       The reason for the audio only service rather than synchronous audio and video virtual visit was related to technical difficulties or patient preference/necessity.     Each patient to whom I provide medical services by telemedicine is:  (1) informed of the relationship between the physician and patient and the respective role of any other health care provider with respect to management of the patient; and (2) notified that they may decline to receive medical services by telemedicine and may withdraw from such care at any time. Patient verbally consented to receive this service via voice-only telephone call.       HPI:   Interval:  Accompanied by daughter on phone.    Follow up with wt loss and dysphagia.  Asking about benefiber, has a BM in am. Taking 1 tablespoon. Now having more leakage and urgency. Stopped the laxatives for a brief time.  Wt loss recently. Going to see heme onc, has elevated Ca levels as well.    Has trouble swallowing, assoc with coughing fits. Has to take biotene before eating for dry mouth. This will help the swallowing. Has to take lozenges to help with swallowing.  Swallowing difficulties occur in upper neck.      ========================  Initial clinic visit:  6 months of worsening constipation. Stools are hard to pass at times. Has BM about every 3 days, starts to feel the urge the 2nd or third day.   Takes 3 stool softeners every night. Drinks water every day. Eating high fiber diet. No aggrivating factors. No radiation. Her hernia has been present at least 5 years or so, possibly longer.  Has had some constipation issues for multiple years and prior to this, but here recently that has worsened.  She is concerned about her left  inguinal hernia causing possible problems with constipation. Previously used to drink water every AM and that made her regular - 5 10 years ago. Only takes miralax for purge laxative.     She also mentions dysphagia, with foods sometimes sticking near top of sternum. This has improved recently but before was symptomatic. Sometimes would bring up thick phlegm. Mostly with solid foods but occ liquids as well. Never had EGD. Stopped taking fosamax because she thought that could cause it but PCP wanted her to resume.    =============================     Assessment and plan:       this sounds like oropharyngeal dysphagia given the coughing episodes and the associated dry mouth.  I will refer to speech therapy for a modified barium study.  This is also a less invasive approach in this was discussed with the patient and her daughter.  Pending on the speech therapy study we can consider an EGD but I feel like the less invasive approach is to start with speech therapy.    Constipation  Take benefiber, increase senna to relieve the constipation.    Needs to see oncology for hyperca and wt loss.    RTC as needed.              This service was not originating from a related E/M service provided within the previous 7 days nor will  to an E/M service or procedure within the next 24 hours or my soonest available appointment.  Prevailing standard of care was able to be met in this audio-only visit.

## 2020-08-28 ENCOUNTER — TELEPHONE (OUTPATIENT)
Dept: GASTROENTEROLOGY | Facility: CLINIC | Age: 85
End: 2020-08-28

## 2020-08-28 ENCOUNTER — TELEPHONE (OUTPATIENT)
Dept: INTERNAL MEDICINE | Facility: CLINIC | Age: 85
End: 2020-08-28

## 2020-08-28 NOTE — TELEPHONE ENCOUNTER
----- Message from Viridiana Rondon sent at 8/28/2020 11:04 AM CDT -----  Type:  Patient Returning Call    Who Called:pt   Who Left Message for Patient: pt   Does the patient know what this is regarding?: pt had a missed call   Would the patient rather a call back or a response via [facility]?  Call   Best Call Back Number:[phone]  Additional Information:  call back

## 2020-08-28 NOTE — TELEPHONE ENCOUNTER
Attempted to schedule the Modified Barium swallow with speech. Pt said that she is not yet ready to schedule. She said that she thinks that Dr. Collins has fixed her problem. She said that she had a very good BM today. Pt said that she will call us back to schedule once she decided to go through with the test.

## 2020-08-28 NOTE — TELEPHONE ENCOUNTER
----- Message from Javan Jha sent at 8/28/2020  2:57 PM CDT -----  Regarding: Advice  Contact: Patient [phone]  Patient would like to get medical advice.    Comments: Calling to inform the office, did see the specialist that pcp recommended, was inform to stop taking the  hydroCHLOROthiazide (MICROZIDE) 12.5 mg capsule  And start another Rx that will help to lower Calcium intake, would like to speak with office to discuss.    Please call an advise  Thank you

## 2020-08-28 NOTE — TELEPHONE ENCOUNTER
----- Message from Gale Tavares MA sent at 8/25/2020  1:55 PM CDT -----  Please schedule mod barium and speech eval, orders placed.

## 2020-08-28 NOTE — TELEPHONE ENCOUNTER
Spoke with pt and she wants dr. Caballero to know that dr. del castillo hem\onc wanted pt to stop taking hctz , pt wants to make sure dr. Caballero is ok with this. Please call pt back on monday

## 2020-08-31 NOTE — TELEPHONE ENCOUNTER
Spoke to pt and advised that I discussed her case with Dr. Goins.  Since her bp was difficult to control when we changed meds in the past, we preferred to keep her HCTZ as is.  As long as her weight remained stable and she was asymptomatic, we would manage conservatively based on her age.  Ok to proceed with Mod barium and speech eval rec by Dr. Collins.

## 2020-09-02 ENCOUNTER — TELEPHONE (OUTPATIENT)
Dept: GASTROENTEROLOGY | Facility: CLINIC | Age: 85
End: 2020-09-02

## 2020-09-02 NOTE — TELEPHONE ENCOUNTER
Spoke with pt. Modified Barium Swallow with speech scheduled on 9/8/20 at 10:00am. Verbal Understanding.

## 2020-09-02 NOTE — TELEPHONE ENCOUNTER
----- Message from Elizabeth Bermudez sent at 9/2/2020  2:45 PM CDT -----  Contact: [phone]  Pt is returning the Office call.    Please call and advise

## 2020-09-06 ENCOUNTER — PATIENT OUTREACH (OUTPATIENT)
Dept: ADMINISTRATIVE | Facility: OTHER | Age: 85
End: 2020-09-06

## 2020-09-07 NOTE — PROGRESS NOTES
Chart reviewed.   Immunizations: Triggered Imm Registry     Orders placed: n/a  Upcoming appts to satisfy BRIDGETT topics: n/a

## 2020-09-08 ENCOUNTER — OFFICE VISIT (OUTPATIENT)
Dept: PODIATRY | Facility: CLINIC | Age: 85
End: 2020-09-08
Payer: MEDICARE

## 2020-09-08 ENCOUNTER — CLINICAL SUPPORT (OUTPATIENT)
Dept: SPEECH THERAPY | Facility: HOSPITAL | Age: 85
End: 2020-09-08
Attending: INTERNAL MEDICINE
Payer: MEDICARE

## 2020-09-08 ENCOUNTER — HOSPITAL ENCOUNTER (OUTPATIENT)
Dept: RADIOLOGY | Facility: HOSPITAL | Age: 85
Discharge: HOME | End: 2020-09-08
Attending: INTERNAL MEDICINE
Payer: MEDICARE

## 2020-09-08 VITALS
DIASTOLIC BLOOD PRESSURE: 75 MMHG | SYSTOLIC BLOOD PRESSURE: 186 MMHG | WEIGHT: 71.44 LBS | BODY MASS INDEX: 14.02 KG/M2 | HEIGHT: 60 IN | HEART RATE: 68 BPM

## 2020-09-08 DIAGNOSIS — B35.1 DERMATOPHYTOSIS OF NAIL: ICD-10-CM

## 2020-09-08 DIAGNOSIS — M79.671 BILATERAL FOOT PAIN: Primary | ICD-10-CM

## 2020-09-08 DIAGNOSIS — Z74.09 IMPAIRED MOBILITY AND ADLS: ICD-10-CM

## 2020-09-08 DIAGNOSIS — R13.10 DYSPHAGIA, UNSPECIFIED TYPE: ICD-10-CM

## 2020-09-08 DIAGNOSIS — R13.19 OTHER DYSPHAGIA: ICD-10-CM

## 2020-09-08 DIAGNOSIS — N18.30 CKD (CHRONIC KIDNEY DISEASE) STAGE 3, GFR 30-59 ML/MIN: ICD-10-CM

## 2020-09-08 DIAGNOSIS — Z78.9 IMPAIRED MOBILITY AND ADLS: ICD-10-CM

## 2020-09-08 DIAGNOSIS — M79.672 BILATERAL FOOT PAIN: Primary | ICD-10-CM

## 2020-09-08 PROCEDURE — 99999 PR PBB SHADOW E&M-EST. PATIENT-LVL III: CPT | Mod: PBBFAC,,, | Performed by: PODIATRIST

## 2020-09-08 PROCEDURE — 25500020 PHARM REV CODE 255: Performed by: INTERNAL MEDICINE

## 2020-09-08 PROCEDURE — 97535 SELF CARE MNGMENT TRAINING: CPT

## 2020-09-08 PROCEDURE — 99499 UNLISTED E&M SERVICE: CPT | Mod: S$PBB,,, | Performed by: PODIATRIST

## 2020-09-08 PROCEDURE — 99499 NO LOS: ICD-10-PCS | Mod: S$PBB,,, | Performed by: PODIATRIST

## 2020-09-08 PROCEDURE — 74230 X-RAY XM SWLNG FUNCJ C+: CPT | Mod: 26,,, | Performed by: RADIOLOGY

## 2020-09-08 PROCEDURE — 11721 ROUTINE FOOT CARE: ICD-10-PCS | Mod: S$PBB,,, | Performed by: PODIATRIST

## 2020-09-08 PROCEDURE — 74230 FL MODIFIED BARIUM SWALLOW SPEECH STUDY: ICD-10-PCS | Mod: 26,,, | Performed by: RADIOLOGY

## 2020-09-08 PROCEDURE — 99999 PR PBB SHADOW E&M-EST. PATIENT-LVL III: ICD-10-PCS | Mod: PBBFAC,,, | Performed by: PODIATRIST

## 2020-09-08 PROCEDURE — 99213 OFFICE O/P EST LOW 20 MIN: CPT | Mod: PBBFAC,25,PN | Performed by: PODIATRIST

## 2020-09-08 PROCEDURE — 92611 MOTION FLUOROSCOPY/SWALLOW: CPT

## 2020-09-08 PROCEDURE — A9698 NON-RAD CONTRAST MATERIALNOC: HCPCS | Performed by: INTERNAL MEDICINE

## 2020-09-08 PROCEDURE — 74230 X-RAY XM SWLNG FUNCJ C+: CPT | Mod: TC

## 2020-09-08 PROCEDURE — 11721 DEBRIDE NAIL 6 OR MORE: CPT | Mod: PBBFAC,PN | Performed by: PODIATRIST

## 2020-09-08 RX ADMIN — BARIUM SULFATE 50 ML: 0.81 POWDER, FOR SUSPENSION ORAL at 10:09

## 2020-09-08 NOTE — PATIENT INSTRUCTIONS
General nail care measures for abnormal nails include:    ? Keeping nails trimmed short    ? Avoiding trauma     ? Avoiding contact irritants     ? Keeping nails dry (avoiding wet work)    ? Avoiding all nail cosmetics

## 2020-09-08 NOTE — PROGRESS NOTES
Chief Complaint: Nail Care      HPI:  An Luz is a 89 y.o. female  With Thick painful toenails preventing sock and shoe wear and imparing ambulation.  Gradual onset, worsening over past several years, aggravated by increased weight bearing, shoe gear, pressure.  Periodic debridement helps symptoms. Denies trauma, surgery all toes.      PCP:  Jalyn Caballero MD          Patient Active Problem List   Diagnosis    Mixed hyperlipidemia    Osteoporosis    Kyphoscoliosis    Pseudoaphakia - Both Eyes    Blepharospasm - Both Eyes    Ptosis - Both Eyes    Familial drusen - Both Eyes    Lagophthalmos, unspecified - Both Eyes    Dry eye syndrome - Both Eyes    Anemia    Debility    Urinary dysfunction    Open angle with borderline findings and low glaucoma risk in both eyes    Fall    Acute posthemorrhagic anemia    Idiopathic scoliosis of lumbar spine    Idiopathic scoliosis of thoracic spine    Benign essential hypertension    Urinary incontinence    Closed fracture of right shoulder    Impaired mobility and ADLs    History of hip fracture    Other chronic pain    Chronic constipation    History of fall    Aortic atherosclerosis    Prediabetes    Esophageal dysphagia    Moderate protein-calorie malnutrition    Weight loss    CKD (chronic kidney disease) stage 3, GFR 30-59 ml/min         Current Outpatient Medications on File Prior to Visit   Medication Sig Dispense Refill    acetaminophen (TYLENOL) 325 MG tablet Take 325 mg by mouth every 6 (six) hours as needed for Pain.      alendronate (FOSAMAX) 70 MG tablet TAKE 1 TABLET EVERY 7 DAYS 12 tablet 4    amLODIPine (NORVASC) 5 MG tablet TAKE 1 TABLET DAILY 90 tablet 4    atenolol (TENORMIN) 25 MG tablet TAKE 1 TABLET TWICE A  tablet 4    cholecalciferol, vitamin D3, (VITAMIN D3) 1,000 unit capsule Take 1,000 Units by mouth once daily.      FOLIC ACID/MULTIVIT-MIN/LUTEIN (CENTRUM SILVER ORAL) Take 1 tablet by mouth once  daily.       hydroCHLOROthiazide (MICROZIDE) 12.5 mg capsule Take 1 capsule (12.5 mg total) by mouth once daily. 90 capsule 3    losartan (COZAAR) 100 MG tablet TAKE 1 TABLET DAILY 90 tablet 3    oxybutynin (DITROPAN-XL) 5 MG TR24 TAKE 1 TABLET DAILY 90 tablet 4    polyethylene glycol (GLYCOLAX) 17 gram PwPk Take one capful with a full glass of water daily as needed for constipation. 30 each 3    senna (SENOKOT) 8.6 mg tablet Take 1 tablet by mouth once daily.      wheat dextrin (BENEFIBER HEALTHY SHAPE) 5 gram/7.4 gram Powd Take by mouth.       No current facility-administered medications on file prior to visit.            Review of patient's allergies indicates:   Allergen Reactions    Zestril [lisinopril] Swelling     Facial Swelling    Augmentin [amoxicillin-pot clavulanate] Nausea And Vomiting    Sulfa (sulfonamide antibiotics)      Unknown reaction         Social History     Socioeconomic History    Marital status:      Spouse name: Not on file    Number of children: 4    Years of education: Not on file    Highest education level: Not on file   Occupational History    Occupation:  of [employer]     Comment: Retired after 37 years   Social Needs    Financial resource strain: Not on file    Food insecurity     Worry: Not on file     Inability: Not on file    Transportation needs     Medical: Not on file     Non-medical: Not on file   Tobacco Use    Smoking status: Never Smoker    Smokeless tobacco: Never Used    Tobacco comment: The patient exercises every other day on an exercise bike.  On alternative days she does floor exercises with weights.   Substance and Sexual Activity    Alcohol use: Yes     Alcohol/week: 2.0 - 4.0 standard drinks     Types: 1 Glasses of wine, 1 - 3 Standard drinks or equivalent per week     Comment: 1 glass of wine w/ dinner and w/ company    Drug use: No    Sexual activity: Not Currently   Lifestyle    Physical activity      Days per week: Not on file     Minutes per session: Not on file    Stress: Not on file   Relationships    Social connections     Talks on phone: Not on file     Gets together: Not on file     Attends Church service: Not on file     Active member of club or organization: Not on file     Attends meetings of clubs or organizations: Not on file     Relationship status: Not on file   Other Topics Concern    Are you pregnant or think you may be? Not Asked    Breast-feeding Not Asked   Social History Narrative    Not on file           Review of Systems   Constitution: Negative for chills, diaphoresis, fever, malaise/fatigue and night sweats.   Cardiovascular: Negative for claudication, cyanosis, leg swelling and syncope.   Skin: Positive for itching, nail changes and rash. Negative for color change, dry skin, suspicious lesions and unusual hair distribution.   Musculoskeletal: Negative for falls, joint pain, joint swelling, muscle cramps, muscle weakness and stiffness.   Gastrointestinal: Negative for constipation, diarrhea, nausea and vomiting.   Neurological: Negative for brief paralysis, disturbances in coordination, focal weakness, numbness, paresthesias, sensory change and tremors.           Objective:        Vitals:    09/08/20 1359   BP: (!) 186/75   BP Location: Right arm   Patient Position: Sitting   BP Method: Small (Automatic)   Pulse: 68   Weight: 32.4 kg (71 lb 6.9 oz)   Height: 5' (1.524 m)         Physical Exam  Constitutional:       General: She is not in acute distress.     Appearance: She is well-developed. She is not diaphoretic.   Cardiovascular:      Pulses:           Popliteal pulses are 2+ on the right side and 2+ on the left side.        Dorsalis pedis pulses are 2+ on the right side and 2+ on the left side.        Posterior tibial pulses are 1+ on the right side and 1+ on the left side.      Comments: Capillary refill 3 seconds all toes/distal feet, all toes/both feet warm to touch.       Negative lymphadenopathy bilateral popliteal fossa and tarsal tunnel.      Negavie lower extremity edema bilateral.    Musculoskeletal:      Right ankle: She exhibits normal range of motion, no swelling, no ecchymosis, no deformity, no laceration and normal pulse. Achilles tendon normal. Achilles tendon exhibits no pain, no defect and normal Garay's test results.      Comments: Normal angle, base, station of gait. All ten toes without clubbing, cyanosis, or signs of ischemia.  No pain to palpation bilateral lower extremities.  Range of motion, stability, muscle strength, and muscle tone normal bilateral feet and legs.     Lymphadenopathy:      Lower Body: No right inguinal adenopathy. No left inguinal adenopathy.      Comments: Negative lymphadenopathy bilateral popliteal fossa and tarsal tunnel.    Negative lymphangitic streaking bilateral feet/ankles/legs.   Skin:     General: Skin is warm and dry.      Capillary Refill: Capillary refill takes 2 to 3 seconds.      Coloration: Skin is not pale.      Findings: No abrasion, bruising, burn, ecchymosis, erythema, laceration, lesion or rash.      Nails: There is no clubbing.        Comments: Dry scale with superficial flakes over an erythematous base  without ulceration, drainage, pus, tracking, fluctuance, malodor, or cardinal signs infection.    Otherwise;  Skin is normal age and health appropriate color, turgor, texture, and temperature bilateral lower extremities without ulceration, hyperpigmentation, discoloration, masses nodules or cords palpated.  No ecchymosis, erythema, edema, or cardinal signs of infection bilateral lower extremities.    Toenails 1st, 2nd, 3rd, 4th, 5th  bilateral are hypertrophic thickened 2-3 mm, dystrophic, discolored tanish brown with tan, gray crumbly subungual debris.  Severe pain and tenderness  to distal nail plate pressure, without periungual skin abnormality of each.     Neurological:      Mental Status: She is alert and  oriented to person, place, and time.      Sensory: No sensory deficit.      Motor: No tremor, atrophy or abnormal muscle tone.      Gait: Gait normal.      Deep Tendon Reflexes:      Reflex Scores:       Patellar reflexes are 2+ on the right side and 2+ on the left side.       Achilles reflexes are 2+ on the right side and 2+ on the left side.     Comments: Negative tinel sign to percussion sural, superficial peroneal, deep peroneal, saphenous, and posterior tibial nerves right and left ankles and feet.     Psychiatric:         Behavior: Behavior is cooperative.               Assessment:       Encounter Diagnoses   Name Primary?    CKD (chronic kidney disease) stage 3, GFR 30-59 ml/min     Dermatophytosis of nail     Impaired mobility and ADLs     Bilateral foot pain Yes         Plan:       Problem List Items Addressed This Visit     CKD (chronic kidney disease) stage 3, GFR 30-59 ml/min    Relevant Orders    Routine Foot Care    Impaired mobility and ADLs    Relevant Orders    Routine Foot Care      Other Visit Diagnoses     Bilateral foot pain    -  Primary    Relevant Orders    Routine Foot Care    Dermatophytosis of nail        Relevant Orders    Routine Foot Care            I counseled the patient on the patient's conditions, their implications and medical management.      General nail care measures for abnormal nails include:    ? Keeping nails trimmed short    ? Avoiding trauma     ? Avoiding contact irritants     ? Keeping nails dry (avoiding wet work)    ? Avoiding all nail cosmetics       Follow up in 4 months per patient request.         Routine Foot Care    Date/Time: 9/8/2020 2:00 PM  Performed by: Bia Caballero DPM  Authorized by: Bia Caballero DPM     Consent Done?:  Yes (Verbal)    Nail Care Type:  Debride  Location(s): All  (Left 1st Toe, Left 3rd Toe, Left 2nd Toe, Left 4th Toe, Left 5th Toe, Right 1st Toe, Right 2nd Toe, Right 3rd Toe, Right 4th Toe and Right 5th Toe)  Patient tolerance:   Patient tolerated the procedure well with no immediate complications     With patient's permission, the toenails mentioned above were aggressively reduced and debrided using a nail nipper, removing all offending nail and debris. The patient will continue to monitor the areas daily, inspect the feet, wear protective shoe gear when ambulatory, and moisturizer to maintain skin integrity.

## 2020-09-08 NOTE — PROGRESS NOTES
TIME RECORD    Date: 09/08/2020    Start Time:  10:05  Stop Time:  10:28    PROCEDURES:    TIMED  Procedure Time Min.    Start:  Stop:     Start:  Stop:     Start:  Stop:     Start:  Stop:          UNTIMED  Procedure Time Min.   MBS  Start: 10:05  Stop: 10:17 12 mins   Self Care/Home Management Training:   Start: 10:17  Stop: 10:28 11 mins     Total Timed Minutes:  23 mins  Total Timed Units:  0  Total Untimed Units:  2  Charges Billed/# of units:  2      REHAB SERVICE VIDEO SWALLOW STUDY    HICN:  [id number]  Onset Date:  2019  SOC Date:  9/8/20  Certification Period:  9/8/20 to 10/8/20  Primary Diagnosis:  Dysphagia  Treatment Diagnosis:  Dysphagia   Pertinent Medical History:    Past Medical History:   Diagnosis Date    Anemia     Arthritis     Fever blister     Glaucoma     Hypercalcemia     Hyperlipidemia     Hypertension     Osteoporosis     Pneumonia     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    Pubic ramus fracture     Scoliosis      Prior Therapy Dates/Results (same condition):  No prior ST therapy, referred by Dr. Collins (GI) MD for swallowing eval. Last clinic note 8/25/20  HPI:   Accompanied by daughter on phone.   Follow up with wt loss and dysphagia.  Asking about benefiber, has a BM in am. Taking 1 tablespoon. Now having more leakage and urgency. Stopped the laxatives for a brief time.  Wt loss recently. Going to see heme onc, has elevated Ca levels as well.     Has trouble swallowing, assoc with coughing fits. Has to take biotene before eating for dry mouth. This will help the swallowing. Has to take lozenges to help with swallowing.  Swallowing difficulties occur in upper neck.       ========================  Initial clinic visit:  6 months of worsening constipation. Stools are hard to pass at times. Has BM about every 3 days, starts to feel the urge the 2nd or third day.   Takes 3 stool softeners every night. Drinks water every day. Eating high fiber diet. No aggrivating factors. No radiation.  Her hernia has been present at least 5 years or so, possibly longer.  Has had some constipation issues for multiple years and prior to this, but here recently that has worsened.  She is concerned about her left inguinal hernia causing possible problems with constipation. Previously used to drink water every AM and that made her regular - 5 10 years ago. Only takes miralax for purge laxative.     She also mentions dysphagia, with foods sometimes sticking near top of sternum. This has improved recently but before was symptomatic. Sometimes would bring up thick phlegm. Mostly with solid foods but occ liquids as well. Never had EGD. Stopped taking fosamax because she thought that could cause it but PCP wanted her to resume.   =============================   Assessment and plan:      this sounds like oropharyngeal dysphagia given the coughing episodes and the associated dry mouth.  I will refer to speech therapy for a modified barium study.  This is also a less invasive approach in this was discussed with the patient and her daughter.  Pending on the speech therapy study we can consider an EGD but I feel like the less invasive approach is to start with speech therapy.    Functional Deficits Leading to Referral:  Pt seen by GI for dysphagia, pt referred for OP MBS to r/o dysphagia and aspiration risk.  Social Cultural:  Pt accompanied by dtr, pt in wheelchair, needed to be transferred to fluoro chair.   Nutrition:  Pt currently on a soft diet, consumes thin liquids (enjoys coffee), does not care for ensure or boost supplement.   Signs of Abuse:  No  Medication:  See EMR  Alertness/Attention:  Alert, cooperative  Patient Position:  Sitting  View:  Lateral     Oral Motor:    Oral Musculature: WNL  Structure Abnormalities: WNL  Dentition: upper and lower dentures present; uses dentures to eat  Secretion Management: WNL  Mucosal Quality: WNL  Mandibular Strength and Mobility: WNL  Oral Labial Strength and Mobility:  WNL  Lingual Strength and Mobility: WNL  Velar Elevation: WNL  Buccal Strength and Mobility: WNL  Volitional Cough: elicited  Volitional Swallow: timely  Voice Prior to PO Intake: clear    Presentations:    THIN:- self regulated sip via cup x6 and self regulated consecutive sips via straw x3  NECTAR THICK liquids: tsp swallow x3, self regulated cup sips x8  PUREE:- self fed tsp bites of pudding with barium paste x2  DICED PEACHES: tsp of diced peaches x2 bites mixed with barium powder  SOLID:- 1 inch bite of sabra cracker with barium paste x1    Oral Preparation / Oral Phase   WFL - Pt with adequate bolus acceptance, containment, control and timely A-P transfer across consistencies   No presence of naso-pharyngeal reflux  Pharyngeal Phase   Pt with slight delay in swallow trigger (2-3 second delay) w/ premature spillage of thin liquids into vallecula with mild overflow into pyriform sinuses.   Pt with reduced tongue base retraction  Pt with reduced hyolaryngeal elevation and excursion patterns  Pt with complete epiglottic inversion patterns  Pt with fair airway protection when swallowing liquids and solids.   Mild vallecular and pyriform sinus residue noted on THIN liquids, no residuals noted with nectar thick, pureed or solid textures.   No vallecular or pyriform sinus residue noted with nectar thick liquids  One episode of transient penetration during the swallow on cyclical swallows of thin liquids via straw.  Per Rosenbeck's 8-Point Penetration- Aspiration Scale:   1) Material does not enter airway. ON NECTAR, PUREED and SOLID textures.   (2) Material enters the airway, remains above the vocal folds, and is ejected from the airway. THIN liquids    Cervical Esophageal Phase   UES appeared to accommodate all bolus types without stasis or retrograde movement observed     Impressions: Patient presents with MILD pharyngeal dysphagia as noted above. Dysphagia marked by delayed swallow trigger, reduced tongue base  strength and reduced hyolaryngeal lift. Some degree of penetration is expected given pt's age. NO airway aspiration noted. Pt remains at low risk for aspiration due to mild pharyngeal residuals noted on thin liquids if 2nd swallow is not performed during consecutive PO intake.  Pt still with functional swallow given age and past medical hx.     Prognosis: Fair given mild degree of deficits. Discussed risk for continued dysphagia on thin liquids (discussed using nectar thick liquids but dtr reports she is concerned that pt may not consume enough liquids if her diet is altered).   Education: SLP provided extensive education to dtr and pt s/p MBS. SLP provided information/ education on swallowing anatomy, MBSS results, and SLP recommendations.   All questions/addressed.     Plan:   Follow up with GI MD and PCP as needed.   SLP to submit MBS report to MD via [facility] system    Recommendations:   · Continue [facility] soft diet with chopped meats, thin liquids by cup swallows, alternate sips and bites, double swallows after liquids to clear out pharynx, whole meds one by one.   · Slow rate of intake, must take liquid swallows by cup after solid bites.   · Upright for meals, if continued coughing is noted- recommend using thickener for nectar thick liquids.   · Standard aspiration precautions discussed with pt and dtr at length.

## 2020-11-19 ENCOUNTER — TELEPHONE (OUTPATIENT)
Dept: INTERNAL MEDICINE | Facility: CLINIC | Age: 85
End: 2020-11-19

## 2020-11-19 ENCOUNTER — LAB VISIT (OUTPATIENT)
Dept: LAB | Facility: HOSPITAL | Age: 85
End: 2020-11-19
Attending: INTERNAL MEDICINE
Payer: MEDICARE

## 2020-11-19 ENCOUNTER — IMMUNIZATION (OUTPATIENT)
Dept: INTERNAL MEDICINE | Facility: CLINIC | Age: 85
End: 2020-11-19
Payer: MEDICARE

## 2020-11-19 ENCOUNTER — OFFICE VISIT (OUTPATIENT)
Dept: INTERNAL MEDICINE | Facility: CLINIC | Age: 85
End: 2020-11-19
Payer: MEDICARE

## 2020-11-19 VITALS
SYSTOLIC BLOOD PRESSURE: 152 MMHG | HEART RATE: 49 BPM | BODY MASS INDEX: 13.68 KG/M2 | WEIGHT: 69.69 LBS | DIASTOLIC BLOOD PRESSURE: 74 MMHG | OXYGEN SATURATION: 99 % | HEIGHT: 60 IN

## 2020-11-19 DIAGNOSIS — I70.0 AORTIC ATHEROSCLEROSIS: ICD-10-CM

## 2020-11-19 DIAGNOSIS — R63.4 WEIGHT LOSS: Primary | ICD-10-CM

## 2020-11-19 DIAGNOSIS — R73.03 PREDIABETES: ICD-10-CM

## 2020-11-19 DIAGNOSIS — D64.9 ANEMIA, UNSPECIFIED TYPE: ICD-10-CM

## 2020-11-19 DIAGNOSIS — E78.2 MIXED HYPERLIPIDEMIA: ICD-10-CM

## 2020-11-19 DIAGNOSIS — M81.0 OSTEOPOROSIS, UNSPECIFIED OSTEOPOROSIS TYPE, UNSPECIFIED PATHOLOGICAL FRACTURE PRESENCE: ICD-10-CM

## 2020-11-19 DIAGNOSIS — D47.2 MGUS (MONOCLONAL GAMMOPATHY OF UNKNOWN SIGNIFICANCE): ICD-10-CM

## 2020-11-19 DIAGNOSIS — E83.52 HYPERCALCEMIA: ICD-10-CM

## 2020-11-19 DIAGNOSIS — R32 URINARY INCONTINENCE, UNSPECIFIED TYPE: ICD-10-CM

## 2020-11-19 DIAGNOSIS — I10 BENIGN ESSENTIAL HYPERTENSION: ICD-10-CM

## 2020-11-19 DIAGNOSIS — N18.31 STAGE 3A CHRONIC KIDNEY DISEASE: ICD-10-CM

## 2020-11-19 LAB
ALBUMIN SERPL BCP-MCNC: 3.7 G/DL (ref 3.5–5.2)
ALP SERPL-CCNC: 62 U/L (ref 55–135)
ALT SERPL W/O P-5'-P-CCNC: 7 U/L (ref 10–44)
ANION GAP SERPL CALC-SCNC: 9 MMOL/L (ref 8–16)
AST SERPL-CCNC: 26 U/L (ref 10–40)
BASOPHILS # BLD AUTO: 0.07 K/UL (ref 0–0.2)
BASOPHILS NFR BLD: 0.8 % (ref 0–1.9)
BILIRUB SERPL-MCNC: 0.5 MG/DL (ref 0.1–1)
BUN SERPL-MCNC: 18 MG/DL (ref 10–30)
CALCIUM SERPL-MCNC: 10.7 MG/DL (ref 8.7–10.5)
CHLORIDE SERPL-SCNC: 95 MMOL/L (ref 95–110)
CO2 SERPL-SCNC: 30 MMOL/L (ref 23–29)
CREAT SERPL-MCNC: 0.8 MG/DL (ref 0.5–1.4)
DIFFERENTIAL METHOD: ABNORMAL
EOSINOPHIL # BLD AUTO: 0.2 K/UL (ref 0–0.5)
EOSINOPHIL NFR BLD: 1.7 % (ref 0–8)
ERYTHROCYTE [DISTWIDTH] IN BLOOD BY AUTOMATED COUNT: 14 % (ref 11.5–14.5)
EST. GFR  (AFRICAN AMERICAN): >60 ML/MIN/1.73 M^2
EST. GFR  (NON AFRICAN AMERICAN): >60 ML/MIN/1.73 M^2
ESTIMATED AVG GLUCOSE: 114 MG/DL (ref 68–131)
GLUCOSE SERPL-MCNC: 103 MG/DL (ref 70–110)
HBA1C MFR BLD HPLC: 5.6 % (ref 4–5.6)
HCT VFR BLD AUTO: 36.1 % (ref 37–48.5)
HGB BLD-MCNC: 11.4 G/DL (ref 12–16)
IMM GRANULOCYTES # BLD AUTO: 0.02 K/UL (ref 0–0.04)
IMM GRANULOCYTES NFR BLD AUTO: 0.2 % (ref 0–0.5)
LYMPHOCYTES # BLD AUTO: 1.7 K/UL (ref 1–4.8)
LYMPHOCYTES NFR BLD: 19.6 % (ref 18–48)
MCH RBC QN AUTO: 31 PG (ref 27–31)
MCHC RBC AUTO-ENTMCNC: 31.6 G/DL (ref 32–36)
MCV RBC AUTO: 98 FL (ref 82–98)
MONOCYTES # BLD AUTO: 1 K/UL (ref 0.3–1)
MONOCYTES NFR BLD: 11 % (ref 4–15)
NEUTROPHILS # BLD AUTO: 5.9 K/UL (ref 1.8–7.7)
NEUTROPHILS NFR BLD: 66.7 % (ref 38–73)
NRBC BLD-RTO: 0 /100 WBC
PLATELET # BLD AUTO: 268 K/UL (ref 150–350)
PMV BLD AUTO: 11.9 FL (ref 9.2–12.9)
POTASSIUM SERPL-SCNC: 3.8 MMOL/L (ref 3.5–5.1)
PROT SERPL-MCNC: 9 G/DL (ref 6–8.4)
RBC # BLD AUTO: 3.68 M/UL (ref 4–5.4)
SODIUM SERPL-SCNC: 134 MMOL/L (ref 136–145)
WBC # BLD AUTO: 8.84 K/UL (ref 3.9–12.7)

## 2020-11-19 PROCEDURE — 99999 PR PBB SHADOW E&M-EST. PATIENT-LVL V: CPT | Mod: PBBFAC,,, | Performed by: INTERNAL MEDICINE

## 2020-11-19 PROCEDURE — 83036 HEMOGLOBIN GLYCOSYLATED A1C: CPT

## 2020-11-19 PROCEDURE — G0008 ADMIN INFLUENZA VIRUS VAC: HCPCS | Mod: PBBFAC

## 2020-11-19 PROCEDURE — 80053 COMPREHEN METABOLIC PANEL: CPT

## 2020-11-19 PROCEDURE — 99214 OFFICE O/P EST MOD 30 MIN: CPT | Mod: S$PBB,,, | Performed by: INTERNAL MEDICINE

## 2020-11-19 PROCEDURE — 36415 COLL VENOUS BLD VENIPUNCTURE: CPT

## 2020-11-19 PROCEDURE — 99215 OFFICE O/P EST HI 40 MIN: CPT | Mod: PBBFAC,25 | Performed by: INTERNAL MEDICINE

## 2020-11-19 PROCEDURE — 90694 VACC AIIV4 NO PRSRV 0.5ML IM: CPT | Mod: PBBFAC

## 2020-11-19 PROCEDURE — 99214 PR OFFICE/OUTPT VISIT, EST, LEVL IV, 30-39 MIN: ICD-10-PCS | Mod: S$PBB,,, | Performed by: INTERNAL MEDICINE

## 2020-11-19 PROCEDURE — 99999 PR PBB SHADOW E&M-EST. PATIENT-LVL V: ICD-10-PCS | Mod: PBBFAC,,, | Performed by: INTERNAL MEDICINE

## 2020-11-19 PROCEDURE — 85025 COMPLETE CBC W/AUTO DIFF WBC: CPT

## 2020-11-19 NOTE — PROGRESS NOTES
INTERNAL MEDICINE ESTABLISHED PATIENT VISIT NOTE    Subjective:     Chief Complaint: Follow-up  HTN, wt loss, hypercalcemia     Patient ID: An Luz is a 89 y.o. female with HTN, HLD, preDM, IgG Kappa MGUS, anemia, osteoporosis c kyphoscoliosis, glaucoma, chronic dry eyes, urinary incontinence, chronic constipation, aortic atherosclerosis noted incidentally on prior imaging, last seen by me in Aug, here today for f/u.    To review:  At baseline, lives at home c her daughter who works.  AAOx3, does most ADLs independently but appreciates assistance.  Wears poise pads for hx urinary incontinence, no issues c fecal incontinence.  Ambulates c a cane.  Has had 3 falls in the past, in 2013, 2017, and again 5/18.  Had r humeral neck fx back in 2017 and hip fx in 2013 s/p ORIF B.    Has been followed by me over the past year for wt loss.  Clinically asymptomatic other than some intermittent dysphagia who did modified Ba study which showed no aspiration but showed residue c thicker consistencies.    On labs at last visit in Aug, was also noted to have hypercalcemia so MVI stopped and was referred to Hematology given her anemia and hypercalcemia and was noted to have IgG Kappa MGUS.  Was seen by Dr. Goins who rec f/u CBC and CMP in 2-3 mos and rec f/u prn as pt did not want extensive w/u or treatment.  Was kept on HCTZ by me as her BP had been very difficult to control in the past.    States she has not lost any additional weight since her last visit.  No recent falls.  Still wants only conservative mgmt without extensive w/u and testing.  Was in a rush this morning and states she did not yet take her BP meds.    Past Medical History:  Past Medical History:   Diagnosis Date    Anemia     Arthritis     Fever blister     Glaucoma     Hypercalcemia     Hyperlipidemia     Hypertension     Osteoporosis     Pneumonia     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    Pubic ramus fracture     Scoliosis        Home  Medications:  Prior to Admission medications    Medication Sig Start Date End Date Taking? Authorizing Provider   acetaminophen (TYLENOL) 325 MG tablet Take 325 mg by mouth every 6 (six) hours as needed for Pain.    Historical Provider   alendronate (FOSAMAX) 70 MG tablet TAKE 1 TABLET EVERY 7 DAYS 2/3/20   Jalyn Caballero MD   amLODIPine (NORVASC) 5 MG tablet TAKE 1 TABLET DAILY 10/15/19   Jalyn Caballero MD   atenolol (TENORMIN) 25 MG tablet TAKE 1 TABLET TWICE A DAY 10/15/19   Jalyn Caballero MD   cholecalciferol, vitamin D3, (VITAMIN D3) 1,000 unit capsule Take 1,000 Units by mouth once daily.    Historical Provider   FOLIC ACID/MULTIVIT-MIN/LUTEIN (CENTRUM SILVER ORAL) Take 1 tablet by mouth once daily.     Historical Provider   hydroCHLOROthiazide (MICROZIDE) 12.5 mg capsule Take 1 capsule (12.5 mg total) by mouth once daily. 7/27/20 7/27/21  Jalyn Caballero MD   losartan (COZAAR) 100 MG tablet TAKE 1 TABLET DAILY 8/17/20   Jalyn Caballero MD   oxybutynin (DITROPAN-XL) 5 MG TR24 TAKE 1 TABLET DAILY 10/1/19   Jalyn Caballero MD   polyethylene glycol (GLYCOLAX) 17 gram PwPk Take one capful with a full glass of water daily as needed for constipation. 6/25/18   Jalyn Caballero MD   senna (SENOKOT) 8.6 mg tablet Take 1 tablet by mouth once daily.    Historical Provider   wheat dextrin (BENEFIBER HEALTHY SHAPE) 5 gram/7.4 gram Powd Take by mouth.    Historical Provider       Allergies:  Review of patient's allergies indicates:   Allergen Reactions    Zestril [lisinopril] Swelling     Facial Swelling    Augmentin [amoxicillin-pot clavulanate] Nausea And Vomiting    Sulfa (sulfonamide antibiotics)      Unknown reaction       Social History:  Social History     Tobacco Use    Smoking status: Never Smoker    Smokeless tobacco: Never Used    Tobacco comment: The patient exercises every other day on an exercise bike.  On alternative days she does floor exercises with weights.   Substance Use Topics    Alcohol use: Yes     Alcohol/week: 2.0 - 4.0  standard drinks     Types: 1 Glasses of wine, 1 - 3 Standard drinks or equivalent per week     Comment: 1 glass of wine w/ dinner and w/ company    Drug use: No        Review of Systems   Constitutional: Negative for chills, fatigue and fever.   Respiratory: Negative for cough, chest tightness and shortness of breath.    Cardiovascular: Negative for chest pain.   Gastrointestinal: Negative for abdominal pain and blood in stool.   Genitourinary: Negative for dysuria and frequency.         Health Maintenance:     Immunizations:   Influenza 9/2019, rec today  TDap is up to date 5/2017  Prevnar 13 5/2016, pvax 6/18  Shingrix is not UTD.    rec today but pt declined due to concerns regarding potential s/e, Risks and benefits were discussed with patient.        Cancer Screening:  PAP: n/a based on age, also c hx hyst  Mammogram: n/a based on age  Colonoscopy: n/a based on age  DEXA:  7/2018, cont alendronate, will schedule repeat now.    Objective:   BP (!) 152/74 (BP Location: Right arm, Patient Position: Sitting, BP Method: Medium (Manual))   Pulse (!) 49   Ht 5' (1.524 m)   Wt 31.6 kg (69 lb 10.7 oz)   LMP  (LMP Unknown)   SpO2 99%   BMI 13.61 kg/m²        General: AAO x3, appears thin and frail  HEENT: PERRL, OP clear  CV: RRR, no m/r/g  Pulm: Lungs CTAB, no crackles, no wheezes  Abd: s/NT/ND +BS  Extremities: no c/c/e    Labs:     Lab Results   Component Value Date    WBC 8.84 11/19/2020    HGB 11.4 (L) 11/19/2020    HCT 36.1 (L) 11/19/2020    MCV 98 11/19/2020     11/19/2020     Lab Results   Component Value Date    IRON 49 08/13/2020    TIBC 348 08/13/2020    FERRITIN 64 08/13/2020       Sodium   Date Value Ref Range Status   11/19/2020 134 (L) 136 - 145 mmol/L Final     Potassium   Date Value Ref Range Status   11/19/2020 3.8 3.5 - 5.1 mmol/L Final     Chloride   Date Value Ref Range Status   11/19/2020 95 95 - 110 mmol/L Final     CO2   Date Value Ref Range Status   11/19/2020 30 (H) 23 - 29 mmol/L  Final     Glucose   Date Value Ref Range Status   11/19/2020 103 70 - 110 mg/dL Final     BUN   Date Value Ref Range Status   11/19/2020 18 10 - 30 mg/dL Final     Creatinine   Date Value Ref Range Status   11/19/2020 0.8 0.5 - 1.4 mg/dL Final     Calcium   Date Value Ref Range Status   11/19/2020 10.7 (H) 8.7 - 10.5 mg/dL Final     Total Protein   Date Value Ref Range Status   11/19/2020 9.0 (H) 6.0 - 8.4 g/dL Final     Albumin   Date Value Ref Range Status   11/19/2020 3.7 3.5 - 5.2 g/dL Final     Total Bilirubin   Date Value Ref Range Status   11/19/2020 0.5 0.1 - 1.0 mg/dL Final     Comment:     For infants and newborns, interpretation of results should be based  on gestational age, weight and in agreement with clinical  observations.  Premature Infant recommended reference ranges:  Up to 24 hours.............<8.0 mg/dL  Up to 48 hours............<12.0 mg/dL  3-5 days..................<15.0 mg/dL  6-29 days.................<15.0 mg/dL       Alkaline Phosphatase   Date Value Ref Range Status   11/19/2020 62 55 - 135 U/L Final     AST   Date Value Ref Range Status   11/19/2020 26 10 - 40 U/L Final     ALT   Date Value Ref Range Status   11/19/2020 7 (L) 10 - 44 U/L Final     Anion Gap   Date Value Ref Range Status   11/19/2020 9 8 - 16 mmol/L Final     eGFR if    Date Value Ref Range Status   11/19/2020 >60.0 >60 mL/min/1.73 m^2 Final     eGFR if non    Date Value Ref Range Status   11/19/2020 >60.0 >60 mL/min/1.73 m^2 Final     Comment:     Calculation used to obtain the estimated glomerular filtration  rate (eGFR) is the CKD-EPI equation.        Lab Results   Component Value Date    PTH 55.0 05/20/2019    CALCIUM 10.7 (H) 11/19/2020    CAION 1.32 05/20/2019    PHOS 5.3 (H) 10/28/2017       Lab Results   Component Value Date    HGBA1C 5.6 11/19/2020     Lab Results   Component Value Date    LDLCALC 128.8 07/30/2020     Lab Results   Component Value Date    TSH 2.724 01/06/2020          Assessment/Plan     An was seen today for follow-up.    Diagnoses and all orders for this visit:    Weight loss  Hypercalcemia  MGUS (monoclonal gammopathy of unknown significance)  As per HPI  Seen by Heme-Onc for hypercalcemia and anemia c plan for conservative mgmt.  Will consider changing HCTZ if hypercalcemia persists but BP has been difficult to control as she has had various s/e c different meds.  Will repeat labs now and reassess.  Wt today stable compared to last visit but very frail.  Discussed fall precautions, has living will which daughter states she can bring to clinic to scan.  -     Comprehensive Metabolic Panel; Future    Prediabetes  Mild, improved on last check, will repeat now.  Does not need aggressive mgmt based on age, will prioritize wt gain base don numbers.  -     Hemoglobin A1C; Future    Osteoporosis, unspecified osteoporosis type, unspecified pathological fracture presence  As per HPI  On Alendronate which she has been swallowing s issues  Repeat dexa now.  -     DXA Bone Density Spine And Hip; Future    Mixed hyperlipidemia  Last check in July normalized, no acute issues.     Benign essential hypertension  Elevated today but did not take meds yet, daughter to call me later today c reading about 3 hrs after taking her daily meds.    Stage 3a chronic kidney disease  Stable on last check, will monitor.    Anemia, unspecified type  Fe studies low limit of normal  Will repeat labs now.  -     CBC Auto Differential; Future    Aortic atherosclerosis  No acute issues    Urinary incontinence, unspecified type  Stable on current regimen.      HM as above  RTC in 4 mos, sooner if needed.    Jalyn Caballero MD  Department of Internal Medicine - [facility]  11/22/2020

## 2020-11-19 NOTE — TELEPHONE ENCOUNTER
----- Message from Bri Rader sent at 11/19/2020  4:15 PM CST -----  Regarding: BP reading  Contact: Patient [phone]  [phone] Bp at  4:10 pb

## 2020-11-23 NOTE — TELEPHONE ENCOUNTER
Spoke to pt who states she has been in the BP program at [facility] many years ago and has had difficulty keeping her BP consistently controlled.  Had LE edema at higher doses of Amlodipine.  Had intolerable urinary frequency c HCTZ.  Did not tolerate Hydralazine.  Can try changing Atenolol to Toprol but pt states she will recheck BP tonight and call me tomorrow morning with an updated reading before making any changes.  Will follow.

## 2020-11-24 ENCOUNTER — TELEPHONE (OUTPATIENT)
Dept: INTERNAL MEDICINE | Facility: CLINIC | Age: 85
End: 2020-11-24

## 2020-11-24 NOTE — TELEPHONE ENCOUNTER
----- Message from Denisha Oconnell sent at 11/24/2020 11:37 AM CST -----  Contact: ANITA RUGGIERO [[id number]] @ [phone]  Mae patient is calling in her b/p readings from last night.    [facility]  [phone] (Phone) or  [phone] (Fax) if you want her to have the New Rx immediatly call it to  [facility]  [phone] [phone] (Phone) or  [phone] (Fax).        B/p   - 11/23   -  118/86  Pulse rate  of 60.         Allergies/Adverse Reaction     Zestril [Lisinopril]Swelling  Augmentin [Amoxicillin-pot Clavulanate]Nausea And Vomiting  Sulfa (Sulfonamide Antibiotics)

## 2021-01-07 ENCOUNTER — OFFICE VISIT (OUTPATIENT)
Dept: OPHTHALMOLOGY | Facility: CLINIC | Age: 86
End: 2021-01-07
Payer: MEDICARE

## 2021-01-07 DIAGNOSIS — Z96.1 PSEUDOPHAKIA OF BOTH EYES: ICD-10-CM

## 2021-01-07 DIAGNOSIS — H04.123 DRY EYE SYNDROME, BILATERAL: ICD-10-CM

## 2021-01-07 DIAGNOSIS — G24.5 BLEPHAROSPASM: ICD-10-CM

## 2021-01-07 DIAGNOSIS — H02.403 PTOSIS, BILATERAL: ICD-10-CM

## 2021-01-07 DIAGNOSIS — H40.013 OPEN ANGLE WITH BORDERLINE FINDINGS AND LOW GLAUCOMA RISK IN BOTH EYES: Primary | ICD-10-CM

## 2021-01-07 DIAGNOSIS — H35.361 FAMILIAL DRUSEN, BILATERAL: ICD-10-CM

## 2021-01-07 DIAGNOSIS — H35.362 FAMILIAL DRUSEN, BILATERAL: ICD-10-CM

## 2021-01-07 PROCEDURE — 92014 PR EYE EXAM, EST PATIENT,COMPREHESV: ICD-10-PCS | Mod: S$PBB,,, | Performed by: OPHTHALMOLOGY

## 2021-01-07 PROCEDURE — 99999 PR PBB SHADOW E&M-EST. PATIENT-LVL III: CPT | Mod: PBBFAC,,, | Performed by: OPHTHALMOLOGY

## 2021-01-07 PROCEDURE — 92133 POSTERIOR SEGMENT OCT OPTIC NERVE(OCULAR COHERENCE TOMOGRAPHY) - OU - BOTH EYES: ICD-10-PCS | Mod: 26,S$PBB,, | Performed by: OPHTHALMOLOGY

## 2021-01-07 PROCEDURE — 92014 COMPRE OPH EXAM EST PT 1/>: CPT | Mod: S$PBB,,, | Performed by: OPHTHALMOLOGY

## 2021-01-07 PROCEDURE — 99999 PR PBB SHADOW E&M-EST. PATIENT-LVL III: ICD-10-PCS | Mod: PBBFAC,,, | Performed by: OPHTHALMOLOGY

## 2021-01-07 PROCEDURE — 92133 CPTRZD OPH DX IMG PST SGM ON: CPT | Mod: PBBFAC | Performed by: OPHTHALMOLOGY

## 2021-01-07 PROCEDURE — 99213 OFFICE O/P EST LOW 20 MIN: CPT | Mod: PBBFAC | Performed by: OPHTHALMOLOGY

## 2021-01-11 ENCOUNTER — OFFICE VISIT (OUTPATIENT)
Dept: OTOLARYNGOLOGY | Facility: CLINIC | Age: 86
End: 2021-01-11
Payer: MEDICARE

## 2021-01-11 VITALS — HEART RATE: 57 BPM | SYSTOLIC BLOOD PRESSURE: 159 MMHG | DIASTOLIC BLOOD PRESSURE: 71 MMHG

## 2021-01-11 DIAGNOSIS — H61.23 IMPACTED CERUMEN, BILATERAL: ICD-10-CM

## 2021-01-11 DIAGNOSIS — Z97.4 WEARS HEARING AID IN BOTH EARS: Primary | ICD-10-CM

## 2021-01-11 PROCEDURE — 99999 PR PBB SHADOW E&M-EST. PATIENT-LVL III: ICD-10-PCS | Mod: PBBFAC,,, | Performed by: OTOLARYNGOLOGY

## 2021-01-11 PROCEDURE — 99499 NO LOS: ICD-10-PCS | Mod: S$PBB,,, | Performed by: OTOLARYNGOLOGY

## 2021-01-11 PROCEDURE — 69210 PR REMOVAL IMPACTED CERUMEN REQUIRING INSTRUMENTATION, UNILATERAL: ICD-10-PCS | Mod: S$PBB,,, | Performed by: OTOLARYNGOLOGY

## 2021-01-11 PROCEDURE — 99213 OFFICE O/P EST LOW 20 MIN: CPT | Mod: PBBFAC | Performed by: OTOLARYNGOLOGY

## 2021-01-11 PROCEDURE — 99999 PR PBB SHADOW E&M-EST. PATIENT-LVL III: CPT | Mod: PBBFAC,,, | Performed by: OTOLARYNGOLOGY

## 2021-01-11 PROCEDURE — 69210 REMOVE IMPACTED EAR WAX UNI: CPT | Mod: 50,PBBFAC | Performed by: OTOLARYNGOLOGY

## 2021-01-11 PROCEDURE — 69210 REMOVE IMPACTED EAR WAX UNI: CPT | Mod: S$PBB,,, | Performed by: OTOLARYNGOLOGY

## 2021-01-11 PROCEDURE — 99499 UNLISTED E&M SERVICE: CPT | Mod: S$PBB,,, | Performed by: OTOLARYNGOLOGY

## 2021-01-12 ENCOUNTER — OFFICE VISIT (OUTPATIENT)
Dept: PODIATRY | Facility: CLINIC | Age: 86
End: 2021-01-12
Payer: MEDICARE

## 2021-01-12 VITALS — WEIGHT: 69 LBS | HEIGHT: 60 IN | BODY MASS INDEX: 13.55 KG/M2

## 2021-01-12 DIAGNOSIS — B35.1 DERMATOPHYTOSIS OF NAIL: Primary | ICD-10-CM

## 2021-01-12 PROCEDURE — 99999 PR PBB SHADOW E&M-EST. PATIENT-LVL III: CPT | Mod: PBBFAC,,, | Performed by: PODIATRIST

## 2021-01-12 PROCEDURE — 99499 UNLISTED E&M SERVICE: CPT | Mod: ,,, | Performed by: PODIATRIST

## 2021-01-12 PROCEDURE — 17999 PR NON-COVERED FOOT CARE: ICD-10-PCS | Mod: CSM,,, | Performed by: PODIATRIST

## 2021-01-12 PROCEDURE — 99999 PR PBB SHADOW E&M-EST. PATIENT-LVL III: ICD-10-PCS | Mod: PBBFAC,,, | Performed by: PODIATRIST

## 2021-01-12 PROCEDURE — 99213 OFFICE O/P EST LOW 20 MIN: CPT | Mod: PBBFAC,PN | Performed by: PODIATRIST

## 2021-01-12 PROCEDURE — 99499 NO LOS: ICD-10-PCS | Mod: ,,, | Performed by: PODIATRIST

## 2021-01-12 PROCEDURE — 17999 UNLISTD PX SKN MUC MEMB SUBQ: CPT | Mod: CSM,,, | Performed by: PODIATRIST

## 2021-03-08 ENCOUNTER — TELEPHONE (OUTPATIENT)
Dept: INTERNAL MEDICINE | Facility: CLINIC | Age: 86
End: 2021-03-08

## 2021-03-10 ENCOUNTER — OFFICE VISIT (OUTPATIENT)
Dept: INTERNAL MEDICINE | Facility: CLINIC | Age: 86
End: 2021-03-10
Payer: MEDICARE

## 2021-03-10 ENCOUNTER — PATIENT OUTREACH (OUTPATIENT)
Dept: ADMINISTRATIVE | Facility: HOSPITAL | Age: 86
End: 2021-03-10

## 2021-03-10 VITALS
OXYGEN SATURATION: 99 % | HEIGHT: 60 IN | HEART RATE: 62 BPM | WEIGHT: 71.63 LBS | SYSTOLIC BLOOD PRESSURE: 122 MMHG | DIASTOLIC BLOOD PRESSURE: 78 MMHG | BODY MASS INDEX: 14.06 KG/M2

## 2021-03-10 DIAGNOSIS — D47.2 MGUS (MONOCLONAL GAMMOPATHY OF UNKNOWN SIGNIFICANCE): ICD-10-CM

## 2021-03-10 DIAGNOSIS — E83.52 HYPERCALCEMIA: ICD-10-CM

## 2021-03-10 DIAGNOSIS — E78.2 MIXED HYPERLIPIDEMIA: ICD-10-CM

## 2021-03-10 DIAGNOSIS — N18.31 STAGE 3A CHRONIC KIDNEY DISEASE: ICD-10-CM

## 2021-03-10 DIAGNOSIS — R63.4 WEIGHT LOSS: Primary | ICD-10-CM

## 2021-03-10 DIAGNOSIS — I70.0 AORTIC ATHEROSCLEROSIS: ICD-10-CM

## 2021-03-10 DIAGNOSIS — I10 BENIGN ESSENTIAL HYPERTENSION: ICD-10-CM

## 2021-03-10 DIAGNOSIS — M81.0 AGE RELATED OSTEOPOROSIS, UNSPECIFIED PATHOLOGICAL FRACTURE PRESENCE: ICD-10-CM

## 2021-03-10 DIAGNOSIS — R32 URINARY INCONTINENCE, UNSPECIFIED TYPE: ICD-10-CM

## 2021-03-10 DIAGNOSIS — D64.9 ANEMIA, UNSPECIFIED TYPE: ICD-10-CM

## 2021-03-10 PROCEDURE — 99999 PR PBB SHADOW E&M-EST. PATIENT-LVL IV: ICD-10-PCS | Mod: PBBFAC,,, | Performed by: INTERNAL MEDICINE

## 2021-03-10 PROCEDURE — 99999 PR PBB SHADOW E&M-EST. PATIENT-LVL IV: CPT | Mod: PBBFAC,,, | Performed by: INTERNAL MEDICINE

## 2021-03-10 PROCEDURE — 99214 OFFICE O/P EST MOD 30 MIN: CPT | Mod: S$PBB,,, | Performed by: INTERNAL MEDICINE

## 2021-03-10 PROCEDURE — 99214 OFFICE O/P EST MOD 30 MIN: CPT | Mod: PBBFAC | Performed by: INTERNAL MEDICINE

## 2021-03-10 PROCEDURE — 99214 PR OFFICE/OUTPT VISIT, EST, LEVL IV, 30-39 MIN: ICD-10-PCS | Mod: S$PBB,,, | Performed by: INTERNAL MEDICINE

## 2021-03-10 RX ORDER — OXYBUTYNIN CHLORIDE 5 MG/1
5 TABLET, EXTENDED RELEASE ORAL DAILY
Qty: 90 TABLET | Refills: 3 | Status: SHIPPED | OUTPATIENT
Start: 2021-03-10 | End: 2021-04-01

## 2021-03-16 ENCOUNTER — TELEPHONE (OUTPATIENT)
Dept: INTERNAL MEDICINE | Facility: CLINIC | Age: 86
End: 2021-03-16

## 2021-03-18 ENCOUNTER — TELEPHONE (OUTPATIENT)
Dept: INTERNAL MEDICINE | Facility: CLINIC | Age: 86
End: 2021-03-18

## 2021-03-23 ENCOUNTER — TELEPHONE (OUTPATIENT)
Dept: INTERNAL MEDICINE | Facility: CLINIC | Age: 86
End: 2021-03-23

## 2021-03-23 DIAGNOSIS — N39.41 URGE INCONTINENCE OF URINE: Primary | ICD-10-CM

## 2021-03-24 ENCOUNTER — TELEPHONE (OUTPATIENT)
Dept: INTERNAL MEDICINE | Facility: CLINIC | Age: 86
End: 2021-03-24

## 2021-04-01 ENCOUNTER — OFFICE VISIT (OUTPATIENT)
Dept: UROGYNECOLOGY | Facility: CLINIC | Age: 86
End: 2021-04-01
Payer: MEDICARE

## 2021-04-01 VITALS
WEIGHT: 68.69 LBS | SYSTOLIC BLOOD PRESSURE: 119 MMHG | BODY MASS INDEX: 13.49 KG/M2 | DIASTOLIC BLOOD PRESSURE: 70 MMHG | HEIGHT: 60 IN

## 2021-04-01 DIAGNOSIS — N39.41 URGE URINARY INCONTINENCE: Primary | ICD-10-CM

## 2021-04-01 DIAGNOSIS — R35.0 URINARY FREQUENCY: ICD-10-CM

## 2021-04-01 PROCEDURE — 99999 PR PBB SHADOW E&M-EST. PATIENT-LVL III: CPT | Mod: PBBFAC,,, | Performed by: PHYSICIAN ASSISTANT

## 2021-04-01 PROCEDURE — 99213 OFFICE O/P EST LOW 20 MIN: CPT | Mod: PBBFAC | Performed by: PHYSICIAN ASSISTANT

## 2021-04-01 PROCEDURE — 99214 OFFICE O/P EST MOD 30 MIN: CPT | Mod: S$PBB,,, | Performed by: PHYSICIAN ASSISTANT

## 2021-04-01 PROCEDURE — 99214 PR OFFICE/OUTPT VISIT, EST, LEVL IV, 30-39 MIN: ICD-10-PCS | Mod: S$PBB,,, | Performed by: PHYSICIAN ASSISTANT

## 2021-04-01 PROCEDURE — 87086 URINE CULTURE/COLONY COUNT: CPT | Performed by: PHYSICIAN ASSISTANT

## 2021-04-01 PROCEDURE — 99999 PR PBB SHADOW E&M-EST. PATIENT-LVL III: ICD-10-PCS | Mod: PBBFAC,,, | Performed by: PHYSICIAN ASSISTANT

## 2021-04-01 RX ORDER — OXYBUTYNIN CHLORIDE 10 MG/1
10 TABLET, EXTENDED RELEASE ORAL DAILY
Qty: 30 TABLET | Refills: 11 | Status: SHIPPED | OUTPATIENT
Start: 2021-04-01 | End: 2021-04-07 | Stop reason: SDUPTHER

## 2021-04-03 LAB — BACTERIA UR CULT: NO GROWTH

## 2021-04-07 DIAGNOSIS — R35.0 URINARY FREQUENCY: ICD-10-CM

## 2021-04-07 RX ORDER — OXYBUTYNIN CHLORIDE 10 MG/1
10 TABLET, EXTENDED RELEASE ORAL DAILY
Qty: 30 TABLET | Refills: 11 | Status: SHIPPED | OUTPATIENT
Start: 2021-04-07 | End: 2022-02-16 | Stop reason: SDUPTHER

## 2021-04-08 ENCOUNTER — HOSPITAL ENCOUNTER (OUTPATIENT)
Dept: RADIOLOGY | Facility: CLINIC | Age: 86
Discharge: HOME | End: 2021-04-08
Attending: INTERNAL MEDICINE
Payer: MEDICARE

## 2021-04-08 DIAGNOSIS — M81.0 OSTEOPOROSIS, UNSPECIFIED OSTEOPOROSIS TYPE, UNSPECIFIED PATHOLOGICAL FRACTURE PRESENCE: ICD-10-CM

## 2021-04-08 PROCEDURE — 77080 DXA BONE DENSITY AXIAL: CPT | Mod: 26,,, | Performed by: INTERNAL MEDICINE

## 2021-04-08 PROCEDURE — 77080 DXA BONE DENSITY AXIAL: CPT | Mod: TC

## 2021-04-08 PROCEDURE — 77080 DEXA BONE DENSITY SPINE HIP: ICD-10-PCS | Mod: 26,,, | Performed by: INTERNAL MEDICINE

## 2021-04-26 ENCOUNTER — TELEPHONE (OUTPATIENT)
Dept: INTERNAL MEDICINE | Facility: CLINIC | Age: 86
End: 2021-04-26

## 2021-04-26 DIAGNOSIS — M81.0 AGE RELATED OSTEOPOROSIS, UNSPECIFIED PATHOLOGICAL FRACTURE PRESENCE: Primary | ICD-10-CM

## 2021-04-27 NOTE — TELEPHONE ENCOUNTER
----- Message from Yaya Read MA sent at 8/1/2017 12:35 PM CDT -----  Contact: Gina ramos/Raquel  - [phone]   stated the Rx for atenolol (TENORMIN) 25 MG tablet is on back order. Please call   Department of Anesthesiology  Postprocedure Note    Patient: Pina Lucero  MRN: [id number]  YOB: 1941  Date of evaluation: 4/27/2021  Time:  12:31 PM     Procedure Summary     Date: 04/27/21 Room / Location: [address]    Anesthesia Start: 0613 Anesthesia Stop: 5983    Procedure: LEFT VATS UPPER LOBE WEDGE (Left Chest) Diagnosis: (LUNG CANCER)    Surgeons: Nai Euceda MD Responsible Provider: Miles Sheridan MD    Anesthesia Type: general ASA Status: 3          Anesthesia Type: general    Heather Phase I:      Heather Phase II:      Last vitals: Reviewed and per EMR flowsheets.        Anesthesia Post Evaluation    Patient location during evaluation: bedside  Patient participation: complete - patient participated  Level of consciousness: awake and alert  Pain score: 4  Airway patency: patent  Nausea & Vomiting: no nausea and no vomiting  Complications: no  Cardiovascular status: blood pressure returned to baseline and hemodynamically stable  Respiratory status: acceptable  Hydration status: euvolemic

## 2021-05-13 ENCOUNTER — TELEPHONE (OUTPATIENT)
Dept: PODIATRY | Facility: CLINIC | Age: 86
End: 2021-05-13

## 2021-05-13 ENCOUNTER — OFFICE VISIT (OUTPATIENT)
Dept: PODIATRY | Facility: CLINIC | Age: 86
End: 2021-05-13
Payer: MEDICARE

## 2021-05-13 ENCOUNTER — TELEPHONE (OUTPATIENT)
Dept: INTERNAL MEDICINE | Facility: CLINIC | Age: 86
End: 2021-05-13

## 2021-05-13 VITALS
WEIGHT: 68 LBS | HEIGHT: 60 IN | BODY MASS INDEX: 13.35 KG/M2 | DIASTOLIC BLOOD PRESSURE: 68 MMHG | SYSTOLIC BLOOD PRESSURE: 195 MMHG

## 2021-05-13 DIAGNOSIS — B35.1 DERMATOPHYTOSIS OF NAIL: Primary | ICD-10-CM

## 2021-05-13 DIAGNOSIS — N18.31 STAGE 3A CHRONIC KIDNEY DISEASE: ICD-10-CM

## 2021-05-13 DIAGNOSIS — L90.9 FAT PAD ATROPHY OF FOOT: ICD-10-CM

## 2021-05-13 DIAGNOSIS — Z78.9 IMPAIRED MOBILITY AND ADLS: ICD-10-CM

## 2021-05-13 DIAGNOSIS — Z74.09 IMPAIRED MOBILITY AND ADLS: ICD-10-CM

## 2021-05-13 PROCEDURE — 11721 DEBRIDE NAIL 6 OR MORE: CPT | Mod: Q9,PBBFAC,PN | Performed by: PODIATRIST

## 2021-05-13 PROCEDURE — 99213 OFFICE O/P EST LOW 20 MIN: CPT | Mod: PBBFAC,PN | Performed by: PODIATRIST

## 2021-05-13 PROCEDURE — 99999 PR PBB SHADOW E&M-EST. PATIENT-LVL III: ICD-10-PCS | Mod: PBBFAC,,, | Performed by: PODIATRIST

## 2021-05-13 PROCEDURE — 99999 PR PBB SHADOW E&M-EST. PATIENT-LVL III: CPT | Mod: PBBFAC,,, | Performed by: PODIATRIST

## 2021-05-13 PROCEDURE — 99499 NO LOS: ICD-10-PCS | Mod: S$PBB,,, | Performed by: PODIATRIST

## 2021-05-13 PROCEDURE — 99499 UNLISTED E&M SERVICE: CPT | Mod: S$PBB,,, | Performed by: PODIATRIST

## 2021-05-13 PROCEDURE — 11721 ROUTINE FOOT CARE: ICD-10-PCS | Mod: Q9,S$PBB,, | Performed by: PODIATRIST

## 2021-06-02 ENCOUNTER — TELEPHONE (OUTPATIENT)
Dept: INTERNAL MEDICINE | Facility: CLINIC | Age: 86
End: 2021-06-02

## 2021-06-02 ENCOUNTER — PES CALL (OUTPATIENT)
Dept: ADMINISTRATIVE | Facility: CLINIC | Age: 86
End: 2021-06-02

## 2021-06-08 DIAGNOSIS — I10 BENIGN ESSENTIAL HYPERTENSION: ICD-10-CM

## 2021-06-08 RX ORDER — HYDROCHLOROTHIAZIDE 12.5 MG/1
12.5 CAPSULE ORAL DAILY
Qty: 90 CAPSULE | Refills: 3 | Status: SHIPPED | OUTPATIENT
Start: 2021-06-08 | End: 2021-08-16

## 2021-08-04 ENCOUNTER — TELEPHONE (OUTPATIENT)
Dept: INTERNAL MEDICINE | Facility: CLINIC | Age: 86
End: 2021-08-04

## 2021-08-10 ENCOUNTER — OFFICE VISIT (OUTPATIENT)
Dept: HOME HEALTH SERVICES | Facility: CLINIC | Age: 86
End: 2021-08-10
Payer: MEDICARE

## 2021-08-10 VITALS
WEIGHT: 70.19 LBS | HEART RATE: 64 BPM | SYSTOLIC BLOOD PRESSURE: 105 MMHG | OXYGEN SATURATION: 97 % | TEMPERATURE: 98 F | HEIGHT: 60 IN | DIASTOLIC BLOOD PRESSURE: 66 MMHG | BODY MASS INDEX: 13.78 KG/M2

## 2021-08-10 DIAGNOSIS — M41.24 OTHER IDIOPATHIC SCOLIOSIS, THORACIC REGION: ICD-10-CM

## 2021-08-10 DIAGNOSIS — I70.0 AORTIC ATHEROSCLEROSIS: ICD-10-CM

## 2021-08-10 DIAGNOSIS — Z99.89 DEPENDENCE ON OTHER ENABLING MACHINES AND DEVICES: ICD-10-CM

## 2021-08-10 DIAGNOSIS — R32 URINARY INCONTINENCE, UNSPECIFIED TYPE: ICD-10-CM

## 2021-08-10 DIAGNOSIS — Z74.09 OTHER REDUCED MOBILITY: ICD-10-CM

## 2021-08-10 DIAGNOSIS — M81.0 AGE RELATED OSTEOPOROSIS, UNSPECIFIED PATHOLOGICAL FRACTURE PRESENCE: ICD-10-CM

## 2021-08-10 DIAGNOSIS — E78.2 MIXED HYPERLIPIDEMIA: ICD-10-CM

## 2021-08-10 DIAGNOSIS — R63.6 UNDERWEIGHT: ICD-10-CM

## 2021-08-10 DIAGNOSIS — D47.2 MGUS (MONOCLONAL GAMMOPATHY OF UNKNOWN SIGNIFICANCE): ICD-10-CM

## 2021-08-10 DIAGNOSIS — I10 BENIGN ESSENTIAL HYPERTENSION: ICD-10-CM

## 2021-08-10 DIAGNOSIS — Z00.00 ENCOUNTER FOR PREVENTIVE HEALTH EXAMINATION: Primary | ICD-10-CM

## 2021-08-10 PROBLEM — N18.30 CKD (CHRONIC KIDNEY DISEASE) STAGE 3, GFR 30-59 ML/MIN: Status: RESOLVED | Noted: 2020-07-06 | Resolved: 2021-08-10

## 2021-08-10 PROCEDURE — G0439 PR MEDICARE ANNUAL WELLNESS SUBSEQUENT VISIT: ICD-10-PCS | Mod: S$GLB,,, | Performed by: NURSE PRACTITIONER

## 2021-08-10 PROCEDURE — G0439 PPPS, SUBSEQ VISIT: HCPCS | Mod: S$GLB,,, | Performed by: NURSE PRACTITIONER

## 2021-08-11 ENCOUNTER — OFFICE VISIT (OUTPATIENT)
Dept: INTERNAL MEDICINE | Facility: CLINIC | Age: 86
End: 2021-08-11
Payer: MEDICARE

## 2021-08-11 ENCOUNTER — LAB VISIT (OUTPATIENT)
Dept: LAB | Facility: HOSPITAL | Age: 86
End: 2021-08-11
Attending: INTERNAL MEDICINE
Payer: MEDICARE

## 2021-08-11 VITALS
DIASTOLIC BLOOD PRESSURE: 66 MMHG | WEIGHT: 70.13 LBS | OXYGEN SATURATION: 98 % | HEIGHT: 60 IN | SYSTOLIC BLOOD PRESSURE: 105 MMHG | HEART RATE: 68 BPM | BODY MASS INDEX: 13.77 KG/M2

## 2021-08-11 DIAGNOSIS — I10 ESSENTIAL HYPERTENSION: Primary | ICD-10-CM

## 2021-08-11 DIAGNOSIS — M41.9 KYPHOSCOLIOSIS: ICD-10-CM

## 2021-08-11 DIAGNOSIS — I10 ESSENTIAL HYPERTENSION: ICD-10-CM

## 2021-08-11 DIAGNOSIS — M81.0 AGE RELATED OSTEOPOROSIS, UNSPECIFIED PATHOLOGICAL FRACTURE PRESENCE: ICD-10-CM

## 2021-08-11 DIAGNOSIS — E78.2 MIXED HYPERLIPIDEMIA: ICD-10-CM

## 2021-08-11 DIAGNOSIS — I70.0 AORTIC ATHEROSCLEROSIS: ICD-10-CM

## 2021-08-11 DIAGNOSIS — I10 BENIGN ESSENTIAL HYPERTENSION: ICD-10-CM

## 2021-08-11 DIAGNOSIS — D63.8 ANEMIA OF CHRONIC DISEASE: ICD-10-CM

## 2021-08-11 DIAGNOSIS — R73.03 PREDIABETES: ICD-10-CM

## 2021-08-11 LAB
ALBUMIN SERPL BCP-MCNC: 3.3 G/DL (ref 3.5–5.2)
ALP SERPL-CCNC: 67 U/L (ref 55–135)
ALT SERPL W/O P-5'-P-CCNC: 9 U/L (ref 10–44)
ANION GAP SERPL CALC-SCNC: 6 MMOL/L (ref 8–16)
AST SERPL-CCNC: 23 U/L (ref 10–40)
BASOPHILS # BLD AUTO: 0.06 K/UL (ref 0–0.2)
BASOPHILS NFR BLD: 0.8 % (ref 0–1.9)
BILIRUB SERPL-MCNC: 0.5 MG/DL (ref 0.1–1)
BUN SERPL-MCNC: 23 MG/DL (ref 10–30)
CALCIUM SERPL-MCNC: 11.1 MG/DL (ref 8.7–10.5)
CHLORIDE SERPL-SCNC: 98 MMOL/L (ref 95–110)
CO2 SERPL-SCNC: 30 MMOL/L (ref 23–29)
CREAT SERPL-MCNC: 0.9 MG/DL (ref 0.5–1.4)
DIFFERENTIAL METHOD: ABNORMAL
EOSINOPHIL # BLD AUTO: 0.1 K/UL (ref 0–0.5)
EOSINOPHIL NFR BLD: 1.8 % (ref 0–8)
ERYTHROCYTE [DISTWIDTH] IN BLOOD BY AUTOMATED COUNT: 14 % (ref 11.5–14.5)
EST. GFR  (AFRICAN AMERICAN): >60 ML/MIN/1.73 M^2
EST. GFR  (NON AFRICAN AMERICAN): 55.3 ML/MIN/1.73 M^2
GLUCOSE SERPL-MCNC: 91 MG/DL (ref 70–110)
HCT VFR BLD AUTO: 32.5 % (ref 37–48.5)
HGB BLD-MCNC: 10 G/DL (ref 12–16)
IMM GRANULOCYTES # BLD AUTO: 0.02 K/UL (ref 0–0.04)
IMM GRANULOCYTES NFR BLD AUTO: 0.3 % (ref 0–0.5)
LYMPHOCYTES # BLD AUTO: 1.4 K/UL (ref 1–4.8)
LYMPHOCYTES NFR BLD: 18.3 % (ref 18–48)
MCH RBC QN AUTO: 29.5 PG (ref 27–31)
MCHC RBC AUTO-ENTMCNC: 30.8 G/DL (ref 32–36)
MCV RBC AUTO: 96 FL (ref 82–98)
MONOCYTES # BLD AUTO: 0.9 K/UL (ref 0.3–1)
MONOCYTES NFR BLD: 11.6 % (ref 4–15)
NEUTROPHILS # BLD AUTO: 5.2 K/UL (ref 1.8–7.7)
NEUTROPHILS NFR BLD: 67.2 % (ref 38–73)
NRBC BLD-RTO: 0 /100 WBC
PLATELET # BLD AUTO: 290 K/UL (ref 150–450)
PMV BLD AUTO: 11.5 FL (ref 9.2–12.9)
POTASSIUM SERPL-SCNC: 4 MMOL/L (ref 3.5–5.1)
PROT SERPL-MCNC: 8.8 G/DL (ref 6–8.4)
RBC # BLD AUTO: 3.39 M/UL (ref 4–5.4)
SODIUM SERPL-SCNC: 134 MMOL/L (ref 136–145)
WBC # BLD AUTO: 7.75 K/UL (ref 3.9–12.7)

## 2021-08-11 PROCEDURE — 99999 PR PBB SHADOW E&M-EST. PATIENT-LVL IV: ICD-10-PCS | Mod: PBBFAC,,, | Performed by: INTERNAL MEDICINE

## 2021-08-11 PROCEDURE — 36415 COLL VENOUS BLD VENIPUNCTURE: CPT | Performed by: INTERNAL MEDICINE

## 2021-08-11 PROCEDURE — 99214 OFFICE O/P EST MOD 30 MIN: CPT | Mod: PBBFAC | Performed by: INTERNAL MEDICINE

## 2021-08-11 PROCEDURE — 85025 COMPLETE CBC W/AUTO DIFF WBC: CPT | Performed by: INTERNAL MEDICINE

## 2021-08-11 PROCEDURE — 99214 PR OFFICE/OUTPT VISIT, EST, LEVL IV, 30-39 MIN: ICD-10-PCS | Mod: S$PBB,,, | Performed by: INTERNAL MEDICINE

## 2021-08-11 PROCEDURE — 99214 OFFICE O/P EST MOD 30 MIN: CPT | Mod: S$PBB,,, | Performed by: INTERNAL MEDICINE

## 2021-08-11 PROCEDURE — 80053 COMPREHEN METABOLIC PANEL: CPT | Performed by: INTERNAL MEDICINE

## 2021-08-11 PROCEDURE — 99999 PR PBB SHADOW E&M-EST. PATIENT-LVL IV: CPT | Mod: PBBFAC,,, | Performed by: INTERNAL MEDICINE

## 2021-08-11 RX ORDER — LOSARTAN POTASSIUM 100 MG/1
100 TABLET ORAL DAILY
Qty: 90 TABLET | Refills: 3 | Status: SHIPPED | OUTPATIENT
Start: 2021-08-11 | End: 2022-06-28

## 2021-08-12 ENCOUNTER — OFFICE VISIT (OUTPATIENT)
Dept: PODIATRY | Facility: CLINIC | Age: 86
End: 2021-08-12
Payer: MEDICARE

## 2021-08-12 VITALS
WEIGHT: 70.13 LBS | DIASTOLIC BLOOD PRESSURE: 63 MMHG | HEART RATE: 56 BPM | SYSTOLIC BLOOD PRESSURE: 139 MMHG | BODY MASS INDEX: 13.77 KG/M2 | HEIGHT: 60 IN

## 2021-08-12 DIAGNOSIS — L90.9 FAT PAD ATROPHY OF FOOT: ICD-10-CM

## 2021-08-12 DIAGNOSIS — N18.31 STAGE 3A CHRONIC KIDNEY DISEASE: ICD-10-CM

## 2021-08-12 DIAGNOSIS — B35.1 DERMATOPHYTOSIS OF NAIL: Primary | ICD-10-CM

## 2021-08-12 PROCEDURE — 99214 OFFICE O/P EST MOD 30 MIN: CPT | Mod: PBBFAC,PN | Performed by: PODIATRIST

## 2021-08-12 PROCEDURE — 99499 NO LOS: ICD-10-PCS | Mod: S$PBB,,, | Performed by: PODIATRIST

## 2021-08-12 PROCEDURE — 11721 DEBRIDE NAIL 6 OR MORE: CPT | Mod: Q9,PBBFAC,PN | Performed by: PODIATRIST

## 2021-08-12 PROCEDURE — 11721 ROUTINE FOOT CARE: ICD-10-PCS | Mod: Q9,S$PBB,, | Performed by: PODIATRIST

## 2021-08-12 PROCEDURE — 99999 PR PBB SHADOW E&M-EST. PATIENT-LVL IV: ICD-10-PCS | Mod: PBBFAC,,, | Performed by: PODIATRIST

## 2021-08-12 PROCEDURE — 99999 PR PBB SHADOW E&M-EST. PATIENT-LVL IV: CPT | Mod: PBBFAC,,, | Performed by: PODIATRIST

## 2021-08-12 PROCEDURE — 99499 UNLISTED E&M SERVICE: CPT | Mod: S$PBB,,, | Performed by: PODIATRIST

## 2021-08-16 ENCOUNTER — LAB VISIT (OUTPATIENT)
Dept: LAB | Facility: HOSPITAL | Age: 86
End: 2021-08-16
Payer: MEDICARE

## 2021-08-16 ENCOUNTER — OFFICE VISIT (OUTPATIENT)
Dept: ENDOCRINOLOGY | Facility: CLINIC | Age: 86
End: 2021-08-16
Payer: MEDICARE

## 2021-08-16 VITALS
HEIGHT: 60 IN | HEART RATE: 64 BPM | DIASTOLIC BLOOD PRESSURE: 76 MMHG | BODY MASS INDEX: 13.63 KG/M2 | WEIGHT: 69.44 LBS | SYSTOLIC BLOOD PRESSURE: 104 MMHG

## 2021-08-16 DIAGNOSIS — M81.0 AGE RELATED OSTEOPOROSIS, UNSPECIFIED PATHOLOGICAL FRACTURE PRESENCE: ICD-10-CM

## 2021-08-16 DIAGNOSIS — M41.9 KYPHOSCOLIOSIS: ICD-10-CM

## 2021-08-16 DIAGNOSIS — E83.52 HYPERCALCEMIA: ICD-10-CM

## 2021-08-16 LAB — 25(OH)D3+25(OH)D2 SERPL-MCNC: 32 NG/ML (ref 30–96)

## 2021-08-16 PROCEDURE — 99204 PR OFFICE/OUTPT VISIT, NEW, LEVL IV, 45-59 MIN: ICD-10-PCS | Mod: S$PBB,,, | Performed by: INTERNAL MEDICINE

## 2021-08-16 PROCEDURE — 99204 OFFICE O/P NEW MOD 45 MIN: CPT | Mod: S$PBB,,, | Performed by: INTERNAL MEDICINE

## 2021-08-16 PROCEDURE — 36415 COLL VENOUS BLD VENIPUNCTURE: CPT | Performed by: INTERNAL MEDICINE

## 2021-08-16 PROCEDURE — 99999 PR PBB SHADOW E&M-EST. PATIENT-LVL IV: CPT | Mod: PBBFAC,,, | Performed by: INTERNAL MEDICINE

## 2021-08-16 PROCEDURE — 99999 PR PBB SHADOW E&M-EST. PATIENT-LVL IV: ICD-10-PCS | Mod: PBBFAC,,, | Performed by: INTERNAL MEDICINE

## 2021-08-16 PROCEDURE — 82306 VITAMIN D 25 HYDROXY: CPT | Performed by: INTERNAL MEDICINE

## 2021-08-16 PROCEDURE — 99214 OFFICE O/P EST MOD 30 MIN: CPT | Mod: PBBFAC | Performed by: INTERNAL MEDICINE

## 2021-08-17 ENCOUNTER — TELEPHONE (OUTPATIENT)
Dept: INTERNAL MEDICINE | Facility: CLINIC | Age: 86
End: 2021-08-17

## 2021-08-17 DIAGNOSIS — I10 BENIGN ESSENTIAL HYPERTENSION: Primary | ICD-10-CM

## 2021-08-17 RX ORDER — LABETALOL 100 MG/1
100 TABLET, FILM COATED ORAL 2 TIMES DAILY
Qty: 60 TABLET | Refills: 1 | Status: SHIPPED | OUTPATIENT
Start: 2021-08-17 | End: 2021-10-21 | Stop reason: SDUPTHER

## 2021-09-14 ENCOUNTER — TELEPHONE (OUTPATIENT)
Dept: INTERNAL MEDICINE | Facility: CLINIC | Age: 86
End: 2021-09-14

## 2021-09-14 DIAGNOSIS — R30.0 DYSURIA: Primary | ICD-10-CM

## 2021-09-14 RX ORDER — CIPROFLOXACIN 250 MG/1
250 TABLET, FILM COATED ORAL 2 TIMES DAILY
Qty: 6 TABLET | Refills: 0 | Status: SHIPPED | OUTPATIENT
Start: 2021-09-14 | End: 2021-09-17

## 2021-09-15 ENCOUNTER — LAB VISIT (OUTPATIENT)
Dept: LAB | Facility: HOSPITAL | Age: 86
End: 2021-09-15
Attending: INTERNAL MEDICINE
Payer: MEDICARE

## 2021-09-15 ENCOUNTER — TELEPHONE (OUTPATIENT)
Dept: INFECTIOUS DISEASES | Facility: HOSPITAL | Age: 86
End: 2021-09-15

## 2021-09-15 DIAGNOSIS — R30.0 DYSURIA: ICD-10-CM

## 2021-09-15 LAB
AMORPH CRY UR QL COMP ASSIST: ABNORMAL
BACTERIA #/AREA URNS AUTO: ABNORMAL /HPF
BILIRUB UR QL STRIP: NEGATIVE
CLARITY UR REFRACT.AUTO: ABNORMAL
COLOR UR AUTO: YELLOW
GLUCOSE UR QL STRIP: NEGATIVE
HGB UR QL STRIP: NEGATIVE
HYALINE CASTS UR QL AUTO: 0 /LPF
KETONES UR QL STRIP: NEGATIVE
LEUKOCYTE ESTERASE UR QL STRIP: ABNORMAL
MICROSCOPIC COMMENT: ABNORMAL
NITRITE UR QL STRIP: POSITIVE
PH UR STRIP: 8 [PH] (ref 5–8)
PROT UR QL STRIP: ABNORMAL
RBC #/AREA URNS AUTO: 28 /HPF (ref 0–4)
SP GR UR STRIP: 1.02 (ref 1–1.03)
URN SPEC COLLECT METH UR: ABNORMAL
WBC #/AREA URNS AUTO: 19 /HPF (ref 0–5)

## 2021-09-15 PROCEDURE — 87086 URINE CULTURE/COLONY COUNT: CPT | Performed by: INTERNAL MEDICINE

## 2021-09-15 PROCEDURE — 87077 CULTURE AEROBIC IDENTIFY: CPT | Performed by: INTERNAL MEDICINE

## 2021-09-15 PROCEDURE — 81001 URINALYSIS AUTO W/SCOPE: CPT | Performed by: INTERNAL MEDICINE

## 2021-09-15 PROCEDURE — 87186 SC STD MICRODIL/AGAR DIL: CPT | Performed by: INTERNAL MEDICINE

## 2021-09-15 PROCEDURE — 87088 URINE BACTERIA CULTURE: CPT | Performed by: INTERNAL MEDICINE

## 2021-09-17 ENCOUNTER — TELEPHONE (OUTPATIENT)
Dept: INTERNAL MEDICINE | Facility: CLINIC | Age: 86
End: 2021-09-17

## 2021-09-17 LAB — BACTERIA UR CULT: ABNORMAL

## 2021-10-06 ENCOUNTER — TELEPHONE (OUTPATIENT)
Dept: INFECTIOUS DISEASES | Facility: HOSPITAL | Age: 86
End: 2021-10-06

## 2021-10-18 ENCOUNTER — TELEPHONE (OUTPATIENT)
Dept: INTERNAL MEDICINE | Facility: CLINIC | Age: 86
End: 2021-10-18

## 2021-10-21 DIAGNOSIS — I10 BENIGN ESSENTIAL HYPERTENSION: ICD-10-CM

## 2021-10-21 RX ORDER — LABETALOL 100 MG/1
100 TABLET, FILM COATED ORAL 2 TIMES DAILY
Qty: 60 TABLET | Refills: 1 | Status: SHIPPED | OUTPATIENT
Start: 2021-10-21 | End: 2021-11-01 | Stop reason: SDUPTHER

## 2021-11-01 DIAGNOSIS — I10 BENIGN ESSENTIAL HYPERTENSION: ICD-10-CM

## 2021-11-01 RX ORDER — LABETALOL 100 MG/1
100 TABLET, FILM COATED ORAL 2 TIMES DAILY
Qty: 180 TABLET | Refills: 3 | Status: SHIPPED | OUTPATIENT
Start: 2021-11-01 | End: 2021-11-05 | Stop reason: SDUPTHER

## 2021-11-05 ENCOUNTER — TELEPHONE (OUTPATIENT)
Dept: INTERNAL MEDICINE | Facility: CLINIC | Age: 86
End: 2021-11-05
Payer: MEDICARE

## 2021-11-10 ENCOUNTER — PATIENT OUTREACH (OUTPATIENT)
Dept: ADMINISTRATIVE | Facility: OTHER | Age: 86
End: 2021-11-10
Payer: MEDICARE

## 2021-11-11 ENCOUNTER — OFFICE VISIT (OUTPATIENT)
Dept: PODIATRY | Facility: CLINIC | Age: 86
End: 2021-11-11
Payer: MEDICARE

## 2021-11-11 VITALS
WEIGHT: 69 LBS | SYSTOLIC BLOOD PRESSURE: 162 MMHG | BODY MASS INDEX: 13.55 KG/M2 | DIASTOLIC BLOOD PRESSURE: 60 MMHG | HEIGHT: 60 IN | HEART RATE: 71 BPM

## 2021-11-11 DIAGNOSIS — M79.671 BILATERAL FOOT PAIN: ICD-10-CM

## 2021-11-11 DIAGNOSIS — B35.1 DERMATOPHYTOSIS OF NAIL: ICD-10-CM

## 2021-11-11 DIAGNOSIS — N18.31 STAGE 3A CHRONIC KIDNEY DISEASE: Primary | ICD-10-CM

## 2021-11-11 DIAGNOSIS — M79.672 BILATERAL FOOT PAIN: ICD-10-CM

## 2021-11-11 PROCEDURE — 99999 PR PBB SHADOW E&M-EST. PATIENT-LVL III: CPT | Mod: PBBFAC,,, | Performed by: PODIATRIST

## 2021-11-11 PROCEDURE — 11721 DEBRIDE NAIL 6 OR MORE: CPT | Mod: Q9,PBBFAC,PN | Performed by: PODIATRIST

## 2021-11-11 PROCEDURE — 11721 ROUTINE FOOT CARE: ICD-10-PCS | Mod: Q9,S$PBB,, | Performed by: PODIATRIST

## 2021-11-11 PROCEDURE — 99499 UNLISTED E&M SERVICE: CPT | Mod: S$PBB,,, | Performed by: PODIATRIST

## 2021-11-11 PROCEDURE — 99213 OFFICE O/P EST LOW 20 MIN: CPT | Mod: PBBFAC,PN | Performed by: PODIATRIST

## 2021-11-11 PROCEDURE — 99999 PR PBB SHADOW E&M-EST. PATIENT-LVL III: ICD-10-PCS | Mod: PBBFAC,,, | Performed by: PODIATRIST

## 2021-11-11 PROCEDURE — 99499 NO LOS: ICD-10-PCS | Mod: S$PBB,,, | Performed by: PODIATRIST

## 2021-11-16 ENCOUNTER — TELEPHONE (OUTPATIENT)
Dept: INTERNAL MEDICINE | Facility: CLINIC | Age: 86
End: 2021-11-16
Payer: MEDICARE

## 2021-11-18 ENCOUNTER — LAB VISIT (OUTPATIENT)
Dept: LAB | Facility: HOSPITAL | Age: 86
End: 2021-11-18
Attending: INTERNAL MEDICINE
Payer: MEDICARE

## 2021-11-18 ENCOUNTER — IMMUNIZATION (OUTPATIENT)
Dept: INTERNAL MEDICINE | Facility: CLINIC | Age: 86
End: 2021-11-18
Payer: MEDICARE

## 2021-11-18 DIAGNOSIS — M81.0 AGE RELATED OSTEOPOROSIS, UNSPECIFIED PATHOLOGICAL FRACTURE PRESENCE: ICD-10-CM

## 2021-11-18 LAB
ALBUMIN SERPL BCP-MCNC: 3.2 G/DL (ref 3.5–5.2)
ANION GAP SERPL CALC-SCNC: 5 MMOL/L (ref 8–16)
BUN SERPL-MCNC: 21 MG/DL (ref 10–30)
CALCIUM SERPL-MCNC: 9.9 MG/DL (ref 8.7–10.5)
CHLORIDE SERPL-SCNC: 104 MMOL/L (ref 95–110)
CO2 SERPL-SCNC: 28 MMOL/L (ref 23–29)
CREAT SERPL-MCNC: 0.8 MG/DL (ref 0.5–1.4)
EST. GFR  (AFRICAN AMERICAN): >60 ML/MIN/1.73 M^2
EST. GFR  (NON AFRICAN AMERICAN): >60 ML/MIN/1.73 M^2
GLUCOSE SERPL-MCNC: 82 MG/DL (ref 70–110)
PHOSPHATE SERPL-MCNC: 3.4 MG/DL (ref 2.7–4.5)
POTASSIUM SERPL-SCNC: 4.1 MMOL/L (ref 3.5–5.1)
PTH-INTACT SERPL-MCNC: 80.1 PG/ML (ref 9–77)
SODIUM SERPL-SCNC: 137 MMOL/L (ref 136–145)

## 2021-11-18 PROCEDURE — 80069 RENAL FUNCTION PANEL: CPT | Performed by: INTERNAL MEDICINE

## 2021-11-18 PROCEDURE — 36415 COLL VENOUS BLD VENIPUNCTURE: CPT | Performed by: INTERNAL MEDICINE

## 2021-11-18 PROCEDURE — 83970 ASSAY OF PARATHORMONE: CPT | Performed by: INTERNAL MEDICINE

## 2021-11-18 PROCEDURE — G0008 ADMIN INFLUENZA VIRUS VAC: HCPCS | Mod: PBBFAC

## 2021-11-18 PROCEDURE — 90694 VACC AIIV4 NO PRSRV 0.5ML IM: CPT | Mod: PBBFAC

## 2021-12-02 ENCOUNTER — TELEPHONE (OUTPATIENT)
Dept: ENDOCRINOLOGY | Facility: CLINIC | Age: 86
End: 2021-12-02
Payer: MEDICARE

## 2021-12-03 ENCOUNTER — TELEPHONE (OUTPATIENT)
Dept: INTERNAL MEDICINE | Facility: CLINIC | Age: 86
End: 2021-12-03
Payer: MEDICARE

## 2021-12-06 ENCOUNTER — TELEPHONE (OUTPATIENT)
Dept: INTERNAL MEDICINE | Facility: CLINIC | Age: 86
End: 2021-12-06
Payer: MEDICARE

## 2022-01-06 ENCOUNTER — INFUSION (OUTPATIENT)
Dept: INFECTIOUS DISEASES | Facility: HOSPITAL | Age: 87
End: 2022-01-06
Attending: INTERNAL MEDICINE
Payer: MEDICARE

## 2022-01-06 VITALS
SYSTOLIC BLOOD PRESSURE: 153 MMHG | TEMPERATURE: 97 F | WEIGHT: 72.75 LBS | DIASTOLIC BLOOD PRESSURE: 70 MMHG | HEIGHT: 60 IN | HEART RATE: 64 BPM | BODY MASS INDEX: 14.28 KG/M2

## 2022-01-06 DIAGNOSIS — M81.0 AGE RELATED OSTEOPOROSIS, UNSPECIFIED PATHOLOGICAL FRACTURE PRESENCE: Primary | ICD-10-CM

## 2022-01-06 PROCEDURE — 96372 THER/PROPH/DIAG INJ SC/IM: CPT

## 2022-01-06 PROCEDURE — 63600175 PHARM REV CODE 636 W HCPCS: Mod: JG | Performed by: INTERNAL MEDICINE

## 2022-01-06 RX ADMIN — DENOSUMAB 60 MG: 60 INJECTION SUBCUTANEOUS at 12:01

## 2022-01-06 NOTE — PROGRESS NOTES
Pt received prolia injection.  Pt tolerated well, advised pt to wait for 15 minutes post injection.

## 2022-01-12 NOTE — PROGRESS NOTES
HPI     DLS: 1/07/2021    Pt here for Yearly Check/OCT:  Pt states she has a hard time reading the closed captions on the tv.     Meds;  Genteal PM QHS OU  AT's PRN OU    1) Glaucoma suspect - borderline  - low risk   2) PCIOL OU   3) Hx Blepharospasms   4) Ptosis   5) Familial Drusen OU   6) LUPE   7) Lagophthalomos   8) PVD OD   9) PCO OU    Last edited by Erlinda Sneed on 1/13/2022  3:08 PM. (History)            Assessment /Plan     For exam results, see Encounter Report.    Open angle with borderline findings and low glaucoma risk in both eyes  -     Posterior Segment OCT Optic Nerve- Both eyes    Blepharospasm - Both Eyes  -     lifitegrast (XIIDRA) 5 % Dpet; Place 1 drop into both eyes 2 (two) times a day.  Dispense: 60 each; Refill: 11    Familial drusen, bilateral    Dry eye syndrome, bilateral  -     lifitegrast (XIIDRA) 5 % Dpet; Place 1 drop into both eyes 2 (two) times a day.  Dispense: 60 each; Refill: 11    Pseudophakia of both eyes    Keratitis sicca, both eyes  -     lifitegrast (XIIDRA) 5 % Dpet; Place 1 drop into both eyes 2 (two) times a day.  Dispense: 60 each; Refill: 11      1. Glaucoma Suspect   First HVF   9/6/2011   First photos   2010   Treatment / Drops started   - none           Family history    Familial drusen OU        Glaucoma meds    none        H/O adverse rxn to glaucoma drops    none        LASERS    none        GLAUCOMA SURGERIES    none        OTHER EYE SURGERIES   PC IOL OD (12/07), OS (4/2010),                                     S/P neuromyectomy, levator repair, ptosis, repair RUL margin defect (4/10/11)        CDR    0.7/0.7        Tbase    12-18/12-18        Tmax    18/18        Ttarget    ??           HVF    7 test 2011 to  2019 - gen dep/? IAD - fluctuates   od // full (POOR RELIABILITY) os   NO MORE VF TESTING - TOO UNRELIABLE         Gonio    +4 ou        CCT   508/508        OCT   6  test 2008 to 2021 - RNFL - nl od // nl os         HRT    4 test 2011 to 2018  - MR -   HSD od // HSD os /// CDR xxx od // xxx os        Disc photos    2010,  2016, 2019      - Ttoday    14/16  - Test done today   IOP  / oct      2. -  Blepharospasm  - S/P neuromyectomy, levator repair, ptosis, repair RUL margin defect (4/10/11)- Dr Begum  - Residual RUL notch  - Pt used to get Botox injections (last Botox injection OU was in 2011)  - since retiring she does not care to get more botox - she says her friends know she blinks a lot  - notch in th RUL post surgery with assoc lag    3. Dominant familial drusen OU   Monitor    4.   LUPE / lag - 2/2 above surgery - Lagopthalmous   AT's prn // ointment qhs    5. Ptosis ou    OS >> OD   2/2 #2    6. PCIOL - ou   OD (12/07), - Chantal   OS (4/2010) - Dr Cornejo    7. Refractive error    Very happy with her glasses from [facility]   Has computer glasses and regular bifocals    8. PVD od  - not having any symptoms    Stable x 3 weeks    Warned of signs of RD     9/ C/O poor distance  vision and now trouble with reading    Her favorite magnifyer is a gift from the B    Minimal PCO ou - NVS    Do not recommend yag cap - would be difficult to focus on it    Refer to low vision clinic - [place]     Plan:    Cont lubrication eye drops and ointment   Trial of xiidra - not sure insurance will cover it but worth trying     RTC in 1 YEAR - IOP - carmelita pen // DFE / OCT - RNFL/ fast mac    NO MORE HVF'S  Testing - TOO UNRELIABLE // PT HAS TROUBLE GETTING INTO MACHINE - pt is 89 + yrs old     I have seen and personally examined the patient.  I agree with the findings, assessment and plan of the resident and/or fellow.     Dian Cornejo MD

## 2022-01-13 ENCOUNTER — OFFICE VISIT (OUTPATIENT)
Dept: OPHTHALMOLOGY | Facility: CLINIC | Age: 87
End: 2022-01-13
Payer: COMMERCIAL

## 2022-01-13 DIAGNOSIS — H35.362 FAMILIAL DRUSEN, BILATERAL: ICD-10-CM

## 2022-01-13 DIAGNOSIS — H04.123 DRY EYE SYNDROME, BILATERAL: ICD-10-CM

## 2022-01-13 DIAGNOSIS — H35.361 FAMILIAL DRUSEN, BILATERAL: ICD-10-CM

## 2022-01-13 DIAGNOSIS — Z96.1 PSEUDOPHAKIA OF BOTH EYES: ICD-10-CM

## 2022-01-13 DIAGNOSIS — M35.01 KERATITIS SICCA, BOTH EYES: ICD-10-CM

## 2022-01-13 DIAGNOSIS — G24.5 BLEPHAROSPASM: ICD-10-CM

## 2022-01-13 DIAGNOSIS — H40.013 OPEN ANGLE WITH BORDERLINE FINDINGS AND LOW GLAUCOMA RISK IN BOTH EYES: Primary | ICD-10-CM

## 2022-01-13 PROCEDURE — 99213 OFFICE O/P EST LOW 20 MIN: CPT | Mod: PBBFAC | Performed by: OPHTHALMOLOGY

## 2022-01-13 PROCEDURE — 92133 POSTERIOR SEGMENT OCT OPTIC NERVE(OCULAR COHERENCE TOMOGRAPHY) - OU - BOTH EYES: ICD-10-PCS | Mod: S$GLB,,, | Performed by: OPHTHALMOLOGY

## 2022-01-13 PROCEDURE — 92012 INTRM OPH EXAM EST PATIENT: CPT | Mod: S$GLB,,, | Performed by: OPHTHALMOLOGY

## 2022-01-13 PROCEDURE — 99999 PR PBB SHADOW E&M-EST. PATIENT-LVL III: CPT | Mod: PBBFAC,,, | Performed by: OPHTHALMOLOGY

## 2022-01-13 PROCEDURE — 92133 CPTRZD OPH DX IMG PST SGM ON: CPT | Mod: PBBFAC | Performed by: OPHTHALMOLOGY

## 2022-01-13 PROCEDURE — 92012 PR EYE EXAM, EST PATIENT,INTERMED: ICD-10-PCS | Mod: S$GLB,,, | Performed by: OPHTHALMOLOGY

## 2022-01-13 PROCEDURE — 99999 PR PBB SHADOW E&M-EST. PATIENT-LVL III: ICD-10-PCS | Mod: PBBFAC,,, | Performed by: OPHTHALMOLOGY

## 2022-01-13 RX ORDER — LIFITEGRAST 50 MG/ML
1 SOLUTION/ DROPS OPHTHALMIC 2 TIMES DAILY
Qty: 60 EACH | Refills: 11 | Status: SHIPPED | OUTPATIENT
Start: 2022-01-13 | End: 2022-02-18 | Stop reason: SDUPTHER

## 2022-01-14 ENCOUNTER — TELEPHONE (OUTPATIENT)
Dept: OPHTHALMOLOGY | Facility: CLINIC | Age: 87
End: 2022-01-14
Payer: COMMERCIAL

## 2022-01-14 NOTE — TELEPHONE ENCOUNTER
----- Message from KIM Dykes sent at 1/13/2022  5:06 PM CST -----    ----- Message -----  From: Erlinda Sneed  Sent: 1/13/2022   4:29 PM CST  To: Jennifer BRASWELL Staff    Please call pt to schedule a Low vision Clinic  with Dr. Rodriguez per Dr. Cornejo next available.   -Erlinda    Thank you so much.

## 2022-02-07 DIAGNOSIS — M81.0 OSTEOPOROSIS, UNSPECIFIED OSTEOPOROSIS TYPE, UNSPECIFIED PATHOLOGICAL FRACTURE PRESENCE: ICD-10-CM

## 2022-02-08 RX ORDER — ALENDRONATE SODIUM 70 MG/1
TABLET ORAL
Qty: 12 TABLET | Refills: 3 | Status: SHIPPED | OUTPATIENT
Start: 2022-02-08 | End: 2022-02-16

## 2022-02-10 ENCOUNTER — OFFICE VISIT (OUTPATIENT)
Dept: PODIATRY | Facility: CLINIC | Age: 87
End: 2022-02-10
Payer: MEDICARE

## 2022-02-10 VITALS
DIASTOLIC BLOOD PRESSURE: 63 MMHG | HEIGHT: 60 IN | WEIGHT: 72 LBS | HEART RATE: 65 BPM | SYSTOLIC BLOOD PRESSURE: 133 MMHG | BODY MASS INDEX: 14.14 KG/M2

## 2022-02-10 DIAGNOSIS — B35.1 DERMATOPHYTOSIS OF NAIL: ICD-10-CM

## 2022-02-10 DIAGNOSIS — N18.31 STAGE 3A CHRONIC KIDNEY DISEASE: Primary | ICD-10-CM

## 2022-02-10 DIAGNOSIS — L85.1 PLANTAR POROKERATOSIS, ACQUIRED: ICD-10-CM

## 2022-02-10 DIAGNOSIS — M79.671 BILATERAL FOOT PAIN: ICD-10-CM

## 2022-02-10 DIAGNOSIS — L90.9 FAT PAD ATROPHY OF FOOT: ICD-10-CM

## 2022-02-10 DIAGNOSIS — M79.672 BILATERAL FOOT PAIN: ICD-10-CM

## 2022-02-10 PROCEDURE — 1101F PR PT FALLS ASSESS DOC 0-1 FALLS W/OUT INJ PAST YR: ICD-10-PCS | Mod: CPTII,S$GLB,, | Performed by: PODIATRIST

## 2022-02-10 PROCEDURE — 1157F ADVNC CARE PLAN IN RCRD: CPT | Mod: CPTII,S$GLB,, | Performed by: PODIATRIST

## 2022-02-10 PROCEDURE — 1126F PR PAIN SEVERITY QUANTIFIED, NO PAIN PRESENT: ICD-10-PCS | Mod: CPTII,S$GLB,, | Performed by: PODIATRIST

## 2022-02-10 PROCEDURE — 99999 PR PBB SHADOW E&M-EST. PATIENT-LVL III: CPT | Mod: PBBFAC,,, | Performed by: PODIATRIST

## 2022-02-10 PROCEDURE — 11055 PARING/CUTG B9 HYPRKER LES 1: CPT | Mod: Q9,S$GLB,, | Performed by: PODIATRIST

## 2022-02-10 PROCEDURE — 1126F AMNT PAIN NOTED NONE PRSNT: CPT | Mod: CPTII,S$GLB,, | Performed by: PODIATRIST

## 2022-02-10 PROCEDURE — 1157F PR ADVANCE CARE PLAN OR EQUIV PRESENT IN MEDICAL RECORD: ICD-10-PCS | Mod: CPTII,S$GLB,, | Performed by: PODIATRIST

## 2022-02-10 PROCEDURE — 1101F PT FALLS ASSESS-DOCD LE1/YR: CPT | Mod: CPTII,S$GLB,, | Performed by: PODIATRIST

## 2022-02-10 PROCEDURE — 11055 ROUTINE FOOT CARE: ICD-10-PCS | Mod: Q9,S$GLB,, | Performed by: PODIATRIST

## 2022-02-10 PROCEDURE — 1159F MED LIST DOCD IN RCRD: CPT | Mod: CPTII,S$GLB,, | Performed by: PODIATRIST

## 2022-02-10 PROCEDURE — 3288F PR FALLS RISK ASSESSMENT DOCUMENTED: ICD-10-PCS | Mod: CPTII,S$GLB,, | Performed by: PODIATRIST

## 2022-02-10 PROCEDURE — 11721 ROUTINE FOOT CARE: ICD-10-PCS | Mod: 59,Q9,S$GLB, | Performed by: PODIATRIST

## 2022-02-10 PROCEDURE — 1160F PR REVIEW ALL MEDS BY PRESCRIBER/CLIN PHARMACIST DOCUMENTED: ICD-10-PCS | Mod: CPTII,S$GLB,, | Performed by: PODIATRIST

## 2022-02-10 PROCEDURE — 1160F RVW MEDS BY RX/DR IN RCRD: CPT | Mod: CPTII,S$GLB,, | Performed by: PODIATRIST

## 2022-02-10 PROCEDURE — 1159F PR MEDICATION LIST DOCUMENTED IN MEDICAL RECORD: ICD-10-PCS | Mod: CPTII,S$GLB,, | Performed by: PODIATRIST

## 2022-02-10 PROCEDURE — 99499 UNLISTED E&M SERVICE: CPT | Mod: S$GLB,,, | Performed by: PODIATRIST

## 2022-02-10 PROCEDURE — 99499 NO LOS: ICD-10-PCS | Mod: S$GLB,,, | Performed by: PODIATRIST

## 2022-02-10 PROCEDURE — 99999 PR PBB SHADOW E&M-EST. PATIENT-LVL III: ICD-10-PCS | Mod: PBBFAC,,, | Performed by: PODIATRIST

## 2022-02-10 PROCEDURE — 11721 DEBRIDE NAIL 6 OR MORE: CPT | Mod: 59,Q9,S$GLB, | Performed by: PODIATRIST

## 2022-02-10 PROCEDURE — 3288F FALL RISK ASSESSMENT DOCD: CPT | Mod: CPTII,S$GLB,, | Performed by: PODIATRIST

## 2022-02-10 NOTE — PROGRESS NOTES
Chief Complaint: Follow-up (Foot Exam/PCP jalyn Caballero MD 08/11/21)      HPI:   An Luz is a 89 y.o. female  With Thick painful toenails preventing sock and shoe wear and imparing ambulation.  Gradual onset, worsening over past several years, aggravated by increased weight bearing, shoe gear, pressure.  Periodic debridement helps symptoms. Denies trauma, surgery all toes.      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 glaucoma risk in both eyes    Fall    Acute posthemorrhagic anemia    Idiopathic scoliosis of lumbar spine    Idiopathic scoliosis of thoracic spine    Benign essential hypertension    Urinary incontinence    Closed fracture of right shoulder    Impaired mobility and ADLs    History of hip fracture    Other chronic pain    Chronic constipation    History of fall    Aortic atherosclerosis    Prediabetes    Esophageal dysphagia    Moderate protein-calorie malnutrition    Weight loss    Hypercalcemia    MGUS (monoclonal gammopathy of unknown significance)         Current Outpatient Medications on File Prior to Visit   Medication Sig Dispense Refill    acetaminophen (TYLENOL) 325 MG tablet Take 325 mg by mouth every 6 (six) hours as needed for Pain.      alendronate (FOSAMAX) 70 MG tablet TAKE 1 TABLET EVERY 7 DAYS 12 tablet 3    amLODIPine (NORVASC) 5 MG tablet TAKE 1 TABLET DAILY 90 tablet 3    cholecalciferol, vitamin D3, (VITAMIN D3) 25 mcg (1,000 unit) capsule Take 1,000 Units by mouth once daily.      FOLIC ACID/MULTIVIT-MIN/LUTEIN (CENTRUM SILVER  ORAL) Take 1 tablet by mouth once daily.       labetaloL (NORMODYNE) 100 MG tablet Take 1 tablet (100 mg total) by mouth 2 (two) times daily. 180 tablet 3    lifitegrast (XIIDRA) 5 % Dpet Place 1 drop into both eyes 2 (two) times a day. 60 each 11    losartan (COZAAR) 100 MG tablet Take 1 tablet (100 mg total) by mouth once daily. 90 tablet 3    oxybutynin (DITROPAN-XL) 10 MG 24 hr tablet Take 1 tablet (10 mg total) by mouth once daily. 30 tablet 11    polyethylene glycol (GLYCOLAX) 17 gram PwPk Take one capful with a full glass of water daily as needed for constipation. 30 each 3    senna (SENOKOT) 8.6 mg tablet Take 1 tablet by mouth once daily.      wheat dextrin 5 gram/7.4 gram Powd Take by mouth.       No current facility-administered medications on file prior to visit.           Review of patient's allergies indicates:   Allergen Reactions    Zestril [lisinopril] Swelling     Facial Swelling    Augmentin [amoxicillin-pot clavulanate] Nausea And Vomiting    Sulfa (sulfonamide antibiotics)      Unknown reaction                   Objective:        Vitals:    02/10/22 1217   BP: 133/63   Pulse: 65   Weight: 32.7 kg (72 lb)   Height: 5' (1.524 m)           Physical Exam  Constitutional:       General: She is not in acute distress.     Appearance: She is well-developed. She is not diaphoretic.   Cardiovascular:      Pulses:           Popliteal pulses are 2+ on the right side and 2+ on the left side.        Dorsalis pedis pulses are 2+ on the right side and 2+ on the left side.        Posterior tibial pulses are 1+ on the right side and 1+ on the left side.      Comments: Capillary refill 3 seconds all toes/distal feet, all toes/both feet warm to touch.      Negative lymphadenopathy bilateral popliteal fossa and tarsal tunnel.      Negavie lower extremity edema bilateral.    Musculoskeletal:      Right ankle: No swelling, deformity, ecchymosis or lacerations. Normal range of motion. Normal pulse.      Right  Achilles Tendon: Normal. No defects. Garay's test negative.      Comments: Normal angle, base, station of gait. All ten toes without clubbing, cyanosis, or signs of ischemia.  No pain to palpation bilateral lower extremities.  Range of motion, stability, muscle strength, and muscle tone normal bilateral feet and legs.     Lymphadenopathy:      Lower Body: No right inguinal adenopathy. No left inguinal adenopathy.      Comments: Negative lymphadenopathy bilateral popliteal fossa and tarsal tunnel.    Negative lymphangitic streaking bilateral feet/ankles/legs.   Skin:     General: Skin is warm and dry.      Capillary Refill: Capillary refill takes 2 to 3 seconds.      Coloration: Skin is not pale.      Findings: No abrasion, bruising, burn, ecchymosis, erythema, laceration, lesion or rash.      Nails: There is no clubbing.      Comments: Dry scale with superficial flakes over an erythematous base  without ulceration, drainage, pus, tracking, fluctuance, malodor, or cardinal signs infection.    Toenails 1st, 2nd, 3rd, 4th, 5th  bilateral are hypertrophic thickened 2-3 mm, dystrophic, discolored tanish brown with tan, gray crumbly subungual debris.  Severe pain and tenderness  to distal nail plate pressure, without periungual skin abnormality of each.    Skin is thin and atrophic.    No wounds noted.  No breakdown on the heels.        Neurological:      Mental Status: She is alert and oriented to person, place, and time.      Sensory: No sensory deficit.      Motor: No tremor, atrophy or abnormal muscle tone.      Gait: Gait normal.      Deep Tendon Reflexes:      Reflex Scores:       Patellar reflexes are 2+ on the right side and 2+ on the left side.       Achilles reflexes are 2+ on the right side and 2+ on the left side.     Comments: Negative tinel sign to percussion sural, superficial peroneal, deep peroneal, saphenous, and posterior tibial nerves right and left ankles and feet.     Psychiatric:          Behavior: Behavior is cooperative.               Assessment:       Encounter Diagnoses   Name Primary?    Stage 3a chronic kidney disease Yes    Bilateral foot pain     Dermatophytosis of nail     Fat pad atrophy of foot     Plantar porokeratosis, acquired          Plan:         I counseled the patient on her conditions, their implications and medical management.    Shoe and activity modification as needed for relief.   Offload heels when sleeping.  Avoid direct pressure to heels.     Routine Foot Care    Date/Time: 2/10/2022 12:00 PM  Performed by: Bia Caballero DPM  Authorized by: Bia Caballero DPM     Consent Done?:  Yes (Verbal)  Hyperkeratotic Skin Lesions?: Yes    Number of trimmed lesions:  1  Location(s):  Left 4th Metatarsal Head    Nail Care Type:  Debride  Location(s): All  (Left 1st Toe, Left 3rd Toe, Left 2nd Toe, Left 4th Toe, Left 5th Toe, Right 1st Toe, Right 2nd Toe, Right 3rd Toe, Right 4th Toe and Right 5th Toe)  Patient tolerance:  Patient tolerated the procedure well with no immediate complications     With patient's permission, the toenails mentioned above were reduced and debrided using a nail nipper, removing offending nail and debris.   Utilizing a #15 scalpel, I trimmed the corns and calluses at the above mentioned location.      The patient will continue to monitor the areas daily, inspect the feet, wear protective shoe gear when ambulatory, and moisturizer to maintain skin integrity.

## 2022-02-16 ENCOUNTER — OFFICE VISIT (OUTPATIENT)
Dept: INTERNAL MEDICINE | Facility: CLINIC | Age: 87
End: 2022-02-16
Payer: MEDICARE

## 2022-02-16 ENCOUNTER — LAB VISIT (OUTPATIENT)
Dept: LAB | Facility: HOSPITAL | Age: 87
End: 2022-02-16
Attending: INTERNAL MEDICINE
Payer: MEDICARE

## 2022-02-16 VITALS
WEIGHT: 76.75 LBS | BODY MASS INDEX: 15.07 KG/M2 | SYSTOLIC BLOOD PRESSURE: 136 MMHG | OXYGEN SATURATION: 99 % | HEART RATE: 67 BPM | DIASTOLIC BLOOD PRESSURE: 70 MMHG | HEIGHT: 60 IN

## 2022-02-16 DIAGNOSIS — M81.0 AGE RELATED OSTEOPOROSIS, UNSPECIFIED PATHOLOGICAL FRACTURE PRESENCE: ICD-10-CM

## 2022-02-16 DIAGNOSIS — R73.03 PREDIABETES: ICD-10-CM

## 2022-02-16 DIAGNOSIS — I10 BENIGN ESSENTIAL HYPERTENSION: Primary | ICD-10-CM

## 2022-02-16 DIAGNOSIS — E83.52 HYPERCALCEMIA: ICD-10-CM

## 2022-02-16 DIAGNOSIS — R35.0 URINARY FREQUENCY: ICD-10-CM

## 2022-02-16 DIAGNOSIS — D63.8 ANEMIA OF CHRONIC DISEASE: ICD-10-CM

## 2022-02-16 DIAGNOSIS — E78.2 MIXED HYPERLIPIDEMIA: ICD-10-CM

## 2022-02-16 DIAGNOSIS — I70.0 AORTIC ATHEROSCLEROSIS: ICD-10-CM

## 2022-02-16 DIAGNOSIS — I10 BENIGN ESSENTIAL HYPERTENSION: ICD-10-CM

## 2022-02-16 DIAGNOSIS — D47.2 MGUS (MONOCLONAL GAMMOPATHY OF UNKNOWN SIGNIFICANCE): ICD-10-CM

## 2022-02-16 DIAGNOSIS — R63.4 WEIGHT LOSS: ICD-10-CM

## 2022-02-16 PROBLEM — Z91.81 HISTORY OF FALL: Status: RESOLVED | Noted: 2018-06-25 | Resolved: 2022-02-16

## 2022-02-16 LAB
ALBUMIN SERPL BCP-MCNC: 3.4 G/DL (ref 3.5–5.2)
ALP SERPL-CCNC: 65 U/L (ref 55–135)
ALT SERPL W/O P-5'-P-CCNC: 9 U/L (ref 10–44)
ANION GAP SERPL CALC-SCNC: 10 MMOL/L (ref 8–16)
AST SERPL-CCNC: 18 U/L (ref 10–40)
BASOPHILS # BLD AUTO: 0.05 K/UL (ref 0–0.2)
BASOPHILS NFR BLD: 0.7 % (ref 0–1.9)
BILIRUB SERPL-MCNC: 0.3 MG/DL (ref 0.1–1)
BUN SERPL-MCNC: 32 MG/DL (ref 10–30)
CALCIUM SERPL-MCNC: 9.2 MG/DL (ref 8.7–10.5)
CHLORIDE SERPL-SCNC: 105 MMOL/L (ref 95–110)
CO2 SERPL-SCNC: 24 MMOL/L (ref 23–29)
CREAT SERPL-MCNC: 1 MG/DL (ref 0.5–1.4)
DIFFERENTIAL METHOD: ABNORMAL
EOSINOPHIL # BLD AUTO: 0.2 K/UL (ref 0–0.5)
EOSINOPHIL NFR BLD: 3.4 % (ref 0–8)
ERYTHROCYTE [DISTWIDTH] IN BLOOD BY AUTOMATED COUNT: 14.7 % (ref 11.5–14.5)
EST. GFR  (AFRICAN AMERICAN): 55.7 ML/MIN/1.73 M^2
EST. GFR  (NON AFRICAN AMERICAN): 48.3 ML/MIN/1.73 M^2
ESTIMATED AVG GLUCOSE: 117 MG/DL (ref 68–131)
GLUCOSE SERPL-MCNC: 113 MG/DL (ref 70–110)
HBA1C MFR BLD: 5.7 % (ref 4–5.6)
HCT VFR BLD AUTO: 28.8 % (ref 37–48.5)
HGB BLD-MCNC: 8.8 G/DL (ref 12–16)
IMM GRANULOCYTES # BLD AUTO: 0.02 K/UL (ref 0–0.04)
IMM GRANULOCYTES NFR BLD AUTO: 0.3 % (ref 0–0.5)
LYMPHOCYTES # BLD AUTO: 1.4 K/UL (ref 1–4.8)
LYMPHOCYTES NFR BLD: 20 % (ref 18–48)
MCH RBC QN AUTO: 29.9 PG (ref 27–31)
MCHC RBC AUTO-ENTMCNC: 30.6 G/DL (ref 32–36)
MCV RBC AUTO: 98 FL (ref 82–98)
MONOCYTES # BLD AUTO: 0.9 K/UL (ref 0.3–1)
MONOCYTES NFR BLD: 12.3 % (ref 4–15)
NEUTROPHILS # BLD AUTO: 4.4 K/UL (ref 1.8–7.7)
NEUTROPHILS NFR BLD: 63.3 % (ref 38–73)
NRBC BLD-RTO: 0 /100 WBC
PLATELET # BLD AUTO: 248 K/UL (ref 150–450)
PMV BLD AUTO: 11.6 FL (ref 9.2–12.9)
POTASSIUM SERPL-SCNC: 4.3 MMOL/L (ref 3.5–5.1)
PROT SERPL-MCNC: 7.9 G/DL (ref 6–8.4)
PTH-INTACT SERPL-MCNC: 180.8 PG/ML (ref 9–77)
RBC # BLD AUTO: 2.94 M/UL (ref 4–5.4)
SODIUM SERPL-SCNC: 139 MMOL/L (ref 136–145)
WBC # BLD AUTO: 6.99 K/UL (ref 3.9–12.7)

## 2022-02-16 PROCEDURE — 3288F FALL RISK ASSESSMENT DOCD: CPT | Mod: CPTII,S$GLB,, | Performed by: INTERNAL MEDICINE

## 2022-02-16 PROCEDURE — 36415 COLL VENOUS BLD VENIPUNCTURE: CPT | Performed by: INTERNAL MEDICINE

## 2022-02-16 PROCEDURE — 83036 HEMOGLOBIN GLYCOSYLATED A1C: CPT | Performed by: INTERNAL MEDICINE

## 2022-02-16 PROCEDURE — 1157F ADVNC CARE PLAN IN RCRD: CPT | Mod: CPTII,S$GLB,, | Performed by: INTERNAL MEDICINE

## 2022-02-16 PROCEDURE — 1157F PR ADVANCE CARE PLAN OR EQUIV PRESENT IN MEDICAL RECORD: ICD-10-PCS | Mod: CPTII,S$GLB,, | Performed by: INTERNAL MEDICINE

## 2022-02-16 PROCEDURE — 82728 ASSAY OF FERRITIN: CPT | Performed by: INTERNAL MEDICINE

## 2022-02-16 PROCEDURE — 1160F PR REVIEW ALL MEDS BY PRESCRIBER/CLIN PHARMACIST DOCUMENTED: ICD-10-PCS | Mod: CPTII,S$GLB,, | Performed by: INTERNAL MEDICINE

## 2022-02-16 PROCEDURE — 1126F PR PAIN SEVERITY QUANTIFIED, NO PAIN PRESENT: ICD-10-PCS | Mod: CPTII,S$GLB,, | Performed by: INTERNAL MEDICINE

## 2022-02-16 PROCEDURE — 99999 PR PBB SHADOW E&M-EST. PATIENT-LVL III: ICD-10-PCS | Mod: PBBFAC,,, | Performed by: INTERNAL MEDICINE

## 2022-02-16 PROCEDURE — 3288F PR FALLS RISK ASSESSMENT DOCUMENTED: ICD-10-PCS | Mod: CPTII,S$GLB,, | Performed by: INTERNAL MEDICINE

## 2022-02-16 PROCEDURE — 1159F PR MEDICATION LIST DOCUMENTED IN MEDICAL RECORD: ICD-10-PCS | Mod: CPTII,S$GLB,, | Performed by: INTERNAL MEDICINE

## 2022-02-16 PROCEDURE — 99214 PR OFFICE/OUTPT VISIT, EST, LEVL IV, 30-39 MIN: ICD-10-PCS | Mod: S$GLB,,, | Performed by: INTERNAL MEDICINE

## 2022-02-16 PROCEDURE — 80053 COMPREHEN METABOLIC PANEL: CPT | Performed by: INTERNAL MEDICINE

## 2022-02-16 PROCEDURE — 99214 OFFICE O/P EST MOD 30 MIN: CPT | Mod: S$GLB,,, | Performed by: INTERNAL MEDICINE

## 2022-02-16 PROCEDURE — 84466 ASSAY OF TRANSFERRIN: CPT | Performed by: INTERNAL MEDICINE

## 2022-02-16 PROCEDURE — 1160F RVW MEDS BY RX/DR IN RCRD: CPT | Mod: CPTII,S$GLB,, | Performed by: INTERNAL MEDICINE

## 2022-02-16 PROCEDURE — 1101F PR PT FALLS ASSESS DOC 0-1 FALLS W/OUT INJ PAST YR: ICD-10-PCS | Mod: CPTII,S$GLB,, | Performed by: INTERNAL MEDICINE

## 2022-02-16 PROCEDURE — 1126F AMNT PAIN NOTED NONE PRSNT: CPT | Mod: CPTII,S$GLB,, | Performed by: INTERNAL MEDICINE

## 2022-02-16 PROCEDURE — 99999 PR PBB SHADOW E&M-EST. PATIENT-LVL III: CPT | Mod: PBBFAC,,, | Performed by: INTERNAL MEDICINE

## 2022-02-16 PROCEDURE — 85025 COMPLETE CBC W/AUTO DIFF WBC: CPT | Performed by: INTERNAL MEDICINE

## 2022-02-16 PROCEDURE — 1101F PT FALLS ASSESS-DOCD LE1/YR: CPT | Mod: CPTII,S$GLB,, | Performed by: INTERNAL MEDICINE

## 2022-02-16 PROCEDURE — 1159F MED LIST DOCD IN RCRD: CPT | Mod: CPTII,S$GLB,, | Performed by: INTERNAL MEDICINE

## 2022-02-16 PROCEDURE — 83970 ASSAY OF PARATHORMONE: CPT | Performed by: INTERNAL MEDICINE

## 2022-02-16 RX ORDER — OXYBUTYNIN CHLORIDE 10 MG/1
10 TABLET, EXTENDED RELEASE ORAL DAILY
Qty: 90 TABLET | Refills: 3 | Status: SHIPPED | OUTPATIENT
Start: 2022-02-16 | End: 2023-01-05

## 2022-02-16 RX ORDER — AMLODIPINE BESYLATE 5 MG/1
5 TABLET ORAL DAILY
Qty: 90 TABLET | Refills: 3 | Status: SHIPPED | OUTPATIENT
Start: 2022-02-16 | End: 2023-02-13

## 2022-02-16 NOTE — PROGRESS NOTES
INTERNAL MEDICINE ESTABLISHED PATIENT VISIT NOTE    Subjective:     Chief Complaint: Follow-up  HTN     Patient ID: An Luz is a 89 y.o. female with HTN, HLD, preDM, IgG Kappa MGUS, anemia, osteoporosis c kyphoscoliosis, glaucoma, chronic dry eyes, urinary incontinence, chronic constipation, aortic atherosclerosis noted incidentally on prior imaging, last seen by me in Aug, here today for f/u.    To review:  At baseline, lives at home c her daughter who works.  AAOx3, does most ADLs independently but appreciates assistance.  Wears poise pads for hx urinary incontinence, no issues c fecal incontinence.  Ambulates c a cane.     Was seen by Dr. Monteiro in Aug for osteoporosis who rec Prolia, received first dose in Jan.    HCTZ was also stopped in Aug due to hypercalcemia.  Has still been on Losartan and Amlodipine.  Atenolol changed to Labetolol for better BP control which she has been taking s issues and feeling well overall.    Has also gained 7 lbs since her last visit.     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 Authorizing Provider   acetaminophen (TYLENOL) 325 MG tablet Take 325 mg by mouth every 6 (six) hours as needed for Pain.   Yes Historical Provider   alendronate (FOSAMAX) 70 MG tablet TAKE 1 TABLET EVERY 7 DAYS 2/8/22  Yes Jalyn Caballero MD   amLODIPine (NORVASC) 5 MG tablet TAKE 1 TABLET DAILY 1/3/22  Yes Jalyn Caballero MD   cholecalciferol, vitamin D3, (VITAMIN D3) 25 mcg (1,000 unit) capsule Take 1,000 Units by mouth once daily.   Yes Historical Provider   FOLIC ACID/MULTIVIT-MIN/LUTEIN (CENTRUM SILVER ORAL) Take 1 tablet by mouth once daily.    Yes  Historical Provider   labetaloL (NORMODYNE) 100 MG tablet Take 1 tablet (100 mg total) by mouth 2 (two) times daily. 11/5/21 11/5/22 Yes Effie Ann NP   lifitegrast (XIIDRA) 5 % Dpet Place 1 drop into both eyes 2 (two) times a day. 1/13/22  Yes Dian Cornejo MD   losartan (COZAAR) 100 MG tablet Take 1 tablet (100 mg total) by mouth once daily. 8/11/21  Yes Jalyn Caballero MD   oxybutynin (DITROPAN-XL) 10 MG 24 hr tablet Take 1 tablet (10 mg total) by mouth once daily. 4/7/21 4/7/22 Yes Effie Ann NP   polyethylene glycol (GLYCOLAX) 17 gram PwPk Take one capful with a full glass of water daily as needed for constipation. 6/25/18  Yes Jalyn Caballero MD   senna (SENOKOT) 8.6 mg tablet Take 1 tablet by mouth once daily.   Yes Historical Provider   wheat dextrin 5 gram/7.4 gram Powd Take by mouth.   Yes Historical Provider       Allergies:  Review of patient's allergies indicates:   Allergen Reactions    Zestril [lisinopril] Swelling     Facial Swelling    Augmentin [amoxicillin-pot clavulanate] Nausea And Vomiting    Sulfa (sulfonamide antibiotics)      Unknown reaction       Social History:  Social History     Tobacco Use    Smoking status: Never Smoker    Smokeless tobacco: Never Used    Tobacco comment: The patient exercises every other day on an exercise bike.  On alternative days she does floor exercises with weights.   Substance Use Topics    Alcohol use: Yes     Alcohol/week: 2.0 - 4.0 standard drinks     Types: 1 Glasses of wine, 1 - 3 Standard drinks or equivalent per week     Comment: 1 glass of wine w/ dinner and w/ company    Drug use: No        Review of Systems   Constitutional: Negative for appetite change, chills, fatigue, fever and unexpected weight change.   HENT: Negative for congestion, hearing loss and rhinorrhea.    Eyes: Negative for pain and visual disturbance.   Respiratory: Negative for cough, chest tightness, shortness of breath and wheezing.    Cardiovascular:  Negative for chest pain, palpitations and leg swelling.   Gastrointestinal: Negative for abdominal distention and abdominal pain.   Endocrine: Negative for polydipsia and polyuria.   Genitourinary: Negative for decreased urine volume, difficulty urinating, dysuria, hematuria and vaginal discharge.   Neurological: Negative for weakness, numbness and headaches.   Psychiatric/Behavioral: Negative for behavioral problems and confusion.         Health Maintenance:     Immunizations:   Influenza 11/2021  TDap is up to date 5/2017  Prevnar 13 5/2016, pvax 6/18  Shingrix is not UTD.    rec but pt declined due to concerns regarding potential s/e, Risks and benefits were discussed with patient.  Covid vacc 2/2021, 3/2021, 11/2021        Cancer Screening:  PAP: n/a based on age, also c hx hyst  Mammogram: n/a based on age  Colonoscopy: n/a based on age  DEXA:  4/2021 worse despite weekly alendronate.  Referred to Endo, seen by Dr. Monteiro who changed to Prolia.    Objective:   /70 (BP Location: Left arm, Patient Position: Sitting, BP Method: Small (Manual))   Pulse 67   Ht 5' (1.524 m)   Wt 34.8 kg (76 lb 11.5 oz)   LMP  (LMP Unknown)   SpO2 99%   BMI 14.98 kg/m²        General: AAO x3, no apparent distress  HEENT: PERRL, OP clear  CV: RRR, no m/r/g  Pulm: Lungs CTAB, no crackles, no wheezes  Abd: s/NT/ND +BS  Extremities: no c/c/e    Labs:     Lab Results   Component Value Date    WBC 7.75 08/11/2021    HGB 10.0 (L) 08/11/2021    HCT 32.5 (L) 08/11/2021    MCV 96 08/11/2021     08/11/2021     Lab Results   Component Value Date    IRON 49 08/13/2020    TIBC 348 08/13/2020    FERRITIN 64 08/13/2020       Sodium   Date Value Ref Range Status   11/18/2021 137 136 - 145 mmol/L Final     Potassium   Date Value Ref Range Status   11/18/2021 4.1 3.5 - 5.1 mmol/L Final     Chloride   Date Value Ref Range Status   11/18/2021 104 95 - 110 mmol/L Final     CO2   Date Value Ref Range Status   11/18/2021 28 23 - 29 mmol/L  Final     Glucose   Date Value Ref Range Status   11/18/2021 82 70 - 110 mg/dL Final     BUN   Date Value Ref Range Status   11/18/2021 21 10 - 30 mg/dL Final     Creatinine   Date Value Ref Range Status   11/18/2021 0.8 0.5 - 1.4 mg/dL Final     Calcium   Date Value Ref Range Status   11/18/2021 9.9 8.7 - 10.5 mg/dL Final     Total Protein   Date Value Ref Range Status   08/11/2021 8.8 (H) 6.0 - 8.4 g/dL Final     Albumin   Date Value Ref Range Status   11/18/2021 3.2 (L) 3.5 - 5.2 g/dL Final     Total Bilirubin   Date Value Ref Range Status   08/11/2021 0.5 0.1 - 1.0 mg/dL Final     Comment:     For infants and newborns, interpretation of results should be based  on gestational age, weight and in agreement with clinical  observations.    Premature Infant recommended reference ranges:  Up to 24 hours.............<8.0 mg/dL  Up to 48 hours............<12.0 mg/dL  3-5 days..................<15.0 mg/dL  6-29 days.................<15.0 mg/dL       Alkaline Phosphatase   Date Value Ref Range Status   08/11/2021 67 55 - 135 U/L Final     AST   Date Value Ref Range Status   08/11/2021 23 10 - 40 U/L Final     ALT   Date Value Ref Range Status   08/11/2021 9 (L) 10 - 44 U/L Final     Anion Gap   Date Value Ref Range Status   11/18/2021 5 (L) 8 - 16 mmol/L Final     eGFR if    Date Value Ref Range Status   11/18/2021 >60.0 >60 mL/min/1.73 m^2 Final     eGFR if non    Date Value Ref Range Status   11/18/2021 >60.0 >60 mL/min/1.73 m^2 Final     Comment:     Calculation used to obtain the estimated glomerular filtration  rate (eGFR) is the CKD-EPI equation.        Lab Results   Component Value Date    HGBA1C 5.6 11/19/2020     Lab Results   Component Value Date    LDLCALC 128.8 07/30/2020     Lab Results   Component Value Date    TSH 2.724 01/06/2020         Assessment/Plan     An was seen today for follow-up.    Diagnoses and all orders for this visit:    Benign essential hypertension  at  goal.  Cont current medications.  -     Comprehensive Metabolic Panel; Future  -     amLODIPine (NORVASC) 5 MG tablet; Take 1 tablet (5 mg total) by mouth once daily.    Age related osteoporosis, unspecified pathological fracture presence  As per HPI  Cont Prolia as per Dr. Monteiro, tolerating well so far.    Hypercalcemia  Likely due to HCTZ  Resolved on last check  Will monitor  PTH also a little high in the past, will monitor.  -     PTH, intact; Future    MGUS (monoclonal gammopathy of unknown significance)  Seen by Heme-Onc in the past but declined additional w/u based on age, will monitor Ca, renal function, anemia, and weight    Prediabetes  Lab Results   Component Value Date    HGBA1C 5.6 11/19/2020     Normalized on last check  Advised our focus for her is on weight gain  Will repeat labs now.  -     Hemoglobin A1C; Future    Weight loss  Doing well c wt gain since last visit  Encouraged continued increased oral intake.    Aortic atherosclerosis  No acute issues  Cont bp control    Anemia of chronic disease  Stable on last check  -     CBC Auto Differential; Future    Mixed hyperlipidemia  Lab Results   Component Value Date    LDLCALC 128.8 07/30/2020     Not req statin based on age, no longer needs lipid checks    Urinary frequency  Stable on meds, okay to fill  -     oxybutynin (DITROPAN-XL) 10 MG 24 hr tablet; Take 1 tablet (10 mg total) by mouth once daily.        HM as above  RTC in 6 mos, sooner if needed.  Labs today.  Jalyn Caballero MD  Department of Internal Medicine - [facility]  02/16/2022

## 2022-02-17 ENCOUNTER — TELEPHONE (OUTPATIENT)
Dept: INTERNAL MEDICINE | Facility: CLINIC | Age: 87
End: 2022-02-17
Payer: MEDICARE

## 2022-02-17 LAB
FERRITIN SERPL-MCNC: 23 NG/ML (ref 20–300)
IRON SERPL-MCNC: 35 UG/DL (ref 30–160)
SATURATED IRON: 8 % (ref 20–50)
TOTAL IRON BINDING CAPACITY: 416 UG/DL (ref 250–450)
TRANSFERRIN SERPL-MCNC: 281 MG/DL (ref 200–375)

## 2022-02-17 NOTE — TELEPHONE ENCOUNTER
----- Message from Jalyn Caballero MD sent at 2/17/2022  8:15 AM CST -----  Mae, please call the lab to have iron profile and ferritin added to her labs from yesterday.  ICD 10 D64.9

## 2022-02-18 DIAGNOSIS — G24.5 BLEPHAROSPASM: ICD-10-CM

## 2022-02-18 DIAGNOSIS — M35.01 KERATITIS SICCA, BOTH EYES: ICD-10-CM

## 2022-02-18 DIAGNOSIS — H04.123 DRY EYE SYNDROME, BILATERAL: ICD-10-CM

## 2022-02-18 RX ORDER — LIFITEGRAST 50 MG/ML
1 SOLUTION/ DROPS OPHTHALMIC 2 TIMES DAILY
Qty: 180 EACH | Refills: 3 | Status: SHIPPED | OUTPATIENT
Start: 2022-02-18

## 2022-02-21 ENCOUNTER — TELEPHONE (OUTPATIENT)
Dept: INTERNAL MEDICINE | Facility: CLINIC | Age: 87
End: 2022-02-21
Payer: MEDICARE

## 2022-02-21 DIAGNOSIS — D64.9 ANEMIA, UNSPECIFIED TYPE: ICD-10-CM

## 2022-02-21 DIAGNOSIS — N17.9 AKI (ACUTE KIDNEY INJURY): Primary | ICD-10-CM

## 2022-02-21 NOTE — TELEPHONE ENCOUNTER
Pt notified of recent lab results.  Anemia worse, iron low normal.  Denies blood in stools.  Hx ?MGUS, seen by Heme/Onc, but pt had deferred BM bx and further w/u based on age.  Pt still does not want aggressive or invasive w/u so will repeat BMP and CBC in a month (Cr slightly worse compared to previous).  PTH worse, but Ca still remains normal since stopping HCTZ.  Will monitor.

## 2022-03-04 ENCOUNTER — TELEPHONE (OUTPATIENT)
Dept: INTERNAL MEDICINE | Facility: CLINIC | Age: 87
End: 2022-03-04
Payer: MEDICARE

## 2022-03-04 ENCOUNTER — OFFICE VISIT (OUTPATIENT)
Dept: URGENT CARE | Facility: CLINIC | Age: 87
End: 2022-03-04
Payer: MEDICARE

## 2022-03-04 VITALS
SYSTOLIC BLOOD PRESSURE: 143 MMHG | DIASTOLIC BLOOD PRESSURE: 50 MMHG | WEIGHT: 76 LBS | OXYGEN SATURATION: 96 % | TEMPERATURE: 98 F | BODY MASS INDEX: 14.92 KG/M2 | HEIGHT: 60 IN | RESPIRATION RATE: 18 BRPM | HEART RATE: 57 BPM

## 2022-03-04 DIAGNOSIS — L03.116 CELLULITIS OF LEFT ANTERIOR LOWER LEG: Primary | ICD-10-CM

## 2022-03-04 PROCEDURE — 99214 OFFICE O/P EST MOD 30 MIN: CPT | Mod: S$GLB,,,

## 2022-03-04 PROCEDURE — 73590 XR TIBIA FIBULA 2 VIEW LEFT: ICD-10-PCS | Mod: LT,S$GLB,, | Performed by: RADIOLOGY

## 2022-03-04 PROCEDURE — 1157F ADVNC CARE PLAN IN RCRD: CPT | Mod: CPTII,S$GLB,,

## 2022-03-04 PROCEDURE — 99214 PR OFFICE/OUTPT VISIT, EST, LEVL IV, 30-39 MIN: ICD-10-PCS | Mod: S$GLB,,,

## 2022-03-04 PROCEDURE — 87075 CULTR BACTERIA EXCEPT BLOOD: CPT

## 2022-03-04 PROCEDURE — 87186 SC STD MICRODIL/AGAR DIL: CPT

## 2022-03-04 PROCEDURE — 1160F PR REVIEW ALL MEDS BY PRESCRIBER/CLIN PHARMACIST DOCUMENTED: ICD-10-PCS | Mod: CPTII,S$GLB,,

## 2022-03-04 PROCEDURE — 1160F RVW MEDS BY RX/DR IN RCRD: CPT | Mod: CPTII,S$GLB,,

## 2022-03-04 PROCEDURE — 87077 CULTURE AEROBIC IDENTIFY: CPT

## 2022-03-04 PROCEDURE — 87070 CULTURE OTHR SPECIMN AEROBIC: CPT

## 2022-03-04 PROCEDURE — 1159F MED LIST DOCD IN RCRD: CPT | Mod: CPTII,S$GLB,,

## 2022-03-04 PROCEDURE — 1157F PR ADVANCE CARE PLAN OR EQUIV PRESENT IN MEDICAL RECORD: ICD-10-PCS | Mod: CPTII,S$GLB,,

## 2022-03-04 PROCEDURE — 73590 X-RAY EXAM OF LOWER LEG: CPT | Mod: LT,S$GLB,, | Performed by: RADIOLOGY

## 2022-03-04 PROCEDURE — 1159F PR MEDICATION LIST DOCUMENTED IN MEDICAL RECORD: ICD-10-PCS | Mod: CPTII,S$GLB,,

## 2022-03-04 RX ORDER — CLINDAMYCIN PHOSPHATE 150 MG/ML
600 INJECTION, SOLUTION INTRAVENOUS
Status: DISCONTINUED | OUTPATIENT
Start: 2022-03-04 | End: 2022-03-04

## 2022-03-04 RX ORDER — CLINDAMYCIN HYDROCHLORIDE 300 MG/1
300 CAPSULE ORAL 3 TIMES DAILY
Qty: 21 CAPSULE | Refills: 0 | Status: SHIPPED | OUTPATIENT
Start: 2022-03-04 | End: 2022-03-11

## 2022-03-04 RX ORDER — MUPIROCIN 20 MG/G
OINTMENT TOPICAL 3 TIMES DAILY
Qty: 22 G | Refills: 0 | Status: SHIPPED | OUTPATIENT
Start: 2022-03-04 | End: 2022-03-11

## 2022-03-04 NOTE — PROGRESS NOTES
Subjective:       Patient ID: An Luz is a 89 y.o. female.    Vitals:  height is 5' (1.524 m) and weight is 34.5 kg (76 lb). Her oral temperature is 98.1 °F (36.7 °C). Her blood pressure is 143/50 (abnormal) and her pulse is 57 (abnormal). Her respiration is 18 and oxygen saturation is 96%.     Chief Complaint: Leg Injury    89 yo female presents to urgent care wit her daughter for evaluation. Patient states a book fell from a shelf and hit her left leg around 2 weeks ago. As of the last 4 days, she has had redness/swelling/pain to the entire left lower leg. She has tried tylenol for her symptoms with no relief. Denies fevers, weakness, CP, SOB.     Leg Pain   The incident occurred more than 1 week ago. The incident occurred at home. Injury mechanism: Book fell off from a shelf. Pain location: Left lower leg. Associated symptoms include an inability to bear weight. Pertinent negatives include no numbness or tingling. She reports no foreign bodies present. The symptoms are aggravated by movement and weight bearing. She has tried acetaminophen for the symptoms.       Skin: Positive for color change (redness and swelling to left lower leg) and abrasion. Negative for erythema.   Neurological: Negative for numbness.       Objective:      Physical Exam   Constitutional: She is oriented to person, place, and time. She appears well-developed.   HENT:   Head: Normocephalic and atraumatic. Head is without abrasion, without contusion and without laceration.   Ears:   Right Ear: External ear normal.   Left Ear: External ear normal.   Nose: Nose normal.   Mouth/Throat: Oropharynx is clear and moist and mucous membranes are normal.   Eyes: Conjunctivae, EOM and lids are normal. Pupils are equal, round, and reactive to light.   Neck: Trachea normal and phonation normal. Neck supple.   Cardiovascular: Normal rate, regular rhythm and normal heart sounds.   Pulmonary/Chest: Effort normal and breath sounds normal. No  stridor. No respiratory distress.   Musculoskeletal: Normal range of motion.         General: Normal range of motion.   Neurological: She is alert and oriented to person, place, and time.   Skin: Skin is warm, dry, intact and no rash. Capillary refill takes less than 2 seconds. No abrasion, No burn, No bruising, No erythema and No ecchymosis         Comments: Left lower extremity erythematous, edematous, and tender to palpation. Estimated 3 cm by 3 cm healing abrasion noted just below the left knee. No crepitus on palpitation. Compartments soft. Sensation intact. ROM intact.     See attached image.    Psychiatric: Her speech is normal and behavior is normal. Judgment and thought content normal.   Nursing note and vitals reviewed.        XR TIBIA FIBULA 2 VIEW LEFT    Result Date: 3/4/2022  EXAMINATION: XR TIBIA FIBULA 2 VIEW LEFT CLINICAL HISTORY: Cellulitis of left lower limb TECHNIQUE: AP and lateral views of the left tibia and fibula were performed. COMPARISON: None. FINDINGS: Overall alignment is within normal limits.  No displaced fracture, dislocation or destructive osseous process.  There is diffuse soft tissue prominence with subcutaneous stranding about the left lower leg and ankle, particularly along the calf and posterolateral left shin region, from edema or cellulitis.  No subcutaneous emphysema or radiodense retained foreign body.  Scattered nonspecific soft tissue calcifications along the left shin region     Left lower leg diffuse nonspecific soft tissue swelling from edema or cellulitis without acute displaced fracture-dislocation or radiodense retained foreign body identified.     Electronically signed by: Rishi Ashford MD Date:    03/04/2022 Time:    13:36          Assessment:       1. Cellulitis of left anterior lower leg          Plan:       Patient well appearing, vitals stable, afebrile. Presents with cellulitis of the left lower extremity. Reviewed xray results with patient. Discussed treatment  with antibiotics and compression stocking. Gave patient strict ER precautions if wound is not improving in 24 hours. Patient and her daughter verbalized understanding to the above plan of care and had no further questions.    Case discussed with Dr. Jonas and Dr. Suarez who personally saw the patient as well.     Cellulitis of left anterior lower leg  -     XR TIBIA FIBULA 2 VIEW LEFT; Future; Expected date: 03/04/2022  -     Culture, Aerobic  -     Culture, Anaerobic  -     Discontinue: clindamycin injection 600 mg  -     clindamycin (CLEOCIN) 300 MG capsule; Take 1 capsule (300 mg total) by mouth 3 (three) times daily. for 7 days  Dispense: 21 capsule; Refill: 0  -     mupirocin (BACTROBAN) 2 % ointment; Apply topically 3 (three) times daily. for 7 days  Dispense: 22 g; Refill: 0      Patient Instructions                                              Abscess/Cellulitis   If your condition worsens or fails to improve we recommend that you receive another evaluation at the ER immediately or contact your PCP to discuss your concerns or return here. You must understand that you've received an urgent care treatment only and that you may be released before all your medical problems are known or treated. You the patient will arrange for follow-up care as instructed.     Avoid picking or manipulating the wound.     Clean the wound twice a day and put the antibiotic ointment on it if prescribed.     If you were prescribed antibiotics, please take them to completion. If you are female and on BCP use additional methods to prevent pregnancy while on antibiotics and for one cycle after.     Tylenol or ibuprofen can also be used as directed for pain unless you have an allergy to them or medical condition such as stomach ulcers, kidney or liver disease or blood thinners etc for which you should not be taking these type of medications.     Return in 48- 72 hours for recheck.     You should seek ER treatment if fever, increase  pain, swelling or other signs of increasing infection OR IF NO IMPROVEMENT IN 24 HOURS.     Please follow up with your primary care doctor or specialist as needed.

## 2022-03-04 NOTE — TELEPHONE ENCOUNTER
Spoke with pt and she states she have a small wound on her leg with swelling and wants to know if the doctor can prescribe medication, pt informed she need an appt , pt ok with this an will go to urgent care [place].

## 2022-03-04 NOTE — PATIENT INSTRUCTIONS
Abscess/Cellulitis   If your condition worsens or fails to improve we recommend that you receive another evaluation at the ER immediately or contact your PCP to discuss your concerns or return here. You must understand that you've received an urgent care treatment only and that you may be released before all your medical problems are known or treated. You the patient will arrange for follow-up care as instructed.     Avoid picking or manipulating the wound.     Clean the wound twice a day and put the antibiotic ointment on it if prescribed.     If you were prescribed antibiotics, please take them to completion. If you are female and on BCP use additional methods to prevent pregnancy while on antibiotics and for one cycle after.     Tylenol or ibuprofen can also be used as directed for pain unless you have an allergy to them or medical condition such as stomach ulcers, kidney or liver disease or blood thinners etc for which you should not be taking these type of medications.     Return in 48- 72 hours for recheck.     You should seek ER treatment if fever, increase pain, swelling or other signs of increasing infection OR IF NO IMPROVEMENT IN 24 HOURS.     Please follow up with your primary care doctor or specialist as needed.

## 2022-03-04 NOTE — TELEPHONE ENCOUNTER
----- Message from Jaron Watkins sent at 3/4/2022  9:11 AM CST -----  Contact: Jing (daughter) [phone]  Pt requesting a call back for advice on antibiotics.    Please call and advice

## 2022-03-06 ENCOUNTER — TELEPHONE (OUTPATIENT)
Dept: URGENT CARE | Facility: CLINIC | Age: 87
End: 2022-03-06
Payer: MEDICARE

## 2022-03-06 NOTE — TELEPHONE ENCOUNTER
Called patients daughter to follow up on their visit from March 4th. Ms. Ch states the swelling and redness has decreased since the patient was seen the other day. States the patient was still in some pain as of last night but woke up this morning with significant improvement. Still awaiting culture results. Discussed the importance of continued close follow-up and ER precautions. Patients daughter verbalized understanding and was grateful for the follow-up call. She had no further questions.

## 2022-03-07 LAB — BACTERIA SPEC AEROBE CULT: ABNORMAL

## 2022-03-08 ENCOUNTER — TELEPHONE (OUTPATIENT)
Dept: INTERNAL MEDICINE | Facility: CLINIC | Age: 87
End: 2022-03-08
Payer: MEDICARE

## 2022-03-08 ENCOUNTER — TELEPHONE (OUTPATIENT)
Dept: URGENT CARE | Facility: CLINIC | Age: 87
End: 2022-03-08
Payer: MEDICARE

## 2022-03-08 NOTE — TELEPHONE ENCOUNTER
----- Message from Teresa Oakes sent at 3/8/2022  1:33 PM CST -----  Contact: [facility] [phone]  Patient would like a call back in regards to her wanting speak with you about her urgent care visit please.

## 2022-03-09 ENCOUNTER — TELEPHONE (OUTPATIENT)
Dept: URGENT CARE | Facility: CLINIC | Age: 87
End: 2022-03-09
Payer: MEDICARE

## 2022-03-09 LAB — BACTERIA SPEC ANAEROBE CULT: NORMAL

## 2022-03-09 NOTE — TELEPHONE ENCOUNTER
Gave patient negative wound culture results.  Informed patient that this was probably not an accurate swab just because her wound was already scabbed over and was hard to get a good sample.  Told patient to continue oral antibiotics anyway.  She states that she was able to get an appointment with internal medicine on the 15th.  Patient states that leg swelling has improved greatly and there is very minimal swelling left.  Patient had no further questions or concerns.

## 2022-03-15 ENCOUNTER — LAB VISIT (OUTPATIENT)
Dept: LAB | Facility: HOSPITAL | Age: 87
End: 2022-03-15
Attending: INTERNAL MEDICINE
Payer: MEDICARE

## 2022-03-15 ENCOUNTER — OFFICE VISIT (OUTPATIENT)
Dept: INTERNAL MEDICINE | Facility: CLINIC | Age: 87
End: 2022-03-15
Payer: MEDICARE

## 2022-03-15 VITALS
SYSTOLIC BLOOD PRESSURE: 136 MMHG | OXYGEN SATURATION: 99 % | HEART RATE: 65 BPM | DIASTOLIC BLOOD PRESSURE: 64 MMHG | HEIGHT: 60 IN | WEIGHT: 75.63 LBS | BODY MASS INDEX: 14.85 KG/M2

## 2022-03-15 DIAGNOSIS — N17.9 AKI (ACUTE KIDNEY INJURY): ICD-10-CM

## 2022-03-15 DIAGNOSIS — D64.9 ANEMIA, UNSPECIFIED TYPE: ICD-10-CM

## 2022-03-15 DIAGNOSIS — L03.116 CELLULITIS OF LEFT LOWER EXTREMITY: Primary | ICD-10-CM

## 2022-03-15 LAB
ANION GAP SERPL CALC-SCNC: 9 MMOL/L (ref 8–16)
BASOPHILS # BLD AUTO: 0.09 K/UL (ref 0–0.2)
BASOPHILS NFR BLD: 1.5 % (ref 0–1.9)
BUN SERPL-MCNC: 21 MG/DL (ref 10–30)
CALCIUM SERPL-MCNC: 10 MG/DL (ref 8.7–10.5)
CHLORIDE SERPL-SCNC: 105 MMOL/L (ref 95–110)
CO2 SERPL-SCNC: 26 MMOL/L (ref 23–29)
CREAT SERPL-MCNC: 0.8 MG/DL (ref 0.5–1.4)
DIFFERENTIAL METHOD: ABNORMAL
EOSINOPHIL # BLD AUTO: 0.2 K/UL (ref 0–0.5)
EOSINOPHIL NFR BLD: 2.6 % (ref 0–8)
ERYTHROCYTE [DISTWIDTH] IN BLOOD BY AUTOMATED COUNT: 14.6 % (ref 11.5–14.5)
EST. GFR  (AFRICAN AMERICAN): >60 ML/MIN/1.73 M^2
EST. GFR  (NON AFRICAN AMERICAN): >60 ML/MIN/1.73 M^2
GLUCOSE SERPL-MCNC: 89 MG/DL (ref 70–110)
HCT VFR BLD AUTO: 27.2 % (ref 37–48.5)
HGB BLD-MCNC: 8.3 G/DL (ref 12–16)
IMM GRANULOCYTES # BLD AUTO: 0.03 K/UL (ref 0–0.04)
IMM GRANULOCYTES NFR BLD AUTO: 0.5 % (ref 0–0.5)
LYMPHOCYTES # BLD AUTO: 1.3 K/UL (ref 1–4.8)
LYMPHOCYTES NFR BLD: 21.3 % (ref 18–48)
MCH RBC QN AUTO: 29.9 PG (ref 27–31)
MCHC RBC AUTO-ENTMCNC: 30.5 G/DL (ref 32–36)
MCV RBC AUTO: 98 FL (ref 82–98)
MONOCYTES # BLD AUTO: 0.9 K/UL (ref 0.3–1)
MONOCYTES NFR BLD: 15 % (ref 4–15)
NEUTROPHILS # BLD AUTO: 3.5 K/UL (ref 1.8–7.7)
NEUTROPHILS NFR BLD: 59.1 % (ref 38–73)
NRBC BLD-RTO: 0 /100 WBC
PLATELET # BLD AUTO: 340 K/UL (ref 150–450)
PMV BLD AUTO: 11 FL (ref 9.2–12.9)
POTASSIUM SERPL-SCNC: 4.8 MMOL/L (ref 3.5–5.1)
RBC # BLD AUTO: 2.78 M/UL (ref 4–5.4)
SODIUM SERPL-SCNC: 140 MMOL/L (ref 136–145)
WBC # BLD AUTO: 5.88 K/UL (ref 3.9–12.7)

## 2022-03-15 PROCEDURE — 80048 BASIC METABOLIC PNL TOTAL CA: CPT | Performed by: INTERNAL MEDICINE

## 2022-03-15 PROCEDURE — 85025 COMPLETE CBC W/AUTO DIFF WBC: CPT | Performed by: INTERNAL MEDICINE

## 2022-03-15 PROCEDURE — 1159F MED LIST DOCD IN RCRD: CPT | Mod: CPTII,S$GLB,, | Performed by: INTERNAL MEDICINE

## 2022-03-15 PROCEDURE — 1160F RVW MEDS BY RX/DR IN RCRD: CPT | Mod: CPTII,S$GLB,, | Performed by: INTERNAL MEDICINE

## 2022-03-15 PROCEDURE — 1160F PR REVIEW ALL MEDS BY PRESCRIBER/CLIN PHARMACIST DOCUMENTED: ICD-10-PCS | Mod: CPTII,S$GLB,, | Performed by: INTERNAL MEDICINE

## 2022-03-15 PROCEDURE — 1101F PT FALLS ASSESS-DOCD LE1/YR: CPT | Mod: CPTII,S$GLB,, | Performed by: INTERNAL MEDICINE

## 2022-03-15 PROCEDURE — 3288F PR FALLS RISK ASSESSMENT DOCUMENTED: ICD-10-PCS | Mod: CPTII,S$GLB,, | Performed by: INTERNAL MEDICINE

## 2022-03-15 PROCEDURE — 99999 PR PBB SHADOW E&M-EST. PATIENT-LVL IV: CPT | Mod: PBBFAC,,, | Performed by: INTERNAL MEDICINE

## 2022-03-15 PROCEDURE — 99999 PR PBB SHADOW E&M-EST. PATIENT-LVL IV: ICD-10-PCS | Mod: PBBFAC,,, | Performed by: INTERNAL MEDICINE

## 2022-03-15 PROCEDURE — 99213 OFFICE O/P EST LOW 20 MIN: CPT | Mod: S$GLB,,, | Performed by: INTERNAL MEDICINE

## 2022-03-15 PROCEDURE — 1126F PR PAIN SEVERITY QUANTIFIED, NO PAIN PRESENT: ICD-10-PCS | Mod: CPTII,S$GLB,, | Performed by: INTERNAL MEDICINE

## 2022-03-15 PROCEDURE — 99213 PR OFFICE/OUTPT VISIT, EST, LEVL III, 20-29 MIN: ICD-10-PCS | Mod: S$GLB,,, | Performed by: INTERNAL MEDICINE

## 2022-03-15 PROCEDURE — 1157F ADVNC CARE PLAN IN RCRD: CPT | Mod: CPTII,S$GLB,, | Performed by: INTERNAL MEDICINE

## 2022-03-15 PROCEDURE — 1159F PR MEDICATION LIST DOCUMENTED IN MEDICAL RECORD: ICD-10-PCS | Mod: CPTII,S$GLB,, | Performed by: INTERNAL MEDICINE

## 2022-03-15 PROCEDURE — 1126F AMNT PAIN NOTED NONE PRSNT: CPT | Mod: CPTII,S$GLB,, | Performed by: INTERNAL MEDICINE

## 2022-03-15 PROCEDURE — 36415 COLL VENOUS BLD VENIPUNCTURE: CPT | Performed by: INTERNAL MEDICINE

## 2022-03-15 PROCEDURE — 1101F PR PT FALLS ASSESS DOC 0-1 FALLS W/OUT INJ PAST YR: ICD-10-PCS | Mod: CPTII,S$GLB,, | Performed by: INTERNAL MEDICINE

## 2022-03-15 PROCEDURE — 1157F PR ADVANCE CARE PLAN OR EQUIV PRESENT IN MEDICAL RECORD: ICD-10-PCS | Mod: CPTII,S$GLB,, | Performed by: INTERNAL MEDICINE

## 2022-03-15 PROCEDURE — 3288F FALL RISK ASSESSMENT DOCD: CPT | Mod: CPTII,S$GLB,, | Performed by: INTERNAL MEDICINE

## 2022-03-15 NOTE — PROGRESS NOTES
INTERNAL MEDICINE ESTABLISHED PATIENT VISIT NOTE    Subjective:     Chief Complaint: Follow-up  cellulitis     Patient ID: An Luz is a 89 y.o. female with HTN, HLD, preDM, IgG Kappa MGUS, anemia, osteoporosis c kyphoscoliosis managed by Dr. Monteiro/Cat, glaucoma, chronic dry eyes, urinary incontinence, chronic constipation, aortic atherosclerosis noted incidentally on prior imaging, last seen by me a month ago, here today for focused visit for f/u cellulitis.    To review:  At baseline, lives at home c her daughter who works.  AAOx3, does most ADLs independently but appreciates assistance.  Wears poise pads for hx urinary incontinence, no issues c fecal incontinence.  Ambulates c a cane.     Labs at last visit showed slightly worsening anemia and iron low normal.  Cr also had been up from baseline.  Had hx MGUS but declined further w/u due to her age.    Was seen in Urgent care on 3/4/22 for pain/swelling to the LLE p a book fell on it 2 weeks prior.  Was sent for L tib fib xray which showed no fx, just diffuse soft tissue swelling.    Wound was cultured and rx'ed Clinda for 7 days which she completed this past Thursday.  cx grew out MRSA sens to Clindamycin.  States clinda caused GI upset and nausea which are improving and has been taking a probiotic.    States leg looks and feels much better but still has a scab to which her daughter has been applying Mupirocin and covering it with a small dressing at bedtime.    Denies fever or drainage.    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 Authorizing Provider   acetaminophen (TYLENOL) 325 MG tablet Take 325  mg by mouth every 6 (six) hours as needed for Pain.    Historical Provider   amLODIPine (NORVASC) 5 MG tablet Take 1 tablet (5 mg total) by mouth once daily. 2/16/22   Jalyn Caballero MD   cholecalciferol, vitamin D3, (VITAMIN D3) 25 mcg (1,000 unit) capsule Take 1,000 Units by mouth once daily.    Historical Provider   FOLIC ACID/MULTIVIT-MIN/LUTEIN (CENTRUM SILVER ORAL) Take 1 tablet by mouth once daily.     Historical Provider   labetaloL (NORMODYNE) 100 MG tablet Take 1 tablet (100 mg total) by mouth 2 (two) times daily. 11/5/21 11/5/22  Effie Ann NP   lifitegrast (XIIDRA) 5 % Dpet Place 1 drop into both eyes 2 (two) times a day. 2/18/22   Dian Cornejo MD   losartan (COZAAR) 100 MG tablet Take 1 tablet (100 mg total) by mouth once daily. 8/11/21   Jalyn Caballero MD   oxybutynin (DITROPAN-XL) 10 MG 24 hr tablet Take 1 tablet (10 mg total) by mouth once daily. 2/16/22 2/16/23  Jalyn Caballero MD   polyethylene glycol (GLYCOLAX) 17 gram PwPk Take one capful with a full glass of water daily as needed for constipation. 6/25/18   Jalyn Caballero MD   senna (SENOKOT) 8.6 mg tablet Take 1 tablet by mouth once daily.    Historical Provider   wheat dextrin 5 gram/7.4 gram Powd Take by mouth.    Historical Provider       Allergies:  Review of patient's allergies indicates:   Allergen Reactions    Zestril [lisinopril] Swelling     Facial Swelling    Augmentin [amoxicillin-pot clavulanate] Nausea And Vomiting    Sulfa (sulfonamide antibiotics)      Unknown reaction       Social History:  Social History     Tobacco Use    Smoking status: Never Smoker    Smokeless tobacco: Never Used    Tobacco comment: The patient exercises every other day on an exercise bike.  On alternative days she does floor exercises with weights.   Substance Use Topics    Alcohol use: Yes     Alcohol/week: 2.0 - 4.0 standard drinks     Types: 1 Glasses of wine, 1 - 3 Standard drinks or equivalent per week     Comment: 1 glass of wine w/ dinner  and w/ company    Drug use: No        Review of Systems   Constitutional: Negative for chills, fatigue and fever.   Respiratory: Negative for cough, chest tightness and shortness of breath.    Cardiovascular: Negative for chest pain.   Gastrointestinal: Negative for abdominal pain and blood in stool.   Genitourinary: Negative for dysuria and frequency.   Skin: Positive for wound.         Health Maintenance:     Immunizations:   Influenza 11/2021  TDap is up to date 5/2017  Prevnar 13 5/2016, pvax 6/18  Shingrix is not UTD.    rec but pt declined due to concerns regarding potential s/e, Risks and benefits were discussed with patient.  Covid vacc 2/2021, 3/2021, 11/2021        Cancer Screening:  PAP: n/a based on age, also c hx hyst  Mammogram: n/a based on age  Colonoscopy: n/a based on age  DEXA:  4/2021 worse despite weekly alendronate.  Referred to Endo, seen by Dr. Monteiro who changed to Prolia.    Objective:   /64   Pulse 65   Ht 5' (1.524 m)   Wt 34.3 kg (75 lb 9.9 oz)   LMP  (LMP Unknown)   SpO2 99%   BMI 14.77 kg/m²        Leg: LLE still c scab over previous wound but no surrounding erythema.  Skin is soft and moist from application of Mupirocin last night.  No drainage appreciated.  Covered back with non-adherent dressing and soft ACE bandage.    Labs:     Lab Results   Component Value Date    WBC 6.99 02/16/2022    HGB 8.8 (L) 02/16/2022    HCT 28.8 (L) 02/16/2022    MCV 98 02/16/2022     02/16/2022     Sodium   Date Value Ref Range Status   02/16/2022 139 136 - 145 mmol/L Final     Potassium   Date Value Ref Range Status   02/16/2022 4.3 3.5 - 5.1 mmol/L Final     Chloride   Date Value Ref Range Status   02/16/2022 105 95 - 110 mmol/L Final     CO2   Date Value Ref Range Status   02/16/2022 24 23 - 29 mmol/L Final     Glucose   Date Value Ref Range Status   02/16/2022 113 (H) 70 - 110 mg/dL Final     BUN   Date Value Ref Range Status   02/16/2022 32 (H) 10 - 30 mg/dL Final     Creatinine    Date Value Ref Range Status   02/16/2022 1.0 0.5 - 1.4 mg/dL Final     Calcium   Date Value Ref Range Status   02/16/2022 9.2 8.7 - 10.5 mg/dL Final     Total Protein   Date Value Ref Range Status   02/16/2022 7.9 6.0 - 8.4 g/dL Final     Albumin   Date Value Ref Range Status   02/16/2022 3.4 (L) 3.5 - 5.2 g/dL Final     Total Bilirubin   Date Value Ref Range Status   02/16/2022 0.3 0.1 - 1.0 mg/dL Final     Comment:     For infants and newborns, interpretation of results should be based  on gestational age, weight and in agreement with clinical  observations.    Premature Infant recommended reference ranges:  Up to 24 hours.............<8.0 mg/dL  Up to 48 hours............<12.0 mg/dL  3-5 days..................<15.0 mg/dL  6-29 days.................<15.0 mg/dL       Alkaline Phosphatase   Date Value Ref Range Status   02/16/2022 65 55 - 135 U/L Final     AST   Date Value Ref Range Status   02/16/2022 18 10 - 40 U/L Final     ALT   Date Value Ref Range Status   02/16/2022 9 (L) 10 - 44 U/L Final     Anion Gap   Date Value Ref Range Status   02/16/2022 10 8 - 16 mmol/L Final     eGFR if    Date Value Ref Range Status   02/16/2022 55.7 (A) >60 mL/min/1.73 m^2 Final     eGFR if non    Date Value Ref Range Status   02/16/2022 48.3 (A) >60 mL/min/1.73 m^2 Final     Comment:     Calculation used to obtain the estimated glomerular filtration  rate (eGFR) is the CKD-EPI equation.        Lab Results   Component Value Date    HGBA1C 5.7 (H) 02/16/2022     Lab Results   Component Value Date    LDLCALC 128.8 07/30/2020     Lab Results   Component Value Date    TSH 2.724 01/06/2020         Assessment/Plan     An was seen today for follow-up.    Diagnoses and all orders for this visit:    Cellulitis of left lower extremity  Examined today.  cellulitis appears resolved.  Advised to stop Mupirocin so scab can dry out and fall off, advised to avoid picking.  Advised to let me know if  recurrent erythema, pain, or drainage.    HM as above  RTC in Aug as previously scheduled for f/u, sooner if needed.  Labs today to f/u last mos labs.    Jalyn Caballero MD  Department of Internal Medicine - [facility]  03/15/2022

## 2022-03-31 ENCOUNTER — TELEPHONE (OUTPATIENT)
Dept: INTERNAL MEDICINE | Facility: CLINIC | Age: 87
End: 2022-03-31
Payer: MEDICARE

## 2022-03-31 DIAGNOSIS — D50.9 IRON DEFICIENCY ANEMIA, UNSPECIFIED IRON DEFICIENCY ANEMIA TYPE: Primary | ICD-10-CM

## 2022-03-31 NOTE — TELEPHONE ENCOUNTER
----- Message from Petty Dillon sent at 3/31/2022  4:10 PM CDT -----  Contact: Self/[phone]  Pt said that she is calling in regards to needing to know If the doctor wants her to take  the vitamin she has been taking along with the new medication she was prescribed for her iron. Please advise

## 2022-03-31 NOTE — TELEPHONE ENCOUNTER
Spoke to pt regarding recent labs.  Will start Fe supplementation daily, FeSO4 325 mg daily for a week, then BID if tolerated.  Discussed potential s/e of constipation which pt currently has no issues with.  Can add otc Colace if needed.  Will repeat CBC in one month to make sure not continuing to drop.

## 2022-04-12 ENCOUNTER — OFFICE VISIT (OUTPATIENT)
Dept: OPTOMETRY | Facility: CLINIC | Age: 87
End: 2022-04-12
Payer: MEDICARE

## 2022-04-12 DIAGNOSIS — H54.3 LOW VISION, BOTH EYES: Primary | ICD-10-CM

## 2022-04-12 PROCEDURE — 99499 NO LOS: ICD-10-PCS | Mod: S$GLB,,, | Performed by: OPTOMETRIST

## 2022-04-12 PROCEDURE — 1157F ADVNC CARE PLAN IN RCRD: CPT | Mod: CPTII,S$GLB,, | Performed by: OPTOMETRIST

## 2022-04-12 PROCEDURE — 92015 PR REFRACTION: ICD-10-PCS | Mod: S$GLB,,, | Performed by: OPTOMETRIST

## 2022-04-12 PROCEDURE — 1126F PR PAIN SEVERITY QUANTIFIED, NO PAIN PRESENT: ICD-10-PCS | Mod: CPTII,S$GLB,, | Performed by: OPTOMETRIST

## 2022-04-12 PROCEDURE — 3288F FALL RISK ASSESSMENT DOCD: CPT | Mod: CPTII,S$GLB,, | Performed by: OPTOMETRIST

## 2022-04-12 PROCEDURE — 1160F PR REVIEW ALL MEDS BY PRESCRIBER/CLIN PHARMACIST DOCUMENTED: ICD-10-PCS | Mod: CPTII,S$GLB,, | Performed by: OPTOMETRIST

## 2022-04-12 PROCEDURE — 1101F PR PT FALLS ASSESS DOC 0-1 FALLS W/OUT INJ PAST YR: ICD-10-PCS | Mod: CPTII,S$GLB,, | Performed by: OPTOMETRIST

## 2022-04-12 PROCEDURE — 1101F PT FALLS ASSESS-DOCD LE1/YR: CPT | Mod: CPTII,S$GLB,, | Performed by: OPTOMETRIST

## 2022-04-12 PROCEDURE — 1159F MED LIST DOCD IN RCRD: CPT | Mod: CPTII,S$GLB,, | Performed by: OPTOMETRIST

## 2022-04-12 PROCEDURE — 1126F AMNT PAIN NOTED NONE PRSNT: CPT | Mod: CPTII,S$GLB,, | Performed by: OPTOMETRIST

## 2022-04-12 PROCEDURE — 99999 PR PBB SHADOW E&M-EST. PATIENT-LVL III: CPT | Mod: PBBFAC,,, | Performed by: OPTOMETRIST

## 2022-04-12 PROCEDURE — 1159F PR MEDICATION LIST DOCUMENTED IN MEDICAL RECORD: ICD-10-PCS | Mod: CPTII,S$GLB,, | Performed by: OPTOMETRIST

## 2022-04-12 PROCEDURE — 3288F PR FALLS RISK ASSESSMENT DOCUMENTED: ICD-10-PCS | Mod: CPTII,S$GLB,, | Performed by: OPTOMETRIST

## 2022-04-12 PROCEDURE — 1157F PR ADVANCE CARE PLAN OR EQUIV PRESENT IN MEDICAL RECORD: ICD-10-PCS | Mod: CPTII,S$GLB,, | Performed by: OPTOMETRIST

## 2022-04-12 PROCEDURE — 99999 PR PBB SHADOW E&M-EST. PATIENT-LVL III: ICD-10-PCS | Mod: PBBFAC,,, | Performed by: OPTOMETRIST

## 2022-04-12 PROCEDURE — 92015 DETERMINE REFRACTIVE STATE: CPT | Mod: S$GLB,,, | Performed by: OPTOMETRIST

## 2022-04-12 PROCEDURE — 1160F RVW MEDS BY RX/DR IN RCRD: CPT | Mod: CPTII,S$GLB,, | Performed by: OPTOMETRIST

## 2022-04-12 PROCEDURE — 99499 UNLISTED E&M SERVICE: CPT | Mod: S$GLB,,, | Performed by: OPTOMETRIST

## 2022-04-13 NOTE — PROGRESS NOTES
HPI     low vision      Additional comments: Patient An Luz is a 89 year old female.              Comments     Patient referred by Dr. Cornejo for new patient low vision evaluation.   Pt states that her [facility] OU has not changed since last visit with Dr. Cornejo. Pt states that glasses of 2 years have not been helping with   distance [facility] for the past few months, especially with closed caption on the   TV. Pt uses magnifier to help with reading small print. Patient denies   diplopia, headaches, flashes/floaters, and pain.    DLS: 01/13/2022 with Dr. Cornejo    Gtts: (+)AT's PRN OU (+)OTC eye salve qhs OU - pt stopped using Xiidra 1   month ago, noticed no improvement with eyes    POHx:  1. Glaucoma Suspect                 2. -  Blepharospasm     3. Dominant familial drusen OU              Monitor     4.   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 Ptosis ou               OS >> OD                   6. PCIOL - ou              OD (12/07), - Chantal              OS (4/2010) - Dr Cornejo     7. Refractive error                  8. PVD OD      9. C/O poor distance  vision and now trouble with reading               Her favorite magnifyer is a gift from the [facility]               Minimal PCO ou - NVS               Do not recommend yag cap - would be difficult to focus on it               Refer to low vision clinic - Dr. Rodriguez     FOHx: (+)chronic dry eyes - mother            Last edited by Harini Gupta on 4/12/2022  3:40 PM. (History)            Assessment /Plan     For exam results, see Encounter Report.    Low vision, both eyes      1. Spec rx given for new glasses, cont use of hand held magnifiers at home, read magnifiers, they are good strength for her. RTC yearly refraction.

## 2022-05-02 ENCOUNTER — LAB VISIT (OUTPATIENT)
Dept: LAB | Facility: HOSPITAL | Age: 87
End: 2022-05-02
Attending: INTERNAL MEDICINE
Payer: MEDICARE

## 2022-05-02 DIAGNOSIS — D50.9 IRON DEFICIENCY ANEMIA, UNSPECIFIED IRON DEFICIENCY ANEMIA TYPE: ICD-10-CM

## 2022-05-02 LAB
BASOPHILS # BLD AUTO: 0.06 K/UL (ref 0–0.2)
BASOPHILS NFR BLD: 1.1 % (ref 0–1.9)
DIFFERENTIAL METHOD: ABNORMAL
EOSINOPHIL # BLD AUTO: 0.2 K/UL (ref 0–0.5)
EOSINOPHIL NFR BLD: 3.1 % (ref 0–8)
ERYTHROCYTE [DISTWIDTH] IN BLOOD BY AUTOMATED COUNT: 15.8 % (ref 11.5–14.5)
HCT VFR BLD AUTO: 29.7 % (ref 37–48.5)
HGB BLD-MCNC: 9.3 G/DL (ref 12–16)
IMM GRANULOCYTES # BLD AUTO: 0.02 K/UL (ref 0–0.04)
IMM GRANULOCYTES NFR BLD AUTO: 0.4 % (ref 0–0.5)
LYMPHOCYTES # BLD AUTO: 1.3 K/UL (ref 1–4.8)
LYMPHOCYTES NFR BLD: 22.6 % (ref 18–48)
MCH RBC QN AUTO: 29.4 PG (ref 27–31)
MCHC RBC AUTO-ENTMCNC: 31.3 G/DL (ref 32–36)
MCV RBC AUTO: 94 FL (ref 82–98)
MONOCYTES # BLD AUTO: 0.8 K/UL (ref 0.3–1)
MONOCYTES NFR BLD: 14.8 % (ref 4–15)
NEUTROPHILS # BLD AUTO: 3.2 K/UL (ref 1.8–7.7)
NEUTROPHILS NFR BLD: 58 % (ref 38–73)
NRBC BLD-RTO: 0 /100 WBC
PLATELET # BLD AUTO: 219 K/UL (ref 150–450)
PMV BLD AUTO: 11.2 FL (ref 9.2–12.9)
RBC # BLD AUTO: 3.16 M/UL (ref 4–5.4)
WBC # BLD AUTO: 5.54 K/UL (ref 3.9–12.7)

## 2022-05-02 PROCEDURE — 36415 COLL VENOUS BLD VENIPUNCTURE: CPT | Performed by: INTERNAL MEDICINE

## 2022-05-02 PROCEDURE — 85025 COMPLETE CBC W/AUTO DIFF WBC: CPT | Performed by: INTERNAL MEDICINE

## 2022-06-07 ENCOUNTER — TELEPHONE (OUTPATIENT)
Dept: INTERNAL MEDICINE | Facility: CLINIC | Age: 87
End: 2022-06-07
Payer: MEDICARE

## 2022-06-07 NOTE — TELEPHONE ENCOUNTER
----- Message from Regine Felix sent at 6/7/2022  2:08 PM CDT -----  Contact: An   An would like Dr Caballero know that she received her second Covid booster today

## 2022-06-13 ENCOUNTER — OFFICE VISIT (OUTPATIENT)
Dept: PODIATRY | Facility: CLINIC | Age: 87
End: 2022-06-13
Payer: MEDICARE

## 2022-06-13 ENCOUNTER — TELEPHONE (OUTPATIENT)
Dept: PODIATRY | Facility: CLINIC | Age: 87
End: 2022-06-13

## 2022-06-13 VITALS
DIASTOLIC BLOOD PRESSURE: 69 MMHG | HEIGHT: 60 IN | HEART RATE: 64 BPM | BODY MASS INDEX: 14.72 KG/M2 | WEIGHT: 75 LBS | SYSTOLIC BLOOD PRESSURE: 161 MMHG

## 2022-06-13 DIAGNOSIS — M79.671 BILATERAL FOOT PAIN: ICD-10-CM

## 2022-06-13 DIAGNOSIS — M79.672 BILATERAL FOOT PAIN: ICD-10-CM

## 2022-06-13 DIAGNOSIS — Z78.9 IMPAIRED MOBILITY AND ADLS: ICD-10-CM

## 2022-06-13 DIAGNOSIS — B35.1 DERMATOPHYTOSIS OF NAIL: ICD-10-CM

## 2022-06-13 DIAGNOSIS — N18.31 STAGE 3A CHRONIC KIDNEY DISEASE: Primary | ICD-10-CM

## 2022-06-13 DIAGNOSIS — Z74.09 IMPAIRED MOBILITY AND ADLS: ICD-10-CM

## 2022-06-13 PROCEDURE — 1157F PR ADVANCE CARE PLAN OR EQUIV PRESENT IN MEDICAL RECORD: ICD-10-PCS | Mod: CPTII,S$GLB,, | Performed by: PODIATRIST

## 2022-06-13 PROCEDURE — 99499 UNLISTED E&M SERVICE: CPT | Mod: S$GLB,,, | Performed by: PODIATRIST

## 2022-06-13 PROCEDURE — 1160F PR REVIEW ALL MEDS BY PRESCRIBER/CLIN PHARMACIST DOCUMENTED: ICD-10-PCS | Mod: CPTII,S$GLB,, | Performed by: PODIATRIST

## 2022-06-13 PROCEDURE — 1101F PT FALLS ASSESS-DOCD LE1/YR: CPT | Mod: CPTII,S$GLB,, | Performed by: PODIATRIST

## 2022-06-13 PROCEDURE — 99499 NO LOS: ICD-10-PCS | Mod: S$GLB,,, | Performed by: PODIATRIST

## 2022-06-13 PROCEDURE — 1159F PR MEDICATION LIST DOCUMENTED IN MEDICAL RECORD: ICD-10-PCS | Mod: CPTII,S$GLB,, | Performed by: PODIATRIST

## 2022-06-13 PROCEDURE — 1126F PR PAIN SEVERITY QUANTIFIED, NO PAIN PRESENT: ICD-10-PCS | Mod: CPTII,S$GLB,, | Performed by: PODIATRIST

## 2022-06-13 PROCEDURE — 11721 ROUTINE FOOT CARE: ICD-10-PCS | Mod: Q9,S$GLB,, | Performed by: PODIATRIST

## 2022-06-13 PROCEDURE — 1101F PR PT FALLS ASSESS DOC 0-1 FALLS W/OUT INJ PAST YR: ICD-10-PCS | Mod: CPTII,S$GLB,, | Performed by: PODIATRIST

## 2022-06-13 PROCEDURE — 1160F RVW MEDS BY RX/DR IN RCRD: CPT | Mod: CPTII,S$GLB,, | Performed by: PODIATRIST

## 2022-06-13 PROCEDURE — 1126F AMNT PAIN NOTED NONE PRSNT: CPT | Mod: CPTII,S$GLB,, | Performed by: PODIATRIST

## 2022-06-13 PROCEDURE — 1157F ADVNC CARE PLAN IN RCRD: CPT | Mod: CPTII,S$GLB,, | Performed by: PODIATRIST

## 2022-06-13 PROCEDURE — 3288F PR FALLS RISK ASSESSMENT DOCUMENTED: ICD-10-PCS | Mod: CPTII,S$GLB,, | Performed by: PODIATRIST

## 2022-06-13 PROCEDURE — 3288F FALL RISK ASSESSMENT DOCD: CPT | Mod: CPTII,S$GLB,, | Performed by: PODIATRIST

## 2022-06-13 PROCEDURE — 11721 DEBRIDE NAIL 6 OR MORE: CPT | Mod: Q9,S$GLB,, | Performed by: PODIATRIST

## 2022-06-13 PROCEDURE — 99999 PR PBB SHADOW E&M-EST. PATIENT-LVL IV: CPT | Mod: PBBFAC,,, | Performed by: PODIATRIST

## 2022-06-13 PROCEDURE — 99999 PR PBB SHADOW E&M-EST. PATIENT-LVL IV: ICD-10-PCS | Mod: PBBFAC,,, | Performed by: PODIATRIST

## 2022-06-13 PROCEDURE — 1159F MED LIST DOCD IN RCRD: CPT | Mod: CPTII,S$GLB,, | Performed by: PODIATRIST

## 2022-06-13 NOTE — TELEPHONE ENCOUNTER
----- Message from Chaim Pineda MA sent at 6/13/2022  1:20 PM CDT -----  Contact: [phone]    ----- Message -----  From: Kevin Braga  Sent: 6/13/2022   1:05 PM CDT  To: Ada Cardozo    Pt would like a call back about chronic kidney disease that she seen on her printout from today.

## 2022-06-13 NOTE — TELEPHONE ENCOUNTER
Staff contacted and had Dr. Caballero speak with the  patient regarding having chronic kidney disease taken off of her paper work.    Dr. Caballero removed chronic kidney disease taken off of the patient paper work.    Patient verbalized understanding.

## 2022-06-13 NOTE — PROGRESS NOTES
Chief Complaint: Foot Problem      HPI:   An Luz is a 89 y.o. female  With Thick painful toenails preventing sock and shoe wear and imparing ambulation.  Gradual onset, worsening over past several years, aggravated by increased weight bearing, shoe gear, pressure.  Periodic debridement helps symptoms. Denies trauma, surgery all toes.      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 glaucoma risk in both eyes    Idiopathic scoliosis of lumbar spine    Idiopathic scoliosis of thoracic spine    Benign essential hypertension    Urinary incontinence    Closed fracture of right shoulder    Impaired mobility and ADLs    History of hip fracture    Other chronic pain    Chronic constipation    Aortic atherosclerosis    Prediabetes    Esophageal dysphagia    Moderate protein-calorie malnutrition    Weight loss    Hypercalcemia    MGUS (monoclonal gammopathy of unknown significance)         Current Outpatient Medications on File Prior to Visit   Medication Sig Dispense Refill    acetaminophen (TYLENOL) 325 MG tablet Take 325 mg by mouth every 6 (six) hours as needed for Pain.      amLODIPine (NORVASC) 5 MG tablet Take 1 tablet (5 mg total) by mouth once daily. 90 tablet 3    cholecalciferol, vitamin D3, (VITAMIN D3) 25 mcg (1,000 unit) capsule Take 1,000 Units by mouth once daily.      FOLIC ACID/MULTIVIT-MIN/LUTEIN (CENTRUM SILVER ORAL) Take 1 tablet by mouth once daily.       labetaloL (NORMODYNE) 100 MG tablet Take 1 tablet (100 mg total) by mouth 2 (two) times daily. 180  tablet 3    lifitegrast (XIIDRA) 5 % Dpet Place 1 drop into both eyes 2 (two) times a day. 180 each 3    losartan (COZAAR) 100 MG tablet Take 1 tablet (100 mg total) by mouth once daily. 90 tablet 3    oxybutynin (DITROPAN-XL) 10 MG 24 hr tablet Take 1 tablet (10 mg total) by mouth once daily. 90 tablet 3    polyethylene glycol (GLYCOLAX) 17 gram PwPk Take one capful with a full glass of water daily as needed for constipation. 30 each 3    senna (SENOKOT) 8.6 mg tablet Take 1 tablet by mouth once daily.      wheat dextrin 5 gram/7.4 gram Powd Take by mouth.       No current facility-administered medications on file prior to visit.           Review of patient's allergies indicates:   Allergen Reactions    Zestril [lisinopril] Swelling     Facial Swelling    Augmentin [amoxicillin-pot clavulanate] Nausea And Vomiting    Sulfa (sulfonamide antibiotics)      Unknown reaction                   Objective:        Vitals:    06/13/22 1214   BP: (!) 161/69   Pulse: 64   Weight: 34 kg (75 lb)   Height: 5' (1.524 m)           Physical Exam  Constitutional:       General: She is not in acute distress.     Appearance: She is well-developed. She is not diaphoretic.   Cardiovascular:      Pulses:           Popliteal pulses are 2+ on the right side and 2+ on the left side.        Dorsalis pedis pulses are 2+ on the right side and 2+ on the left side.        Posterior tibial pulses are 1+ on the right side and 1+ on the left side.      Comments: Capillary refill 3 seconds all toes/distal feet, all toes/both feet warm to touch.      Negative lymphadenopathy bilateral popliteal fossa and tarsal tunnel.      Negavie lower extremity edema bilateral.    Musculoskeletal:      Right ankle: No swelling, deformity, ecchymosis or lacerations. Normal range of motion. Normal pulse.      Right Achilles Tendon: Normal. No defects. Garay's test negative.      Comments: Normal angle, base, station of gait. All ten toes without clubbing,  cyanosis, or signs of ischemia.  No pain to palpation bilateral lower extremities.  Range of motion, stability, muscle strength, and muscle tone normal bilateral feet and legs.     Lymphadenopathy:      Lower Body: No right inguinal adenopathy. No left inguinal adenopathy.      Comments: Negative lymphadenopathy bilateral popliteal fossa and tarsal tunnel.    Negative lymphangitic streaking bilateral feet/ankles/legs.   Skin:     General: Skin is warm and dry.      Capillary Refill: Capillary refill takes 2 to 3 seconds.      Coloration: Skin is not pale.      Findings: No abrasion, bruising, burn, ecchymosis, erythema, laceration, lesion or rash.      Nails: There is no clubbing.      Comments: Dry scale with superficial flakes over an erythematous base  without ulceration, drainage, pus, tracking, fluctuance, malodor, or cardinal signs infection.    Toenails 1st, 2nd, 3rd, 4th, 5th  bilateral are hypertrophic thickened 2-3 mm, dystrophic, discolored tanish brown with tan, gray crumbly subungual debris.  Severe pain and tenderness  to distal nail plate pressure, without periungual skin abnormality of each.    Skin is thin and atrophic.    No wounds noted.  No breakdown on the heels.        Neurological:      Mental Status: She is alert and oriented to person, place, and time.      Sensory: No sensory deficit.      Motor: No tremor, atrophy or abnormal muscle tone.      Gait: Gait normal.      Deep Tendon Reflexes:      Reflex Scores:       Patellar reflexes are 2+ on the right side and 2+ on the left side.       Achilles reflexes are 2+ on the right side and 2+ on the left side.     Comments: Negative tinel sign to percussion sural, superficial peroneal, deep peroneal, saphenous, and posterior tibial nerves right and left ankles and feet.     Psychiatric:         Behavior: Behavior is cooperative.               Assessment:       Encounter Diagnoses   Name Primary?    Stage 3a chronic kidney disease Yes     Bilateral foot pain     Impaired mobility and ADLs     Dermatophytosis of nail          Plan:         I counseled the patient on her conditions, their implications and medical management.    Shoe and activity modification as needed for relief.     Routine Foot Care    Date/Time: 6/13/2022 12:00 PM  Performed by: Bia Caballero DPM  Authorized by: Bia Caballero DPM     Consent Done?:  Yes (Verbal)    Nail Care Type:  Debride  Location(s): All  (Left 1st Toe, Left 3rd Toe, Left 2nd Toe, Left 4th Toe, Left 5th Toe, Right 1st Toe, Right 2nd Toe, Right 3rd Toe, Right 4th Toe and Right 5th Toe)  Patient tolerance:  Patient tolerated the procedure well with no immediate complications     With patient's permission, the toenails mentioned above were reduced and debrided using a nail nipper, removing offending nail and debris. The patient will continue to monitor the areas daily, inspect the feet, wear protective shoe gear when ambulatory, and moisturizer to maintain skin integrity.

## 2022-07-03 ENCOUNTER — HOSPITAL ENCOUNTER (EMERGENCY)
Facility: OTHER | Age: 87
Discharge: HOME | End: 2022-07-03
Attending: EMERGENCY MEDICINE
Payer: MEDICARE

## 2022-07-03 VITALS
DIASTOLIC BLOOD PRESSURE: 62 MMHG | BODY MASS INDEX: 15.71 KG/M2 | OXYGEN SATURATION: 91 % | SYSTOLIC BLOOD PRESSURE: 137 MMHG | RESPIRATION RATE: 17 BRPM | HEIGHT: 60 IN | HEART RATE: 67 BPM | TEMPERATURE: 98 F | WEIGHT: 80 LBS

## 2022-07-03 DIAGNOSIS — S02.2XXA CLOSED FRACTURE OF NASAL BONE, INITIAL ENCOUNTER: ICD-10-CM

## 2022-07-03 DIAGNOSIS — I10 HYPERTENSION, UNCONTROLLED: ICD-10-CM

## 2022-07-03 DIAGNOSIS — S51.011A LACERATION OF RIGHT ELBOW, INITIAL ENCOUNTER: Primary | ICD-10-CM

## 2022-07-03 DIAGNOSIS — S59.909A ELBOW INJURY, INITIAL ENCOUNTER: ICD-10-CM

## 2022-07-03 PROCEDURE — 25000003 PHARM REV CODE 250: Performed by: EMERGENCY MEDICINE

## 2022-07-03 PROCEDURE — 12002 RPR S/N/AX/GEN/TRNK2.6-7.5CM: CPT

## 2022-07-03 PROCEDURE — 99284 EMERGENCY DEPT VISIT MOD MDM: CPT | Mod: 25

## 2022-07-03 RX ORDER — LIDOCAINE HYDROCHLORIDE AND EPINEPHRINE 10; 10 MG/ML; UG/ML
10 INJECTION, SOLUTION INFILTRATION; PERINEURAL
Status: COMPLETED | OUTPATIENT
Start: 2022-07-03 | End: 2022-07-03

## 2022-07-03 RX ORDER — CEPHALEXIN 500 MG/1
500 CAPSULE ORAL EVERY 12 HOURS
Qty: 20 CAPSULE | Refills: 0 | Status: SHIPPED | OUTPATIENT
Start: 2022-07-03 | End: 2022-07-13

## 2022-07-03 RX ORDER — ACETAMINOPHEN 325 MG/1
650 TABLET ORAL
Status: COMPLETED | OUTPATIENT
Start: 2022-07-03 | End: 2022-07-03

## 2022-07-03 RX ORDER — LOSARTAN POTASSIUM 50 MG/1
100 TABLET ORAL
Status: COMPLETED | OUTPATIENT
Start: 2022-07-03 | End: 2022-07-03

## 2022-07-03 RX ORDER — LABETALOL 100 MG/1
100 TABLET, FILM COATED ORAL
Status: COMPLETED | OUTPATIENT
Start: 2022-07-03 | End: 2022-07-03

## 2022-07-03 RX ORDER — CEPHALEXIN 500 MG/1
500 CAPSULE ORAL
Status: COMPLETED | OUTPATIENT
Start: 2022-07-03 | End: 2022-07-03

## 2022-07-03 RX ORDER — CEPHALEXIN 500 MG/1
500 CAPSULE ORAL EVERY 12 HOURS
Qty: 20 CAPSULE | Refills: 0 | Status: SHIPPED | OUTPATIENT
Start: 2022-07-03 | End: 2022-07-03 | Stop reason: SDUPTHER

## 2022-07-03 RX ORDER — AMLODIPINE BESYLATE 5 MG/1
5 TABLET ORAL
Status: COMPLETED | OUTPATIENT
Start: 2022-07-03 | End: 2022-07-03

## 2022-07-03 RX ADMIN — LOSARTAN POTASSIUM 100 MG: 50 TABLET, FILM COATED ORAL at 12:07

## 2022-07-03 RX ADMIN — CEPHALEXIN 500 MG: 500 CAPSULE ORAL at 12:07

## 2022-07-03 RX ADMIN — BACITRACIN ZINC, NEOMYCIN, POLYMYXIN B 1 EACH: 400; 3.5; 5 OINTMENT TOPICAL at 12:07

## 2022-07-03 RX ADMIN — LABETALOL HYDROCHLORIDE 100 MG: 100 TABLET, FILM COATED ORAL at 12:07

## 2022-07-03 RX ADMIN — ACETAMINOPHEN 325MG 650 MG: 325 TABLET ORAL at 12:07

## 2022-07-03 RX ADMIN — LIDOCAINE HYDROCHLORIDE AND EPINEPHRINE 10 ML: 10; 10 INJECTION, SOLUTION INFILTRATION; PERINEURAL at 12:07

## 2022-07-03 RX ADMIN — AMLODIPINE BESYLATE 5 MG: 5 TABLET ORAL at 12:07

## 2022-07-03 NOTE — ED NOTES
Arrived via EMS S/P fall this am while ambulating to bathroom, denies LOC, no deformities noted to hips/legs, dried blood w/ + deformity to nose and right elbow dressing intact and soiled, dressing removed per Dr. Izaguirre, large laceration to right elbow, bleeding controlled,  elevated at present.  Daughter at bedside  LOC: The patient is awake, alert and aware of environment with an appropriate affect, the patient is oriented x 3 and speaking appropriately.  APPEARANCE: Patient resting comfortably and in no acute distress, patient is clean and well groomed, patient's clothing is properly fastened.  SKIN: The skin is warm and dry, patient has normal skin turgor and moist mucus membranes, skin intact  MUSKULOSKELETAL: Patient moving all extremities well, no obvious swelling or deformities noted.  RESPIRATORY: Airway is open and patent, respirations are spontaneous, patient has a normal effort and rate. Breath sounds are clear and equal bilaterally.  CARDIAC: Normal heart sounds. No peripheral edema.  ABDOMEN: Soft and non tender to palpation, no distention noted. Bowel sounds present.

## 2022-07-03 NOTE — ED PROVIDER NOTES
Encounter Date: 7/3/2022    SCRIBE #1 NOTE: I, Flavia Tavares, am scribing for, and in the presence of,  Yuly Izaguirre MD. I have scribed the following portions of the note - Other sections scribed: HPI, ROS, PE.       History     Chief Complaint   Patient presents with    Fall     Mechanical fall this AM, fell face forward, skin tear to R elbow and swelling/bruising to nose. Denies LOC.      Time seen by provider: 10:28 AM    This is a 89 y.o. 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 Patient reports that her daughter called her at 7:30 AM to get her up for [facility]. She states that she went to go turn on a light outside of her bedroom and when she went to go back to her bedroom she slipped. Patient reports that she fell onto her right elbow and did hit her face, unsure if LOC. She notes that she uses a cane to ambulate. Patient notes pain in her right elbow, which worsens with movement. She denies any neck pain, back pain, bilateral leg pain, pain in right shoulder, or bilateral hip pain. Patient states that she drinks occasionally, but denies smoking.    The history is provided by the patient.     Review of patient's allergies indicates:   Allergen Reactions    Zestril [lisinopril] Swelling     Facial Swelling    Augmentin [amoxicillin-pot clavulanate] Nausea And Vomiting    Sulfa (sulfonamide antibiotics)      Unknown reaction     Past Medical History:   Diagnosis Date    Anemia     Arthritis     Fever blister     Glaucoma     Hypercalcemia     Hyperlipidemia     Hypertension     Osteoporosis     Pneumonia     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    Pubic ramus fracture     Scoliosis      Past Surgical History:   Procedure Laterality Date    APPENDECTOMY      blepharospasm surgery      CATARACT EXTRACTION W/  INTRAOCULAR LENS IMPLANT Right 12/2007        CATARACT  EXTRACTION W/  INTRAOCULAR LENS IMPLANT Left 04/2010        EYE SURGERY      HYSTERECTOMY      OOPHORECTOMY      ORIF HIP FRACTURE Left 12/2013    TONSILLECTOMY       Family History   Problem Relation Age of Onset    Stroke Father 49    No Known Problems Mother     No Known Problems Daughter     Cancer Maternal Grandmother         Gastric    Heart disease Brother     No Known Problems Son     No Known Problems Daughter     No Known Problems Maternal Aunt     No Known Problems Maternal Uncle     No Known Problems Paternal Aunt     No Known Problems Paternal Uncle     No Known Problems Maternal Grandfather     No Known Problems Paternal Grandmother     No Known Problems Paternal Grandfather     No Known Problems Son     No Known Problems Daughter     Amblyopia Neg Hx     Blindness Neg Hx     Cataracts Neg Hx     Macular degeneration Neg Hx     Retinal detachment Neg Hx     Strabismus Neg Hx     Thyroid disease Neg Hx     Melanoma Neg Hx      Social History     Tobacco Use    Smoking status: Never Smoker    Smokeless tobacco: Never Used    Tobacco comment: The patient exercises every other day on an exercise bike.  On alternative days she does floor exercises with weights.   Substance Use Topics    Alcohol use: Yes     Alcohol/week: 2.0 - 4.0 standard drinks     Types: 1 Glasses of wine, 1 - 3 Standard drinks or equivalent per week     Comment: 1 glass of wine w/ dinner and w/ company    Drug use: No     Review of Systems   Constitutional: Negative for chills and fever.   HENT: Positive for facial swelling and nosebleeds. Negative for congestion and sore throat.    Eyes: Negative for visual disturbance.   Respiratory: Negative for cough and shortness of breath.    Cardiovascular: Negative for chest pain and palpitations.   Gastrointestinal: Negative for abdominal pain, diarrhea and vomiting.   Genitourinary: Negative for decreased urine volume, dysuria and vaginal  discharge.   Musculoskeletal: Positive for arthralgias (Right elbow). Negative for back pain, joint swelling, myalgias, neck pain and neck stiffness.   Skin: Positive for wound (Right elbow). Negative for rash.   Neurological: Negative for weakness, numbness and headaches.   Psychiatric/Behavioral: Negative for confusion.       Physical Exam     Initial Vitals   BP Pulse Resp Temp SpO2   07/03/22 0952 07/03/22 0952 07/03/22 0953 07/03/22 0953 07/03/22 0952   (!) 188/92 73 (!) 22 97.8 °F (36.6 °C) 95 %      MAP       --                Physical Exam    Nursing note and vitals reviewed.  Constitutional: She appears well-developed and well-nourished. No distress.   HENT:   Head: Normocephalic.   Nose: No nasal septal hematoma.   Mouth/Throat: Oropharynx is clear and moist. No oropharyngeal exudate.   Dried blood in nares.  Swelling and ecchymosis over nasal bridge.  No septal hematoma.   Eyes: Conjunctivae and EOM are normal. Pupils are equal, round, and reactive to light.   Neck: Neck supple.   Normal range of motion.  Cardiovascular: Normal rate and normal heart sounds.   No murmur heard.  Pulmonary/Chest: Breath sounds normal. No respiratory distress. She has no wheezes. She has no rhonchi. She has no rales.   Abdominal: Abdomen is soft. There is no abdominal tenderness. There is no rebound and no guarding.   Musculoskeletal:         General: No tenderness or edema.      Cervical back: Normal range of motion and neck supple.      Comments: Bony tenderness of right elbow with laceration present. All other joints were aggressively palpated and ranged without tenderness or decreased ROM except as otherwise mentioned.  Scoliosis and kyphosis.   There is no midline tenderness of the cervical spine.    There is no midline tenderness of the thoracic spine.    There is no midline tenderness of the lumbar spine.    There is no tenderness over the sacrum.       Neurological: She is alert and oriented to person, place, and  time. She has normal strength. GCS score is 15. GCS eye subscore is 4. GCS verbal subscore is 5. GCS motor subscore is 6.   Skin: Skin is warm and dry. Ecchymosis (with swelling to nasal bridge) noted. No rash noted.   6 cm linear full-thickness laceration to the right elbow.   Psychiatric: She has a normal mood and affect. Thought content normal.         ED Course   Lac Repair    Date/Time: 7/5/2022 3:05 PM  Performed by: Yuly Izaguirre MD  Authorized by: Yuly Izaguirre MD     Consent:     Consent obtained:  Verbal    Risks discussed:  Infection, pain, need for additional repair, poor cosmetic result, nerve damage and poor wound healing  Laceration details:     Location:  Shoulder/arm    Shoulder/arm location:  R elbow    Length (cm):  6  Pre-procedure details:     Preparation:  Patient was prepped and draped in usual sterile fashion and imaging obtained to evaluate for foreign bodies  Exploration:     Hemostasis achieved with:  Direct pressure    Imaging obtained: x-ray      Imaging outcome: foreign body not noted      Wound exploration: wound explored through full range of motion and entire depth of wound visualized      Wound extent: no foreign bodies/material noted, no nerve damage noted, no tendon damage noted and no underlying fracture noted    Treatment:     Area cleansed with:  Saline    Amount of cleaning:  Extensive    Irrigation solution:  Sterile saline    Irrigation method:  Pressure wash and syringe  Skin repair:     Repair method:  Sutures    Suture size:  5-0    Suture technique:  Simple interrupted    Number of sutures:  9  Approximation:     Approximation:  Close  Repair type:     Repair type:  Simple  Post-procedure details:     Dressing:  Antibiotic ointment, non-adherent dressing and bulky dressing    Procedure completion:  Tolerated well, no immediate complications      Labs Reviewed - No data to display       Imaging Results          X-Ray Elbow Complete Right (Final result)  Result time  07/03/22 11:00:35    Final result by Maria Del Carmen Ramirez MD (07/03/22 11:00:35)                 Impression:      Please see above.      Electronically signed by: Maria Del Carmen Ramirez  Date:    07/03/2022  Time:    11:00             Narrative:    EXAMINATION:  XR ELBOW COMPLETE 3 VIEW RIGHT    CLINICAL HISTORY:  . Unspecified injury of unspecified elbow, initial encounter    TECHNIQUE:  AP, lateral, and oblique views of the right elbow were performed.    COMPARISON:  None    FINDINGS:  No acute fracture or dislocation seen.  A few punctate high-density foci adjacent to the proximal ulna felt to represent enthesophytes.    No elbow joint effusion.    Soft tissue edema along the posterior aspect of the elbow with suspected soft tissue laceration.                                CT Cervical Spine Without Contrast (Final result)  Result time 07/03/22 10:26:48    Final result by Kota Martin MD (07/03/22 10:26:48)                 Impression:      No evidence of acute intracranial hemorrhage.    Mild chronic small vessel ischemic change and cerebral volume loss.    Minimally displaced bilateral nasal bone fractures with overlying soft tissue swelling.    Moderate endodontal disease.    Moderate cervical spondylosis as above, without evidence of an acute cervical spine fracture.    This report was flagged in Epic as abnormal.      Electronically signed by: Kota Martin MD  Date:    07/03/2022  Time:    10:26             Narrative:    EXAMINATION:  CT HEAD WITHOUT CONTRAST; CT MAXILLOFACIAL WITHOUT CONTRAST; CT CERVICAL SPINE WITHOUT CONTRAST    CLINICAL HISTORY:  Head trauma, minor (Age >= 65y);; Facial trauma, blunt;; Neck trauma (Age >= 65y);    TECHNIQUE:  Low dose axial CT images obtained throughout the head, facial bones and cervical spine without intravenous contrast.  Axial, sagittal and coronal reconstructions were performed.    COMPARISON:  None.    FINDINGS:  Head:    Prominence of the ventricles and sulci  compatible with  cerebral volume loss. Configuration not suggestive of hydrocephalus.    No extra-axial blood or fluid collections.    Mild patchy hypoattenuation in the supratentorial white matter, nonspecific but most likely reflecting chronic small vessel ischemic changes. No recent or remote major vascular distribution infarct. No acute hemorrhage.  No mass effect or midline shift.    No calvarial fracture.    Facial bones:    Soft tissue swelling over the nose and in periorbital region.  Mild discontinuity subtle depression of nasal bones bilaterally suggesting a fracture at this site.  The remainder of the facial bones appear intact.  No additional displaced fracture elsewhere.  No orbital fracture.    Postsurgical change both globes.  Globes otherwise within normal limits.    Mild thickening of nasal mucosa.  Slight leftward nasal septal deviation without definite fracture.    Endodontal disease with multiple dental caries and missing teeth.    Degenerative change of the TMJs.  No dislocation.    Chronic appearing opacification of the left petrous apex.  Otherwise mastoid air cells are clear. Mild patchy mucoperiosteal thickening in the paranasal sinuses.    Spine:    No evidence of an acute displaced fracture.  Incomplete fusion posterior arch of C1.  No focal osseous destructive lesions.    Grade 2 anterolisthesis C3-4 subtle anterolisthesis at C4-5 likely on degenerative basis noting advanced facet arthropathy at these levels.    Multilevel loss of disc height degenerative endplate changes, worst at C5-6 and C6-7.  No bony spinal canal stenosis.  Multilevel neural foraminal narrowing from uncovertebral and facet hypertrophy.    Limited evaluation of the intraspinal contents demonstrates no evidence of hematoma.    The paraspinal soft tissue structures exhibit no acute abnormalities.  Scarring in the bilateral lung apices.  Scattered atherosclerotic calcification.                                CT  Maxillofacial Without Contrast (Final result)  Result time 07/03/22 10:26:48    Final result by Kota Martin MD (07/03/22 10:26:48)                 Impression:      No evidence of acute intracranial hemorrhage.    Mild chronic small vessel ischemic change and cerebral volume loss.    Minimally displaced bilateral nasal bone fractures with overlying soft tissue swelling.    Moderate endodontal disease.    Moderate cervical spondylosis as above, without evidence of an acute cervical spine fracture.    This report was flagged in Epic as abnormal.      Electronically signed by: Kota Martin MD  Date:    07/03/2022  Time:    10:26             Narrative:    EXAMINATION:  CT HEAD WITHOUT CONTRAST; CT MAXILLOFACIAL WITHOUT CONTRAST; CT CERVICAL SPINE WITHOUT CONTRAST    CLINICAL HISTORY:  Head trauma, minor (Age >= 65y);; Facial trauma, blunt;; Neck trauma (Age >= 65y);    TECHNIQUE:  Low dose axial CT images obtained throughout the head, facial bones and cervical spine without intravenous contrast.  Axial, sagittal and coronal reconstructions were performed.    COMPARISON:  None.    FINDINGS:  Head:    Prominence of the ventricles and sulci compatible with  cerebral volume loss. Configuration not suggestive of hydrocephalus.    No extra-axial blood or fluid collections.    Mild patchy hypoattenuation in the supratentorial white matter, nonspecific but most likely reflecting chronic small vessel ischemic changes. No recent or remote major vascular distribution infarct. No acute hemorrhage.  No mass effect or midline shift.    No calvarial fracture.    Facial bones:    Soft tissue swelling over the nose and in periorbital region.  Mild discontinuity subtle depression of nasal bones bilaterally suggesting a fracture at this site.  The remainder of the facial bones appear intact.  No additional displaced fracture elsewhere.  No orbital fracture.    Postsurgical change both globes.  Globes otherwise within normal  limits.    Mild thickening of nasal mucosa.  Slight leftward nasal septal deviation without definite fracture.    Endodontal disease with multiple dental caries and missing teeth.    Degenerative change of the TMJs.  No dislocation.    Chronic appearing opacification of the left petrous apex.  Otherwise mastoid air cells are clear. Mild patchy mucoperiosteal thickening in the paranasal sinuses.    Spine:    No evidence of an acute displaced fracture.  Incomplete fusion posterior arch of C1.  No focal osseous destructive lesions.    Grade 2 anterolisthesis C3-4 subtle anterolisthesis at C4-5 likely on degenerative basis noting advanced facet arthropathy at these levels.    Multilevel loss of disc height degenerative endplate changes, worst at C5-6 and C6-7.  No bony spinal canal stenosis.  Multilevel neural foraminal narrowing from uncovertebral and facet hypertrophy.    Limited evaluation of the intraspinal contents demonstrates no evidence of hematoma.    The paraspinal soft tissue structures exhibit no acute abnormalities.  Scarring in the bilateral lung apices.  Scattered atherosclerotic calcification.                                CT Head Without Contrast (Final result)  Result time 07/03/22 10:26:48    Final result by Kota Martin MD (07/03/22 10:26:48)                 Impression:      No evidence of acute intracranial hemorrhage.    Mild chronic small vessel ischemic change and cerebral volume loss.    Minimally displaced bilateral nasal bone fractures with overlying soft tissue swelling.    Moderate endodontal disease.    Moderate cervical spondylosis as above, without evidence of an acute cervical spine fracture.    This report was flagged in Epic as abnormal.      Electronically signed by: Kota Martin MD  Date:    07/03/2022  Time:    10:26             Narrative:    EXAMINATION:  CT HEAD WITHOUT CONTRAST; CT MAXILLOFACIAL WITHOUT CONTRAST; CT CERVICAL SPINE WITHOUT CONTRAST    CLINICAL  HISTORY:  Head trauma, minor (Age >= 65y);; Facial trauma, blunt;; Neck trauma (Age >= 65y);    TECHNIQUE:  Low dose axial CT images obtained throughout the head, facial bones and cervical spine without intravenous contrast.  Axial, sagittal and coronal reconstructions were performed.    COMPARISON:  None.    FINDINGS:  Head:    Prominence of the ventricles and sulci compatible with  cerebral volume loss. Configuration not suggestive of hydrocephalus.    No extra-axial blood or fluid collections.    Mild patchy hypoattenuation in the supratentorial white matter, nonspecific but most likely reflecting chronic small vessel ischemic changes. No recent or remote major vascular distribution infarct. No acute hemorrhage.  No mass effect or midline shift.    No calvarial fracture.    Facial bones:    Soft tissue swelling over the nose and in periorbital region.  Mild discontinuity subtle depression of nasal bones bilaterally suggesting a fracture at this site.  The remainder of the facial bones appear intact.  No additional displaced fracture elsewhere.  No orbital fracture.    Postsurgical change both globes.  Globes otherwise within normal limits.    Mild thickening of nasal mucosa.  Slight leftward nasal septal deviation without definite fracture.    Endodontal disease with multiple dental caries and missing teeth.    Degenerative change of the TMJs.  No dislocation.    Chronic appearing opacification of the left petrous apex.  Otherwise mastoid air cells are clear. Mild patchy mucoperiosteal thickening in the paranasal sinuses.    Spine:    No evidence of an acute displaced fracture.  Incomplete fusion posterior arch of C1.  No focal osseous destructive lesions.    Grade 2 anterolisthesis C3-4 subtle anterolisthesis at C4-5 likely on degenerative basis noting advanced facet arthropathy at these levels.    Multilevel loss of disc height degenerative endplate changes, worst at C5-6 and C6-7.  No bony spinal canal  stenosis.  Multilevel neural foraminal narrowing from uncovertebral and facet hypertrophy.    Limited evaluation of the intraspinal contents demonstrates no evidence of hematoma.    The paraspinal soft tissue structures exhibit no acute abnormalities.  Scarring in the bilateral lung apices.  Scattered atherosclerotic calcification.                              X-Rays:   Independently Interpreted Readings:   Other Readings:  Right elbow:  No fracture, no dislocation, no foreign body.  Will defer to Radiology reading.    Medications   losartan tablet 100 mg (100 mg Oral Given 7/3/22 1246)   amLODIPine tablet 5 mg (5 mg Oral Given 7/3/22 1247)   labetaloL tablet 100 mg (100 mg Oral Given 7/3/22 1247)   acetaminophen tablet 650 mg (650 mg Oral Given 7/3/22 1247)   neomycin-bacitracnZn-polymyxnB packet (1 each Topical (Top) Given by Other 7/3/22 1248)   LIDOcaine-EPINEPHrine 1%-1:100,000 injection 10 mL (10 mLs Intradermal Given by Provider 7/3/22 1249)   cephALEXin capsule 500 mg (500 mg Oral Given 7/3/22 1247)     Medical Decision Making:   History:   Old Medical Records: I decided to obtain old medical records.  Independently Interpreted Test(s):   I have ordered and independently interpreted X-rays - see prior notes.  Clinical Tests:   Radiological Study: Ordered and Reviewed  ED Management:  Emergent evaluation a 89-year-old female who presents after a mechanical fall.  Vital signs are benign, afebrile.  On exam she has evidence of facial trauma.  CT head face and cervical spine obtained, nasal bone fractures but no other fractures or dislocations.  She has a laceration to the right elbow with associated bony tenderness.  X-ray shows no acute process.  Laceration was cleaned and repaired in standard fashion.  There was visible bone, and given full-thickness, will treat with prophylactic antibiotics.  Although patient has allergy to Augmentin this is listed as nausea only.  She tolerated cephalexin in the ED and is  prescribed cephalexin.  She is also treated with home blood pressure medications and Tylenol for pain.  Ultimately she is discharged home into the care of her daughter in good condition and encouraged close follow-up with her doctor and given strict return precautions.  Suture should remain in place 10-14 days given that they are overlying large joint.  Bulky dressing was placed to limit movement for the 1st few days to aid in healing.  I reviewed medical record and she is up-to-date on her tetanus.          Scribe Attestation:   Scribe #1: I performed the above scribed service and the documentation accurately describes the services I performed. I attest to the accuracy of the note.               Physician Attestation for Scribe: I, Yuly Izaguirre, reviewed documentation as scribed in my presence, which is both accurate and complete.    Clinical Impression:   Final diagnoses:  [S59.909A] Elbow injury, initial encounter  [I10] Hypertension, uncontrolled  [S51.011A] Laceration of right elbow, initial encounter (Primary)  [S02.2XXA] Closed fracture of nasal bone, initial encounter          ED Disposition Condition    Discharge Stable        ED Prescriptions     Medication Sig Dispense Start Date End Date Auth. Provider    cephALEXin (KEFLEX) 500 MG capsule  (Status: Discontinued) Take 1 capsule (500 mg total) by mouth every 12 (twelve) hours. for 10 days 20 capsule 7/3/2022 7/3/2022 Yuly Izaguirre MD    cephALEXin (KEFLEX) 500 MG capsule Take 1 capsule (500 mg total) by mouth every 12 (twelve) hours. for 10 days 20 capsule 7/3/2022 7/13/2022 Yuly Izaguirre MD        Follow-up Information     Follow up With Specialties Details Why Contact Info    Jalyn Caballero MD Internal Medicine Schedule an appointment as soon as possible for a visit  For suture removal 10-14 days [address]  [place] [address]  [phone]      [facility] Emergency Medicine  As needed, If symptoms worsen or any concern of  infection such as redness, increasing pain, or drainage from laceration site. [address] [facility] [id number] [phone]           Yuly Izaguirre MD  07/05/22 [id number]

## 2022-07-07 ENCOUNTER — INFUSION (OUTPATIENT)
Dept: INFECTIOUS DISEASES | Facility: HOSPITAL | Age: 87
End: 2022-07-07
Attending: INTERNAL MEDICINE
Payer: MEDICARE

## 2022-07-07 VITALS
DIASTOLIC BLOOD PRESSURE: 71 MMHG | SYSTOLIC BLOOD PRESSURE: 139 MMHG | RESPIRATION RATE: 20 BRPM | BODY MASS INDEX: 13.67 KG/M2 | OXYGEN SATURATION: 95 % | HEART RATE: 73 BPM | WEIGHT: 70 LBS | TEMPERATURE: 98 F

## 2022-07-07 DIAGNOSIS — M81.0 AGE RELATED OSTEOPOROSIS, UNSPECIFIED PATHOLOGICAL FRACTURE PRESENCE: Primary | ICD-10-CM

## 2022-07-07 PROCEDURE — 63600175 PHARM REV CODE 636 W HCPCS: Mod: JG | Performed by: INTERNAL MEDICINE

## 2022-07-07 PROCEDURE — 96372 THER/PROPH/DIAG INJ SC/IM: CPT

## 2022-07-07 RX ADMIN — DENOSUMAB 60 MG: 60 INJECTION SUBCUTANEOUS at 12:07

## 2022-07-07 NOTE — PLAN OF CARE
Instructed patient and family to ask for help when patient is moving around to prevent fall risks, and to continue reporting pain and evaluating pain management stretegies to see if they are helping. Voiced understanding.

## 2022-07-07 NOTE — PROGRESS NOTES
"  Pt received prolia 60mg sq injection to left arm.  Pt tolerated well.  Spoke with Dr. Chasidy Doherty : "Dr. Monteiro, Ms. Luz is here for her Prolia injection and she fell Sunday and broke her nose. Do we still give the Prolia today?"  Response per Dr. Chasidy Monteiro "yes, that is fine."  Presently taking vitamin D and calcium. Left with no apparent distress per daughter in wheelchair.    "

## 2022-07-07 NOTE — PLAN OF CARE
Instructed patient and family about importance of avoiding fall risks by asking for help and family keeping clutter out of the way and helping when needed to avoid falling.  Also, talked to daughter about pain management and patient's pain experiences. Voiced understanding to all teaching.

## 2022-07-19 ENCOUNTER — CLINICAL SUPPORT (OUTPATIENT)
Dept: URGENT CARE | Facility: CLINIC | Age: 87
End: 2022-07-19
Payer: MEDICARE

## 2022-07-19 VITALS
DIASTOLIC BLOOD PRESSURE: 61 MMHG | SYSTOLIC BLOOD PRESSURE: 118 MMHG | WEIGHT: 69 LBS | TEMPERATURE: 98 F | BODY MASS INDEX: 13.48 KG/M2 | OXYGEN SATURATION: 96 % | RESPIRATION RATE: 20 BRPM | HEART RATE: 61 BPM

## 2022-07-19 DIAGNOSIS — Z48.02 VISIT FOR SUTURE REMOVAL: Primary | ICD-10-CM

## 2022-07-19 PROCEDURE — 99499 NO LOS: ICD-10-PCS | Mod: S$GLB,,,

## 2022-07-19 PROCEDURE — 99024 POSTOP FOLLOW-UP VISIT: CPT | Mod: S$GLB,,,

## 2022-07-19 PROCEDURE — 99499 UNLISTED E&M SERVICE: CPT | Mod: S$GLB,,,

## 2022-07-19 PROCEDURE — 99024 SUTURE REMOVAL: ICD-10-PCS | Mod: S$GLB,,,

## 2022-07-19 NOTE — PATIENT INSTRUCTIONS
6 sutures removed. COME BACK IN 5 DAYS TO HAVE THE LAST 3 SUTURES REMOVED.     WOUND CARE:  - Clean wound 2 x per day with warm water and a mild soap (dial, dove, cetaphil). After cleaning wound apply antibiotic ointment to area. If wound becomes dirty clean it immediately.   - Keep wound covered when going out in public or going into a dirty space. Keep wound open when at home.   - If you start to noticed increased redness or swelling around the wound, you should return to urgent care, go to the emergency room, or promptly follow up with primary care.     - Rest.    - Drink plenty of fluids.    - Acetaminophen (tylenol) or Ibuprofen (advil,motrin) as directed as needed for fever/pain. Avoid tylenol if you have a history of liver disease. Do not take ibuprofen if you have a history of GI bleeding, kidney disease, or if you take blood thinners.   - Ibuprofen dosing for adults: 400 mg by mouth every 4-6 hours as needed. Max: 2400 mg/day; Info: use lowest effective dose, shortest effective treatment duration; give w/ food if GI upset occurs.  - Ibuprofen dosing for children: [6 mo-12 yo] Dose: 5-10 mg/kg/dose by mouth every 6-8h as needed; Max: 40 mg/kg/day; Info: use lower dose for fever <102.5 F, higher dose for fever >102.5 F; use shortest effective tx duration; give w/ food if GI upset occurs. [13 yo and older] Dose: 200-400 mg by mouth every 4-6 hours as needed; Max: 1200 mg/day; Info: use lowest effective dose, shortest effective tx duration; give w/ food if GI upset occurs.  - Tylenol dosing for adults: [By mouth route, immediate-release form] Dose: 325-1000 mg by mouth every 4-6h as needed; Max: 1 g/4h and 4 g/day from all sources. [By mouth route, extended-release form] Dose: 650-1300 mg Extended Release by mouth every 8h as needed; Max: 4 g/day from all sources.   - Tylenol dosing for children: 6-12 yo [ oral tablet/capsule ] Dose: 1 tab/cap by mouth every 4-6h as needed; Max: 5 tabs or caps/24h; Info: do not  exceed 75 mg/kg/day, up to 1 g/4h and 4 g/day, from all sources. 13 yo and older [ oral tablet/capsule ] Dose: 1-2 tabs/caps by mouth every 4-6h as needed; Max: 10 tabs or caps/24h; Info: do not exceed 1 g/4h and 4 g/day from all sources.    - You must understand that you have received an Urgent Care treatment only and that you may be released before all of your medical problems are known or treated.   - You, the patient, will arrange for follow up care as instructed.   - If your condition worsens or fails to improve we recommend that you receive another evaluation at the ER immediately or contact your PCP to discuss your concerns or return here.   - Follow up with your PCP or specialty clinic as directed in the next 1-2 weeks if not improved or as needed.  You can call [phone] to schedule an appointment with the appropriate provider.    If your symptoms do not improve or worsen, go to the emergency room immediately.

## 2022-07-19 NOTE — PROCEDURES
Suture Removal    Date/Time: 7/19/2022 2:15 PM  Location procedure was performed: [facility] EMERGENCY DEPARTMENT  Performed by: Magaly Azul PA-C  Authorized by: Magaly Azul PA-C   Body area: upper extremity  Location details: right elbow  Description of findings: 9 sutures in place. Well healing wound. No discharge or erythema surrounding wound. No signs of infection.    Wound Appearance: nontender, no drainage, nonpurulent and clean  Sutures Removed: 6  Post-removal: dressing applied  Complications: Yes  Specimens: No  Implants: No  Patient tolerance: Patient tolerated the procedure well with no immediate complications  Comments: As I was removing sutures, the wound was starting to open up again. I left in 3 sutures to help reinforce healing and told patient to come back in 5 days for the removal of the last 3 sutures. Patient and her daughter expressed understanding and agreed with plan.

## 2022-07-19 NOTE — PROGRESS NOTES
Subjective:       Patient ID: An Luz is a 89 y.o. female.    Vitals:  weight is 31.3 kg (69 lb). Her temperature is 98 °F (36.7 °C). Her blood pressure is 118/61 and her pulse is 61. Her respiration is 20 and oxygen saturation is 96%.     Chief Complaint: Suture / Staple Removal    Patient presents for suture removal over the right elbow. She had them placed at [facility] ED on 7/3. 9 sutures in total placed. Patient denies discharge coming from the wound. She does state that the wound has been itchy.     Suture / Staple Removal  The sutures were placed 7 to 10 days ago. She tried regular soap and water washings since the wound repair. The treatment provided no relief. Her temperature was unmeasured prior to arrival. There has been no drainage from the wound. There is no redness present. There is no swelling present. There is no pain present. She has no difficulty moving the affected extremity or digit.       Constitution: Negative for chills, sweating, fatigue and fever.   HENT: Negative for ear pain, congestion and sinus pressure.    Neck: Negative for neck pain and neck stiffness.   Cardiovascular: Negative for chest trauma.   Eyes: Negative for eye pain and eye redness.   Gastrointestinal: Negative for nausea, vomiting, constipation and diarrhea.   Skin: Positive for wound and laceration. Negative for hives.   Allergic/Immunologic: Negative for hives, itching and sneezing.   Neurological: Negative for altered mental status.   Psychiatric/Behavioral: Negative for altered mental status.       Objective:      Physical Exam   Constitutional: She is oriented to person, place, and time. She appears well-developed. She is cooperative.  Non-toxic appearance. She does not appear ill. No distress.   HENT:   Head: Normocephalic and atraumatic.   Ears:   Right Ear: Hearing, tympanic membrane, external ear and ear canal normal.   Left Ear: Hearing, tympanic membrane, external ear and ear canal normal.    Nose: Nose normal. No mucosal edema, rhinorrhea or nasal deformity. No epistaxis. Right sinus exhibits no maxillary sinus tenderness and no frontal sinus tenderness. Left sinus exhibits no maxillary sinus tenderness and no frontal sinus tenderness.   Mouth/Throat: Uvula is midline, oropharynx is clear and moist and mucous membranes are normal. No trismus in the jaw. Normal dentition. No uvula swelling. No posterior oropharyngeal erythema.   Eyes: Conjunctivae and lids are normal. Right eye exhibits no discharge. Left eye exhibits no discharge. No scleral icterus.   Neck: Trachea normal and phonation normal. Neck supple.   Cardiovascular: Normal rate, regular rhythm, normal heart sounds and normal pulses.   Pulmonary/Chest: Effort normal and breath sounds normal. No respiratory distress.   Abdominal: Normal appearance and bowel sounds are normal. She exhibits no distension and no mass. Soft. There is no abdominal tenderness.   Musculoskeletal: Normal range of motion.         General: No deformity. Normal range of motion.   Neurological: She is alert and oriented to person, place, and time. She exhibits normal muscle tone. Coordination normal.   Skin: Skin is warm, dry, intact, not diaphoretic and not pale.         Comments: 9 sutures placed to the right elbow. Upon removal of sutures, laceration still not completely healed and coming apart. I kept 3 sutures in to help reinforce healing.    Psychiatric: Her speech is normal and behavior is normal. Judgment and thought content normal.   Nursing note and vitals reviewed.        Assessment:       1. Visit for suture removal          Plan:         Visit for suture removal  -     Suture Removal                  Patient Instructions   6 sutures removed. COME BACK IN 5 DAYS TO HAVE THE LAST 3 SUTURES REMOVED.     WOUND CARE:  - Clean wound 2 x per day with warm water and a mild soap (dial, dove, cetaphil). After cleaning wound apply antibiotic ointment to area. If wound  becomes dirty clean it immediately.   - Keep wound covered when going out in public or going into a dirty space. Keep wound open when at home.   - If you start to noticed increased redness or swelling around the wound, you should return to urgent care, go to the emergency room, or promptly follow up with primary care.     - Rest.    - Drink plenty of fluids.    - Acetaminophen (tylenol) or Ibuprofen (advil,motrin) as directed as needed for fever/pain. Avoid tylenol if you have a history of liver disease. Do not take ibuprofen if you have a history of GI bleeding, kidney disease, or if you take blood thinners.   - Ibuprofen dosing for adults: 400 mg by mouth every 4-6 hours as needed. Max: 2400 mg/day; Info: use lowest effective dose, shortest effective treatment duration; give w/ food if GI upset occurs.  - Ibuprofen dosing for children: [6 mo-12 yo] Dose: 5-10 mg/kg/dose by mouth every 6-8h as needed; Max: 40 mg/kg/day; Info: use lower dose for fever <102.5 F, higher dose for fever >102.5 F; use shortest effective tx duration; give w/ food if GI upset occurs. [11 yo and older] Dose: 200-400 mg by mouth every 4-6 hours as needed; Max: 1200 mg/day; Info: use lowest effective dose, shortest effective tx duration; give w/ food if GI upset occurs.  - Tylenol dosing for adults: [By mouth route, immediate-release form] Dose: 325-1000 mg by mouth every 4-6h as needed; Max: 1 g/4h and 4 g/day from all sources. [By mouth route, extended-release form] Dose: 650-1300 mg Extended Release by mouth every 8h as needed; Max: 4 g/day from all sources.   - Tylenol dosing for children: 6-12 yo [ oral tablet/capsule ] Dose: 1 tab/cap by mouth every 4-6h as needed; Max: 5 tabs or caps/24h; Info: do not exceed 75 mg/kg/day, up to 1 g/4h and 4 g/day, from all sources. 11 yo and older [ oral tablet/capsule ] Dose: 1-2 tabs/caps by mouth every 4-6h as needed; Max: 10 tabs or caps/24h; Info: do not exceed 1 g/4h and 4 g/day from all  sources.    - You must understand that you have received an Urgent Care treatment only and that you may be released before all of your medical problems are known or treated.   - You, the patient, will arrange for follow up care as instructed.   - If your condition worsens or fails to improve we recommend that you receive another evaluation at the ER immediately or contact your PCP to discuss your concerns or return here.   - Follow up with your PCP or specialty clinic as directed in the next 1-2 weeks if not improved or as needed.  You can call [phone] to schedule an appointment with the appropriate provider.    If your symptoms do not improve or worsen, go to the emergency room immediately.

## 2022-07-26 ENCOUNTER — CLINICAL SUPPORT (OUTPATIENT)
Dept: URGENT CARE | Facility: CLINIC | Age: 87
End: 2022-07-26
Payer: MEDICARE

## 2022-07-26 VITALS
WEIGHT: 69 LBS | TEMPERATURE: 97 F | HEART RATE: 72 BPM | DIASTOLIC BLOOD PRESSURE: 62 MMHG | SYSTOLIC BLOOD PRESSURE: 130 MMHG | RESPIRATION RATE: 14 BRPM | BODY MASS INDEX: 13.48 KG/M2

## 2022-07-26 DIAGNOSIS — Z48.02 VISIT FOR SUTURE REMOVAL: Primary | ICD-10-CM

## 2022-07-26 PROCEDURE — 99024 POSTOP FOLLOW-UP VISIT: CPT | Mod: S$GLB,,, | Performed by: NURSE PRACTITIONER

## 2022-07-26 PROCEDURE — 99024 SUTURE REMOVAL: ICD-10-PCS | Mod: S$GLB,,, | Performed by: NURSE PRACTITIONER

## 2022-07-26 PROCEDURE — 99212 OFFICE O/P EST SF 10 MIN: CPT | Mod: S$GLB,,, | Performed by: NURSE PRACTITIONER

## 2022-07-26 PROCEDURE — 99212 PR OFFICE/OUTPT VISIT, EST, LEVL II, 10-19 MIN: ICD-10-PCS | Mod: S$GLB,,, | Performed by: NURSE PRACTITIONER

## 2022-07-26 PROCEDURE — 99499 UNLISTED E&M SERVICE: CPT | Mod: S$GLB,,, | Performed by: NURSE PRACTITIONER

## 2022-07-26 PROCEDURE — 99499 NO LOS: ICD-10-PCS | Mod: S$GLB,,, | Performed by: NURSE PRACTITIONER

## 2022-07-26 NOTE — PROCEDURES
Suture Removal    Date/Time: 7/26/2022 3:45 PM  Location procedure was performed: [facility]  Performed by: Brenda Tena NP  Authorized by: Brenda Tena NP   Body area: upper extremity  Location details: right elbow  Description of findings: well healed   Wound Appearance: clean, well healed, no drainage, normal color and nontender  Sutures Removed: 3  Facility: sutures placed in this facility  Complications: No  Specimens: No  Implants: No  Patient tolerance: Patient tolerated the procedure well with no immediate complications

## 2022-07-26 NOTE — PROGRESS NOTES
Subjective:       Patient ID: An Luz is a 89 y.o. female.    Vitals:  weight is 31.3 kg (69 lb). Her temperature is 97 °F (36.1 °C). Her blood pressure is 130/62 and her pulse is 72. Her respiration is 14.     Chief Complaint: Suture / Staple Removal (RT ELBOW/)    C/o right elbow sutures.  Sutures placed in ER 23 days ago.  She recently was here and was told to wait five days and then return for to have the final three removed.  Doing well, healing.  Here with daughter.  No complaints.    Suture / Staple Removal  The sutures were placed more than 14 days ago. She tried regular soap and water washings and antibiotic ointment use since the wound repair. The treatment provided moderate relief. Her temperature was unmeasured prior to arrival. There has been no drainage from the wound. The redness has improved. The swelling has improved. There is no pain present.       Skin: Negative for erythema.       Objective:      Physical Exam   Constitutional: She is oriented to person, place, and time. She appears well-developed.   HENT:   Head: Normocephalic and atraumatic. Head is without abrasion, without contusion and without laceration.   Ears:   Right Ear: External ear normal.   Left Ear: External ear normal.   Nose: Nose normal.   Mouth/Throat: Oropharynx is clear and moist and mucous membranes are normal.   Eyes: Conjunctivae, EOM and lids are normal. Pupils are equal, round, and reactive to light.   Neck: Trachea normal and phonation normal. Neck supple.   Cardiovascular: Normal rate.   Pulmonary/Chest: Effort normal.   Musculoskeletal: Normal range of motion.         General: Normal range of motion.   Neurological: She is alert and oriented to person, place, and time.   Skin: Skin is warm, dry, intact and no rash. Capillary refill takes less than 2 seconds. No abrasion, No burn, No bruising, No erythema and No ecchymosis   Psychiatric: Her speech is normal and behavior is normal. Judgment and thought  content normal.   Nursing note and vitals reviewed.        Assessment:       1. Visit for suture removal          Plan:         Visit for suture removal  -     Suture Removal

## 2022-08-12 ENCOUNTER — TELEPHONE (OUTPATIENT)
Dept: INTERNAL MEDICINE | Facility: CLINIC | Age: 87
End: 2022-08-12
Payer: MEDICARE

## 2022-08-12 NOTE — TELEPHONE ENCOUNTER
----- Message from Lucie Radford sent at 8/12/2022 10:24 AM CDT -----  Contact: [phone]  Pt is calling she states she is anxious to give the dr some info that she needs to give you please give return call she states it is very important she states she will be back at 1  call then they have an appt for other people

## 2022-08-24 ENCOUNTER — OFFICE VISIT (OUTPATIENT)
Dept: INTERNAL MEDICINE | Facility: CLINIC | Age: 87
End: 2022-08-24
Payer: MEDICARE

## 2022-08-24 ENCOUNTER — LAB VISIT (OUTPATIENT)
Dept: LAB | Facility: HOSPITAL | Age: 87
End: 2022-08-24
Attending: INTERNAL MEDICINE
Payer: MEDICARE

## 2022-08-24 VITALS
DIASTOLIC BLOOD PRESSURE: 62 MMHG | HEART RATE: 64 BPM | SYSTOLIC BLOOD PRESSURE: 102 MMHG | BODY MASS INDEX: 13.85 KG/M2 | OXYGEN SATURATION: 99 % | WEIGHT: 70.56 LBS | HEIGHT: 60 IN | RESPIRATION RATE: 18 BRPM

## 2022-08-24 VITALS
SYSTOLIC BLOOD PRESSURE: 102 MMHG | OXYGEN SATURATION: 97 % | DIASTOLIC BLOOD PRESSURE: 62 MMHG | WEIGHT: 69.88 LBS | HEIGHT: 60 IN | HEART RATE: 81 BPM | BODY MASS INDEX: 13.72 KG/M2

## 2022-08-24 DIAGNOSIS — R63.4 WEIGHT LOSS: ICD-10-CM

## 2022-08-24 DIAGNOSIS — H40.013 OPEN ANGLE WITH BORDERLINE FINDINGS AND LOW GLAUCOMA RISK IN BOTH EYES: ICD-10-CM

## 2022-08-24 DIAGNOSIS — E21.3 HYPERPARATHYROIDISM: ICD-10-CM

## 2022-08-24 DIAGNOSIS — D47.2 MGUS (MONOCLONAL GAMMOPATHY OF UNKNOWN SIGNIFICANCE): ICD-10-CM

## 2022-08-24 DIAGNOSIS — I10 BENIGN ESSENTIAL HYPERTENSION: ICD-10-CM

## 2022-08-24 DIAGNOSIS — E44.0 MODERATE PROTEIN-CALORIE MALNUTRITION: ICD-10-CM

## 2022-08-24 DIAGNOSIS — D63.8 ANEMIA OF CHRONIC DISEASE: ICD-10-CM

## 2022-08-24 DIAGNOSIS — R73.03 PREDIABETES: ICD-10-CM

## 2022-08-24 DIAGNOSIS — M81.0 AGE-RELATED OSTEOPOROSIS WITHOUT CURRENT PATHOLOGICAL FRACTURE: ICD-10-CM

## 2022-08-24 DIAGNOSIS — R26.9 ABNORMALITY OF GAIT AND MOBILITY: ICD-10-CM

## 2022-08-24 DIAGNOSIS — S02.2XXD CLOSED FRACTURE OF NASAL BONE WITH ROUTINE HEALING, SUBSEQUENT ENCOUNTER: ICD-10-CM

## 2022-08-24 DIAGNOSIS — Z99.89 DEPENDENCE ON OTHER ENABLING MACHINES AND DEVICES: ICD-10-CM

## 2022-08-24 DIAGNOSIS — R13.19 ESOPHAGEAL DYSPHAGIA: ICD-10-CM

## 2022-08-24 DIAGNOSIS — M41.06 INFANTILE IDIOPATHIC SCOLIOSIS OF LUMBAR REGION: ICD-10-CM

## 2022-08-24 DIAGNOSIS — I70.0 AORTIC ATHEROSCLEROSIS: ICD-10-CM

## 2022-08-24 DIAGNOSIS — M41.04 INFANTILE IDIOPATHIC SCOLIOSIS OF THORACIC REGION: ICD-10-CM

## 2022-08-24 DIAGNOSIS — N18.30 STAGE 3 CHRONIC KIDNEY DISEASE, UNSPECIFIED WHETHER STAGE 3A OR 3B CKD: ICD-10-CM

## 2022-08-24 DIAGNOSIS — G89.29 OTHER CHRONIC PAIN: ICD-10-CM

## 2022-08-24 DIAGNOSIS — R63.6 UNDERWEIGHT: ICD-10-CM

## 2022-08-24 DIAGNOSIS — D47.2 MGUS (MONOCLONAL GAMMOPATHY OF UNKNOWN SIGNIFICANCE): Primary | ICD-10-CM

## 2022-08-24 DIAGNOSIS — E78.2 MIXED HYPERLIPIDEMIA: ICD-10-CM

## 2022-08-24 DIAGNOSIS — Z00.00 ENCOUNTER FOR PREVENTIVE HEALTH EXAMINATION: Primary | ICD-10-CM

## 2022-08-24 LAB
ALBUMIN SERPL BCP-MCNC: 3.3 G/DL (ref 3.5–5.2)
ALP SERPL-CCNC: 53 U/L (ref 55–135)
ALT SERPL W/O P-5'-P-CCNC: 8 U/L (ref 10–44)
ANION GAP SERPL CALC-SCNC: 9 MMOL/L (ref 8–16)
AST SERPL-CCNC: 18 U/L (ref 10–40)
BASOPHILS # BLD AUTO: 0.07 K/UL (ref 0–0.2)
BASOPHILS NFR BLD: 1.1 % (ref 0–1.9)
BILIRUB SERPL-MCNC: 0.3 MG/DL (ref 0.1–1)
BUN SERPL-MCNC: 27 MG/DL (ref 10–30)
CALCIUM SERPL-MCNC: 10.3 MG/DL (ref 8.7–10.5)
CHLORIDE SERPL-SCNC: 105 MMOL/L (ref 95–110)
CO2 SERPL-SCNC: 26 MMOL/L (ref 23–29)
CREAT SERPL-MCNC: 0.8 MG/DL (ref 0.5–1.4)
DIFFERENTIAL METHOD: ABNORMAL
EOSINOPHIL # BLD AUTO: 0.3 K/UL (ref 0–0.5)
EOSINOPHIL NFR BLD: 4.2 % (ref 0–8)
ERYTHROCYTE [DISTWIDTH] IN BLOOD BY AUTOMATED COUNT: 14.8 % (ref 11.5–14.5)
EST. GFR  (NO RACE VARIABLE): >60 ML/MIN/1.73 M^2
ESTIMATED AVG GLUCOSE: 108 MG/DL (ref 68–131)
FERRITIN SERPL-MCNC: 117 NG/ML (ref 20–300)
GLUCOSE SERPL-MCNC: 98 MG/DL (ref 70–110)
HBA1C MFR BLD: 5.4 % (ref 4–5.6)
HCT VFR BLD AUTO: 30.5 % (ref 37–48.5)
HGB BLD-MCNC: 9.5 G/DL (ref 12–16)
IMM GRANULOCYTES # BLD AUTO: 0.01 K/UL (ref 0–0.04)
IMM GRANULOCYTES NFR BLD AUTO: 0.2 % (ref 0–0.5)
IRON SERPL-MCNC: 68 UG/DL (ref 30–160)
LYMPHOCYTES # BLD AUTO: 1.4 K/UL (ref 1–4.8)
LYMPHOCYTES NFR BLD: 21.7 % (ref 18–48)
MCH RBC QN AUTO: 31.4 PG (ref 27–31)
MCHC RBC AUTO-ENTMCNC: 31.1 G/DL (ref 32–36)
MCV RBC AUTO: 101 FL (ref 82–98)
MONOCYTES # BLD AUTO: 0.8 K/UL (ref 0.3–1)
MONOCYTES NFR BLD: 13.3 % (ref 4–15)
NEUTROPHILS # BLD AUTO: 3.7 K/UL (ref 1.8–7.7)
NEUTROPHILS NFR BLD: 59.5 % (ref 38–73)
NRBC BLD-RTO: 0 /100 WBC
PLATELET # BLD AUTO: 252 K/UL (ref 150–450)
PMV BLD AUTO: 11 FL (ref 9.2–12.9)
POTASSIUM SERPL-SCNC: 4.5 MMOL/L (ref 3.5–5.1)
PROT SERPL-MCNC: 8 G/DL (ref 6–8.4)
PTH-INTACT SERPL-MCNC: 73.7 PG/ML (ref 9–77)
RBC # BLD AUTO: 3.03 M/UL (ref 4–5.4)
SATURATED IRON: 23 % (ref 20–50)
SODIUM SERPL-SCNC: 140 MMOL/L (ref 136–145)
TOTAL IRON BINDING CAPACITY: 295 UG/DL (ref 250–450)
TRANSFERRIN SERPL-MCNC: 199 MG/DL (ref 200–375)
TSH SERPL DL<=0.005 MIU/L-ACNC: 2.48 UIU/ML (ref 0.4–4)
WBC # BLD AUTO: 6.23 K/UL (ref 3.9–12.7)

## 2022-08-24 PROCEDURE — 36415 COLL VENOUS BLD VENIPUNCTURE: CPT | Performed by: INTERNAL MEDICINE

## 2022-08-24 PROCEDURE — 83036 HEMOGLOBIN GLYCOSYLATED A1C: CPT | Performed by: INTERNAL MEDICINE

## 2022-08-24 PROCEDURE — 3288F PR FALLS RISK ASSESSMENT DOCUMENTED: ICD-10-PCS | Mod: CPTII,S$GLB,, | Performed by: INTERNAL MEDICINE

## 2022-08-24 PROCEDURE — 99999 PR PBB SHADOW E&M-EST. PATIENT-LVL IV: CPT | Mod: PBBFAC,,, | Performed by: INTERNAL MEDICINE

## 2022-08-24 PROCEDURE — 1160F RVW MEDS BY RX/DR IN RCRD: CPT | Mod: CPTII,S$GLB,, | Performed by: INTERNAL MEDICINE

## 2022-08-24 PROCEDURE — 1159F MED LIST DOCD IN RCRD: CPT | Mod: CPTII,S$GLB,, | Performed by: INTERNAL MEDICINE

## 2022-08-24 PROCEDURE — 85025 COMPLETE CBC W/AUTO DIFF WBC: CPT | Performed by: INTERNAL MEDICINE

## 2022-08-24 PROCEDURE — 1126F PR PAIN SEVERITY QUANTIFIED, NO PAIN PRESENT: ICD-10-PCS | Mod: CPTII,S$GLB,, | Performed by: INTERNAL MEDICINE

## 2022-08-24 PROCEDURE — 99999 PR PBB SHADOW E&M-EST. PATIENT-LVL IV: CPT | Mod: PBBFAC,,, | Performed by: NURSE PRACTITIONER

## 2022-08-24 PROCEDURE — 1170F PR FUNCTIONAL STATUS ASSESSED: ICD-10-PCS | Mod: CPTII,S$GLB,, | Performed by: NURSE PRACTITIONER

## 2022-08-24 PROCEDURE — 1160F RVW MEDS BY RX/DR IN RCRD: CPT | Mod: CPTII,S$GLB,, | Performed by: NURSE PRACTITIONER

## 2022-08-24 PROCEDURE — 1157F PR ADVANCE CARE PLAN OR EQUIV PRESENT IN MEDICAL RECORD: ICD-10-PCS | Mod: CPTII,S$GLB,, | Performed by: INTERNAL MEDICINE

## 2022-08-24 PROCEDURE — 99214 OFFICE O/P EST MOD 30 MIN: CPT | Mod: S$GLB,,, | Performed by: INTERNAL MEDICINE

## 2022-08-24 PROCEDURE — 84443 ASSAY THYROID STIM HORMONE: CPT | Performed by: INTERNAL MEDICINE

## 2022-08-24 PROCEDURE — 99999 PR PBB SHADOW E&M-EST. PATIENT-LVL IV: ICD-10-PCS | Mod: PBBFAC,,, | Performed by: INTERNAL MEDICINE

## 2022-08-24 PROCEDURE — 1100F PTFALLS ASSESS-DOCD GE2>/YR: CPT | Mod: CPTII,S$GLB,, | Performed by: NURSE PRACTITIONER

## 2022-08-24 PROCEDURE — 1101F PT FALLS ASSESS-DOCD LE1/YR: CPT | Mod: CPTII,S$GLB,, | Performed by: INTERNAL MEDICINE

## 2022-08-24 PROCEDURE — 1157F ADVNC CARE PLAN IN RCRD: CPT | Mod: CPTII,S$GLB,, | Performed by: NURSE PRACTITIONER

## 2022-08-24 PROCEDURE — 1160F PR REVIEW ALL MEDS BY PRESCRIBER/CLIN PHARMACIST DOCUMENTED: ICD-10-PCS | Mod: CPTII,S$GLB,, | Performed by: INTERNAL MEDICINE

## 2022-08-24 PROCEDURE — 82728 ASSAY OF FERRITIN: CPT | Performed by: INTERNAL MEDICINE

## 2022-08-24 PROCEDURE — G0439 PPPS, SUBSEQ VISIT: HCPCS | Mod: S$GLB,,, | Performed by: NURSE PRACTITIONER

## 2022-08-24 PROCEDURE — 80053 COMPREHEN METABOLIC PANEL: CPT | Performed by: INTERNAL MEDICINE

## 2022-08-24 PROCEDURE — 1126F PR PAIN SEVERITY QUANTIFIED, NO PAIN PRESENT: ICD-10-PCS | Mod: CPTII,S$GLB,, | Performed by: NURSE PRACTITIONER

## 2022-08-24 PROCEDURE — 1100F PR PT FALLS ASSESS DOC 2+ FALLS/FALL W/INJURY/YR: ICD-10-PCS | Mod: CPTII,S$GLB,, | Performed by: NURSE PRACTITIONER

## 2022-08-24 PROCEDURE — G0439 PR MEDICARE ANNUAL WELLNESS SUBSEQUENT VISIT: ICD-10-PCS | Mod: S$GLB,,, | Performed by: NURSE PRACTITIONER

## 2022-08-24 PROCEDURE — 1159F MED LIST DOCD IN RCRD: CPT | Mod: CPTII,S$GLB,, | Performed by: NURSE PRACTITIONER

## 2022-08-24 PROCEDURE — 83540 ASSAY OF IRON: CPT | Performed by: INTERNAL MEDICINE

## 2022-08-24 PROCEDURE — 99214 PR OFFICE/OUTPT VISIT, EST, LEVL IV, 30-39 MIN: ICD-10-PCS | Mod: S$GLB,,, | Performed by: INTERNAL MEDICINE

## 2022-08-24 PROCEDURE — 1159F PR MEDICATION LIST DOCUMENTED IN MEDICAL RECORD: ICD-10-PCS | Mod: CPTII,S$GLB,, | Performed by: NURSE PRACTITIONER

## 2022-08-24 PROCEDURE — 3288F FALL RISK ASSESSMENT DOCD: CPT | Mod: CPTII,S$GLB,, | Performed by: NURSE PRACTITIONER

## 2022-08-24 PROCEDURE — 1126F AMNT PAIN NOTED NONE PRSNT: CPT | Mod: CPTII,S$GLB,, | Performed by: NURSE PRACTITIONER

## 2022-08-24 PROCEDURE — 1157F ADVNC CARE PLAN IN RCRD: CPT | Mod: CPTII,S$GLB,, | Performed by: INTERNAL MEDICINE

## 2022-08-24 PROCEDURE — 1159F PR MEDICATION LIST DOCUMENTED IN MEDICAL RECORD: ICD-10-PCS | Mod: CPTII,S$GLB,, | Performed by: INTERNAL MEDICINE

## 2022-08-24 PROCEDURE — 1157F PR ADVANCE CARE PLAN OR EQUIV PRESENT IN MEDICAL RECORD: ICD-10-PCS | Mod: CPTII,S$GLB,, | Performed by: NURSE PRACTITIONER

## 2022-08-24 PROCEDURE — 1101F PR PT FALLS ASSESS DOC 0-1 FALLS W/OUT INJ PAST YR: ICD-10-PCS | Mod: CPTII,S$GLB,, | Performed by: INTERNAL MEDICINE

## 2022-08-24 PROCEDURE — 1170F FXNL STATUS ASSESSED: CPT | Mod: CPTII,S$GLB,, | Performed by: NURSE PRACTITIONER

## 2022-08-24 PROCEDURE — 99999 PR PBB SHADOW E&M-EST. PATIENT-LVL IV: ICD-10-PCS | Mod: PBBFAC,,, | Performed by: NURSE PRACTITIONER

## 2022-08-24 PROCEDURE — 1126F AMNT PAIN NOTED NONE PRSNT: CPT | Mod: CPTII,S$GLB,, | Performed by: INTERNAL MEDICINE

## 2022-08-24 PROCEDURE — 3288F FALL RISK ASSESSMENT DOCD: CPT | Mod: CPTII,S$GLB,, | Performed by: INTERNAL MEDICINE

## 2022-08-24 PROCEDURE — 3288F PR FALLS RISK ASSESSMENT DOCUMENTED: ICD-10-PCS | Mod: CPTII,S$GLB,, | Performed by: NURSE PRACTITIONER

## 2022-08-24 PROCEDURE — 83970 ASSAY OF PARATHORMONE: CPT | Performed by: INTERNAL MEDICINE

## 2022-08-24 PROCEDURE — 1160F PR REVIEW ALL MEDS BY PRESCRIBER/CLIN PHARMACIST DOCUMENTED: ICD-10-PCS | Mod: CPTII,S$GLB,, | Performed by: NURSE PRACTITIONER

## 2022-08-24 NOTE — PROGRESS NOTES
An Luz presented for a  Medicare AWV and comprehensive Health Risk Assessment today. The following components were reviewed and updated:    · Medical history  · Family History  · Social history  · Allergies and Current Medications  · Health Risk Assessment  · Health Maintenance  · Care Team         ** See Completed Assessments for Annual Wellness Visit within the encounter summary.**         The following assessments were completed:  · Living Situation  · CAGE  · Depression Screening  · Timed Get Up and Go  · Whisper Test  · Cognitive Function Screening      ·   · Nutrition Screening  · ADL Screening  · PAQ Screening        Vitals:    08/24/22 1041 08/24/22 1048   BP:  102/62   BP Location:  Right arm   Patient Position:  Sitting   Pulse:  81   SpO2:  97%   Weight: 31.7 kg (69 lb 14.2 oz)    Height: 5' (1.524 m)      Body mass index is 13.65 kg/m².  Physical Exam  Constitutional:       Comments: Thin, frail   Musculoskeletal:      Comments: +scoliosis, ambulates with can + assist from daughter   Skin:     General: Skin is warm and dry.   Neurological:      General: No focal deficit present.   Psychiatric:         Mood and Affect: Mood normal.               Diagnoses and health risks identified today and associated recommendations/orders:    1. Encounter for preventive health examination  Here for Health Risk Assessment/Annual Wellness Visit.  Health maintenance reviewed and updated. Follow up in one year.  Declined shingrix.    2. Benign essential hypertension  Chronic, stable on current medications. Followed by PCP.    3. Aortic atherosclerosis  Chronic, stable. Noted CT Pelvis 2/16/16. . Followed by PCP.    4. Mixed hyperlipidemia  Chronic, stable with diet. Followed by PCP.    5. Open angle with borderline findings and low glaucoma risk in both eyes  Chronic, stable. Followed by Optometry/Ophthalmology.    6. Prediabetes  Chronic, stable with diet. Last A1c 5.7. Followed by PCP.    7. Moderate  protein-calorie malnutrition  Chronic, last albumin 3.4. Followed by PCP.    8. Underweight  Chronic, weight decreased 6 pounds (76 to 70 today)  from PCP visit 2/2022. Followed by PCP.    9. Esophageal dysphagia  Chronic, stable. Followed by PCP.    10. Stage 3 chronic kidney disease, unspecified whether stage 3a or 3b CKD  Chronic, stable on current medications. Followed by PCP.    11. Hyperparathyroidism  Chronic, stable on current medications. Followed by PCP, Endocrinology.    12. Infantile idiopathic scoliosis of thoracic region  Followed by PCP.    13. Infantile idiopathic scoliosis of lumbar region  Followed by PCP.    14. Other chronic pain  Chronic, stable on current PRN OTC medication. Followed by PCP.    15. MGUS (monoclonal gammopathy of unknown significance)  Chronic, stable. Followed by PCP.    16. Anemia of chronic disease  Chronic, stable. Followed by PCP.    17. Dependence on other enabling machines and devices  Chronic, ambulates with cane. Fall 7/2022 with ED visit. Followed by PCP    18. Abnormality of gait and mobility  Chronic, ambulates with cane. Fall 7/2022 with ED visit. Followed by PCP    19. Age-related osteoporosis without current pathological fracture  Chronic, stable with current medication/Prolia. Followed by PCP, Endocrinology.      Provided An with a 5-10 year written screening schedule and personal prevention plan. Recommendations were developed using the USPSTF age appropriate recommendations. Education, counseling, and referrals were provided as needed. After Visit Summary printed and given to patient which includes a list of additional screenings\tests needed.    Follow up today (on 8/24/2022).with PCP    Britt Lara NP

## 2022-08-24 NOTE — PATIENT INSTRUCTIONS
Counseling and Referral of Other Preventative  (Italic type indicates deductible and co-insurance are waived)    Patient Name: An Luz  Today's Date: 8/24/2022    Health Maintenance       Date Due Completion Date    Shingles Vaccine (1 of 2) Never done ---    Lipid Panel 07/30/2021 7/30/2020    Influenza Vaccine (1) 09/01/2022 11/18/2021    DEXA Scan 04/08/2023 4/8/2021    TETANUS VACCINE 05/02/2027 5/2/2017        No orders of the defined types were placed in this encounter.    The following information is provided to all patients.  This information is to help you find resources for any of the problems found today that may be affecting your health:                Living healthy guide: www.[facility].[place].gov      Understanding Diabetes: www.diabetes.org      Eating healthy: www.cdc.gov/healthyweight      [facility] home safety checklist: www.cdc.gov/steadi/patient.html      Agency on Aging: www.goea.[place].gov      Alcoholics anonymous (AA): www.aa.org      Physical Activity: www.[place].nih.gov/qr6pwcr      Tobacco use: www.quitwithusla.org

## 2022-08-24 NOTE — PROGRESS NOTES
INTERNAL MEDICINE ESTABLISHED PATIENT VISIT NOTE    Subjective:     Chief Complaint: Follow-up  HTN, weight     Patient ID: An Luz is a 89 y.o. female with HTN, HLD, preDM, IgG Kappa MGUS (declined additional w/u due to age, seen by Hematology in the past), anemia, osteoporosis c kyphoscoliosis managed by Dr. Monteiro/Cat, glaucoma, chronic dry eyes, urinary incontinence, chronic constipation, aortic atherosclerosis noted incidentally on prior imaging, last seen by me in March, here today for f/u.    To review:  At baseline, lives at home c her daughter who works.  AAOx3, does most ADLs independently but appreciates assistance.  Wears poise pads for hx urinary incontinence, no issues c fecal incontinence.  Ambulates c a cane.     Had ED visit in July due to a fall, slipped in the bathroom early that morning and fell onto her face and her R elbow c subsequent lac.  Had sutures.  No fracture on elbow xrays.  Discharged on Keflex for 10 days which she completed.  States meds caused some nausea and decreased appetite but finished the course.    Ct cervical spine and additional imaging showed nasal bone fractures.  No fx in cervical spine.    CT head otherwise unremarkable.    Today states she feels mostly back at her previous baseline.  No falls since.  Taking meds as rx'ed.    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 Authorizing Provider   acetaminophen (TYLENOL) 325 MG tablet Take 325 mg by mouth every 6 (six) hours as needed for Pain.    Historical Provider   amLODIPine (NORVASC) 5 MG tablet Take 1 tablet (5 mg total) by mouth once daily. 2/16/22   Jalyn  MD Ada   cholecalciferol, vitamin D3, (VITAMIN D3) 25 mcg (1,000 unit) capsule Take 1,000 Units by mouth once daily.    Historical Provider   FOLIC ACID/MULTIVIT-MIN/LUTEIN (CENTRUM SILVER ORAL) Take 1 tablet by mouth once daily.     Historical Provider   labetaloL (NORMODYNE) 100 MG tablet Take 1 tablet (100 mg total) by mouth 2 (two) times daily. 11/5/21 11/5/22  Effie Ann NP   Lactobac no.41/Bifidobact no.7 (PROBIOTIC-10 ORAL) Take by mouth Daily.    Historical Provider   lifitegrast (XIIDRA) 5 % Dpet Place 1 drop into both eyes 2 (two) times a day.  Patient not taking: Reported on 8/24/2022 2/18/22   Dian Cornejo MD   losartan (COZAAR) 100 MG tablet TAKE 1 TABLET BY MOUTH  DAILY 6/28/22   Effie Ann NP   oxybutynin (DITROPAN-XL) 10 MG 24 hr tablet Take 1 tablet (10 mg total) by mouth once daily. 2/16/22 2/16/23  Jalyn Caballero MD   polyethylene glycol (GLYCOLAX) 17 gram PwPk Take one capful with a full glass of water daily as needed for constipation. 6/25/18   Jalyn Caballero MD   senna (SENOKOT) 8.6 mg tablet Take 1 tablet by mouth once daily.    Historical Provider   wheat dextrin 5 gram/7.4 gram Powd Take by mouth.    Historical Provider       Allergies:  Review of patient's allergies indicates:   Allergen Reactions    Zestril [lisinopril] Swelling     Facial Swelling    Augmentin [amoxicillin-pot clavulanate] Nausea And Vomiting    Sulfa (sulfonamide antibiotics)      Unknown reaction       Social History:  Social History     Tobacco Use    Smoking status: Never Smoker    Smokeless tobacco: Never Used    Tobacco comment: The patient exercises every other day on an exercise bike.  On alternative days she does floor exercises with weights.   Substance Use Topics    Alcohol use: Not Currently    Drug use: No        Review of Systems   Constitutional: Negative for appetite change, chills, fatigue, fever and unexpected weight change.   HENT: Negative for congestion, hearing  loss and rhinorrhea.    Eyes: Negative for pain and visual disturbance.   Respiratory: Negative for cough, chest tightness, shortness of breath and wheezing.    Cardiovascular: Negative for chest pain, palpitations and leg swelling.   Gastrointestinal: Negative for abdominal distention and abdominal pain.   Endocrine: Negative for polydipsia and polyuria.   Genitourinary: Negative for decreased urine volume, difficulty urinating, dysuria, hematuria and vaginal discharge.   Neurological: Negative for weakness, numbness and headaches.   Psychiatric/Behavioral: Negative for behavioral problems and confusion.         Health Maintenance:     Immunizations:   Influenza 11/2021  TDap is up to date 5/2017  Prevnar 13 5/2016, pvax 6/18  Shingrix is not UTD.    rec but pt declined due to concerns regarding potential s/e, Risks and benefits were discussed with patient.  Covid vacc 2/2021, 3/2021, 11/2021, 6/2022        Cancer Screening:  PAP: n/a based on age, also c hx hyst  Mammogram: n/a based on age  Colonoscopy: n/a based on age  DEXA:  4/2021 worse despite weekly alendronate.  Referred to Endo, seen by Dr. Monteiro who changed to Prolia.    Objective:   /62 (BP Location: Right arm, Patient Position: Sitting, BP Method: Small (Manual))   Pulse 64   Resp 18   Ht 5' (1.524 m)   Wt 32 kg (70 lb 8.8 oz)   LMP  (LMP Unknown)   SpO2 99%   BMI 13.78 kg/m²        General: AAO x3, no apparent distress, thin, kyphosis noted.  HEENT: PERRL, OP clear  CV: RRR, sys M best at 2nd RIS  Pulm: Lungs CTAB, no crackles, no wheezes  Abd: s/NT/ND +BS  Extremities: no c/c/e    Labs:     Lab Results   Component Value Date    WBC 6.23 08/24/2022    HGB 9.5 (L) 08/24/2022    HCT 30.5 (L) 08/24/2022     (H) 08/24/2022     08/24/2022     Sodium   Date Value Ref Range Status   08/24/2022 140 136 - 145 mmol/L Final     Potassium   Date Value Ref Range Status   08/24/2022 4.5 3.5 - 5.1 mmol/L Final     Chloride   Date Value Ref  Range Status   08/24/2022 105 95 - 110 mmol/L Final     CO2   Date Value Ref Range Status   08/24/2022 26 23 - 29 mmol/L Final     Glucose   Date Value Ref Range Status   08/24/2022 98 70 - 110 mg/dL Final     BUN   Date Value Ref Range Status   08/24/2022 27 10 - 30 mg/dL Final     Creatinine   Date Value Ref Range Status   08/24/2022 0.8 0.5 - 1.4 mg/dL Final     Calcium   Date Value Ref Range Status   08/24/2022 10.3 8.7 - 10.5 mg/dL Final     Total Protein   Date Value Ref Range Status   08/24/2022 8.0 6.0 - 8.4 g/dL Final     Albumin   Date Value Ref Range Status   08/24/2022 3.3 (L) 3.5 - 5.2 g/dL Final     Total Bilirubin   Date Value Ref Range Status   08/24/2022 0.3 0.1 - 1.0 mg/dL Final     Comment:     For infants and newborns, interpretation of results should be based  on gestational age, weight and in agreement with clinical  observations.    Premature Infant recommended reference ranges:  Up to 24 hours.............<8.0 mg/dL  Up to 48 hours............<12.0 mg/dL  3-5 days..................<15.0 mg/dL  6-29 days.................<15.0 mg/dL       Alkaline Phosphatase   Date Value Ref Range Status   08/24/2022 53 (L) 55 - 135 U/L Final     AST   Date Value Ref Range Status   08/24/2022 18 10 - 40 U/L Final     ALT   Date Value Ref Range Status   08/24/2022 8 (L) 10 - 44 U/L Final     Anion Gap   Date Value Ref Range Status   08/24/2022 9 8 - 16 mmol/L Final     eGFR if    Date Value Ref Range Status   03/15/2022 >60.0 >60 mL/min/1.73 m^2 Final     eGFR if non    Date Value Ref Range Status   03/15/2022 >60.0 >60 mL/min/1.73 m^2 Final     Comment:     Calculation used to obtain the estimated glomerular filtration  rate (eGFR) is the CKD-EPI equation.        Lab Results   Component Value Date    HGBA1C 5.4 08/24/2022     Lab Results   Component Value Date    LDLCALC 128.8 07/30/2020     Lab Results   Component Value Date    TSH 2.480 08/24/2022         Assessment/Plan      An was seen today for follow-up.    Diagnoses and all orders for this visit:    MGUS (monoclonal gammopathy of unknown significance)  As per HPI  Pt deferred additional w/u due to age  Will monitor  -     Comprehensive Metabolic Panel; Future    Weight loss  Fluctuating but appears stable compared to last visit  States her appetite has improved since completed abx as per HPI  Will cont to monitor.  -     TSH; Future    Anemia of chronic disease  Stable, no issues  On Fe supplementation based on last check  -     CBC Auto Differential; Future  -     Iron and TIBC; Future  -     Ferritin; Future    Prediabetes  No acute issues  Aggressive dietary modifications not necessary based on age.  -     Hemoglobin A1C; Future    Benign essential hypertension  at goal.  Cont current medications.    Hyperparathyroidism  -     PTH, intact; Future    Closed fracture of nasal bone with routine healing, subsequent encounter  -     Ambulatory referral/consult to ENT; Future      HM as above  RTC in 4 mos, sooner if needed.    Jalyn Caballero MD  Department of Internal Medicine - [facility]  08/26/2022

## 2022-08-26 ENCOUNTER — TELEPHONE (OUTPATIENT)
Dept: INTERNAL MEDICINE | Facility: CLINIC | Age: 87
End: 2022-08-26
Payer: MEDICARE

## 2022-08-26 NOTE — TELEPHONE ENCOUNTER
----- Message from Jalyn Caballero MD sent at 8/26/2022 11:08 AM CDT -----  Anemia and kidney function stable  Sugars improved  Labs overall stable.  Nursing to notify

## 2022-09-08 ENCOUNTER — OFFICE VISIT (OUTPATIENT)
Dept: OTOLARYNGOLOGY | Facility: CLINIC | Age: 87
End: 2022-09-08
Payer: MEDICARE

## 2022-09-08 VITALS — DIASTOLIC BLOOD PRESSURE: 76 MMHG | SYSTOLIC BLOOD PRESSURE: 117 MMHG | HEART RATE: 75 BPM | TEMPERATURE: 97 F

## 2022-09-08 DIAGNOSIS — L82.1 SEBORRHEIC KERATOSIS: ICD-10-CM

## 2022-09-08 DIAGNOSIS — S02.2XXA CLOSED FRACTURE OF NASAL BONE, INITIAL ENCOUNTER: Primary | ICD-10-CM

## 2022-09-08 PROCEDURE — 99213 PR OFFICE/OUTPT VISIT, EST, LEVL III, 20-29 MIN: ICD-10-PCS | Mod: S$GLB,,, | Performed by: OTOLARYNGOLOGY

## 2022-09-08 PROCEDURE — 1157F ADVNC CARE PLAN IN RCRD: CPT | Mod: CPTII,S$GLB,, | Performed by: OTOLARYNGOLOGY

## 2022-09-08 PROCEDURE — 1126F PR PAIN SEVERITY QUANTIFIED, NO PAIN PRESENT: ICD-10-PCS | Mod: CPTII,S$GLB,, | Performed by: OTOLARYNGOLOGY

## 2022-09-08 PROCEDURE — 99213 OFFICE O/P EST LOW 20 MIN: CPT | Mod: S$GLB,,, | Performed by: OTOLARYNGOLOGY

## 2022-09-08 PROCEDURE — 1100F PTFALLS ASSESS-DOCD GE2>/YR: CPT | Mod: CPTII,S$GLB,, | Performed by: OTOLARYNGOLOGY

## 2022-09-08 PROCEDURE — 3288F PR FALLS RISK ASSESSMENT DOCUMENTED: ICD-10-PCS | Mod: CPTII,S$GLB,, | Performed by: OTOLARYNGOLOGY

## 2022-09-08 PROCEDURE — 3288F FALL RISK ASSESSMENT DOCD: CPT | Mod: CPTII,S$GLB,, | Performed by: OTOLARYNGOLOGY

## 2022-09-08 PROCEDURE — 1157F PR ADVANCE CARE PLAN OR EQUIV PRESENT IN MEDICAL RECORD: ICD-10-PCS | Mod: CPTII,S$GLB,, | Performed by: OTOLARYNGOLOGY

## 2022-09-08 PROCEDURE — 1159F PR MEDICATION LIST DOCUMENTED IN MEDICAL RECORD: ICD-10-PCS | Mod: CPTII,S$GLB,, | Performed by: OTOLARYNGOLOGY

## 2022-09-08 PROCEDURE — 1160F PR REVIEW ALL MEDS BY PRESCRIBER/CLIN PHARMACIST DOCUMENTED: ICD-10-PCS | Mod: CPTII,S$GLB,, | Performed by: OTOLARYNGOLOGY

## 2022-09-08 PROCEDURE — 1159F MED LIST DOCD IN RCRD: CPT | Mod: CPTII,S$GLB,, | Performed by: OTOLARYNGOLOGY

## 2022-09-08 PROCEDURE — 1160F RVW MEDS BY RX/DR IN RCRD: CPT | Mod: CPTII,S$GLB,, | Performed by: OTOLARYNGOLOGY

## 2022-09-08 PROCEDURE — 1100F PR PT FALLS ASSESS DOC 2+ FALLS/FALL W/INJURY/YR: ICD-10-PCS | Mod: CPTII,S$GLB,, | Performed by: OTOLARYNGOLOGY

## 2022-09-08 PROCEDURE — 1126F AMNT PAIN NOTED NONE PRSNT: CPT | Mod: CPTII,S$GLB,, | Performed by: OTOLARYNGOLOGY

## 2022-09-08 NOTE — PROGRESS NOTES
Subjective:     Chief Complaint:   Chief Complaint   Patient presents with    Other     Fall broken bone in nose         An Luz is a 89 y.o. female who was referred to me by Dr. Delvalle in consultation for nasal fracture.    Patient reports blunt facial trauma proximally 2 months ago.  She sustained nasal trauma after a slip and fall.  Unknown loss of consciousness. She was seen in the emergency department in trauma workup was performed including CT scan of the face.  Patient denies nasal obstruction since the incident.  He denies epistaxis.  Denies significant changes to the nose.  Denies rhinorrhea or malocclusion.    Past Medical History  She has a past medical history of Anemia, Arthritis, Fever blister, Glaucoma, Hypercalcemia, Hyperlipidemia, Hypertension, Osteoporosis, Pneumonia, Pseudoaphakia - Both Eyes, Pubic ramus fracture, and Scoliosis.    Past Surgical History  She has a past surgical history that includes Tonsillectomy; Appendectomy; Hysterectomy; Oophorectomy; blepharospasm surgery; ORIF hip fracture (Left, 12/2013); Cataract extraction w/  intraocular lens implant (Right, 12/2007); Cataract extraction w/  intraocular lens implant (Left, 04/2010); and Eye surgery.    Family History  Her family history includes Cancer in her maternal grandmother; Heart disease in her brother; No Known Problems in her daughter, daughter, daughter, maternal aunt, maternal grandfather, maternal uncle, mother, paternal aunt, paternal grandfather, paternal grandmother, paternal uncle, son, and son; Stroke (age of onset: 49) in her father.    Social History  She reports that she has never smoked. She has never used smokeless tobacco. She reports that she does not currently use alcohol. She reports that she does not use drugs.    Allergies  She is allergic to zestril [lisinopril], augmentin [amoxicillin-pot clavulanate], and sulfa (sulfonamide antibiotics).    Medications  She has a current medication  list which includes the following prescription(s): acetaminophen, amlodipine, cholecalciferol (vitamin d3), folic acid/multivit-min/lutein, labetalol, lactobac no.41/bifidobact no.7, xiidra, losartan, oxybutynin, polyethylene glycol, senna, and wheat dextrin.    Review of Systems  Negative except per HPI     Objective:     /76   Pulse 75   Temp 97 °F (36.1 °C) (Temporal)   LMP  (LMP Unknown)      Constitutional:   Vital signs are normal. She appears well-developed and well-nourished. Normal speech.      Head:  Normocephalic and atraumatic.         Ears:    Right Ear: Decreased hearing is noted.   Left Ear: Decreased hearing is noted.   Ears:      Nose:  No mucosal edema, rhinorrhea or septal deviation. Turbinates normal and no turbinate hypertrophy.          Mouth/Throat  Lips, teeth, and gums normal and oropharynx normal.     Neck:  Neck normal without thyromegaly masses, asymmetry, normal tracheal structure, crepitus, and tenderness, thyroid normal, trachea normal, phonation normal and no adenopathy.     Pulmonary/Chest:   Effort normal.     Psychiatric:   She has a normal mood and affect. Her speech is normal and behavior is normal.     Procedure    None    Data Reviewed    CT max/face 7/2/22 reviewed   Minimally displaced nasal bone fracture  No evidence of acute intracranial hemorrhage.  Mild chronic small vessel ischemic change and cerebral volume loss.  Moderate endodontal disease.  Moderate cervical spondylosis as above, without evidence of an acute cervical spine fracture.    Assessment:     1. Closed fracture of nasal bone, initial encounter    2. Seborrheic keratosis          Plan:     The nasal bone fractures minimally displaced and has healed fairly straight.  She is not experiencing any functional breathing issues from this injury.  The septum appears fairly straight.  She does have seborrheic keratosis and a nodular lesion of the left side of the nose.  Recommend evaluation by Dermatology,  she has an appointment next week.  All questions answered and patient was encouraged call with any questions or concerns.

## 2022-10-11 ENCOUNTER — TELEPHONE (OUTPATIENT)
Dept: PODIATRY | Facility: CLINIC | Age: 87
End: 2022-10-11
Payer: MEDICARE

## 2022-10-12 ENCOUNTER — OFFICE VISIT (OUTPATIENT)
Dept: PODIATRY | Facility: CLINIC | Age: 87
End: 2022-10-12
Payer: MEDICARE

## 2022-10-12 VITALS
HEART RATE: 65 BPM | WEIGHT: 70 LBS | DIASTOLIC BLOOD PRESSURE: 73 MMHG | SYSTOLIC BLOOD PRESSURE: 164 MMHG | BODY MASS INDEX: 13.74 KG/M2 | HEIGHT: 60 IN

## 2022-10-12 DIAGNOSIS — L90.9 FAT PAD ATROPHY OF FOOT: ICD-10-CM

## 2022-10-12 DIAGNOSIS — B35.1 DERMATOPHYTOSIS OF NAIL: ICD-10-CM

## 2022-10-12 DIAGNOSIS — Z74.09 IMPAIRED MOBILITY AND ADLS: Primary | ICD-10-CM

## 2022-10-12 DIAGNOSIS — Z78.9 IMPAIRED MOBILITY AND ADLS: Primary | ICD-10-CM

## 2022-10-12 PROCEDURE — 1101F PR PT FALLS ASSESS DOC 0-1 FALLS W/OUT INJ PAST YR: ICD-10-PCS | Mod: CPTII,,, | Performed by: PODIATRIST

## 2022-10-12 PROCEDURE — 3288F PR FALLS RISK ASSESSMENT DOCUMENTED: ICD-10-PCS | Mod: CPTII,,, | Performed by: PODIATRIST

## 2022-10-12 PROCEDURE — 99999 PR PBB SHADOW E&M-EST. PATIENT-LVL IV: CPT | Mod: PBBFAC,,, | Performed by: PODIATRIST

## 2022-10-12 PROCEDURE — 3288F FALL RISK ASSESSMENT DOCD: CPT | Mod: CPTII,,, | Performed by: PODIATRIST

## 2022-10-12 PROCEDURE — 1159F PR MEDICATION LIST DOCUMENTED IN MEDICAL RECORD: ICD-10-PCS | Mod: CPTII,,, | Performed by: PODIATRIST

## 2022-10-12 PROCEDURE — 1101F PT FALLS ASSESS-DOCD LE1/YR: CPT | Mod: CPTII,,, | Performed by: PODIATRIST

## 2022-10-12 PROCEDURE — 17999 UNLISTD PX SKN MUC MEMB SUBQ: CPT | Mod: CSM,S$GLB,, | Performed by: PODIATRIST

## 2022-10-12 PROCEDURE — 1160F RVW MEDS BY RX/DR IN RCRD: CPT | Mod: CPTII,,, | Performed by: PODIATRIST

## 2022-10-12 PROCEDURE — 1126F PR PAIN SEVERITY QUANTIFIED, NO PAIN PRESENT: ICD-10-PCS | Mod: CPTII,,, | Performed by: PODIATRIST

## 2022-10-12 PROCEDURE — 99213 OFFICE O/P EST LOW 20 MIN: CPT | Mod: 25,,, | Performed by: PODIATRIST

## 2022-10-12 PROCEDURE — 1160F PR REVIEW ALL MEDS BY PRESCRIBER/CLIN PHARMACIST DOCUMENTED: ICD-10-PCS | Mod: CPTII,,, | Performed by: PODIATRIST

## 2022-10-12 PROCEDURE — 1157F ADVNC CARE PLAN IN RCRD: CPT | Mod: CPTII,,, | Performed by: PODIATRIST

## 2022-10-12 PROCEDURE — 1159F MED LIST DOCD IN RCRD: CPT | Mod: CPTII,,, | Performed by: PODIATRIST

## 2022-10-12 PROCEDURE — 17999 PR NON-COVERED FOOT CARE: ICD-10-PCS | Mod: CSM,S$GLB,, | Performed by: PODIATRIST

## 2022-10-12 PROCEDURE — 99213 PR OFFICE/OUTPT VISIT, EST, LEVL III, 20-29 MIN: ICD-10-PCS | Mod: 25,,, | Performed by: PODIATRIST

## 2022-10-12 PROCEDURE — 99999 PR PBB SHADOW E&M-EST. PATIENT-LVL IV: ICD-10-PCS | Mod: PBBFAC,,, | Performed by: PODIATRIST

## 2022-10-12 PROCEDURE — 1157F PR ADVANCE CARE PLAN OR EQUIV PRESENT IN MEDICAL RECORD: ICD-10-PCS | Mod: CPTII,,, | Performed by: PODIATRIST

## 2022-10-12 PROCEDURE — 1126F AMNT PAIN NOTED NONE PRSNT: CPT | Mod: CPTII,,, | Performed by: PODIATRIST

## 2022-10-12 RX ORDER — FLUTICASONE PROPIONATE 0.05 MG/G
OINTMENT TOPICAL
COMMUNITY
Start: 2022-09-16

## 2022-10-12 NOTE — PROGRESS NOTES
Chief Complaint: Follow-up (Foot Exam/PCP Jalyn Caballero MD 08/24/22)      HPI:   An Luz is a 89 y.o. female  With Thick painful toenails preventing sock and shoe wear and imparing ambulation.  Gradual onset, worsening over past several years, aggravated by increased weight bearing, shoe gear, pressure.  Periodic debridement helps symptoms. Denies trauma, surgery all toes.  She states she does not have chronic kidney disease.       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 glaucoma risk in both eyes    Idiopathic scoliosis of lumbar spine    Idiopathic scoliosis of thoracic spine    Benign essential hypertension    Urinary incontinence    Closed fracture of right shoulder    Impaired mobility and ADLs    History of hip fracture    Other chronic pain    Chronic constipation    Aortic atherosclerosis    Hyperparathyroidism    Prediabetes    Esophageal dysphagia    Moderate protein-calorie malnutrition    Weight loss    Stage 3 chronic kidney disease    Hypercalcemia    MGUS (monoclonal gammopathy of unknown significance)         Current Outpatient Medications on File Prior to Visit   Medication Sig Dispense Refill    acetaminophen (TYLENOL) 325 MG tablet Take 325 mg by mouth every 6 (six) hours as needed for Pain.      amLODIPine (NORVASC) 5 MG tablet Take 1 tablet (5 mg total) by mouth once daily. 90 tablet 3    cholecalciferol, vitamin D3, (VITAMIN D3) 25 mcg (1,000 unit) capsule Take 1,000 Units by mouth once daily.      fluticasone propionate  (CUTIVATE) 0.005 % ointment Apply topically.      FOLIC ACID/MULTIVIT-MIN/LUTEIN (CENTRUM SILVER ORAL) Take 1 tablet by mouth once daily.       labetaloL (NORMODYNE) 100 MG tablet TAKE 1 TABLET BY MOUTH  TWICE A  tablet 3    Lactobac no.41/Bifidobact no.7 (PROBIOTIC-10 ORAL) Take by mouth Daily.      lifitegrast (XIIDRA) 5 % Dpet Place 1 drop into both eyes 2 (two) times a day. 180 each 3    losartan (COZAAR) 100 MG tablet TAKE 1 TABLET BY MOUTH  DAILY 90 tablet 3    oxybutynin (DITROPAN-XL) 10 MG 24 hr tablet Take 1 tablet (10 mg total) by mouth once daily. 90 tablet 3    polyethylene glycol (GLYCOLAX) 17 gram PwPk Take one capful with a full glass of water daily as needed for constipation. 30 each 3    senna (SENOKOT) 8.6 mg tablet Take 1 tablet by mouth once daily.      wheat dextrin 5 gram/7.4 gram Powd Take by mouth.       No current facility-administered medications on file prior to visit.           Review of patient's allergies indicates:   Allergen Reactions    Zestril [lisinopril] Swelling     Facial Swelling    Augmentin [amoxicillin-pot clavulanate] Nausea And Vomiting    Sulfa (sulfonamide antibiotics)      Unknown reaction                   Objective:        Vitals:    10/12/22 1211   BP: (!) 164/73   Pulse: 65   Weight: 31.8 kg (70 lb)   Height: 5' (1.524 m)           Physical Exam  Constitutional:       General: She is not in acute distress.     Appearance: She is well-developed. She is not diaphoretic.   Cardiovascular:      Pulses:           Popliteal pulses are 2+ on the right side and 2+ on the left side.        Dorsalis pedis pulses are 2+ on the right side and 2+ on the left side.        Posterior tibial pulses are 1+ on the right side and 1+ on the left side.      Comments: Capillary refill 3 seconds all toes/distal feet, all toes/both feet warm to touch.      Negative lymphadenopathy bilateral popliteal fossa and tarsal tunnel.      Negavie lower extremity edema  bilateral.    Musculoskeletal:      Right ankle: No swelling, deformity, ecchymosis or lacerations. Normal range of motion. Normal pulse.      Right Achilles Tendon: Normal. No defects. Garay's test negative.      Comments: Normal angle, base, station of gait. All ten toes without clubbing, cyanosis, or signs of ischemia.  No pain to palpation bilateral lower extremities.  Range of motion, stability, muscle strength, and muscle tone normal bilateral feet and legs.     Lymphadenopathy:      Lower Body: No right inguinal adenopathy. No left inguinal adenopathy.      Comments: Negative lymphadenopathy bilateral popliteal fossa and tarsal tunnel.    Negative lymphangitic streaking bilateral feet/ankles/legs.   Skin:     General: Skin is warm and dry.      Capillary Refill: Capillary refill takes 2 to 3 seconds.      Coloration: Skin is not pale.      Findings: No abrasion, bruising, burn, ecchymosis, erythema, laceration, lesion or rash.      Nails: There is no clubbing.      Comments: Dry scale with superficial flakes over an erythematous base  without ulceration, drainage, pus, tracking, fluctuance, malodor, or cardinal signs infection.    Toenails 1st, 2nd, 3rd, 4th, 5th  bilateral are hypertrophic thickened 2-3 mm, dystrophic, discolored tanish brown with tan, gray crumbly subungual debris.  Severe pain and tenderness  to distal nail plate pressure, without periungual skin abnormality of each.    Skin is thin and atrophic.    No wounds noted.  No breakdown on the heels.        Neurological:      Mental Status: She is alert and oriented to person, place, and time.      Sensory: No sensory deficit.      Motor: No tremor, atrophy or abnormal muscle tone.      Gait: Gait normal.      Deep Tendon Reflexes:      Reflex Scores:       Patellar reflexes are 2+ on the right side and 2+ on the left side.       Achilles reflexes are 2+ on the right side and 2+ on the left side.     Comments: Negative tinel sign to percussion  sural, superficial peroneal, deep peroneal, saphenous, and posterior tibial nerves right and left ankles and feet.     Psychiatric:         Behavior: Behavior is cooperative.             Assessment:       Encounter Diagnoses   Name Primary?    Impaired mobility and ADLs Yes    Dermatophytosis of nail     Fat pad atrophy of foot          Plan:         I counseled the patient on her conditions, their implications and medical management.    Shoe and activity modification as needed for relief.     Nails trimmed per procedure brief.

## 2023-01-01 NOTE — ASSESSMENT & PLAN NOTE
ORIF of the right hip on 10/27, WBAT. Arm was treated with a sling and swathe.   - Ortho precautions: Non weight bearing RUE for 2 weeks.    - Sling and swathe for comfort. Weight bearing as tolerated RLE.  - Surgical wound: Dressing changes every 3 days. Remove staples on POD #14.  - PT/OT: Dep for gait, Max A for transfers, Max A for UB/LB dsg   Mother is RH Positive

## 2023-01-03 ENCOUNTER — OFFICE VISIT (OUTPATIENT)
Dept: INTERNAL MEDICINE | Facility: CLINIC | Age: 88
End: 2023-01-03
Payer: MEDICARE

## 2023-01-03 ENCOUNTER — LAB VISIT (OUTPATIENT)
Dept: LAB | Facility: HOSPITAL | Age: 88
End: 2023-01-03
Attending: INTERNAL MEDICINE
Payer: MEDICARE

## 2023-01-03 VITALS
BODY MASS INDEX: 14.33 KG/M2 | SYSTOLIC BLOOD PRESSURE: 125 MMHG | OXYGEN SATURATION: 97 % | HEIGHT: 60 IN | HEART RATE: 67 BPM | DIASTOLIC BLOOD PRESSURE: 71 MMHG | WEIGHT: 73 LBS

## 2023-01-03 DIAGNOSIS — D47.2 MGUS (MONOCLONAL GAMMOPATHY OF UNKNOWN SIGNIFICANCE): ICD-10-CM

## 2023-01-03 DIAGNOSIS — E21.3 HYPERPARATHYROIDISM: ICD-10-CM

## 2023-01-03 DIAGNOSIS — N18.31 STAGE 3A CHRONIC KIDNEY DISEASE: ICD-10-CM

## 2023-01-03 DIAGNOSIS — D63.8 ANEMIA OF CHRONIC DISEASE: ICD-10-CM

## 2023-01-03 DIAGNOSIS — K59.09 CHRONIC CONSTIPATION: ICD-10-CM

## 2023-01-03 DIAGNOSIS — I10 BENIGN ESSENTIAL HYPERTENSION: ICD-10-CM

## 2023-01-03 DIAGNOSIS — E78.2 MIXED HYPERLIPIDEMIA: ICD-10-CM

## 2023-01-03 DIAGNOSIS — Z78.9 IMPAIRED MOBILITY AND ADLS: ICD-10-CM

## 2023-01-03 DIAGNOSIS — I70.0 AORTIC ATHEROSCLEROSIS: ICD-10-CM

## 2023-01-03 DIAGNOSIS — R53.81 DEBILITY: ICD-10-CM

## 2023-01-03 DIAGNOSIS — Z74.09 IMPAIRED MOBILITY AND ADLS: ICD-10-CM

## 2023-01-03 DIAGNOSIS — E83.52 HYPERCALCEMIA: ICD-10-CM

## 2023-01-03 DIAGNOSIS — M25.649 STIFFNESS OF HAND JOINT, UNSPECIFIED LATERALITY: ICD-10-CM

## 2023-01-03 DIAGNOSIS — M81.0 AGE-RELATED OSTEOPOROSIS WITHOUT CURRENT PATHOLOGICAL FRACTURE: ICD-10-CM

## 2023-01-03 DIAGNOSIS — R63.4 WEIGHT LOSS: Primary | ICD-10-CM

## 2023-01-03 LAB
BASOPHILS # BLD AUTO: 0.04 K/UL (ref 0–0.2)
BASOPHILS NFR BLD: 0.6 % (ref 0–1.9)
DIFFERENTIAL METHOD: ABNORMAL
EOSINOPHIL # BLD AUTO: 0.1 K/UL (ref 0–0.5)
EOSINOPHIL NFR BLD: 1.9 % (ref 0–8)
ERYTHROCYTE [DISTWIDTH] IN BLOOD BY AUTOMATED COUNT: 14.1 % (ref 11.5–14.5)
HCT VFR BLD AUTO: 31.5 % (ref 37–48.5)
HGB BLD-MCNC: 9.7 G/DL (ref 12–16)
IMM GRANULOCYTES # BLD AUTO: 0.03 K/UL (ref 0–0.04)
IMM GRANULOCYTES NFR BLD AUTO: 0.4 % (ref 0–0.5)
LYMPHOCYTES # BLD AUTO: 1.3 K/UL (ref 1–4.8)
LYMPHOCYTES NFR BLD: 18.9 % (ref 18–48)
MCH RBC QN AUTO: 31.4 PG (ref 27–31)
MCHC RBC AUTO-ENTMCNC: 30.8 G/DL (ref 32–36)
MCV RBC AUTO: 102 FL (ref 82–98)
MONOCYTES # BLD AUTO: 0.9 K/UL (ref 0.3–1)
MONOCYTES NFR BLD: 12.7 % (ref 4–15)
NEUTROPHILS # BLD AUTO: 4.6 K/UL (ref 1.8–7.7)
NEUTROPHILS NFR BLD: 65.5 % (ref 38–73)
NRBC BLD-RTO: 0 /100 WBC
PLATELET # BLD AUTO: 220 K/UL (ref 150–450)
PMV BLD AUTO: 11.8 FL (ref 9.2–12.9)
RBC # BLD AUTO: 3.09 M/UL (ref 4–5.4)
WBC # BLD AUTO: 7.02 K/UL (ref 3.9–12.7)

## 2023-01-03 PROCEDURE — 1160F RVW MEDS BY RX/DR IN RCRD: CPT | Mod: CPTII,S$GLB,, | Performed by: INTERNAL MEDICINE

## 2023-01-03 PROCEDURE — 3288F PR FALLS RISK ASSESSMENT DOCUMENTED: ICD-10-PCS | Mod: CPTII,S$GLB,, | Performed by: INTERNAL MEDICINE

## 2023-01-03 PROCEDURE — 1159F MED LIST DOCD IN RCRD: CPT | Mod: CPTII,S$GLB,, | Performed by: INTERNAL MEDICINE

## 2023-01-03 PROCEDURE — 85025 COMPLETE CBC W/AUTO DIFF WBC: CPT | Performed by: INTERNAL MEDICINE

## 2023-01-03 PROCEDURE — 80048 BASIC METABOLIC PNL TOTAL CA: CPT | Performed by: INTERNAL MEDICINE

## 2023-01-03 PROCEDURE — 36415 COLL VENOUS BLD VENIPUNCTURE: CPT | Performed by: INTERNAL MEDICINE

## 2023-01-03 PROCEDURE — 1157F PR ADVANCE CARE PLAN OR EQUIV PRESENT IN MEDICAL RECORD: ICD-10-PCS | Mod: CPTII,S$GLB,, | Performed by: INTERNAL MEDICINE

## 2023-01-03 PROCEDURE — 1160F PR REVIEW ALL MEDS BY PRESCRIBER/CLIN PHARMACIST DOCUMENTED: ICD-10-PCS | Mod: CPTII,S$GLB,, | Performed by: INTERNAL MEDICINE

## 2023-01-03 PROCEDURE — 3288F FALL RISK ASSESSMENT DOCD: CPT | Mod: CPTII,S$GLB,, | Performed by: INTERNAL MEDICINE

## 2023-01-03 PROCEDURE — 99214 PR OFFICE/OUTPT VISIT, EST, LEVL IV, 30-39 MIN: ICD-10-PCS | Mod: S$GLB,,, | Performed by: INTERNAL MEDICINE

## 2023-01-03 PROCEDURE — 1101F PR PT FALLS ASSESS DOC 0-1 FALLS W/OUT INJ PAST YR: ICD-10-PCS | Mod: CPTII,S$GLB,, | Performed by: INTERNAL MEDICINE

## 2023-01-03 PROCEDURE — 99214 OFFICE O/P EST MOD 30 MIN: CPT | Mod: S$GLB,,, | Performed by: INTERNAL MEDICINE

## 2023-01-03 PROCEDURE — 99999 PR PBB SHADOW E&M-EST. PATIENT-LVL IV: CPT | Mod: PBBFAC,,, | Performed by: INTERNAL MEDICINE

## 2023-01-03 PROCEDURE — 99999 PR PBB SHADOW E&M-EST. PATIENT-LVL IV: ICD-10-PCS | Mod: PBBFAC,,, | Performed by: INTERNAL MEDICINE

## 2023-01-03 PROCEDURE — 1101F PT FALLS ASSESS-DOCD LE1/YR: CPT | Mod: CPTII,S$GLB,, | Performed by: INTERNAL MEDICINE

## 2023-01-03 PROCEDURE — 1159F PR MEDICATION LIST DOCUMENTED IN MEDICAL RECORD: ICD-10-PCS | Mod: CPTII,S$GLB,, | Performed by: INTERNAL MEDICINE

## 2023-01-03 PROCEDURE — 1157F ADVNC CARE PLAN IN RCRD: CPT | Mod: CPTII,S$GLB,, | Performed by: INTERNAL MEDICINE

## 2023-01-03 NOTE — PROGRESS NOTES
INTERNAL MEDICINE ESTABLISHED PATIENT VISIT NOTE    Subjective:     Chief Complaint: Follow-up  HTN, weight     Patient ID: An Luz is a 89 y.o. female with HTN, HLD, preDM, IgG Kappa MGUS (declined additional w/u due to age, seen by Hematology in the past), anemia, osteoporosis c kyphoscoliosis managed by Dr. Monteiro/Cat, glaucoma, chronic dry eyes, urinary incontinence, chronic constipation, aortic atherosclerosis noted incidentally on prior imaging, last seen by me in Aug, here today for f/u HTN, weight.    To review:  At baseline, lives at home c her daughter who works.  AAOx3, does most ADLs independently but appreciates assistance.  Wears poise pads for hx urinary incontinence, no issues c fecal incontinence.  Ambulates c a cane.     Has gained two lbs since last visit.  Had a fall last year but none since that time.  Was seen by ENT for nasal bone fracture who noted it was healing well.      Was also seen by her dermatologist, Dr. Redding, for a lesion on her nose and told it could be skin cancer but did not plan for excision based on age.    Feels like her hands have gotten weaker and stiffer over time and interested in PT/OT.  Presents today c her daughter Jing.      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 Authorizing Provider   acetaminophen (TYLENOL) 325 MG tablet Take 325 mg by mouth every 6 (six) hours as needed for Pain.    Historical Provider   amLODIPine (NORVASC) 5 MG tablet Take 1 tablet (5 mg total) by mouth once daily. 2/16/22   Jalyn Caballero MD   cholecalciferol, vitamin D3, (VITAMIN D3) 25 mcg (1,000 unit) capsule Take 1,000 Units by mouth once  daily.    Historical Provider   fluticasone propionate (CUTIVATE) 0.005 % ointment Apply topically. 9/16/22   Historical Provider   FOLIC ACID/MULTIVIT-MIN/LUTEIN (CENTRUM SILVER ORAL) Take 1 tablet by mouth once daily.     Historical Provider   labetaloL (NORMODYNE) 100 MG tablet TAKE 1 TABLET BY MOUTH  TWICE A DAY 9/21/22   Effie Ann NP   Lactobac no.41/Bifidobact no.7 (PROBIOTIC-10 ORAL) Take by mouth Daily.    Historical Provider   lifitegrast (XIIDRA) 5 % Dpet Place 1 drop into both eyes 2 (two) times a day. 2/18/22   Dian Cornejo MD   losartan (COZAAR) 100 MG tablet TAKE 1 TABLET BY MOUTH  DAILY 6/28/22   Effie Ann NP   oxybutynin (DITROPAN-XL) 10 MG 24 hr tablet Take 1 tablet (10 mg total) by mouth once daily. 2/16/22 2/16/23  Jalyn Caballero MD   polyethylene glycol (GLYCOLAX) 17 gram PwPk Take one capful with a full glass of water daily as needed for constipation. 6/25/18   Jalyn Caballero MD   senna (SENOKOT) 8.6 mg tablet Take 1 tablet by mouth once daily.    Historical Provider   wheat dextrin 5 gram/7.4 gram Powd Take by mouth.    Historical Provider       Allergies:  Review of patient's allergies indicates:   Allergen Reactions    Zestril [lisinopril] Swelling     Facial Swelling    Augmentin [amoxicillin-pot clavulanate] Nausea And Vomiting    Sulfa (sulfonamide antibiotics)      Unknown reaction       Social History:  Social History     Tobacco Use    Smoking status: Never    Smokeless tobacco: Never    Tobacco comments:     The patient exercises every other day on an exercise bike.  On alternative days she does floor exercises with weights.   Substance Use Topics    Alcohol use: Not Currently    Drug use: No        Review of Systems   Constitutional:  Negative for appetite change, chills, fatigue, fever and unexpected weight change.   HENT:  Negative for congestion, hearing loss and rhinorrhea.    Eyes:  Negative for pain and visual disturbance.   Respiratory:  Negative for  cough, chest tightness, shortness of breath and wheezing.    Cardiovascular:  Negative for chest pain, palpitations and leg swelling.   Gastrointestinal:  Negative for abdominal distention and abdominal pain.   Endocrine: Negative for polydipsia and polyuria.   Genitourinary:  Negative for decreased urine volume, difficulty urinating, dysuria, hematuria and vaginal discharge.   Neurological:  Negative for weakness, numbness and headaches.   Psychiatric/Behavioral:  Negative for behavioral problems and confusion.        Health Maintenance:     Immunizations:   Influenza 10/2022  TDap is up to date 5/2017  Prevnar 13 5/2016, pvax 6/18  Shingrix is not UTD.    rec but pt declined due to concerns regarding potential s/e, Risks and benefits were discussed with patient.  Covid vacc 2/2021, 3/2021, 11/2021, 6/2022, rec booster today.        Cancer Screening:  PAP: n/a based on age, also c hx hyst  Mammogram: n/a based on age  Colonoscopy: n/a based on age  DEXA:  4/2021 worse despite weekly alendronate.  Referred to Endo, seen by Dr. Monteiro who changed to Prolia.    Objective:   /71 (BP Location: Right arm, Patient Position: Sitting, BP Method: Medium (Manual))   Pulse 67   Ht 5' (1.524 m)   Wt 33.1 kg (72 lb 15.6 oz)   LMP  (LMP Unknown)   SpO2 97%   BMI 14.25 kg/m²        General: AAO x3, no apparent distress  HEENT: PERRL, OP clear  CV: RRR, no m/r/g  Pulm: Lungs CTAB, no crackles, no wheezes  Abd: s/NT/ND +BS  Extremities: no c/c/e    Labs:     Lab Results   Component Value Date    WBC 6.23 08/24/2022    HGB 9.5 (L) 08/24/2022    HCT 30.5 (L) 08/24/2022     (H) 08/24/2022     08/24/2022     Lab Results   Component Value Date    UIBC 221 07/17/2009    IRON 68 08/24/2022    TRANSFERRIN 199 (L) 08/24/2022    TIBC 295 08/24/2022    FESATURATED 23 08/24/2022      Lab Results   Component Value Date    FERRITIN 117 08/24/2022       Sodium   Date Value Ref Range Status   08/24/2022 140 136 - 145 mmol/L  Final     Potassium   Date Value Ref Range Status   08/24/2022 4.5 3.5 - 5.1 mmol/L Final     Chloride   Date Value Ref Range Status   08/24/2022 105 95 - 110 mmol/L Final     CO2   Date Value Ref Range Status   08/24/2022 26 23 - 29 mmol/L Final     Glucose   Date Value Ref Range Status   08/24/2022 98 70 - 110 mg/dL Final     BUN   Date Value Ref Range Status   08/24/2022 27 10 - 30 mg/dL Final     Creatinine   Date Value Ref Range Status   08/24/2022 0.8 0.5 - 1.4 mg/dL Final     Calcium   Date Value Ref Range Status   08/24/2022 10.3 8.7 - 10.5 mg/dL Final     Total Protein   Date Value Ref Range Status   08/24/2022 8.0 6.0 - 8.4 g/dL Final     Albumin   Date Value Ref Range Status   08/24/2022 3.3 (L) 3.5 - 5.2 g/dL Final     Total Bilirubin   Date Value Ref Range Status   08/24/2022 0.3 0.1 - 1.0 mg/dL Final     Comment:     For infants and newborns, interpretation of results should be based  on gestational age, weight and in agreement with clinical  observations.    Premature Infant recommended reference ranges:  Up to 24 hours.............<8.0 mg/dL  Up to 48 hours............<12.0 mg/dL  3-5 days..................<15.0 mg/dL  6-29 days.................<15.0 mg/dL       Alkaline Phosphatase   Date Value Ref Range Status   08/24/2022 53 (L) 55 - 135 U/L Final     AST   Date Value Ref Range Status   08/24/2022 18 10 - 40 U/L Final     ALT   Date Value Ref Range Status   08/24/2022 8 (L) 10 - 44 U/L Final     Anion Gap   Date Value Ref Range Status   08/24/2022 9 8 - 16 mmol/L Final     eGFR if    Date Value Ref Range Status   03/15/2022 >60.0 >60 mL/min/1.73 m^2 Final     eGFR if non    Date Value Ref Range Status   03/15/2022 >60.0 >60 mL/min/1.73 m^2 Final     Comment:     Calculation used to obtain the estimated glomerular filtration  rate (eGFR) is the CKD-EPI equation.        Lab Results   Component Value Date    HGBA1C 5.4 08/24/2022     Lab Results   Component Value Date     LDLCALC 128.8 07/30/2020     Lab Results   Component Value Date    TSH 2.480 08/24/2022         Assessment/Plan     An was seen today for follow-up.    Diagnoses and all orders for this visit:    Weight loss  Up 2 lbs since last visit.  Discussed diet and need for daily protein intake.  Can supplement with shakes/smoothies if needed.    Benign essential hypertension  Stage 3a chronic kidney disease  BP well controlled on current regimen.  Cr stable on last check, no acute issues  Will repeat labs today.  -     Basic Metabolic Panel; Future    Anemia of chronic disease  Some fluctuations on labs last year but improved on last two checks.  Will cont to monitor.  -     CBC Auto Differential; Future    MGUS (monoclonal gammopathy of unknown significance)  Seen by Hematology in the past c labs done but deferred additional w/u due to age.  Will monitor labs.    Age-related osteoporosis without current pathological fracture  As per HPI  Now managed by Dr. Monteiro and on Prolia    Hyperparathyroidism  Hypercalcemia  Both normalized on last check, will  monitor.    Chronic constipation  No acute issues, cont prn meds.    Aortic atherosclerosis  Mixed hyperlipidemia  Deferring additional monitoring or tx based on age.    Debility  Impaired mobility and ADLs  Stiffness of hand joint, unspecified laterality  Progressive weakness over the past year likely 2/2 OA, will refer to PT/OT via  to review exercises and work c hands since she is developing contractures.  Pt declined eval in Hand clinic at this time.  -     Ambulatory referral/consult to Home Health; Future    HM as above  RTC in 4 mos, sooner if needed.  Labs today.    Jalyn Caballero MD  Department of Internal Medicine - [facility]  01/03/2023

## 2023-01-04 LAB
ANION GAP SERPL CALC-SCNC: 9 MMOL/L (ref 8–16)
BUN SERPL-MCNC: 25 MG/DL (ref 10–30)
CALCIUM SERPL-MCNC: 10.6 MG/DL (ref 8.7–10.5)
CHLORIDE SERPL-SCNC: 105 MMOL/L (ref 95–110)
CO2 SERPL-SCNC: 27 MMOL/L (ref 23–29)
CREAT SERPL-MCNC: 0.8 MG/DL (ref 0.5–1.4)
EST. GFR  (NO RACE VARIABLE): >60 ML/MIN/1.73 M^2
GLUCOSE SERPL-MCNC: 72 MG/DL (ref 70–110)
POTASSIUM SERPL-SCNC: 4.1 MMOL/L (ref 3.5–5.1)
SODIUM SERPL-SCNC: 141 MMOL/L (ref 136–145)

## 2023-01-09 PROCEDURE — G0180 MD CERTIFICATION HHA PATIENT: HCPCS | Mod: ,,, | Performed by: INTERNAL MEDICINE

## 2023-01-09 PROCEDURE — G0180 PR HOME HEALTH MD CERTIFICATION: ICD-10-PCS | Mod: ,,, | Performed by: INTERNAL MEDICINE

## 2023-01-12 ENCOUNTER — INFUSION (OUTPATIENT)
Dept: INFECTIOUS DISEASES | Facility: HOSPITAL | Age: 88
End: 2023-01-12
Attending: INTERNAL MEDICINE
Payer: MEDICARE

## 2023-01-12 VITALS
DIASTOLIC BLOOD PRESSURE: 69 MMHG | HEART RATE: 66 BPM | WEIGHT: 73.31 LBS | OXYGEN SATURATION: 94 % | TEMPERATURE: 97 F | BODY MASS INDEX: 14.32 KG/M2 | RESPIRATION RATE: 18 BRPM | SYSTOLIC BLOOD PRESSURE: 145 MMHG

## 2023-01-12 DIAGNOSIS — M81.0 AGE-RELATED OSTEOPOROSIS WITHOUT CURRENT PATHOLOGICAL FRACTURE: Primary | ICD-10-CM

## 2023-01-12 PROCEDURE — 63600175 PHARM REV CODE 636 W HCPCS: Mod: JG | Performed by: INTERNAL MEDICINE

## 2023-01-12 PROCEDURE — 96372 THER/PROPH/DIAG INJ SC/IM: CPT

## 2023-01-12 RX ADMIN — DENOSUMAB 60 MG: 60 INJECTION SUBCUTANEOUS at 01:01

## 2023-01-12 NOTE — PROGRESS NOTES
Pt received Prolia injection in Left arm. Pt currently taking Vit D/Ca+; Pt denies any dental sx in last 3 months;

## 2023-02-11 ENCOUNTER — EXTERNAL HOME HEALTH (OUTPATIENT)
Dept: HOME HEALTH SERVICES | Facility: HOSPITAL | Age: 88
End: 2023-02-11
Payer: MEDICARE

## 2023-02-13 ENCOUNTER — OFFICE VISIT (OUTPATIENT)
Dept: PODIATRY | Facility: CLINIC | Age: 88
End: 2023-02-13
Payer: MEDICARE

## 2023-02-13 VITALS
HEART RATE: 82 BPM | SYSTOLIC BLOOD PRESSURE: 126 MMHG | DIASTOLIC BLOOD PRESSURE: 78 MMHG | HEIGHT: 60 IN | BODY MASS INDEX: 14.33 KG/M2 | WEIGHT: 73 LBS

## 2023-02-13 DIAGNOSIS — L84 CORN OR CALLUS: ICD-10-CM

## 2023-02-13 DIAGNOSIS — Z78.9 IMPAIRED MOBILITY AND ADLS: Primary | ICD-10-CM

## 2023-02-13 DIAGNOSIS — B35.1 DERMATOPHYTOSIS OF NAIL: ICD-10-CM

## 2023-02-13 DIAGNOSIS — Z74.09 IMPAIRED MOBILITY AND ADLS: Primary | ICD-10-CM

## 2023-02-13 DIAGNOSIS — L90.9 FAT PAD ATROPHY OF FOOT: ICD-10-CM

## 2023-02-13 PROCEDURE — 1160F RVW MEDS BY RX/DR IN RCRD: CPT | Mod: CPTII,,, | Performed by: PODIATRIST

## 2023-02-13 PROCEDURE — 3288F PR FALLS RISK ASSESSMENT DOCUMENTED: ICD-10-PCS | Mod: CPTII,,, | Performed by: PODIATRIST

## 2023-02-13 PROCEDURE — 1126F AMNT PAIN NOTED NONE PRSNT: CPT | Mod: CPTII,,, | Performed by: PODIATRIST

## 2023-02-13 PROCEDURE — 99213 PR OFFICE/OUTPT VISIT, EST, LEVL III, 20-29 MIN: ICD-10-PCS | Mod: 25,,, | Performed by: PODIATRIST

## 2023-02-13 PROCEDURE — 99213 OFFICE O/P EST LOW 20 MIN: CPT | Mod: 25,,, | Performed by: PODIATRIST

## 2023-02-13 PROCEDURE — 99999 PR PBB SHADOW E&M-EST. PATIENT-LVL III: CPT | Mod: PBBFAC,,, | Performed by: PODIATRIST

## 2023-02-13 PROCEDURE — 17999 UNLISTD PX SKN MUC MEMB SUBQ: CPT | Mod: CSM,S$GLB,, | Performed by: PODIATRIST

## 2023-02-13 PROCEDURE — 1160F PR REVIEW ALL MEDS BY PRESCRIBER/CLIN PHARMACIST DOCUMENTED: ICD-10-PCS | Mod: CPTII,,, | Performed by: PODIATRIST

## 2023-02-13 PROCEDURE — 1126F PR PAIN SEVERITY QUANTIFIED, NO PAIN PRESENT: ICD-10-PCS | Mod: CPTII,,, | Performed by: PODIATRIST

## 2023-02-13 PROCEDURE — 1101F PR PT FALLS ASSESS DOC 0-1 FALLS W/OUT INJ PAST YR: ICD-10-PCS | Mod: CPTII,,, | Performed by: PODIATRIST

## 2023-02-13 PROCEDURE — 1159F MED LIST DOCD IN RCRD: CPT | Mod: CPTII,,, | Performed by: PODIATRIST

## 2023-02-13 PROCEDURE — 1157F PR ADVANCE CARE PLAN OR EQUIV PRESENT IN MEDICAL RECORD: ICD-10-PCS | Mod: CPTII,,, | Performed by: PODIATRIST

## 2023-02-13 PROCEDURE — 1157F ADVNC CARE PLAN IN RCRD: CPT | Mod: CPTII,,, | Performed by: PODIATRIST

## 2023-02-13 PROCEDURE — 99999 PR PBB SHADOW E&M-EST. PATIENT-LVL III: ICD-10-PCS | Mod: PBBFAC,,, | Performed by: PODIATRIST

## 2023-02-13 PROCEDURE — 3288F FALL RISK ASSESSMENT DOCD: CPT | Mod: CPTII,,, | Performed by: PODIATRIST

## 2023-02-13 PROCEDURE — 1159F PR MEDICATION LIST DOCUMENTED IN MEDICAL RECORD: ICD-10-PCS | Mod: CPTII,,, | Performed by: PODIATRIST

## 2023-02-13 PROCEDURE — 17999 PR NON-COVERED FOOT CARE: ICD-10-PCS | Mod: CSM,S$GLB,, | Performed by: PODIATRIST

## 2023-02-13 PROCEDURE — 1101F PT FALLS ASSESS-DOCD LE1/YR: CPT | Mod: CPTII,,, | Performed by: PODIATRIST

## 2023-02-13 NOTE — PROGRESS NOTES
Chief Complaint: Follow-up (Foot Exam/PCP Jalyn Caballero MD  08/24/22)        HPI:   An Luz is a 89 y.o. female  With Thick painful toenails preventing sock and shoe wear and imparing ambulation.  Gradual onset, worsening over past several years, aggravated by increased weight bearing, shoe gear, pressure.  Periodic debridement helps symptoms. Denies trauma, surgery all toes.  She states she does not have chronic kidney disease.       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 glaucoma risk in both eyes    Idiopathic scoliosis of lumbar spine    Idiopathic scoliosis of thoracic spine    Benign essential hypertension    Urinary incontinence    Closed fracture of right shoulder    Impaired mobility and ADLs    History of hip fracture    Other chronic pain    Chronic constipation    Aortic atherosclerosis    Hyperparathyroidism    Prediabetes    Esophageal dysphagia    Moderate protein-calorie malnutrition    Weight loss    Stage 3 chronic kidney disease    Hypercalcemia    MGUS (monoclonal gammopathy of unknown significance)         Current Outpatient Medications on File Prior to Visit   Medication Sig Dispense Refill    acetaminophen (TYLENOL) 325 MG tablet Take 325 mg by mouth every 6 (six) hours as needed for Pain.      amLODIPine (NORVASC) 5 MG tablet TAKE 1 TABLET BY MOUTH ONCE DAILY 90 tablet 3    cholecalciferol, vitamin D3, (VITAMIN D3) 25 mcg (1,000 unit) capsule Take 1,000 Units by mouth once daily.      fluticasone propionate (CUTIVATE) 0.005 %  ointment Apply topically.      FOLIC ACID/MULTIVIT-MIN/LUTEIN (CENTRUM SILVER ORAL) Take 1 tablet by mouth once daily.       labetaloL (NORMODYNE) 100 MG tablet TAKE 1 TABLET BY MOUTH  TWICE A  tablet 3    Lactobac no.41/Bifidobact no.7 (PROBIOTIC-10 ORAL) Take by mouth Daily.      lifitegrast (XIIDRA) 5 % Dpet Place 1 drop into both eyes 2 (two) times a day. 180 each 3    losartan (COZAAR) 100 MG tablet TAKE 1 TABLET BY MOUTH  DAILY 90 tablet 3    oxybutynin (DITROPAN-XL) 10 MG 24 hr tablet TAKE 1 TABLET BY MOUTH ONCE DAILY 90 tablet 3    polyethylene glycol (GLYCOLAX) 17 gram PwPk Take one capful with a full glass of water daily as needed for constipation. 30 each 3    senna (SENOKOT) 8.6 mg tablet Take 1 tablet by mouth once daily.      wheat dextrin 5 gram/7.4 gram Powd Take by mouth.      [DISCONTINUED] amLODIPine (NORVASC) 5 MG tablet Take 1 tablet (5 mg total) by mouth once daily. 90 tablet 3     No current facility-administered medications on file prior to visit.           Review of patient's allergies indicates:   Allergen Reactions    Zestril [lisinopril] Swelling     Facial Swelling    Augmentin [amoxicillin-pot clavulanate] Nausea And Vomiting    Sulfa (sulfonamide antibiotics)      Unknown reaction                   Objective:        Vitals:    02/13/23 1200   BP: 126/78   Pulse: 82   Weight: 33.1 kg (73 lb)   Height: 5' (1.524 m)           Physical Exam  Constitutional:       General: She is not in acute distress.     Appearance: She is well-developed. She is not diaphoretic.   Cardiovascular:      Pulses:           Popliteal pulses are 2+ on the right side and 2+ on the left side.        Dorsalis pedis pulses are 2+ on the right side and 2+ on the left side.        Posterior tibial pulses are 1+ on the right side and 1+ on the left side.      Comments: Capillary refill 3 seconds all toes/distal feet, all toes/both feet warm to touch.      Negative lymphadenopathy bilateral popliteal fossa and  tarsal tunnel.      Negavie lower extremity edema bilateral.    Musculoskeletal:      Right ankle: No swelling, deformity, ecchymosis or lacerations. Normal range of motion. Normal pulse.      Right Achilles Tendon: Normal. No defects. Garay's test negative.      Comments: Normal angle, base, station of gait. All ten toes without clubbing, cyanosis, or signs of ischemia.  No pain to palpation bilateral lower extremities.  Range of motion, stability, muscle strength, and muscle tone normal bilateral feet and legs.     Lymphadenopathy:      Lower Body: No right inguinal adenopathy. No left inguinal adenopathy.      Comments: Negative lymphadenopathy bilateral popliteal fossa and tarsal tunnel.    Negative lymphangitic streaking bilateral feet/ankles/legs.   Skin:     General: Skin is warm and dry.      Capillary Refill: Capillary refill takes 2 to 3 seconds.      Coloration: Skin is not pale.      Findings: No abrasion, bruising, burn, ecchymosis, erythema, laceration, lesion or rash.      Nails: There is no clubbing.      Comments: Dry scale with superficial flakes over an erythematous base  without ulceration, drainage, pus, tracking, fluctuance, malodor, or cardinal signs infection.    Toenails 1st, 2nd, 3rd, 4th, 5th  bilateral are hypertrophic thickened 2-3 mm, dystrophic, discolored tanish brown with tan, gray crumbly subungual debris.  Severe pain and tenderness  to distal nail plate pressure, without periungual skin abnormality of each.    Skin is thin and atrophic.    No wounds noted.  No breakdown on the heels.        Neurological:      Mental Status: She is alert and oriented to person, place, and time.      Sensory: No sensory deficit.      Motor: No tremor, atrophy or abnormal muscle tone.      Gait: Gait normal.      Deep Tendon Reflexes:      Reflex Scores:       Patellar reflexes are 2+ on the right side and 2+ on the left side.       Achilles reflexes are 2+ on the right side and 2+ on the left  side.     Comments: Negative tinel sign to percussion sural, superficial peroneal, deep peroneal, saphenous, and posterior tibial nerves right and left ankles and feet.     Psychiatric:         Behavior: Behavior is cooperative.             Assessment:       Encounter Diagnoses   Name Primary?    Impaired mobility and ADLs Yes    Dermatophytosis of nail     Fat pad atrophy of foot     Corn or callus          Plan:         I counseled the patient on her conditions, their implications and medical management.  Shoe and activity modification as needed for relief.   Maintain proper foot hygiene.   Continue wearing proper shoe gear, daily foot inspections, never walking without protective shoe gear, never putting sharp instruments to feet.        Nails trimmed per procedure brief. Patient has no qualifying diagnosis.   Patient understands this is not typically a covered service and patient is aware of responsibility of payment.  Nails and calluses  were reduced and trimmed bilaterally. Patient tolerated well.

## 2023-03-30 ENCOUNTER — TELEPHONE (OUTPATIENT)
Dept: INTERNAL MEDICINE | Facility: CLINIC | Age: 88
End: 2023-03-30
Payer: MEDICARE

## 2023-03-30 NOTE — TELEPHONE ENCOUNTER
----- Message from Christal Gupta sent at 3/29/2023  5:27 PM CDT -----  Type: General Call Back     Name of Caller:ANITA RUGGIERO [[id number]]  Symptoms:shingles   Would the patient rather a call back or a response via [facility]? Call back   Best Call Back Number:[phone]  Additional Information: Patient indicates she has had a vaccination for shingles. Patient indicates she is supposed to have another vaccination in 2 months. Patient indicates the provider told her she needed to get the vaccination. Patient would like to notify the provider because she has not tried anything new. Patient indicates she would like the provider to be notified to see if it effects her medications or reacts with them. Please call patient back with further information.

## 2023-04-13 ENCOUNTER — TELEPHONE (OUTPATIENT)
Dept: INTERNAL MEDICINE | Facility: CLINIC | Age: 88
End: 2023-04-13
Payer: MEDICARE

## 2023-04-13 NOTE — TELEPHONE ENCOUNTER
----- Message from Mayra David sent at 4/13/2023 11:29 AM CDT -----  Contact: [phone]  Pt is scheduled for dentist tooth extraction today @ 1pm       Please call and advise @ [phone]     Pt just wanted to make Dr Gunderson aware, please call pt back @ [phone]

## 2023-04-13 NOTE — TELEPHONE ENCOUNTER
Pt daughter informed md is ok with dental procedure and she do not need to stop any medication. Pt daughter also wants to know if pt can take ibuprofen if needed, pt daughter informed md does not want her taking it but if she needs 1 she can only take 1 pill after her procedure if and then take the tylenol as needed.

## 2023-05-16 ENCOUNTER — LAB VISIT (OUTPATIENT)
Dept: LAB | Facility: HOSPITAL | Age: 88
End: 2023-05-16
Attending: INTERNAL MEDICINE
Payer: MEDICARE

## 2023-05-16 ENCOUNTER — OFFICE VISIT (OUTPATIENT)
Dept: INTERNAL MEDICINE | Facility: CLINIC | Age: 88
End: 2023-05-16
Payer: MEDICARE

## 2023-05-16 VITALS
WEIGHT: 68.56 LBS | BODY MASS INDEX: 13.46 KG/M2 | DIASTOLIC BLOOD PRESSURE: 72 MMHG | HEART RATE: 52 BPM | HEIGHT: 60 IN | SYSTOLIC BLOOD PRESSURE: 122 MMHG | OXYGEN SATURATION: 98 %

## 2023-05-16 DIAGNOSIS — M81.0 AGE-RELATED OSTEOPOROSIS WITHOUT CURRENT PATHOLOGICAL FRACTURE: ICD-10-CM

## 2023-05-16 DIAGNOSIS — M35.01 KERATITIS SICCA, BOTH EYES: ICD-10-CM

## 2023-05-16 DIAGNOSIS — E21.3 HYPERPARATHYROIDISM: ICD-10-CM

## 2023-05-16 DIAGNOSIS — R63.4 WEIGHT LOSS: ICD-10-CM

## 2023-05-16 DIAGNOSIS — I10 ESSENTIAL HYPERTENSION: ICD-10-CM

## 2023-05-16 DIAGNOSIS — D63.8 ANEMIA OF CHRONIC DISEASE: ICD-10-CM

## 2023-05-16 DIAGNOSIS — R73.03 PREDIABETES: ICD-10-CM

## 2023-05-16 DIAGNOSIS — E83.52 HYPERCALCEMIA: ICD-10-CM

## 2023-05-16 DIAGNOSIS — I10 BENIGN ESSENTIAL HYPERTENSION: Primary | ICD-10-CM

## 2023-05-16 DIAGNOSIS — I10 BENIGN ESSENTIAL HYPERTENSION: ICD-10-CM

## 2023-05-16 DIAGNOSIS — E44.0 MODERATE PROTEIN-CALORIE MALNUTRITION: ICD-10-CM

## 2023-05-16 DIAGNOSIS — D47.2 MGUS (MONOCLONAL GAMMOPATHY OF UNKNOWN SIGNIFICANCE): ICD-10-CM

## 2023-05-16 LAB
ALBUMIN SERPL BCP-MCNC: 3.3 G/DL (ref 3.5–5.2)
ALP SERPL-CCNC: 65 U/L (ref 55–135)
ALT SERPL W/O P-5'-P-CCNC: 7 U/L (ref 10–44)
ANION GAP SERPL CALC-SCNC: 9 MMOL/L (ref 8–16)
AST SERPL-CCNC: 17 U/L (ref 10–40)
BASOPHILS # BLD AUTO: 0.08 K/UL (ref 0–0.2)
BASOPHILS NFR BLD: 1.3 % (ref 0–1.9)
BILIRUB SERPL-MCNC: 0.2 MG/DL (ref 0.1–1)
BUN SERPL-MCNC: 22 MG/DL (ref 10–30)
CALCIUM SERPL-MCNC: 10.6 MG/DL (ref 8.7–10.5)
CHLORIDE SERPL-SCNC: 106 MMOL/L (ref 95–110)
CO2 SERPL-SCNC: 26 MMOL/L (ref 23–29)
CREAT SERPL-MCNC: 0.8 MG/DL (ref 0.5–1.4)
DIFFERENTIAL METHOD: ABNORMAL
EOSINOPHIL # BLD AUTO: 0.2 K/UL (ref 0–0.5)
EOSINOPHIL NFR BLD: 2.8 % (ref 0–8)
ERYTHROCYTE [DISTWIDTH] IN BLOOD BY AUTOMATED COUNT: 14.4 % (ref 11.5–14.5)
EST. GFR  (NO RACE VARIABLE): >60 ML/MIN/1.73 M^2
GLUCOSE SERPL-MCNC: 149 MG/DL (ref 70–110)
HCT VFR BLD AUTO: 32.3 % (ref 37–48.5)
HGB BLD-MCNC: 10.1 G/DL (ref 12–16)
IMM GRANULOCYTES # BLD AUTO: 0.01 K/UL (ref 0–0.04)
IMM GRANULOCYTES NFR BLD AUTO: 0.2 % (ref 0–0.5)
LYMPHOCYTES # BLD AUTO: 1.4 K/UL (ref 1–4.8)
LYMPHOCYTES NFR BLD: 23.4 % (ref 18–48)
MCH RBC QN AUTO: 31 PG (ref 27–31)
MCHC RBC AUTO-ENTMCNC: 31.3 G/DL (ref 32–36)
MCV RBC AUTO: 99 FL (ref 82–98)
MONOCYTES # BLD AUTO: 0.6 K/UL (ref 0.3–1)
MONOCYTES NFR BLD: 10.4 % (ref 4–15)
NEUTROPHILS # BLD AUTO: 3.8 K/UL (ref 1.8–7.7)
NEUTROPHILS NFR BLD: 61.9 % (ref 38–73)
NRBC BLD-RTO: 0 /100 WBC
PLATELET # BLD AUTO: 271 K/UL (ref 150–450)
PMV BLD AUTO: 11.2 FL (ref 9.2–12.9)
POTASSIUM SERPL-SCNC: 4.1 MMOL/L (ref 3.5–5.1)
PROT SERPL-MCNC: 8 G/DL (ref 6–8.4)
RBC # BLD AUTO: 3.26 M/UL (ref 4–5.4)
SODIUM SERPL-SCNC: 141 MMOL/L (ref 136–145)
TSH SERPL DL<=0.005 MIU/L-ACNC: 1.99 UIU/ML (ref 0.4–4)
WBC # BLD AUTO: 6.07 K/UL (ref 3.9–12.7)

## 2023-05-16 PROCEDURE — 1159F MED LIST DOCD IN RCRD: CPT | Mod: CPTII,,, | Performed by: INTERNAL MEDICINE

## 2023-05-16 PROCEDURE — 99214 PR OFFICE/OUTPT VISIT, EST, LEVL IV, 30-39 MIN: ICD-10-PCS | Mod: ,,, | Performed by: INTERNAL MEDICINE

## 2023-05-16 PROCEDURE — 84443 ASSAY THYROID STIM HORMONE: CPT | Performed by: INTERNAL MEDICINE

## 2023-05-16 PROCEDURE — 1160F RVW MEDS BY RX/DR IN RCRD: CPT | Mod: CPTII,,, | Performed by: INTERNAL MEDICINE

## 2023-05-16 PROCEDURE — 3288F PR FALLS RISK ASSESSMENT DOCUMENTED: ICD-10-PCS | Mod: CPTII,,, | Performed by: INTERNAL MEDICINE

## 2023-05-16 PROCEDURE — 3288F FALL RISK ASSESSMENT DOCD: CPT | Mod: CPTII,,, | Performed by: INTERNAL MEDICINE

## 2023-05-16 PROCEDURE — 99999 PR PBB SHADOW E&M-EST. PATIENT-LVL IV: CPT | Mod: PBBFAC,,, | Performed by: INTERNAL MEDICINE

## 2023-05-16 PROCEDURE — 1101F PT FALLS ASSESS-DOCD LE1/YR: CPT | Mod: CPTII,,, | Performed by: INTERNAL MEDICINE

## 2023-05-16 PROCEDURE — 80053 COMPREHEN METABOLIC PANEL: CPT | Performed by: INTERNAL MEDICINE

## 2023-05-16 PROCEDURE — 1157F PR ADVANCE CARE PLAN OR EQUIV PRESENT IN MEDICAL RECORD: ICD-10-PCS | Mod: CPTII,,, | Performed by: INTERNAL MEDICINE

## 2023-05-16 PROCEDURE — 1126F PR PAIN SEVERITY QUANTIFIED, NO PAIN PRESENT: ICD-10-PCS | Mod: CPTII,,, | Performed by: INTERNAL MEDICINE

## 2023-05-16 PROCEDURE — 1159F PR MEDICATION LIST DOCUMENTED IN MEDICAL RECORD: ICD-10-PCS | Mod: CPTII,,, | Performed by: INTERNAL MEDICINE

## 2023-05-16 PROCEDURE — 1101F PR PT FALLS ASSESS DOC 0-1 FALLS W/OUT INJ PAST YR: ICD-10-PCS | Mod: CPTII,,, | Performed by: INTERNAL MEDICINE

## 2023-05-16 PROCEDURE — 85025 COMPLETE CBC W/AUTO DIFF WBC: CPT | Performed by: INTERNAL MEDICINE

## 2023-05-16 PROCEDURE — 99214 OFFICE O/P EST MOD 30 MIN: CPT | Mod: ,,, | Performed by: INTERNAL MEDICINE

## 2023-05-16 PROCEDURE — 99999 PR PBB SHADOW E&M-EST. PATIENT-LVL IV: ICD-10-PCS | Mod: PBBFAC,,, | Performed by: INTERNAL MEDICINE

## 2023-05-16 PROCEDURE — 1126F AMNT PAIN NOTED NONE PRSNT: CPT | Mod: CPTII,,, | Performed by: INTERNAL MEDICINE

## 2023-05-16 PROCEDURE — 36415 COLL VENOUS BLD VENIPUNCTURE: CPT | Performed by: INTERNAL MEDICINE

## 2023-05-16 PROCEDURE — 1157F ADVNC CARE PLAN IN RCRD: CPT | Mod: CPTII,,, | Performed by: INTERNAL MEDICINE

## 2023-05-16 PROCEDURE — 1160F PR REVIEW ALL MEDS BY PRESCRIBER/CLIN PHARMACIST DOCUMENTED: ICD-10-PCS | Mod: CPTII,,, | Performed by: INTERNAL MEDICINE

## 2023-05-16 RX ORDER — LOSARTAN POTASSIUM 100 MG/1
100 TABLET ORAL DAILY
Qty: 90 TABLET | Refills: 3 | Status: SHIPPED | OUTPATIENT
Start: 2023-05-16

## 2023-05-16 NOTE — PROGRESS NOTES
INTERNAL MEDICINE ESTABLISHED PATIENT VISIT NOTE    Subjective:     Chief Complaint: Follow-up  HTN, HLD, weight     Patient ID: An Luz is a 89 y.o. female with  HTN, HLD, preDM, IgG Kappa MGUS (declined additional w/u due to age, seen by Hematology in the past), anemia, osteoporosis c kyphoscoliosis managed by Dr. Monteiro/[facility], glaucoma, chronic dry eyes, urinary incontinence, chronic constipation, aortic atherosclerosis noted incidentally on prior imaging, last seen by me in 1/2023, here today for f/u HTN, cholesterol, weight.    To review:  At baseline, lives at home c her daughter who works.  AAOx3, does most ADLs independently but appreciates assistance.  Wears poise pads for hx urinary incontinence, no issues c fecal incontinence.  Ambulates c a cane.     Presents today c her daughter Jing.    On prolia via Dr. Monteiro, last in Jan.  Down 5 lbs since last visit.    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 Authorizing Provider   acetaminophen (TYLENOL) 325 MG tablet Take 325 mg by mouth every 6 (six) hours as needed for Pain.   Yes Historical Provider   amLODIPine (NORVASC) 5 MG tablet TAKE 1 TABLET BY MOUTH ONCE DAILY 2/13/23  Yes Jalyn Caballero MD   cholecalciferol, vitamin D3, (VITAMIN D3) 25 mcg (1,000 unit) capsule Take 1,000 Units by mouth once daily.   Yes Historical Provider   fluticasone propionate (CUTIVATE) 0.005 % ointment Apply topically. 9/16/22  Yes Historical Provider   FOLIC ACID/MULTIVIT-MIN/LUTEIN (CENTRUM SILVER ORAL) Take 1 tablet by mouth once daily.    Yes Historical Provider   labetaloL (NORMODYNE) 100 MG tablet TAKE 1 TABLET BY MOUTH  TWICE  A DAY 9/21/22  Yes Effie Ann NP   Lactobac no.41/Bifidobact no.7 (PROBIOTIC-10 ORAL) Take by mouth Daily.   Yes Historical Provider   lifitegrast (XIIDRA) 5 % Dpet Place 1 drop into both eyes 2 (two) times a day. 2/18/22  Yes Dian Cornejo MD   losartan (COZAAR) 100 MG tablet TAKE 1 TABLET BY MOUTH  DAILY 6/28/22  Yes Effie Ann NP   oxybutynin (DITROPAN-XL) 10 MG 24 hr tablet TAKE 1 TABLET BY MOUTH ONCE DAILY 1/5/23  Yes Jalyn Caballero MD   polyethylene glycol (GLYCOLAX) 17 gram PwPk Take one capful with a full glass of water daily as needed for constipation. 6/25/18  Yes Jalyn Caballero MD   senna (SENOKOT) 8.6 mg tablet Take 1 tablet by mouth once daily.   Yes Historical Provider   wheat dextrin 5 gram/7.4 gram Powd Take by mouth.   Yes Historical Provider       Allergies:  Review of patient's allergies indicates:   Allergen Reactions    Zestril [lisinopril] Swelling     Facial Swelling    Augmentin [amoxicillin-pot clavulanate] Nausea And Vomiting    Sulfa (sulfonamide antibiotics)      Unknown reaction       Social History:  Social History     Tobacco Use    Smoking status: Never    Smokeless tobacco: Never    Tobacco comments:     The patient exercises every other day on an exercise bike.  On alternative days she does floor exercises with weights.   Substance Use Topics    Alcohol use: Not Currently    Drug use: No        Review of Systems   Constitutional:  Negative for chills, fatigue and fever.   Respiratory:  Negative for cough, chest tightness and shortness of breath.    Cardiovascular:  Negative for chest pain.   Gastrointestinal:  Negative for abdominal pain and blood in stool.   Genitourinary:  Negative for dysuria and frequency.       Health Maintenance:     Immunizations:   Influenza 10/2022  TDap is up to date 5/2017  Prevnar 13 5/2016, pvax 6/18  Shingrix 2/2023, rec repeat today.  Covid vacc 2/2021, 3/2021, 11/2021, 6/2022, 1/2023      Cancer Screening:  PAP: n/a based on  age, also c hx hyst  Mammogram: n/a based on age  Colonoscopy: n/a based on age  DEXA:  4/2021 worse despite weekly alendronate.  Referred to [facility], seen by Dr. Monteiro who changed to Prolia.    Objective:   /72 (BP Location: Left arm, Patient Position: Sitting, BP Method: Medium (Manual))   Pulse (!) 52   Ht 5' (1.524 m)   Wt 31.1 kg (68 lb 9 oz)   LMP  (LMP Unknown)   SpO2 98%   BMI 13.39 kg/m²        General: AAO x3, no apparent distress  HEENT: PERRL, OP clear  CV: RRR, no m/r/g  Pulm: Lungs CTAB, no crackles, no wheezes  Abd: s/NT/ND +BS  Extremities: no c/c/e    Labs:     Lab Results   Component Value Date    WBC 7.02 01/03/2023    HGB 9.7 (L) 01/03/2023    HCT 31.5 (L) 01/03/2023     (H) 01/03/2023     01/03/2023     Sodium   Date Value Ref Range Status   01/03/2023 141 136 - 145 mmol/L Final     Potassium   Date Value Ref Range Status   01/03/2023 4.1 3.5 - 5.1 mmol/L Final     Chloride   Date Value Ref Range Status   01/03/2023 105 95 - 110 mmol/L Final     CO2   Date Value Ref Range Status   01/03/2023 27 23 - 29 mmol/L Final     Glucose   Date Value Ref Range Status   01/03/2023 72 70 - 110 mg/dL Final     BUN   Date Value Ref Range Status   01/03/2023 25 10 - 30 mg/dL Final     Creatinine   Date Value Ref Range Status   01/03/2023 0.8 0.5 - 1.4 mg/dL Final     Calcium   Date Value Ref Range Status   01/03/2023 10.6 (H) 8.7 - 10.5 mg/dL Final     Total Protein   Date Value Ref Range Status   08/24/2022 8.0 6.0 - 8.4 g/dL Final     Albumin   Date Value Ref Range Status   08/24/2022 3.3 (L) 3.5 - 5.2 g/dL Final     Total Bilirubin   Date Value Ref Range Status   08/24/2022 0.3 0.1 - 1.0 mg/dL Final     Comment:     For infants and newborns, interpretation of results should be based  on gestational age, weight and in agreement with clinical  observations.    Premature Infant recommended reference ranges:  Up to 24 hours.............<8.0 mg/dL  Up to 48 hours............<12.0 mg/dL  3-5  days..................<15.0 mg/dL  6-29 days.................<15.0 mg/dL       Alkaline Phosphatase   Date Value Ref Range Status   08/24/2022 53 (L) 55 - 135 U/L Final     AST   Date Value Ref Range Status   08/24/2022 18 10 - 40 U/L Final     ALT   Date Value Ref Range Status   08/24/2022 8 (L) 10 - 44 U/L Final     Anion Gap   Date Value Ref Range Status   01/03/2023 9 8 - 16 mmol/L Final     eGFR if    Date Value Ref Range Status   03/15/2022 >60.0 >60 mL/min/1.73 m^2 Final     eGFR if non    Date Value Ref Range Status   03/15/2022 >60.0 >60 mL/min/1.73 m^2 Final     Comment:     Calculation used to obtain the estimated glomerular filtration  rate (eGFR) is the CKD-EPI equation.        Lab Results   Component Value Date    HGBA1C 5.4 08/24/2022     Lab Results   Component Value Date    LDLCALC 128.8 07/30/2020     Lab Results   Component Value Date    TSH 2.480 08/24/2022         Assessment/Plan     An was seen today for follow-up.    Diagnoses and all orders for this visit:    Benign essential hypertension  at goal.  Cont current medications.  -     Comprehensive Metabolic Panel; Future    Hypercalcemia  Hyperparathyroidism  Last PTH wnl  Hx MGUS but declined additional eval/tx based on age  Will monitor.  -     Comprehensive Metabolic Panel; Future    MGUS (monoclonal gammopathy of unknown significance)  As above    Anemia of chronic disease  Slightly improved on last check, will monitor.  -     CBC Auto Differential; Future    Moderate protein-calorie malnutrition  Weight loss  Recent dental extractions and fever blisters likely contributing.  Discussed protein shakes/milkshakes, etc (pt does not like Ensure), discussed diet c her daughter who prepares her meals  Pt states her appetite is good so declined addition of appetite stimulants at this time.  -     TSH; Future    Age-related osteoporosis without current pathological fracture  As per HPI  On Prolia as per   [place]    Prediabetes  Lab Results   Component Value Date    HGBA1C 5.4 08/24/2022     Normal on last check, no issues    Keratitis sicca, both eyes  Seen by Dr. Cornejo last year, not currently bothersome, advised to schedule f/u eye exam when able.        HM as above  RTC in 4 mos, sooner if needed.  Labs today.    Jalyn Caballero MD  Department of Internal Medicine - [facility]  05/16/2023

## 2023-06-13 ENCOUNTER — OFFICE VISIT (OUTPATIENT)
Dept: PODIATRY | Facility: CLINIC | Age: 88
End: 2023-06-13
Payer: MEDICARE

## 2023-06-13 VITALS
HEART RATE: 81 BPM | WEIGHT: 68 LBS | SYSTOLIC BLOOD PRESSURE: 128 MMHG | HEIGHT: 60 IN | BODY MASS INDEX: 13.35 KG/M2 | DIASTOLIC BLOOD PRESSURE: 68 MMHG

## 2023-06-13 DIAGNOSIS — Z78.9 IMPAIRED MOBILITY AND ADLS: ICD-10-CM

## 2023-06-13 DIAGNOSIS — L90.9 FAT PAD ATROPHY OF FOOT: Primary | ICD-10-CM

## 2023-06-13 DIAGNOSIS — B35.1 DERMATOPHYTOSIS OF NAIL: ICD-10-CM

## 2023-06-13 DIAGNOSIS — Z74.09 IMPAIRED MOBILITY AND ADLS: ICD-10-CM

## 2023-06-13 PROCEDURE — 1160F RVW MEDS BY RX/DR IN RCRD: CPT | Mod: CPTII,S$GLB,, | Performed by: PODIATRIST

## 2023-06-13 PROCEDURE — 1126F PR PAIN SEVERITY QUANTIFIED, NO PAIN PRESENT: ICD-10-PCS | Mod: CPTII,S$GLB,, | Performed by: PODIATRIST

## 2023-06-13 PROCEDURE — 1159F PR MEDICATION LIST DOCUMENTED IN MEDICAL RECORD: ICD-10-PCS | Mod: CPTII,S$GLB,, | Performed by: PODIATRIST

## 2023-06-13 PROCEDURE — 3288F PR FALLS RISK ASSESSMENT DOCUMENTED: ICD-10-PCS | Mod: CPTII,S$GLB,, | Performed by: PODIATRIST

## 2023-06-13 PROCEDURE — 1101F PR PT FALLS ASSESS DOC 0-1 FALLS W/OUT INJ PAST YR: ICD-10-PCS | Mod: CPTII,S$GLB,, | Performed by: PODIATRIST

## 2023-06-13 PROCEDURE — 3288F FALL RISK ASSESSMENT DOCD: CPT | Mod: CPTII,S$GLB,, | Performed by: PODIATRIST

## 2023-06-13 PROCEDURE — 1126F AMNT PAIN NOTED NONE PRSNT: CPT | Mod: CPTII,S$GLB,, | Performed by: PODIATRIST

## 2023-06-13 PROCEDURE — 1160F PR REVIEW ALL MEDS BY PRESCRIBER/CLIN PHARMACIST DOCUMENTED: ICD-10-PCS | Mod: CPTII,S$GLB,, | Performed by: PODIATRIST

## 2023-06-13 PROCEDURE — 1101F PT FALLS ASSESS-DOCD LE1/YR: CPT | Mod: CPTII,S$GLB,, | Performed by: PODIATRIST

## 2023-06-13 PROCEDURE — 99999 PR PBB SHADOW E&M-EST. PATIENT-LVL III: CPT | Mod: PBBFAC,,, | Performed by: PODIATRIST

## 2023-06-13 PROCEDURE — 1157F ADVNC CARE PLAN IN RCRD: CPT | Mod: CPTII,S$GLB,, | Performed by: PODIATRIST

## 2023-06-13 PROCEDURE — 1159F MED LIST DOCD IN RCRD: CPT | Mod: CPTII,S$GLB,, | Performed by: PODIATRIST

## 2023-06-13 PROCEDURE — 99213 PR OFFICE/OUTPT VISIT, EST, LEVL III, 20-29 MIN: ICD-10-PCS | Mod: S$GLB,,, | Performed by: PODIATRIST

## 2023-06-13 PROCEDURE — 1157F PR ADVANCE CARE PLAN OR EQUIV PRESENT IN MEDICAL RECORD: ICD-10-PCS | Mod: CPTII,S$GLB,, | Performed by: PODIATRIST

## 2023-06-13 PROCEDURE — 99999 PR PBB SHADOW E&M-EST. PATIENT-LVL III: ICD-10-PCS | Mod: PBBFAC,,, | Performed by: PODIATRIST

## 2023-06-13 PROCEDURE — 99213 OFFICE O/P EST LOW 20 MIN: CPT | Mod: S$GLB,,, | Performed by: PODIATRIST

## 2023-06-13 RX ORDER — PENICILLIN V POTASSIUM 500 MG/1
500 TABLET, FILM COATED ORAL 4 TIMES DAILY
COMMUNITY
Start: 2023-03-20

## 2023-06-13 RX ORDER — MUPIROCIN 20 MG/G
OINTMENT TOPICAL
COMMUNITY
Start: 2023-06-12

## 2023-06-13 RX ORDER — AMOXICILLIN 500 MG/1
500 CAPSULE ORAL 3 TIMES DAILY
COMMUNITY
Start: 2023-04-10

## 2023-06-13 RX ORDER — VALACYCLOVIR HYDROCHLORIDE 1 G/1
1000 TABLET, FILM COATED ORAL
COMMUNITY
Start: 2023-06-12

## 2023-06-13 NOTE — PROGRESS NOTES
Chief Complaint: Diabetic Foot Exam (Foot Exam/PCP Jalyn Caballero MD  08/24/22)        HPI:   An Luz is a 89 y.o. female  With Thick painful toenails preventing sock and shoe wear and imparing ambulation.  Denies trauma, surgery all toes.        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 glaucoma risk in both eyes    Idiopathic scoliosis of lumbar spine    Idiopathic scoliosis of thoracic spine    Benign essential hypertension    Urinary incontinence    Closed fracture of right shoulder    Impaired mobility and ADLs    History of hip fracture    Other chronic pain    Chronic constipation    Aortic atherosclerosis    Hyperparathyroidism    Prediabetes    Esophageal dysphagia    Moderate protein-calorie malnutrition    Weight loss    Stage 3 chronic kidney disease    Hypercalcemia    MGUS (monoclonal gammopathy of unknown significance)    Keratitis sicca, both eyes         Current Outpatient Medications on File Prior to Visit   Medication Sig Dispense Refill    acetaminophen (TYLENOL) 325 MG tablet Take 325 mg by mouth every 6 (six) hours as needed for Pain.      amLODIPine (NORVASC) 5 MG tablet TAKE 1 TABLET BY MOUTH ONCE DAILY 90 tablet 3    amoxicillin (AMOXIL) 500 MG capsule Take 500 mg by mouth 3 (three) times daily.      cholecalciferol, vitamin D3, (VITAMIN D3) 25 mcg (1,000 unit) capsule Take 1,000 Units by mouth once daily.      fluticasone propionate (CUTIVATE) 0.005 % ointment Apply topically.      FOLIC ACID/MULTIVIT-MIN/LUTEIN (CENTRUM SILVER  ORAL) Take 1 tablet by mouth once daily.       labetaloL (NORMODYNE) 100 MG tablet TAKE 1 TABLET BY MOUTH  TWICE A  tablet 3    Lactobac no.41/Bifidobact no.7 (PROBIOTIC-10 ORAL) Take by mouth Daily.      lifitegrast (XIIDRA) 5 % Dpet Place 1 drop into both eyes 2 (two) times a day. 180 each 3    losartan (COZAAR) 100 MG tablet Take 1 tablet (100 mg total) by mouth once daily. 90 tablet 3    mupirocin (BACTROBAN) 2 % ointment SMARTSI Application Topical 2-3 Times Daily      oxybutynin (DITROPAN-XL) 10 MG 24 hr tablet TAKE 1 TABLET BY MOUTH ONCE DAILY 90 tablet 3    penicillin v potassium (VEETID) 500 MG tablet Take 500 mg by mouth 4 (four) times daily.      polyethylene glycol (GLYCOLAX) 17 gram PwPk Take one capful with a full glass of water daily as needed for constipation. 30 each 3    senna (SENOKOT) 8.6 mg tablet Take 1 tablet by mouth once daily.      valACYclovir (VALTREX) 1000 MG tablet Take 1,000 mg by mouth.      wheat dextrin 5 gram/7.4 gram Powd Take by mouth.       No current facility-administered medications on file prior to visit.           Review of patient's allergies indicates:   Allergen Reactions    Zestril [lisinopril] Swelling     Facial Swelling    Augmentin [amoxicillin-pot clavulanate] Nausea And Vomiting    Sulfa (sulfonamide antibiotics)      Unknown reaction                   Objective:        Vitals:    23 1206   BP: 128/68   Pulse: 81   Weight: 30.8 kg (68 lb)   Height: 5' (1.524 m)         Physical Exam  Constitutional:       General: She is not in acute distress.     Appearance: She is well-developed. She is not diaphoretic.   Cardiovascular:      Pulses:           Popliteal pulses are 2+ on the right side and 2+ on the left side.        Dorsalis pedis pulses are 2+ on the right side and 2+ on the left side.        Posterior tibial pulses are 1+ on the right side and 1+ on the left side.      Comments: Capillary refill 3 seconds all toes/distal feet, all toes/both feet  warm to touch.      Negative lymphadenopathy bilateral popliteal fossa and tarsal tunnel.      Negavie lower extremity edema bilateral.    Musculoskeletal:      Right ankle: No swelling, deformity, ecchymosis or lacerations. Normal range of motion. Normal pulse.      Right Achilles Tendon: Normal. No defects. Garay's test negative.      Comments: Normal angle, base, station of gait. All ten toes without clubbing, cyanosis, or signs of ischemia.  No pain to palpation bilateral lower extremities.  Range of motion, stability, muscle strength, and muscle tone normal bilateral feet and legs.     Lymphadenopathy:      Lower Body: No right inguinal adenopathy. No left inguinal adenopathy.      Comments: Negative lymphadenopathy bilateral popliteal fossa and tarsal tunnel.    Negative lymphangitic streaking bilateral feet/ankles/legs.   Skin:     General: Skin is warm and dry.      Capillary Refill: Capillary refill takes 2 to 3 seconds.      Coloration: Skin is not pale.      Findings: No abrasion, bruising, burn, ecchymosis, erythema, laceration, lesion or rash.      Nails: There is no clubbing.      Comments: Dry scale with superficial flakes over an erythematous base  without ulceration, drainage, pus, tracking, fluctuance, malodor, or cardinal signs infection.    Toenails 1st, 2nd, 3rd, 4th, 5th  bilateral are hypertrophic thickened 2-3 mm, dystrophic, discolored tanish brown with tan, gray crumbly subungual debris.  Severe pain and tenderness  to distal nail plate pressure, without periungual skin abnormality of each.    Skin is thin and atrophic.    No wounds noted.  No breakdown on the heels.        Neurological:      Mental Status: She is alert and oriented to person, place, and time.      Sensory: No sensory deficit.      Motor: No tremor, atrophy or abnormal muscle tone.      Gait: Gait normal.      Deep Tendon Reflexes:      Reflex Scores:       Patellar reflexes are 2+ on the right side and 2+ on the left  side.       Achilles reflexes are 2+ on the right side and 2+ on the left side.     Comments: Negative tinel sign to percussion sural, superficial peroneal, deep peroneal, saphenous, and posterior tibial nerves right and left ankles and feet.           Assessment:       Encounter Diagnoses   Name Primary?    Fat pad atrophy of foot Yes    Impaired mobility and ADLs     Dermatophytosis of nail          Plan:         I counseled the patient on her conditions, their implications and medical management.  Shoe and activity modification as needed for relief.   Maintain proper foot hygiene.   Continue wearing proper shoe gear, daily foot inspections, never walking without protective shoe gear, never putting sharp instruments to feet.  Consider Kerasal Nail.  Available OTC.  Use daily for at least 6-8 months on the toenails.       I spent a total of 20 minutes on the day of the visit.  This includes face to face time and non-face to face time preparing to see the patient (eg, review of tests), obtaining and/or reviewing separately obtained history, documenting clinical information in the electronic or other health record, independently interpreting results and communicating results to the patient/family/caregiver, or care coordinator.

## 2023-07-13 ENCOUNTER — INFUSION (OUTPATIENT)
Dept: INFECTIOUS DISEASES | Facility: HOSPITAL | Age: 88
End: 2023-07-13
Attending: INTERNAL MEDICINE
Payer: MEDICARE

## 2023-07-13 DIAGNOSIS — M81.0 AGE-RELATED OSTEOPOROSIS WITHOUT CURRENT PATHOLOGICAL FRACTURE: Primary | ICD-10-CM

## 2023-07-13 PROCEDURE — 63600175 PHARM REV CODE 636 W HCPCS: Mod: JZ,JG | Performed by: INTERNAL MEDICINE

## 2023-07-13 PROCEDURE — 96372 THER/PROPH/DIAG INJ SC/IM: CPT

## 2023-07-13 RX ADMIN — DENOSUMAB 60 MG: 60 INJECTION SUBCUTANEOUS at 01:07

## 2023-07-13 NOTE — PLAN OF CARE
Pt educated on handwashing and infection control. Pt verbalized understanding;       
07-Mar-2022 07:36

## 2023-07-18 ENCOUNTER — TELEPHONE (OUTPATIENT)
Dept: INTERNAL MEDICINE | Facility: CLINIC | Age: 88
End: 2023-07-18
Payer: MEDICARE

## 2023-07-18 NOTE — TELEPHONE ENCOUNTER
----- Message from Petty Dillon sent at 7/18/2023  8:57 AM CDT -----  Contact: Daughter/Dian/[phone]  Pt's daughter(Dian ) said that she is calling in regards to needing to let Dr Caballero know that the patient passed away this weekend. Please advise

## (undated) DEVICE — COVER MAYO STAND REINFRCD 30

## (undated) DEVICE — SEE MEDLINE ITEM 146231

## (undated) DEVICE — APPLICATOR CHLORAPREP ORN 26ML

## (undated) DEVICE — TAPE MEDIPORE 4IN X 2YDS

## (undated) DEVICE — SUT VICRYL 2-0 8-18 CP-2

## (undated) DEVICE — DRESSING XEROFORM FOIL PK 1X8

## (undated) DEVICE — STAPLER SKIN ROTATING HEAD

## (undated) DEVICE — SUT VICRYL PLUS ANTIBACT

## (undated) DEVICE — Device

## (undated) DEVICE — KIT PT CARE HANA PROFX SSXT

## (undated) DEVICE — DRESSING GAUZE 6PLY 4X4

## (undated) DEVICE — BIT DRILL QC 3FLUTED 4.2X145 S

## (undated) DEVICE — DRESSING N ADH OIL EMUL 3X8

## (undated) DEVICE — GAUZE SPONGE 4X4 12PLY

## (undated) DEVICE — GLOVE BIOGEL SKINSENSE PI 8.5

## (undated) DEVICE — SUT VICRYL PLUS 2-0 CT1 18

## (undated) DEVICE — CLOSURE SKIN STERI STRIP 1/2X4

## (undated) DEVICE — COVER BACK TABLE 72X21

## (undated) DEVICE — SOL 9P NACL IRR PIC IL

## (undated) DEVICE — SEE MEDLINE ITEM 157131

## (undated) DEVICE — NDL 18GA X1 1/2 REG BEVEL

## (undated) DEVICE — DRAPE XRAY EQUIPMENT UNIV

## (undated) DEVICE — GLOVE BIOGEL SKINSENSE PI 6.5

## (undated) DEVICE — SUT VICRYL+ 1 CT1 18IN

## (undated) DEVICE — UNDERGLOVE BIOGEL PI SZ 6.5 LF

## (undated) DEVICE — SEE MEDLINE ITEM 153151

## (undated) DEVICE — DRAPE STERI-DRAPE 83X125 IOBAN

## (undated) DEVICE — PAD CAST SPECIALIST STRL 6

## (undated) DEVICE — PAD ABD 8X10 STERILE

## (undated) DEVICE — ELECTRODE REM PLYHSV RETURN 9

## (undated) DEVICE — SYR 30CC LUER LOCK